# Patient Record
Sex: FEMALE | Race: WHITE | NOT HISPANIC OR LATINO | Employment: OTHER | URBAN - METROPOLITAN AREA
[De-identification: names, ages, dates, MRNs, and addresses within clinical notes are randomized per-mention and may not be internally consistent; named-entity substitution may affect disease eponyms.]

---

## 2017-01-24 DIAGNOSIS — G47.09 OTHER INSOMNIA: Primary | ICD-10-CM

## 2017-01-24 NOTE — TELEPHONE ENCOUNTER
Hydroxyzine 25 mg      Last Written Prescription Date: 11/25/2016  Last Fill Quantity: 30,  # refills: 1   Last Office Visit with [facility], [facility] or [facility] prescribing provider: 9/13/2016

## 2017-01-25 NOTE — TELEPHONE ENCOUNTER
Prescription approved per [facility] Refill Protocol.    Allen Haq RN

## 2017-01-26 RX ORDER — HYDROXYZINE HYDROCHLORIDE 25 MG/1
25 TABLET, FILM COATED ORAL
Qty: 30 TABLET | Refills: 1 | Status: SHIPPED | OUTPATIENT
Start: 2017-01-26 | End: 2017-03-29

## 2017-02-01 DIAGNOSIS — E78.5 HYPERLIPIDEMIA LDL GOAL <130: ICD-10-CM

## 2017-02-01 LAB
CHOLEST SERPL-MCNC: 204 MG/DL
HDLC SERPL-MCNC: 49 MG/DL
LDLC SERPL CALC-MCNC: 124 MG/DL
NONHDLC SERPL-MCNC: 155 MG/DL
TRIGL SERPL-MCNC: 157 MG/DL

## 2017-02-01 PROCEDURE — 80061 LIPID PANEL: CPT | Performed by: PHYSICIAN ASSISTANT

## 2017-02-01 PROCEDURE — 36415 COLL VENOUS BLD VENIPUNCTURE: CPT | Performed by: PHYSICIAN ASSISTANT

## 2017-02-03 ENCOUNTER — TELEPHONE (OUTPATIENT)
Dept: FAMILY MEDICINE | Facility: CLINIC | Age: 72
End: 2017-02-03

## 2017-02-03 DIAGNOSIS — E78.5 HYPERLIPIDEMIA LDL GOAL <130: Primary | ICD-10-CM

## 2017-02-03 RX ORDER — SIMVASTATIN 40 MG
40 TABLET ORAL AT BEDTIME
Qty: 90 TABLET | Refills: 3 | Status: ON HOLD | OUTPATIENT
Start: 2017-02-03 | End: 2017-03-08

## 2017-02-03 NOTE — TELEPHONE ENCOUNTER
Please call Guillermina with her lab results.  She was started on Simvastatin after her physical and came back for recheck after this..  Some of her numbers have improved, but others have worsened.  I would recommend increase in dose to 40 mg per day  New script sent, but she can double what she has left of the 20 mg.  Recheck in 3 months.  Thanks  SERGO Callahan, PAPhilipC      Recent Labs   Lab Test  02/01/17   1011  09/13/16   1057  08/25/15   1100 08/22/14   CHOL  204*  196  217*  96*   HDL  49*  38*  41*  35   LDL  124*  114*  140*  25   TRIG  157*  218*  180*  180   CHOLHDLRATIO   --    --   5.3*  2.7

## 2017-02-03 NOTE — TELEPHONE ENCOUNTER
Patient states she has not been good about taking the simvastatin because it's at night time. She has sporadically taken it. She says she's working on getting better, sticking a post-it note on her mirror.  Would you still like her to increase the dose or have her take it more consistently?    Thanks,  Cj WILL

## 2017-02-03 NOTE — TELEPHONE ENCOUNTER
Called patient and gave her information below. She verbalized understanding and will be more consistent with taking her medication.    Cj Maharaj RN

## 2017-02-03 NOTE — TELEPHONE ENCOUNTER
No. Okay to continue current dose. She can just take 1/2 tablet of the new script I sent.  Thanks  SERGO Callahan, PA-C

## 2017-02-03 NOTE — LETTER
[address] [id number] [phone]      May 12, 2017      Ledy Odonnell  [address]  [facility] [address]          Dear Ledy Odonnell:    This is to remind you that your provider wanted you to return to the clinic for lab testing.    If you are coming in for Lipids and/or Glucose testing please fast for 10-12 hours. Morning medications can be taken with water.    You may call our office at [phone] to schedule an appointment.    Please disregard this notice if you have already had your labs drawn or made an            appointment.        Sincerely,    Macarena Vinson PA-C

## 2017-02-03 NOTE — TELEPHONE ENCOUNTER
Attempt # 1    Called patient at home number.     Was call answered?  No.  Left message on voicemail with information to call me back.    Cj Maharaj RN

## 2017-02-07 ENCOUNTER — TELEPHONE (OUTPATIENT)
Dept: FAMILY MEDICINE | Facility: CLINIC | Age: 72
End: 2017-02-07

## 2017-02-07 DIAGNOSIS — B37.2 CANDIDAL INTERTRIGO: Primary | ICD-10-CM

## 2017-02-07 RX ORDER — NYSTATIN 100000 [USP'U]/G
1 POWDER TOPICAL PRN
Qty: 1 BOTTLE | Refills: 5 | Status: SHIPPED | OUTPATIENT
Start: 2017-02-07 | End: 2018-01-10

## 2017-02-08 NOTE — TELEPHONE ENCOUNTER
Routing refill request to provider for review/approval because:  Drug not active on patient's medication list    Cj Maharaj RN

## 2017-03-06 DIAGNOSIS — F41.1 GENERALIZED ANXIETY DISORDER: ICD-10-CM

## 2017-03-06 DIAGNOSIS — F33.1 MAJOR DEPRESSIVE DISORDER, RECURRENT EPISODE, MODERATE (H): ICD-10-CM

## 2017-03-06 NOTE — TELEPHONE ENCOUNTER
Medication Detail      Disp Refills Start End GUIDO   sertraline (ZOLOFT) 100 MG tablet 180 tablet 0 9/13/2016  No   Sig: Take 2 tablets by mouth every day        Last Office Visit with [facility] primary care provider:  9/13/2016        Last PHQ-9 score on record=   PHQ-9 SCORE 9/13/2016   Total Score -   Total Score 4

## 2017-03-07 ENCOUNTER — APPOINTMENT (OUTPATIENT)
Dept: CT IMAGING | Facility: CLINIC | Age: 72
End: 2017-03-07
Attending: EMERGENCY MEDICINE
Payer: COMMERCIAL

## 2017-03-07 ENCOUNTER — HOSPITAL ENCOUNTER (OUTPATIENT)
Dept: CT IMAGING | Facility: CLINIC | Age: 72
Discharge: HOME | End: 2017-03-07
Attending: PHYSICIAN ASSISTANT | Admitting: PHYSICIAN ASSISTANT
Payer: COMMERCIAL

## 2017-03-07 ENCOUNTER — TELEPHONE (OUTPATIENT)
Dept: FAMILY MEDICINE | Facility: CLINIC | Age: 72
End: 2017-03-07

## 2017-03-07 ENCOUNTER — HOSPITAL ENCOUNTER (OUTPATIENT)
Facility: CLINIC | Age: 72
Setting detail: OBSERVATION
Discharge: HOME | End: 2017-03-08
Attending: EMERGENCY MEDICINE | Admitting: SURGERY
Payer: COMMERCIAL

## 2017-03-07 DIAGNOSIS — E87.1 HYPOSMOLALITY SYNDROME: ICD-10-CM

## 2017-03-07 DIAGNOSIS — S27.0XXA TRAUMATIC PNEUMOTHORAX, INITIAL ENCOUNTER: ICD-10-CM

## 2017-03-07 DIAGNOSIS — Z72.0 TOBACCO ABUSE: ICD-10-CM

## 2017-03-07 DIAGNOSIS — R29.6 MULTIPLE FALLS: ICD-10-CM

## 2017-03-07 DIAGNOSIS — S22.41XA CLOSED FRACTURE OF MULTIPLE RIBS OF RIGHT SIDE, INITIAL ENCOUNTER: ICD-10-CM
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
## 2017-03-07 DIAGNOSIS — B49 FUNGAL INFECTION: Primary | ICD-10-CM

## 2017-03-07 DIAGNOSIS — E87.1 HYPONATREMIA: ICD-10-CM

## 2017-03-07 DIAGNOSIS — R91.8 ABNORMAL CT LUNG SCREENING: ICD-10-CM

## 2017-03-07 DIAGNOSIS — S27.0XXA TRAUMATIC PNEUMOTHORAX WITH OPEN WOUND INTO THORAX, INITIAL ENCOUNTER: ICD-10-CM

## 2017-03-07 DIAGNOSIS — S21.90XA TRAUMATIC PNEUMOTHORAX WITH OPEN WOUND INTO THORAX, INITIAL ENCOUNTER: ICD-10-CM

## 2017-03-07 LAB
ALBUMIN SERPL-MCNC: 3.7 G/DL (ref 3.4–5)
ALBUMIN UR-MCNC: NEGATIVE MG/DL
ALP SERPL-CCNC: 66 U/L (ref 40–150)
ALT SERPL W P-5'-P-CCNC: 52 U/L (ref 0–50)
ANION GAP SERPL CALCULATED.3IONS-SCNC: 11 MMOL/L (ref 3–14)
APPEARANCE UR: CLEAR
AST SERPL W P-5'-P-CCNC: 85 U/L (ref 0–45)
BASOPHILS # BLD AUTO: 0 10E9/L (ref 0–0.2)
BASOPHILS NFR BLD AUTO: 0.2 %
BILIRUB SERPL-MCNC: 0.8 MG/DL (ref 0.2–1.3)
BILIRUB UR QL STRIP: NEGATIVE
BUN SERPL-MCNC: 7 MG/DL (ref 7–30)
CALCIUM SERPL-MCNC: 10 MG/DL (ref 8.5–10.1)
CHLORIDE SERPL-SCNC: 91 MMOL/L (ref 94–109)
CO2 SERPL-SCNC: 30 MMOL/L (ref 20–32)
COLOR UR AUTO: ABNORMAL
CREAT SERPL-MCNC: 0.6 MG/DL (ref 0.52–1.04)
DIFFERENTIAL METHOD BLD: ABNORMAL
EOSINOPHIL # BLD AUTO: 0.2 10E9/L (ref 0–0.7)
EOSINOPHIL NFR BLD AUTO: 2.3 %
ERYTHROCYTE [DISTWIDTH] IN BLOOD BY AUTOMATED COUNT: 12.6 % (ref 10–15)
FLUAV+FLUBV AG SPEC QL: NEGATIVE
FLUAV+FLUBV AG SPEC QL: NORMAL
GFR SERPL CREATININE-BSD FRML MDRD: ABNORMAL ML/MIN/1.7M2
GLUCOSE SERPL-MCNC: 91 MG/DL (ref 70–99)
GLUCOSE UR STRIP-MCNC: NEGATIVE MG/DL
HCT VFR BLD AUTO: 39.1 % (ref 35–47)
HGB BLD-MCNC: 13.6 G/DL (ref 11.7–15.7)
HGB UR QL STRIP: NEGATIVE
IMM GRANULOCYTES # BLD: 0 10E9/L (ref 0–0.4)
IMM GRANULOCYTES NFR BLD: 0.3 %
KETONES UR STRIP-MCNC: NEGATIVE MG/DL
LACTATE BLD-SCNC: 1.3 MMOL/L (ref 0.7–2.1)
LEUKOCYTE ESTERASE UR QL STRIP: NEGATIVE
LYMPHOCYTES # BLD AUTO: 0.8 10E9/L (ref 0.8–5.3)
LYMPHOCYTES NFR BLD AUTO: 7.8 %
MCH RBC QN AUTO: 30.6 PG (ref 26.5–33)
MCHC RBC AUTO-ENTMCNC: 34.8 G/DL (ref 31.5–36.5)
MCV RBC AUTO: 88 FL (ref 78–100)
MONOCYTES # BLD AUTO: 0.9 10E9/L (ref 0–1.3)
MONOCYTES NFR BLD AUTO: 8.2 %
MUCOUS THREADS #/AREA URNS LPF: PRESENT /LPF
NEUTROPHILS # BLD AUTO: 8.5 10E9/L (ref 1.6–8.3)
NEUTROPHILS NFR BLD AUTO: 81.2 %
NITRATE UR QL: NEGATIVE
NRBC # BLD AUTO: 0 10*3/UL
NRBC BLD AUTO-RTO: 0 /100
PH UR STRIP: 7 PH (ref 5–7)
PLATELET # BLD AUTO: 229 10E9/L (ref 150–450)
POTASSIUM SERPL-SCNC: 3.3 MMOL/L (ref 3.4–5.3)
PROT SERPL-MCNC: 7.2 G/DL (ref 6.8–8.8)
RADIOLOGIST FLAGS: ABNORMAL
RBC # BLD AUTO: 4.44 10E12/L (ref 3.8–5.2)
RBC #/AREA URNS AUTO: 0 /HPF (ref 0–2)
SODIUM SERPL-SCNC: 131 MMOL/L (ref 133–144)
SP GR UR STRIP: 1 (ref 1–1.03)
SPECIMEN SOURCE: NORMAL
TRANS CELLS #/AREA URNS HPF: <1 /HPF (ref 0–1)
TROPONIN I SERPL-MCNC: NORMAL UG/L (ref 0–0.04)
URN SPEC COLLECT METH UR: ABNORMAL
UROBILINOGEN UR STRIP-MCNC: NORMAL MG/DL (ref 0–2)
WBC # BLD AUTO: 10.5 10E9/L (ref 4–11)
WBC #/AREA URNS AUTO: <1 /HPF (ref 0–2)

## 2017-03-07 PROCEDURE — 70450 CT HEAD/BRAIN W/O DYE: CPT

## 2017-03-07 PROCEDURE — 25500064 ZZH RX 255 OP 636: Performed by: RADIOLOGY

## 2017-03-07 PROCEDURE — 85025 COMPLETE CBC W/AUTO DIFF WBC: CPT | Performed by: EMERGENCY MEDICINE

## 2017-03-07 PROCEDURE — 93005 ELECTROCARDIOGRAM TRACING: CPT | Performed by: EMERGENCY MEDICINE

## 2017-03-07 PROCEDURE — 68200002 ZZH TRAUMA EVALUATION W/O CC LEVEL II: Performed by: EMERGENCY MEDICINE

## 2017-03-07 PROCEDURE — 83605 ASSAY OF LACTIC ACID: CPT | Performed by: EMERGENCY MEDICINE

## 2017-03-07 PROCEDURE — 99285 EMERGENCY DEPT VISIT HI MDM: CPT | Mod: Z6 | Performed by: EMERGENCY MEDICINE

## 2017-03-07 PROCEDURE — 99285 EMERGENCY DEPT VISIT HI MDM: CPT | Mod: 25 | Performed by: EMERGENCY MEDICINE

## 2017-03-07 PROCEDURE — 74177 CT ABD & PELVIS W/CONTRAST: CPT

## 2017-03-07 PROCEDURE — 72125 CT NECK SPINE W/O DYE: CPT

## 2017-03-07 PROCEDURE — 84484 ASSAY OF TROPONIN QUANT: CPT | Performed by: EMERGENCY MEDICINE

## 2017-03-07 PROCEDURE — 81001 URINALYSIS AUTO W/SCOPE: CPT | Performed by: EMERGENCY MEDICINE

## 2017-03-07 PROCEDURE — 12000008 ZZH R&B INTERMEDIATE UMMC

## 2017-03-07 PROCEDURE — 25000128 H RX IP 250 OP 636: Performed by: EMERGENCY MEDICINE

## 2017-03-07 PROCEDURE — 80053 COMPREHEN METABOLIC PANEL: CPT | Performed by: EMERGENCY MEDICINE

## 2017-03-07 PROCEDURE — 84443 ASSAY THYROID STIM HORMONE: CPT | Performed by: EMERGENCY MEDICINE

## 2017-03-07 PROCEDURE — 87804 INFLUENZA ASSAY W/OPTIC: CPT | Performed by: EMERGENCY MEDICINE

## 2017-03-07 PROCEDURE — 96360 HYDRATION IV INFUSION INIT: CPT | Mod: 59 | Performed by: EMERGENCY MEDICINE

## 2017-03-07 RX ORDER — OXYCODONE HYDROCHLORIDE 5 MG/1
5-10 TABLET ORAL EVERY 4 HOURS PRN
Status: DISCONTINUED | OUTPATIENT
Start: 2017-03-07 | End: 2017-03-08 | Stop reason: HOSPADM

## 2017-03-07 RX ORDER — AMOXICILLIN 250 MG
1-2 CAPSULE ORAL 2 TIMES DAILY
Status: DISCONTINUED | OUTPATIENT
Start: 2017-03-08 | End: 2017-03-08 | Stop reason: HOSPADM

## 2017-03-07 RX ORDER — BUPROPION HYDROCHLORIDE 150 MG/1
150 TABLET ORAL EVERY MORNING
Status: DISCONTINUED | OUTPATIENT
Start: 2017-03-08 | End: 2017-03-08 | Stop reason: HOSPADM

## 2017-03-07 RX ORDER — POLYETHYLENE GLYCOL 3350 17 G/17G
17 POWDER, FOR SOLUTION ORAL DAILY PRN
Status: DISCONTINUED | OUTPATIENT
Start: 2017-03-07 | End: 2017-03-08 | Stop reason: HOSPADM

## 2017-03-07 RX ORDER — ONDANSETRON 4 MG/1
4 TABLET, ORALLY DISINTEGRATING ORAL EVERY 6 HOURS PRN
Status: DISCONTINUED | OUTPATIENT
Start: 2017-03-07 | End: 2017-03-08 | Stop reason: HOSPADM

## 2017-03-07 RX ORDER — NYSTATIN 100000/ML
500000 SUSPENSION, ORAL (FINAL DOSE FORM) ORAL 4 TIMES DAILY
Status: DISCONTINUED | OUTPATIENT
Start: 2017-03-08 | End: 2017-03-08 | Stop reason: HOSPADM

## 2017-03-07 RX ORDER — IPRATROPIUM BROMIDE AND ALBUTEROL SULFATE 2.5; .5 MG/3ML; MG/3ML
3 SOLUTION RESPIRATORY (INHALATION) EVERY 4 HOURS PRN
Status: DISCONTINUED | OUTPATIENT
Start: 2017-03-07 | End: 2017-03-08 | Stop reason: HOSPADM

## 2017-03-07 RX ORDER — OXYBUTYNIN CHLORIDE 5 MG/1
10 TABLET, EXTENDED RELEASE ORAL DAILY
Status: DISCONTINUED | OUTPATIENT
Start: 2017-03-08 | End: 2017-03-08 | Stop reason: HOSPADM

## 2017-03-07 RX ORDER — ONDANSETRON 2 MG/ML
4 INJECTION INTRAMUSCULAR; INTRAVENOUS EVERY 6 HOURS PRN
Status: DISCONTINUED | OUTPATIENT
Start: 2017-03-07 | End: 2017-03-08 | Stop reason: HOSPADM

## 2017-03-07 RX ORDER — ALBUTEROL SULFATE 90 UG/1
2 AEROSOL, METERED RESPIRATORY (INHALATION) EVERY 6 HOURS PRN
Status: DISCONTINUED | OUTPATIENT
Start: 2017-03-07 | End: 2017-03-08 | Stop reason: HOSPADM

## 2017-03-07 RX ORDER — MAGNESIUM SULFATE HEPTAHYDRATE 40 MG/ML
4 INJECTION, SOLUTION INTRAVENOUS EVERY 4 HOURS PRN
Status: DISCONTINUED | OUTPATIENT
Start: 2017-03-07 | End: 2017-03-08 | Stop reason: HOSPADM

## 2017-03-07 RX ORDER — TRAZODONE HYDROCHLORIDE 50 MG/1
50-100 TABLET, FILM COATED ORAL
Status: DISCONTINUED | OUTPATIENT
Start: 2017-03-07 | End: 2017-03-08 | Stop reason: HOSPADM

## 2017-03-07 RX ORDER — NALOXONE HYDROCHLORIDE 0.4 MG/ML
.1-.4 INJECTION, SOLUTION INTRAMUSCULAR; INTRAVENOUS; SUBCUTANEOUS
Status: DISCONTINUED | OUTPATIENT
Start: 2017-03-07 | End: 2017-03-08 | Stop reason: HOSPADM

## 2017-03-07 RX ORDER — ACETAMINOPHEN 325 MG/1
975 TABLET ORAL EVERY 8 HOURS
Status: DISCONTINUED | OUTPATIENT
Start: 2017-03-08 | End: 2017-03-08 | Stop reason: HOSPADM

## 2017-03-07 RX ORDER — POTASSIUM CHLORIDE 1.5 G/1.58G
20-40 POWDER, FOR SOLUTION ORAL
Status: DISCONTINUED | OUTPATIENT
Start: 2017-03-07 | End: 2017-03-08 | Stop reason: HOSPADM

## 2017-03-07 RX ORDER — POTASSIUM CHLORIDE 750 MG/1
20-40 TABLET, EXTENDED RELEASE ORAL
Status: DISCONTINUED | OUTPATIENT
Start: 2017-03-07 | End: 2017-03-08 | Stop reason: HOSPADM

## 2017-03-07 RX ORDER — POTASSIUM CHLORIDE 7.45 MG/ML
10 INJECTION INTRAVENOUS
Status: DISCONTINUED | OUTPATIENT
Start: 2017-03-07 | End: 2017-03-08 | Stop reason: HOSPADM

## 2017-03-07 RX ORDER — SODIUM CHLORIDE 9 MG/ML
1000 INJECTION, SOLUTION INTRAVENOUS CONTINUOUS
Status: DISCONTINUED | OUTPATIENT
Start: 2017-03-07 | End: 2017-03-08

## 2017-03-07 RX ORDER — SERTRALINE HYDROCHLORIDE 100 MG/1
100 TABLET, FILM COATED ORAL DAILY
Status: DISCONTINUED | OUTPATIENT
Start: 2017-03-08 | End: 2017-03-08

## 2017-03-07 RX ORDER — SERTRALINE HYDROCHLORIDE 100 MG/1
TABLET, FILM COATED ORAL
Qty: 60 TABLET | Refills: 0 | Status: SHIPPED | OUTPATIENT
Start: 2017-03-07 | End: 2017-04-11

## 2017-03-07 RX ORDER — NICOTINE 21 MG/24HR
1 PATCH, TRANSDERMAL 24 HOURS TRANSDERMAL DAILY
Status: DISCONTINUED | OUTPATIENT
Start: 2017-03-08 | End: 2017-03-08 | Stop reason: HOSPADM

## 2017-03-07 RX ORDER — IOPAMIDOL 755 MG/ML
104 INJECTION, SOLUTION INTRAVASCULAR ONCE
Status: COMPLETED | OUTPATIENT
Start: 2017-03-07 | End: 2017-03-07

## 2017-03-07 RX ORDER — POTASSIUM CHLORIDE 29.8 MG/ML
20 INJECTION INTRAVENOUS
Status: DISCONTINUED | OUTPATIENT
Start: 2017-03-07 | End: 2017-03-08 | Stop reason: HOSPADM

## 2017-03-07 RX ORDER — LIDOCAINE 50 MG/G
1-3 PATCH TOPICAL
Status: DISCONTINUED | OUTPATIENT
Start: 2017-03-08 | End: 2017-03-08 | Stop reason: HOSPADM

## 2017-03-07 RX ADMIN — IOPAMIDOL 104 ML: 755 INJECTION, SOLUTION INTRAVENOUS at 16:48

## 2017-03-07 RX ADMIN — SODIUM CHLORIDE 1000 ML: 9 INJECTION, SOLUTION INTRAVENOUS at 18:17

## 2017-03-07 RX ADMIN — SODIUM CHLORIDE 1000 ML: 9 INJECTION, SOLUTION INTRAVENOUS at 17:12

## 2017-03-07 ASSESSMENT — ENCOUNTER SYMPTOMS
CHILLS: 0
VOMITING: 0
LIGHT-HEADEDNESS: 0
NAUSEA: 1
COUGH: 1
NUMBNESS: 0
WEAKNESS: 1
BACK PAIN: 1
RHINORRHEA: 0
FEVER: 0
ABDOMINAL PAIN: 1
SORE THROAT: 0
CONSTIPATION: 0
HEMATURIA: 0
PALPITATIONS: 0
DIARRHEA: 0
FREQUENCY: 0
HEADACHES: 1
DYSURIA: 0

## 2017-03-07 ASSESSMENT — ACTIVITIES OF DAILY LIVING (ADL)
DRESS: 0-->INDEPENDENT
BATHING: 0-->INDEPENDENT
TRANSFERRING: 0-->INDEPENDENT
RETIRED_EATING: 1-->ASSISTIVE EQUIPMENT
COGNITION: 0 - NO COGNITION ISSUES REPORTED
RETIRED_COMMUNICATION: 2-->DIFFICULTY UNDERSTANDING (NOT RELATED TO LANGUAGE BARRIER)
NUMBER_OF_TIMES_PATIENT_HAS_FALLEN_WITHIN_LAST_SIX_MONTHS: 3
SWALLOWING: 0-->SWALLOWS FOODS/LIQUIDS WITHOUT DIFFICULTY
TOILETING: 0-->INDEPENDENT
FALL_HISTORY_WITHIN_LAST_SIX_MONTHS: YES
AMBULATION: 0-->INDEPENDENT

## 2017-03-07 NOTE — TELEPHONE ENCOUNTER
I was contacted by Radiology with urgent CT results. I was told that Guillermina had multiple rib fractures and a small pneumothorax.  She has having a f/u CT scan for findings on lung cancer screening CT.  I therefore called Guillermina to check on her.  She initially is in good spirits, however, when I asked her if she is okay, and if she has had any falls, she states that she has.  She reports that she has not been feeling well for quite some time.   Has been really weak, sick and having recurrent falls. Continues to be very weak.    This fall that she feels injured her ribs was 3 weeks ago.   Has had more than one fall, doesn't say how many.  She needs help getting up after sitting or laying down.  Still very weak and having SOB as well.  I recommended that she take an ambulance to the ER, but she refuses. States that her sister can drive her, but she is not sure when.  I will contact her before the end of my day to find out what her plans are.    Called Guillermina again, she is actually in the ED. Called an updated MD with her situation.    SERGO Callahan, PAPhilipC

## 2017-03-07 NOTE — IP AVS SNAPSHOT
[address] [address]    Phone:  [phone]                                       After Visit Summary   3/7/2017    Ledy Odonnell    MRN: [id number]           After Visit Summary Signature Page     I have received my discharge instructions, and my questions have been answered. I have discussed any challenges I see with this plan with the nurse or doctor.    ..........................................................................................................................................  Patient/Patient Representative Signature      ..........................................................................................................................................  Patient Representative Print Name and Relationship to Patient    ..................................................               ................................................  Date                                            Time    ..........................................................................................................................................  Reviewed by Signature/Title    ...................................................              ..............................................  Date                                                            Time

## 2017-03-07 NOTE — ED NOTES
"Pt reports flu like symptoms, dry cough, decreased appetite, fall x 2 in past two weeks. PT reports told to come to ED for rib fx and \"hole in lung\"   Pneumothorax on CT.  "

## 2017-03-07 NOTE — ED PROVIDER NOTES
"  History     Chief Complaint   Patient presents with     Shortness of Breath     Pt reports flu like symptoms, dry cough, decreased appetite, fall x 2 in past two weeks. PT reports told to come to ED for rib fx and \"hole in lung\"     HPI  Ledy Odonnell is a 71 year old female with a history of osteoarthritis, hypertension, hyperlipidemia, tobacco use and congential left foot deformity who presents to the emergency department today at the recommendation of her primary care provider, Macarena Vinson, for further evaluation of multiple rib fractures and a small pneumothorax. Patient presented today for outpatient CT for follow up of a lung cancer screening CT done in 09/2016. She was noted to have multiple right sided rib fractures as well as a small right pneumothorax. Patient reports increasing weakness with standing over the past few weeks and reports having increased difficulty with standing and moving around. She reports falling 3 times within the past 2 weeks. She denies any chest pain, palpitations or light headedness associated with the falls. She denies hitting her head or loss of consciousness. Patient states she has landed primary on her right side. Over the past week or so she has reported some increasing right sided chest, upper back and upper abdominal pain. She reports an ongoing cough related to chronic bronchitis and states she has been coughing more recently. She states the cough is nonproductive. She denies recent fevers, chills, body aches, runny nose, sore throat or other cold symptoms. She reports some nausea but denies vomiting, diarrhea, constipation or any urinary complaints. Patient does report recent headaches for the past month, which she states is new for her. She denies any recent head trauma and is not anticoagulated.     Of note, patient has a congential deformity of her left foot/ankle. She states she has tried a walking boot but states this did not help her mobility very much. She " currently has an ankle brace and a special orthopedic shoe in order to help her get around easier. She states her left foot is chronically weak and often gives out causing her to fall. She denies any recent surgeries on her foot. She denies any new numbness, weakness or tingling in her left lower extremity. She does report an occasional throbbing pain in the foot. Patient currently lives independently at home.      I did speak with her primary care provider who referred the patient to the emergency department.  She does reports that Mr. Odonnell has a history of alcohol abuse and is unclear if she has resumed drinking which may be contributing to her falls.    I have reviewed the Medications, Allergies, Past Medical and Surgical History, and Social History in the [employer] system.    Past Medical History   Diagnosis Date     Depressive disorder, not elsewhere classified      GENERALIZED ANXIETY DIS 6/21/2007     Inflammatory arthritis      Osteoarthrosis, unspecified whether generalized or localized, unspecified site      Other and unspecified alcohol dependence, unspecified drinking behavior      Unspecified essential hypertension        Past Surgical History   Procedure Laterality Date     C nonspecific procedure  2001     Shoulder Surgery     C nonspecific procedure  1975     Gastric Bypass     C nonspecific procedure       Cholecystectomy     C shoulder surg proc unlisted       C hand/finger surgery unlisted       C total hip arthroplasty  1/4/2011     Lt VALERIE     Herniorrhaphy ventral  5/14/2013     Procedure: HERNIORRHAPHY VENTRAL;  Open Ventral Hernia Repair;  Surgeon: Jhoan Rogers MD;  Location:  OR       Family History   Problem Relation Age of Onset     CANCER Father      pancreatic     HEART DISEASE Mother      Unknown specifics     Arthritis Paternal Grandmother      RA     Dementia Mother      Dementia Maternal Grandmother        Social History   Substance Use Topics     Smoking status: Current Every Day  "Smoker     Packs/day: 0.50     Years: 43.00     Types: Cigarettes     Smokeless tobacco: Never Used     Alcohol use No     No current facility-administered medications for this encounter.      Current Outpatient Prescriptions   Medication     sertraline (ZOLOFT) 100 MG tablet     nystatin (MYCOSTATIN) 128516 UNIT/GM POWD     simvastatin (ZOCOR) 40 MG tablet     hydrOXYzine (ATARAX) 25 MG tablet     oxyCODONE-acetaminophen (PERCOCET)  MG per tablet     oxyCODONE-acetaminophen (PERCOCET)  MG per tablet     oxyCODONE-acetaminophen (PERCOCET)  MG per tablet     traZODone (DESYREL) 50 MG tablet     oxybutynin (DITROPAN XL) 10 MG 24 hr tablet     buPROPion (WELLBUTRIN XL) 150 MG 24 hr tablet     albuterol (PROAIR HFA, PROVENTIL HFA, VENTOLIN HFA) 108 (90 BASE) MCG/ACT inhaler     fluticasone-salmeterol (ADVAIR DISKUS) 250-50 MCG/DOSE diskus inhaler     oxyCODONE-acetaminophen (PERCOCET)  MG per tablet     Cyanocobalamin (VITAMIN B-12 PO)     senna-docusate (SENOKOT-S;PERICOLACE) 8.6-50 MG per tablet     MULTIVITAMIN OR        Allergies   Allergen Reactions     No Known Allergies        Review of Systems   Constitutional: Negative for chills and fever.   HENT: Negative for congestion, rhinorrhea and sore throat.    Respiratory: Positive for cough.    Cardiovascular: Positive for chest pain (right sided). Negative for palpitations.   Gastrointestinal: Positive for abdominal pain (RUQ) and nausea. Negative for constipation, diarrhea and vomiting.   Genitourinary: Negative for dysuria, frequency, hematuria and urgency.   Musculoskeletal: Positive for back pain (right sided upper back).   Neurological: Positive for weakness (generalized) and headaches. Negative for light-headedness and numbness.   All other systems reviewed and are negative.      Physical Exam   BP: 134/76  Pulse: 82  Temp: 98.8  F (37.1  C)  Resp: 18  Height: 165.1 cm (5' 5\")  Weight: 77.1 kg (170 lb)  SpO2: 96 %  Physical Exam "   General: patient is alert and oriented and in no acute distress   Head: atraumatic and normocephalic   EENT: moist mucus membranes without tonsillar erythema or exudates, pupils equal round and reactive   Neck: supple, no midline cervical spine tenderness to palpation  Cardiovascular: regular rate and rhythm, extremities warm and well perfused, no lower extremity edema  Pulmonary: lungs clear to auscultation bilaterally, symmetric breath sounds, no significant crepitus noted on exam  Abdomen: soft, non-tender, nondistended  Musculoskeletal: No point extremity tenderness to palpation, no tenderness to palpation of the thoracic or lumbar spine, left club foot  Neurological: alert and oriented, GCS 15,  moving all extremities symmetrically, CN II-XII intact, strength 5/5 and symmetric in , elbow flexion/extension, knee flexion/extension, sensation to light touch in distal upper and lower extremities intact  Skin: warm, dry, multiple bruises of varying age noted on the back as well as the right upper quadrant of abdomen, erythema of the intertriginous region on the left, small area of abrasion and erythema on the right lateral foot as well as the left lateral foot      ED Course     ED Course     Procedures   3:08 PM  The patient was seen and examined by Dr. Hough in Room ED18.              Critical Care time:  none  Trauma:  Level of trauma activation: Trauma evaluation (consult)  C-collar and immobilization: not indicated, cleared.  CSpine Clearance: by Nexus Criteria  GCS at arrival: 15  GCS at disposition: unchanged  Full Primary and Secondary survey with appropriate immobilization of spine completed in exam section.  Consults prior to admission or transfer: none  Procedures done in the ED: none            Labs Ordered and Resulted from Time of ED Arrival Up to the Time of Departure from the ED - No data to display    Assessments & Plan (with Medical Decision Making)   Blas is a 71 year old female with a  history of osteoarthritis, hypertension, hyperlipidemia, tobacco use and congential left foot deformity who presents with rib fracture and PTX after multiple falls recently.  She is hemodynamically stable and in no respiratory distress.  At this time she does not require a chest tube for the pneumothorax.  Given her multiple falls a CT of the head, cervical spine and abdomen and pelvis was obtained and does not show other acute traumatic injury.  For her weakness if loose up was obtained which is negative.  UA is negative.  Troponin and ECG are unremarkable.  She has no elevation in lactate and denies other infectious symptoms.  Her electrolytes are significant for a sodium of 131.  AST and ALT are slightly elevated with AST being greater in consistent with possible recurrent alcohol use.  Her hemoglobin is normal at 13.6.  She has been seen and evaluated by trauma surgery and will be admitted for further management of the rib fractures, pneumothorax and safety evaluation at home given her multiple falls.    I have reviewed the nursing notes.    I have reviewed the findings, diagnosis, plan and need for follow up with the patient.    New Prescriptions    No medications on file       Final diagnoses:   Closed fracture of multiple ribs of right side, initial encounter   Traumatic pneumothorax, initial encounter   Multiple falls   Hyponatremia   I, Glory Saini, am serving as a trained medical scribe to document services personally performed by Judith Hough MD, based on the provider's statements to me.      IJudtih MD, was physically present and have reviewed and verified the accuracy of this note documented by Glory Saini.       3/7/2017   [facility], EMERGENCY DEPARTMENT     Judith Hough MD  03/07/17 [id number]

## 2017-03-07 NOTE — IP AVS SNAPSHOT
MRN:[id number]                      After Visit Summary   3/7/2017    Ledy Odonnell    MRN: [id number]           Thank you!     Thank you for choosing [facility] for your care. Our goal is always to provide you with excellent care. Hearing back from our patients is one way we can continue to improve our services. Please take a few minutes to complete the written survey that you may receive in the mail after you visit with us. Thank you!        Patient Information     Date Of Birth          1945        About your hospital stay     You were admitted on:  March 7, 2017 You last received care in the:  [address] [facility]    You were discharged on:  March 8, 2017        Reason for your hospital stay       You were hospitalized and treated for the following conditions:  Trauma mechanism: Presumed ground level fall   Time/date of injury: Unknown       Known Injuries:  1. Acute to subacute right sided 4th and 5th anterior rib fractures  2. Subacute right sided 10th rib fracture  3. Small right sided pneumothorax (stable on imaging)       Other diagnoses:   1. Multiple falls   2. COPD  3. Tobacco use disorder  4. HTN  5. HLD  6. 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 Osteoarthritis  8. Generalized anxiety  9. Moderate major depression   10. GERD  11. Chronic pain   12. Hx of gastric bypass s/p 40 years      You were seen by the following services:  Trauma Service  Physical and Occupational therapies                  Who to Call     For medical emergencies, please call 911.  For non-urgent questions about your medical care, please call your primary care provider or clinic, [phone]          Attending Provider     Provider Specialty    Judith Hough MD Emergency Medicine    Ware, Daniele Soriano MD Transplant       Primary Care Provider Office Phone # Fax #    Macarena Vinson PA-C [phone]       [address]  "[facility] [address]         When to contact your care team       Should you have any questions, you can reach us in the following ways. During weekday working hours Monday-Friday, 7:00 am - 4:00 pm, call [phone] to reach our nurse. After 4:00pm and on on weekends call  [phone] and ask  to page [phone] for the Trauma provider on call.      Return to the emergency department if you notice the following: fever over 101.5F,  feel dizzy or faint, fast or irregular heart beats, heavy sweating, increased shortness of breath, changes in walking, speech, or thinking or confusion,  constant nausea or vomiting, persistent pain or new drainage from your wounds.     In case of an emergency go to Emergency department or call 911.                  After Care Instructions     Activity       Your activity upon discharge: activity as tolerated. No heavy lifting of > 10lbs for 8-10 weeks.     Refer to \"going home with a chest injury\" handout.            Diet       Follow this diet upon discharge: Orders Placed This Encounter      Advance Diet as Tolerated: Regular Diet Adult                  Follow-up Appointments     Adult [facility] Follow-up and recommended labs and tests       Follow up with primary care provider, Macarena Vinson, within 7 days to evaluate medication change and for hospital follow- up.  No follow up labs or test are needed.  You should have a followup BMP to assess your sodium and potassium level in 1 week.     Appointments on [place] and/or [facility] (with [facility] or [facility] provider or service). Call [phone] if you haven't heard regarding these appointments within 7 days of discharge.                  Additional Services     Home Care OT Referral for Hospital Discharge       OT to eval and treat    Your provider has ordered home care - occupational therapy. If you have not been contacted within 2 days of your discharge please call the department phone number listed on the top of this " "document.            Home Care PT Referral for Hospital Discharge       _______________________  [facility]  Phone  [phone]  Fax  [phone]  ______________________    PT to eval and treat    Your provider has ordered home care - physical therapy. If you have not been contacted within 2 days of your discharge please call the department phone number listed on the top of this document.                  Pending Results     No orders found for last 3 day(s).            Statement of Approval     Ordered          17 1437  I have reviewed and agree with all the recommendations and orders detailed in this document.  EFFECTIVE NOW     Approved and electronically signed by:  Irma Milan NP             Admission Information     Date & Time Provider Department Dept. Phone    3/7/2017 Daniele Ware MD [address] [facility] [phone]      Your Vitals Were     Blood Pressure Pulse Temperature Respirations Height Weight    147/72 (BP Location: Right arm) 76 98.3  F (36.8  C) (Oral) 20 1.651 m (5' 5\") 77.1 kg (170 lb)    Pulse Oximetry BMI (Body Mass Index)                95% 28.29 kg/m2          MyChart Information     [employer] lets you send messages to your doctor, view your test results, renew your prescriptions, schedule appointments and more. To sign up, go to [email or web address] . Click on \"Log in\" on the left side of the screen, which will take you to the Welcome page. Then click on \"Sign up Now\" on the right side of the page.     You will be asked to enter the access code listed below, as well as some personal information. Please follow the directions to create your username and password.     Your access code is: [email or web address]  Expires: 2017  3:27 PM     Your access code will  in 90 days. If you need help or a new code, please call your [facility] clinic or [phone].        Care EveryWhere ID     This is your Care EveryWhere ID. This could be used by other organizations " to access your [facility] medical records  [id number]           Review of your medicines      START taking        Dose / Directions    miconazole 2 % powder   Commonly known as:  MICATIN; MICRO GUARD   Used for:  Fungal infection        Apply topically 2 times daily Apply to groin   Quantity:  30 g   Refills:  0       nicotine 14 MG/24HR 24 hr patch   Commonly known as:  NICODERM CQ   Used for:  Tobacco abuse        Dose:  1 patch   Place 1 patch onto the skin daily   Quantity:  30 patch   Refills:  0       nystatin 368340 UNIT/ML suspension   Commonly known as:  MYCOSTATIN   Used for:  Fungal infection        Dose:  091229 Units   Take 5 mLs (500,000 Units) by mouth 4 times daily for 10 days   Quantity:  200 mL   Refills:  0         CONTINUE these medicines which may have CHANGED, or have new prescriptions. If we are uncertain of the size of tablets/capsules you have at home, strength may be listed as something that might have changed.        Dose / Directions    oxyCODONE-acetaminophen  MG per tablet   Commonly known as:  PERCOCET   This may have changed:  Another medication with the same name was removed. Continue taking this medication, and follow the directions you see here.   Used for:  Chronic pain disorder        1-2 tablets every 6 hrs prn-to fill on or after 9/11/16   Quantity:  120 tablet   Refills:  0       senna-docusate 8.6-50 MG per tablet   Commonly known as:  SENOKOT-S;PERICOLACE   This may have changed:    - how much to take  - when to take this   Used for:  Ventral hernia        Dose:  1 tablet   Take 1 tablet by mouth 2 times daily.   Quantity:  40 tablet   Refills:  11         CONTINUE these medicines which have NOT CHANGED        Dose / Directions    albuterol 108 (90 BASE) MCG/ACT Inhaler   Commonly known as:  PROAIR HFA/PROVENTIL HFA/VENTOLIN HFA   Used for:  Chronic obstructive pulmonary disease, unspecified COPD type (H)        Dose:  2 puff   Inhale 2 puffs into the lungs every 6  hours as needed for shortness of breath / dyspnea   Quantity:  3 Inhaler   Refills:  1       buPROPion 150 MG 24 hr tablet   Commonly known as:  WELLBUTRIN XL   Used for:  Major depressive disorder, recurrent episode, moderate (H), Tobacco use disorder        Dose:  150 mg   Take 1 tablet (150 mg) by mouth every morning   Quantity:  90 tablet   Refills:  3       fluticasone-salmeterol 250-50 MCG/DOSE diskus inhaler   Commonly known as:  ADVAIR DISKUS   Used for:  COPD exacerbation (H)        Dose:  1 puff   Inhale 1 puff into the lungs 2 times daily   Quantity:  1 Inhaler   Refills:  5       hydrOXYzine 25 MG tablet   Commonly known as:  ATARAX   Used for:  Other insomnia        Dose:  25 mg   Take 1 tablet (25 mg) by mouth nightly as needed for anxiety   Quantity:  30 tablet   Refills:  1       multivitamin, therapeutic Tabs tablet        Dose:  1 tablet   Take 1 tablet by mouth daily   Refills:  0       nystatin [id number] UNIT/GM Powd   Commonly known as:  MYCOSTATIN   Used for:  Candidal intertrigo        Dose:  1 g   Apply 1 g topically as needed   Quantity:  1 Bottle   Refills:  5       oxybutynin 10 MG 24 hr tablet   Commonly known as:  DITROPAN XL   Used for:  Urgency incontinence        Dose:  10 mg   Take 1 tablet (10 mg) by mouth daily   Quantity:  90 tablet   Refills:  3       sertraline 100 MG tablet   Commonly known as:  ZOLOFT   Used for:  Generalized anxiety disorder, Major depressive disorder, recurrent episode, moderate (H)        TAKE 2 TABLETS BY MOUTH EVERY DAY   Quantity:  60 tablet   Refills:  0       traZODone 50 MG tablet   Commonly known as:  DESYREL   Used for:  Insomnia        Dose:   mg   Take 1-2 tablets ( mg) by mouth nightly as needed for sleep   Quantity:  90 tablet   Refills:  3       VITAMIN B-12 PO        Dose:  100 mcg   Take 100 mcg by mouth daily   Refills:  0         STOP taking     MULTIVITAMIN PO           simvastatin 40 MG tablet   Commonly known as:  ZOCOR                 Where to get your medicines      These medications were sent to [facility] - [place], [place] - [address]  [address], [facility] [address]     Phone:  [phone]     miconazole 2 % powder    nicotine 14 MG/24HR 24 hr patch    nystatin [id number] UNIT/ML suspension                Protect others around you: Learn how to safely use, store and throw away your medicines at www.disposemymeds.org.             Medication List: This is a list of all your medications and when to take them. Check marks below indicate your daily home schedule. Keep this list as a reference.      Medications           Morning Afternoon Evening Bedtime As Needed    albuterol 108 (90 BASE) MCG/ACT Inhaler   Commonly known as:  PROAIR HFA/PROVENTIL HFA/VENTOLIN HFA   Inhale 2 puffs into the lungs every 6 hours as needed for shortness of breath / dyspnea                                buPROPion 150 MG 24 hr tablet   Commonly known as:  WELLBUTRIN XL   Take 1 tablet (150 mg) by mouth every morning   Last time this was given:  150 mg on 3/8/2017  9:49 AM                                fluticasone-salmeterol 250-50 MCG/DOSE diskus inhaler   Commonly known as:  ADVAIR DISKUS   Inhale 1 puff into the lungs 2 times daily                                hydrOXYzine 25 MG tablet   Commonly known as:  ATARAX   Take 1 tablet (25 mg) by mouth nightly as needed for anxiety   Last time this was given:  25 mg on 3/8/2017  2:31 AM                                miconazole 2 % powder   Commonly known as:  MICATIN; MICRO GUARD   Apply topically 2 times daily Apply to groin                                multivitamin, therapeutic Tabs tablet   Take 1 tablet by mouth daily                                nicotine 14 MG/24HR 24 hr patch   Commonly known as:  NICODERM CQ   Place 1 patch onto the skin daily   Last time this was given:  1 patch on 3/8/2017  1:36 AM                                nystatin 123542 UNIT/GM Powd    Commonly known as:  MYCOSTATIN   Apply 1 g topically as needed                                nystatin [id number] UNIT/ML suspension   Commonly known as:  MYCOSTATIN   Take 5 mLs (500,000 Units) by mouth 4 times daily for 10 days   Last time this was given:  500,000 Units on 3/8/2017  1:48 PM                                oxybutynin 10 MG 24 hr tablet   Commonly known as:  DITROPAN XL   Take 1 tablet (10 mg) by mouth daily   Last time this was given:  10 mg on 3/8/2017  9:50 AM                                oxyCODONE-acetaminophen  MG per tablet   Commonly known as:  PERCOCET   1-2 tablets every 6 hrs prn-to fill on or after 9/11/16                                senna-docusate 8.6-50 MG per tablet   Commonly known as:  SENOKOT-S;PERICOLACE   Take 1 tablet by mouth 2 times daily.   Last time this was given:  2 tablets on 3/8/2017  9:49 AM                                sertraline 100 MG tablet   Commonly known as:  ZOLOFT   TAKE 2 TABLETS BY MOUTH EVERY DAY   Last time this was given:  200 mg on 3/8/2017  9:49 AM                                traZODone 50 MG tablet   Commonly known as:  DESYREL   Take 1-2 tablets ( mg) by mouth nightly as needed for sleep                                VITAMIN B-12 PO   Take 100 mcg by mouth daily

## 2017-03-07 NOTE — TELEPHONE ENCOUNTER
Reason for Call:  Other     Detailed comments: patient told to call provider when she arrived at ER. GAYATRI Seo took this call.    Phone Number Patient can be reached at:   Ledy Odonnell (self) [phone]             Call taken on 3/7/2017 at 3:03 PM by Joslyn Herrera

## 2017-03-07 NOTE — H&P
"[facility], [place]    History and Physical: Trauma Service     Date of Admission:  3/7/2017  Date of Service (when I saw the patient): 03/07/17    Assessment & Plan   Trauma mechanism: Presumed ground level fall   Time/date of injury:unknown     Known Injuries:  1. Acute to subacute right sided 4th and 5th anterior rib fractures  2. Subacute right sided 10th rib fracture  3. Small to moderate right sided pneumothorax    Other diagnoses:   1. Multiple falls   2. COPD  3. Tobacco use disorder  4. HTN  5. HLD  6. 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 Osteoarthritis  8. Generalized anxiety  9. Moderate major depression   10. GERD  11. Chronic pain   12. Hx of gastric bypass s/p 40 years     Procedure:  None planned   Plan:  1. Admit to Trauma, 7B, Dr. Ware   2. Rib fracture protocol: aggressive pulmonary toilet and pain control.  IS hourly while awake, Acapella, RT to assess NIF, PTA inhalers, PRN duonebs. Supplemental O2 as needed to maintain O2 sats>92%.  2 view CXR in AM to assess pneumothorax.    3. Acute on chronic pain:  Patient has history of chronic pain and is prescribed Percocet by PCP for chronic left leg pain since hip replacement.  Will schedule tylenol, and lidocaine patches and make oxycodone available PRN while inpatient.   4. History of recent falls: Per patient she has been having increased weakness lately resulting in multiple falls apparently r/t \"balance problems\"  This is new in the past several weeks. Uses wheelchair at baseline.  Falls during transferring.  Of note, patient states she recently started taking simvastatin; has been having more weakness, falls, headaches, dizziness since that time.   5. HTN/HLD: hold simvastatin.  Patient started this medication within last month. Has been having progressive weakness and falls since that time.    6. COPD: continue Advair inhaler and PRN albuterol inhaler.  PRN nebs available " "  7. Anxiety/Depression: continue PTA zoloft, Wellbutrin and Hydroxyzine and trazodone PRN at HS  8. Thyroid: multinodular heterogenous thyroid seen on CT scan. Will obtain Thyroid US; thyroid labs   9. PT/OT  10. SW consult for discharge planning     Code status: DNR/DNI confirmed with patient.     General Cares:  GI Prophylaxis: Regular diet   DVT Prophylaxis: Mechanical for now given risk for hemothorax.   Date of last stool/Bowel Regimen:Senna scheduled, Miralax PRN   Pulmonary toilet:IS/Acapella, inhalers, PRN nebs     ETOH: This patient was asked if in the last 3-6 months there has been a time when she had 4 or more drinks in a single day/outing.. Patient answer to the screening question was in the negative. No intervention needed.  Primary Care Physician   Macarena Vinson    Chief Complaint   Weakness     History is obtained from the patient    History of Present Illness   Ledy Odonnell is a 71 year old female with history of congenital foot deformity, multiple recent falls, COPD, HTN, HLD, chronic low back pain, anxiety and depression who presents today from clinic for evaluation of right sided rib fractures and pneumothorax found incidentally on chest CT which was being performed as a follow-up for cancer screening.  Chest CT showed acute to subacute right sided 4th and 5th anterior rib fractures, a subacute appearing 10th rib fracture and small to moderate pneumothorax.  As a result, the patient was instructed to present to the ED for further evaluation and management.   According to the patient she uses a wheelchair for mobility at baseline, and is able to stand and transfer to toilet, bed, chair, shower, etc.  Recently she has been \"just weak and my leg collapses\" during transfers.  She states, \"a lot of it is due to balance\" and balance has been \"weird\" lately.  She denied any lightheadedness/dizziness including when going from sitting to standing and states, \"I give it some time\" when changing " "positions.  Of note, she states she has started taking Simvastatin within the last month.    Upon exam she does endorse some pain in right ribs, but this is not worse with deep inspiration. She states, \"I can feel it.\"  She states the pain started, \"after my last two falls\" which were \"about a week ago Friday.\"  She states, \"I knew I did it then\" but does not endorse having significant pain or SOB since that time. She states that the Percocet  she takes for chronic pain has been mostly adequate for pain control.  She does endorse chronic pain in left leg and states her left foot has \"locked up\" since her hip replacement two + years ago. She endorses chronic pain in right leg and left shoulder for which she receives injections.    Endorses headaches for the last several weeks as well as increased weakness.  She endorses double vision for \"the last 6 months.\"  Of note, she did have a head strike approximately 6 months ago for which she sought care and required \"one stitch.\"  She denies any additional ongoing symptoms since that time.      Past Medical History    I have reviewed this patient's medical history and updated it with pertinent information if needed.   Past Medical History   Diagnosis Date     Depressive disorder, not elsewhere classified      GENERALIZED ANXIETY DIS 6/21/2007     Inflammatory arthritis      Osteoarthrosis, unspecified whether generalized or localized, unspecified site      Other and unspecified alcohol dependence, unspecified drinking behavior      Unspecified essential hypertension        Past Surgical History   I have reviewed this patient's surgical history and updated it with pertinent information if needed.  Past Surgical History   Procedure Laterality Date     C nonspecific procedure  2001     Shoulder Surgery     C nonspecific procedure  1975     Gastric Bypass     C nonspecific procedure       Cholecystectomy     C shoulder surg proc unlisted       C hand/finger surgery unlisted   "     C total hip arthroplasty  2011     Lt VALERIE     Herniorrhaphy ventral  2013     Procedure: HERNIORRHAPHY VENTRAL;  Open Ventral Hernia Repair;  Surgeon: Jhoan Rogers MD;  Location: [facility] OR     Prior to Admission Medications   Prior to Admission Medications   Prescriptions Last Dose Informant Patient Reported? Taking?   Cyanocobalamin (VITAMIN B-12 PO)   Yes No   MULTIVITAMIN OR   Yes No   Sig: Once daily.    albuterol (PROAIR HFA, PROVENTIL HFA, VENTOLIN HFA) 108 (90 BASE) MCG/ACT inhaler   No No   Sig: Inhale 2 puffs into the lungs every 6 hours as needed for shortness of breath / dyspnea   buPROPion (WELLBUTRIN XL) 150 MG 24 hr tablet   No No   Sig: Take 1 tablet (150 mg) by mouth every morning   fluticasone-salmeterol (ADVAIR DISKUS) 250-50 MCG/DOSE diskus inhaler   No No   Sig: Inhale 1 puff into the lungs 2 times daily   hydrOXYzine (ATARAX) 25 MG tablet   No No   Sig: Take 1 tablet (25 mg) by mouth nightly as needed for anxiety   nystatin (MYCOSTATIN) 817962 UNIT/GM POWD   No No   Sig: Apply 1 g topically as needed   oxyCODONE-acetaminophen (PERCOCET)  MG per tablet   No No   Si-2 tablets every 6 hrs prn-to fill on or after 16   oxyCODONE-acetaminophen (PERCOCET)  MG per tablet   No No   Si-2 tablets every 6 hrs prn-to fill on or after 3/11/17   oxyCODONE-acetaminophen (PERCOCET)  MG per tablet   No No   Si-2 tablets every 6 hrs prn-to fill on or after 17   oxyCODONE-acetaminophen (PERCOCET)  MG per tablet   No No   Si-2 tablets every 6 hrs prn-to fill on or after 17   oxybutynin (DITROPAN XL) 10 MG 24 hr tablet   No No   Sig: Take 1 tablet (10 mg) by mouth daily   senna-docusate (SENOKOT-S;PERICOLACE) 8.6-50 MG per tablet   No No   Sig: Take 1 tablet by mouth 2 times daily.   sertraline (ZOLOFT) 100 MG tablet   No No   Sig: TAKE 2 TABLETS BY MOUTH EVERY DAY   simvastatin (ZOCOR) 40 MG tablet   No No   Sig: Take 1 tablet (40 mg) by mouth At  Bedtime   traZODone (DESYREL) 50 MG tablet   No No   Sig: Take 1-2 tablets ( mg) by mouth nightly as needed for sleep      Facility-Administered Medications: None     Allergies   Allergies   Allergen Reactions     No Known Allergies        Social History   Social History     Social History     Marital status:      Spouse name: N/A     Number of children: N/A     Years of education: N/A     Occupational History     Not on file.     Social History Main Topics     Smoking status: Current Every Day Smoker     Packs/day: 0.50     Years: 43.00     Types: Cigarettes     Smokeless tobacco: Never Used     Alcohol use No     Drug use: No     Sexual activity: No     Other Topics Concern     Parent/Sibling W/ Cabg, Mi Or Angioplasty Before 65f 55m? No     Social History Narrative       Family History   Family history reviewed with patient and is noncontributory.    Review of Systems   CONSTITUTIONAL: No fever, chills, sweats, fatigue   EYES: Endorses double vision past 6 months.  no visual blurring, or visual loss  ENT: no decrease in hearing, no tinnitus, no hoarseness  RESPIRATORY: +cough,  no shortness of breath, no sputum   CARDIOVASCULAR: no palpitations, no chest  pain, no exertional chest pain or pressure  GASTROINTESTINAL: no nausea or vomiting, or abd pain  GENITOURINARY:bladder spasms and frequency; some incontinence   MUSCULOSKELETAL: + weakness, no redness, no swelling, + joint pain in right knee, left hip, left shoulder   SKIN: no rashes, abrasions or lacerations  NEUROLOGIC: no numbness or tingling of hands, no numbness or tingling  of feet, no syncope, no tremors or weakness  PSYCHIATRIC: no sleep disturbances, + anxiety and depression    Physical Exam   Temp: 98.8  F (37.1  C) Temp src: Oral BP: 145/69 Pulse: 82 Heart Rate: 88 Resp: 20 SpO2: 97 % O2 Device: None (Room air)    Vital Signs with Ranges  Temp:  [98.8  F (37.1  C)] 98.8  F (37.1  C)  Pulse:  [82] 82  Heart Rate:  [88-99] 88  Resp:   [18-29] 20  BP: (134-151)/(65-90) 145/69  SpO2:  [93 %-97 %] 97 % 170 lbs 0 oz    Primary Survey:  Airway: patient talking  Breathing: symmetric respiratory effort bilaterally  Circulation: central pulses present and peripheral pulses present  Disability: Pupils - left 3 mm and brisk, right 3mm and brisk   [place] Coma Scale - Total 15/15  Eye Response (E): 4= spontaneous,  3= to verbal/voice, 2=  to pain, 1= No response   Verbal Response (V):  5= Orientated, converses,  4= Confused, converses, 3= Inappropriate words,  2= Incomprehensible sounds,  1=No response   Motor Response (M)  6= Obeys commands, 5= Localizes to pain, 4= Withdrawal to pain, 3=Fexion to pain, 2= Extension to pain, 1= No response    Secondary Survey:  General: alert, oriented to person, place, time  Head: atraumatic, normocephalic, trachea midline  Eyes: PERRLA, pupils 3mm, EOMI, corneas and conjunctivae clear  Ears:  non-inflamed external ear canals  Nose: nares patent, no drainage, nasal septum non-tender  Mouth/Throat: Thrush noted on left lower lip and tongue.  No exudates or erythema,  no dental tenderness or malocclusions, no tongue lacerations  Neck:  No cervical collar present. No midline posterior tenderness, full AROM without pain or tenderness   Chest/Pulmonary: normal respiratory rate and rhythm,  diminished clear breath sounds bilaterally, no wheezes, rales or rhonchi, right sided chest wall tenderness. No deformity.   Cardiovascular: S1, S2,  normal and regular rate and rhythm, no murmurs  Abdomen: soft, non-tender, no guarding, no rebound tenderness and no tenderness to palpation  : *no moctezuma, no urine assess  Back/Spine: no deformity, no midline tenderness, no sacral tenderness,  no step-offs and no abrasions or contusions  Musculoskel/Extremities: normal extremities, full AROM of major joints without tenderness, edema, erythema, ecchymosis, or abrasions. 2 PP. no edema. Chronic pain in right knee, left hip and left  shoulder  Hand: no gross deformities of hands or fingers. Full AROM of hand and fingers in flexion and extension.  strength equal and symmetric.   Skin: scattered ecchymosis on back and knees.  no rashes, laceration,skin warm and dry.   Neuro: PERRLA, alert, oriented x 4. CN II-XII grossly intact. No focal deficits. Strength 5/5 x 4 extremities.  Sensation intact  Psychiatric: affect/mood normal, cooperative, normal judgement/insight and memory intact    Data   UA RESULTS:  Recent Labs   Lab Test  03/07/17   1540  04/06/16   1006   COLOR  Light Yellow  Yellow   APPEARANCE  Clear  Cloudy   URINEGLC  Negative  Negative   URINEBILI  Negative  Negative   URINEKETONE  Negative  Negative   SG  1.003  1.010   UBLD  Negative  Moderate*   URINEPH  7.0  7.0   PROTEIN  Negative  Trace*   UROBILINOGEN   --   0.2   NITRITE  Negative  Negative   LEUKEST  Negative  Large*   RBCU  0  10-25*   WBCU  <1  >100*      Results for orders placed or performed during the hospital encounter of 03/07/17 (from the past 24 hour(s))   UA with Microscopic   Result Value Ref Range    Color Urine Light Yellow     Appearance Urine Clear     Glucose Urine Negative NEG mg/dL    Bilirubin Urine Negative NEG    Ketones Urine Negative NEG mg/dL    Specific Gravity Urine 1.003 1.003 - 1.035    Blood Urine Negative NEG    pH Urine 7.0 5.0 - 7.0 pH    Protein Albumin Urine Negative NEG mg/dL    Urobilinogen mg/dL Normal 0.0 - 2.0 mg/dL    Nitrite Urine Negative NEG    Leukocyte Esterase Urine Negative NEG    Source Unspecified Urine     WBC Urine <1 0 - 2 /HPF    RBC Urine 0 0 - 2 /HPF    Transitional Epi <1 0 - 1 /HPF    Mucous Urine Present (A) NEG /LPF   Influenza A/B antigen   Result Value Ref Range    Influenza A/B Agn Specimen Nares     Influenza A Negative NEG    Influenza B  NEG     Negative   Test results must be correlated with clinical data. If necessary, results   should be confirmed by a molecular assay or viral culture.     CBC with  platelets differential   Result Value Ref Range    WBC 10.5 4.0 - 11.0 10e9/L    RBC Count 4.44 3.8 - 5.2 10e12/L    Hemoglobin 13.6 11.7 - 15.7 g/dL    Hematocrit 39.1 35.0 - 47.0 %    MCV 88 78 - 100 fl    MCH 30.6 26.5 - 33.0 pg    MCHC 34.8 31.5 - 36.5 g/dL    RDW 12.6 10.0 - 15.0 %    Platelet Count 229 150 - 450 10e9/L    Diff Method Automated Method     % Neutrophils 81.2 %    % Lymphocytes 7.8 %    % Monocytes 8.2 %    % Eosinophils 2.3 %    % Basophils 0.2 %    % Immature Granulocytes 0.3 %    Nucleated RBCs 0 0 /100    Absolute Neutrophil 8.5 (H) 1.6 - 8.3 10e9/L    Absolute Lymphocytes 0.8 0.8 - 5.3 10e9/L    Absolute Monocytes 0.9 0.0 - 1.3 10e9/L    Absolute Eosinophils 0.2 0.0 - 0.7 10e9/L    Absolute Basophils 0.0 0.0 - 0.2 10e9/L    Abs Immature Granulocytes 0.0 0 - 0.4 10e9/L    Absolute Nucleated RBC 0.0    Comprehensive metabolic panel   Result Value Ref Range    Sodium 131 (L) 133 - 144 mmol/L    Potassium 3.3 (L) 3.4 - 5.3 mmol/L    Chloride 91 (L) 94 - 109 mmol/L    Carbon Dioxide 30 20 - 32 mmol/L    Anion Gap 11 3 - 14 mmol/L    Glucose 91 70 - 99 mg/dL    Urea Nitrogen 7 7 - 30 mg/dL    Creatinine 0.60 0.52 - 1.04 mg/dL    GFR Estimate >90  Non  GFR Calc   >60 mL/min/1.7m2    GFR Estimate If Black >90   GFR Calc   >60 mL/min/1.7m2    Calcium 10.0 8.5 - 10.1 mg/dL    Bilirubin Total 0.8 0.2 - 1.3 mg/dL    Albumin 3.7 3.4 - 5.0 g/dL    Protein Total 7.2 6.8 - 8.8 g/dL    Alkaline Phosphatase 66 40 - 150 U/L    ALT 52 (H) 0 - 50 U/L    AST 85 (H) 0 - 45 U/L   Lactic acid whole blood   Result Value Ref Range    Lactic Acid 1.3 0.7 - 2.1 mmol/L   Troponin I   Result Value Ref Range    Troponin I ES  0.000 - 0.045 ug/L     <0.015  The 99th percentile for upper reference range is 0.045 ug/L.  Troponin values in   the range of 0.045 - 0.120 ug/L may be associated with risks of adverse   clinical events.     EKG 12-lead, tracing only   Result Value Ref Range     Interpretation ECG Click View Image link to view waveform and result    CT Head w/o Contrast    Narrative    CT HEAD W/O CONTRAST 3/7/2017 4:59 PM    Provided History: headache s/p multiple falls    Comparison: None available.    Technique: Using multidetector thin collimation helical acquisition  technique, axial, coronal and sagittal CT images from the skull base  to the vertex were obtained without intravenous contrast.     Findings:    No intracranial hemorrhage, mass effect, or midline shift. Mild  generalized parenchymal volume loss, within normal limits for age. The  ventricles are proportionate to the cerebral sulci. The gray to white  matter differentiation of the cerebral hemispheres is preserved. Mild  nonspecific hypoattenuation the periventricular white matter of both  cerebral hemispheres. The basal cisterns are patent. There are  calcifications of the carotid siphons.    Minimal frontal sinus mucosal thickening. The mastoid air cells are  clear. No external soft tissue swelling. The orbits are unremarkable.       Impression    Impression:   1. No acute intracranial pathology.  2. Mild chronic small vessel ischemic disease and generalized  parenchymal volume loss, within normal limits for age.    I have personally reviewed the examination and initial interpretation  and I agree with the findings.    ADITI ULLOA MD   CT Cervical Spine w/o Contrast    Narrative    CT CERVICAL SPINE W/O CONTRAST 3/7/2017 5:00 PM    Provided History: trauma status post multiple falls    Comparison: Same-day chest CT. Radiographs of the cervical spine  9/15/2008    Technique: Using multidetector thin collimation helical acquisition  technique, axial, coronal and sagittal CT images through the cervical  spine were obtained without intravenous contrast.     Findings:  Evaluation is somewhat limited by motion artifact.    Trace anterolisthesis of C7 on T1 appears degenerative in nature.  There is straightening of the  normal cervical lordosis. No acute  fracture or acute subluxation. No prevertebral edema. Moderate loss of  intervertebral disc height at C3-4. Multilevel mild loss of  intervertebral disc height. Additional multilevel cervical  degenerative changes with disc bulges, endplate osteophytic spurring,  uncinate hypertrophy, and facet hypertrophy. Mild spinal canal  narrowing at C3-4. Mild bilateral neural foraminal narrowing at C3-4.     The thyroid is enlarged and heterogenous containing multiple internal  calcifications. This is also seen on CT of the chest 3/7/2017.    Partially visualized right pleural effusion. Partially visualized  right pneumothorax, as demonstrated on the same day chest CT.      Impression    Impression:   1. No acute cervical fracture or subluxation.  2. Mild to moderate multilevel cervical degenerative changes.  3. Multinodular goiter. Recommend correlation with thyroid ultrasound.  4. Partially visualized right pleural effusion and pneumothorax, as  demonstrated on the same day chest CT.    I have personally reviewed the examination and initial interpretation  and I agree with the findings.    ADITI ULLOA MD   CT Abdomen Pelvis w Contrast    Narrative    PRELIMINARY REPORT - The following report is a preliminary  interpretation.   1. Excluding partially visualized chest findings, no additional acute  traumatic findings are seen in the abdomen or pelvis to explain  right-sided abdominal pain.  2. Nonobstructing left renal calculi.       Rosa Boyce

## 2017-03-08 ENCOUNTER — APPOINTMENT (OUTPATIENT)
Dept: GENERAL RADIOLOGY | Facility: CLINIC | Age: 72
End: 2017-03-08
Attending: NURSE PRACTITIONER
Payer: COMMERCIAL

## 2017-03-08 ENCOUNTER — APPOINTMENT (OUTPATIENT)
Dept: ULTRASOUND IMAGING | Facility: CLINIC | Age: 72
End: 2017-03-08
Attending: NURSE PRACTITIONER
Payer: COMMERCIAL

## 2017-03-08 ENCOUNTER — APPOINTMENT (OUTPATIENT)
Dept: PHYSICAL THERAPY | Facility: CLINIC | Age: 72
End: 2017-03-08
Attending: NURSE PRACTITIONER
Payer: COMMERCIAL

## 2017-03-08 ENCOUNTER — TELEPHONE (OUTPATIENT)
Dept: FAMILY MEDICINE | Facility: CLINIC | Age: 72
End: 2017-03-08

## 2017-03-08 ENCOUNTER — APPOINTMENT (OUTPATIENT)
Dept: GENERAL RADIOLOGY | Facility: CLINIC | Age: 72
End: 2017-03-08
Attending: NEUROLOGICAL SURGERY
Payer: COMMERCIAL

## 2017-03-08 VITALS
HEIGHT: 65 IN | WEIGHT: 170 LBS | BODY MASS INDEX: 28.32 KG/M2 | DIASTOLIC BLOOD PRESSURE: 72 MMHG | TEMPERATURE: 98.3 F | RESPIRATION RATE: 20 BRPM | HEART RATE: 76 BPM | OXYGEN SATURATION: 95 % | SYSTOLIC BLOOD PRESSURE: 147 MMHG

## 2017-03-08 PROBLEM — J93.9 PNEUMOTHORAX: Status: ACTIVE | Noted: 2017-03-08

## 2017-03-08 LAB
CALCIUM SERPL-MCNC: 8.4 MG/DL (ref 8.5–10.1)
CHLORIDE SERPL-SCNC: 101 MMOL/L (ref 94–109)
CO2 SERPL-SCNC: 25 MMOL/L (ref 20–32)
CREAT SERPL-MCNC: 0.57 MG/DL (ref 0.52–1.04)
GFR SERPL CREATININE-BSD FRML MDRD: NORMAL ML/MIN/1.7M2
GLUCOSE SERPL-MCNC: 85 MG/DL (ref 70–99)
INTERPRETATION ECG - MUSE: NORMAL
POTASSIUM SERPL-SCNC: 3.4 MMOL/L (ref 3.4–5.3)
TSH SERPL DL<=0.005 MIU/L-ACNC: 0.42 MU/L (ref 0.4–4)

## 2017-03-08 PROCEDURE — 84132 ASSAY OF SERUM POTASSIUM: CPT | Performed by: SURGERY

## 2017-03-08 PROCEDURE — 25000132 ZZH RX MED GY IP 250 OP 250 PS 637: Performed by: NURSE PRACTITIONER

## 2017-03-08 PROCEDURE — 25000132 ZZH RX MED GY IP 250 OP 250 PS 637: Performed by: NEUROLOGICAL SURGERY

## 2017-03-08 PROCEDURE — G0378 HOSPITAL OBSERVATION PER HR: HCPCS

## 2017-03-08 PROCEDURE — 82435 ASSAY OF BLOOD CHLORIDE: CPT | Performed by: SURGERY

## 2017-03-08 PROCEDURE — 82374 ASSAY BLOOD CARBON DIOXIDE: CPT | Performed by: SURGERY

## 2017-03-08 PROCEDURE — 40000193 ZZH STATISTIC PT WARD VISIT

## 2017-03-08 PROCEDURE — 82947 ASSAY GLUCOSE BLOOD QUANT: CPT | Performed by: SURGERY

## 2017-03-08 PROCEDURE — 25000128 H RX IP 250 OP 636: Performed by: EMERGENCY MEDICINE

## 2017-03-08 PROCEDURE — 84443 ASSAY THYROID STIM HORMONE: CPT | Performed by: NURSE PRACTITIONER

## 2017-03-08 PROCEDURE — 97161 PT EVAL LOW COMPLEX 20 MIN: CPT | Mod: GP

## 2017-03-08 PROCEDURE — 76536 US EXAM OF HEAD AND NECK: CPT

## 2017-03-08 PROCEDURE — 97116 GAIT TRAINING THERAPY: CPT | Mod: GP,59

## 2017-03-08 PROCEDURE — 36415 COLL VENOUS BLD VENIPUNCTURE: CPT | Performed by: SURGERY

## 2017-03-08 PROCEDURE — 82310 ASSAY OF CALCIUM: CPT | Performed by: SURGERY

## 2017-03-08 PROCEDURE — 82565 ASSAY OF CREATININE: CPT | Performed by: SURGERY

## 2017-03-08 PROCEDURE — 71020 XR CHEST 2 VW: CPT

## 2017-03-08 PROCEDURE — 84295 ASSAY OF SERUM SODIUM: CPT | Performed by: NURSE PRACTITIONER

## 2017-03-08 PROCEDURE — 71010 XR CHEST PORT 1 VW: CPT

## 2017-03-08 PROCEDURE — 97530 THERAPEUTIC ACTIVITIES: CPT | Mod: GP

## 2017-03-08 RX ORDER — MULTIVITAMIN,THERAPEUTIC
1 TABLET ORAL DAILY
COMMUNITY

## 2017-03-08 RX ORDER — NYSTATIN 100000/ML
500000 SUSPENSION, ORAL (FINAL DOSE FORM) ORAL 4 TIMES DAILY
Qty: 200 ML | Refills: 0 | Status: SHIPPED
Start: 2017-03-08 | End: 2017-03-18

## 2017-03-08 RX ORDER — CALCIUM CARBONATE 500 MG/1
500 TABLET, CHEWABLE ORAL 2 TIMES DAILY PRN
Status: DISCONTINUED | OUTPATIENT
Start: 2017-03-08 | End: 2017-03-08 | Stop reason: HOSPADM

## 2017-03-08 RX ORDER — NICOTINE 21 MG/24HR
1 PATCH, TRANSDERMAL 24 HOURS TRANSDERMAL DAILY
Qty: 30 PATCH | Refills: 0 | Status: SHIPPED
Start: 2017-03-08 | End: 2017-04-11

## 2017-03-08 RX ORDER — SERTRALINE HYDROCHLORIDE 100 MG/1
200 TABLET, FILM COATED ORAL DAILY
Status: DISCONTINUED | OUTPATIENT
Start: 2017-03-08 | End: 2017-03-08 | Stop reason: HOSPADM

## 2017-03-08 RX ORDER — HYDROXYZINE HYDROCHLORIDE 25 MG/1
25 TABLET, FILM COATED ORAL
Status: DISCONTINUED | OUTPATIENT
Start: 2017-03-08 | End: 2017-03-08 | Stop reason: HOSPADM

## 2017-03-08 RX ADMIN — NYSTATIN 500000 UNITS: 100000 SUSPENSION ORAL at 13:48

## 2017-03-08 RX ADMIN — FLUTICASONE FUROATE AND VILANTEROL TRIFENATATE 1 PUFF: 100; 25 POWDER RESPIRATORY (INHALATION) at 09:47

## 2017-03-08 RX ADMIN — SODIUM CHLORIDE 1000 ML: 9 INJECTION, SOLUTION INTRAVENOUS at 10:13

## 2017-03-08 RX ADMIN — ACETAMINOPHEN 975 MG: 325 TABLET, FILM COATED ORAL at 09:48

## 2017-03-08 RX ADMIN — HYDROXYZINE HYDROCHLORIDE 25 MG: 25 TABLET, FILM COATED ORAL at 02:31

## 2017-03-08 RX ADMIN — NYSTATIN 500000 UNITS: 100000 SUSPENSION ORAL at 09:49

## 2017-03-08 RX ADMIN — BUPROPION HYDROCHLORIDE 150 MG: 150 TABLET, FILM COATED, EXTENDED RELEASE ORAL at 09:49

## 2017-03-08 RX ADMIN — NICOTINE 1 PATCH: 14 PATCH, EXTENDED RELEASE TRANSDERMAL at 01:36

## 2017-03-08 RX ADMIN — ACETAMINOPHEN 975 MG: 325 TABLET, FILM COATED ORAL at 01:04

## 2017-03-08 RX ADMIN — SENNOSIDES AND DOCUSATE SODIUM 2 TABLET: 8.6; 5 TABLET ORAL at 09:49

## 2017-03-08 RX ADMIN — SERTRALINE HYDROCHLORIDE 200 MG: 100 TABLET ORAL at 09:49

## 2017-03-08 RX ADMIN — LIDOCAINE 1 PATCH: 50 PATCH TOPICAL at 01:08

## 2017-03-08 RX ADMIN — OXYBUTYNIN CHLORIDE 10 MG: 5 TABLET, FILM COATED, EXTENDED RELEASE ORAL at 09:50

## 2017-03-08 RX ADMIN — POTASSIUM CHLORIDE 40 MEQ: 750 TABLET, EXTENDED RELEASE ORAL at 01:05

## 2017-03-08 RX ADMIN — OXYCODONE HYDROCHLORIDE 10 MG: 5 TABLET ORAL at 13:36

## 2017-03-08 RX ADMIN — POTASSIUM CHLORIDE 20 MEQ: 750 TABLET, EXTENDED RELEASE ORAL at 03:16

## 2017-03-08 NOTE — PROGRESS NOTES
Trauma Brief Note    Called by nursing regarding patient anxiety/ restlessness.  Briefly, patient is a pleasant 71F who was admitted late on 3/7 with rib fractures and a small associated R PTX undergoing conservative management.  Patient's nurse reported that she was anxious, restless, and tachycardic to 112.  A stat CXR was ordered to eval for expanded PTX and patient was evaluated while this was pending.  She was awake, alert, and in no apparent distress.  Continuous pulse ox monitor demonstrated SpO2 in low 90s, consistent with history of tobacco abuse, and a HR in the low 90s.  The patient reported intermittent LLE spasms/ twitching related to MSK discomfort due to old hip fracture.  Patient also reports taking hydroxyzine 25 QHS PRN at home, corroborated by the MAR.  This had not been restarted following admission.    Will restart home hydroxyzine.  Will also proceed with CXR to r/o expanding PTX.  Will discuss with daytime trauma team in AM unless otherwise dictated by clinical circumstance.    Rex Montalvo MD  Trauma [facility]  [phone] Addendum    Small-moderate R apical PTX identified on CXR, size appears approximately consistent with that seen on original CT chest.  Followup already ordered for AM.    Katia

## 2017-03-08 NOTE — PLAN OF CARE
Problem: Goal Outcome Summary  Goal: Goal Outcome Summary  OT/7B: Evaluation orders received and appreciated. Discussed with PT, MD, and RN regarding pt discharge, pet PT safe discharge recommendation has been made of Home with Home PT for continued therapy to address deficits. Following discussion, pt does not appear to need skilled inpatient OT services at this time as pt has safe discharge plan and all needs are met. Will complete orders at this time, please re-order if change in functional status.

## 2017-03-08 NOTE — PROGRESS NOTES
"[facility], [place]  Trauma Service Progress Note    Date of Service (when I saw the patient): 03/08/2017     Assessment & Plan     Trauma mechanism: Presumed ground level fall   Time/date of injury: Unknown      Known Injuries:  1. Acute to subacute right sided 4th and 5th anterior rib fractures  2. Subacute right sided 10th rib fracture  3. Small right sided pneumothorax (stable on imaging)      Other diagnoses:   1. Multiple falls   2. COPD  3. Tobacco use disorder  4. HTN  5. HLD  6. 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 Osteoarthritis  8. Generalized anxiety  9. Moderate major depression   10. GERD  11. Chronic pain   12. Hx of gastric bypass s/p 40 years      Procedure: None planned   Plan:  1. Tertiary 3/8: negative for additional injuries.   2. Rib fracture protocol: aggressive pulmonary toilet and pain control. IS hourly while awake, Acapella, RT to assess NIF, PTA inhalers, PRN duonebs. Supplemental O2 as needed to maintain O2 sats>92%. CXR 3/8: stable small pneumothorax, on room air.   3. Acute on chronic pain: Patient has history of chronic pain and is prescribed Percocet by PCP for chronic left leg pain since hip replacement. Inpatient -> Schedule tylenol, and lidocaine patches, oxycodone PRN.   4. History of recent falls: Per patient she has been having increased weakness lately resulting in multiple falls apparently r/t \"balance problems\" This is new in the past several weeks. Uses wheelchair at baseline. Falls during transferring. Of note, patient states she recently started taking simvastatin; has been having more weakness, falls, headaches, dizziness since that time.   5. HTN/HLD: hold simvastatin. Patient started this medication within last month. Has been having progressive weakness and falls since that time.   6. COPD: continue Advair inhaler and PRN albuterol inhaler. PRN nebs available.    7. Anxiety/Depression: continue PTA zoloft, Wellbutrin " and Hydroxyzine and trazodone PRN at HS  8. Thyroid: multinodular heterogenous thyroid seen on CT scan. Thyroid US; thyroid labs ordered and reviewed. To follow up with PCP.    9. PT/OT to evaluate mobility today.   10. SW consult for discharge planning      Code status: DNR/DNI confirmed with patient.      General Cares:  GI Prophylaxis: Regular diet   DVT Prophylaxis: Mechanical for now given risk for hemothorax.   Date of last stool/Bowel Regimen:Senna scheduled, Miralax PRN   Pulmonary toilet:IS/Acapella, inhalers, PRN nebs     Interval History   Course reviewed. Patient denies cp, sob, palpitations, n/v/d. Reports pain in Left hip at baseline. No new symptoms .   ROS x 8 negative with exception of those things listed in interval hx    Physical Exam   Temp: 98.8  F (37.1  C) Temp src: Oral BP: 141/81 Pulse: 101 Heart Rate: 86 Resp: 20 SpO2: 91 % O2 Device: None (Room air)    Vitals:    03/07/17 1428   Weight: 77.1 kg (170 lb)     Vital Signs with Ranges  Temp:  [97.2  F (36.2  C)-98.8  F (37.1  C)] 98.8  F (37.1  C)  Pulse:  [] 101  Heart Rate:  [77-99] 86  Resp:  [17-29] 20  BP: (124-160)/() 141/81  SpO2:  [88 %-97 %] 91 %  I/O last 3 completed shifts:  In: 1587.5 [I.V.:1587.5]  Out: -       [place] Coma Scale - Total 15/15  Eye Response (E): 4= spontaneous,  3= to verbal/voice, 2=  to pain, 1= No response  Verbal Response (V):  5= Orientated, converses,  4= Confused, converses, 3= Inappropriate words,  2= Incomprehensible sounds,  1=No response   Motor Response (M)  6= Obeys commands, 5= Localizes to pain, 4= Withdrawal to pain, 3=Fexion to pain, 2= Extension to pain, 1= No response      Constitutional: Awake, alert, cooperative, no apparent distress  Eyes: Lids and lashes normal, pupils equal, round and reactive to light, extra ocular muscles intact, sclera clear, conjunctiva normal.  ENT: Normocephalic, atraumatic  Respiratory: No increased work of breathing, good air exchange, clear to  auscultation bilaterally, no crackles or wheezing.  Cardiovascular:  regular rate and rhythm, normal S1 and S2, no S3 or S4, and no murmur noted.  GI: Normal bowel sounds, soft, non-distended, non-tender, no guarding  Genitourinary:  No urine to assess.   Skin:  Normal skin color, no redness, warmth, or swelling, no ecchymosis, no abrasions, and no jaundice.   Musculoskeletal: There is no redness, warmth, or swelling of the joints.  Pedal pulse palpated. R foot club foot noted.   Neurologic: Awake, alert, oriented.  Cranial nerves II-XII are grossly intact.  Strength and sensory is intact.  No focal deficits  Neuropsychiatric: Calm, normal eye contact, alert, affect appropriate to situation, anxious, oriented.     Medications     NaCl 1,000 mL (03/08/17 1013)       sertraline  200 mg Oral Daily     lidocaine  1-3 patch Transdermal Q24h    And     lidocaine   Transdermal Q24H    And     lidocaine   Transdermal Q8H     senna-docusate  1-2 tablet Oral BID     fluticasone-vilanterol  1 puff Inhalation Daily     oxybutynin  10 mg Oral Daily     nicotine   Transdermal Q8H     [START ON 3/9/2017] nicotine   Transdermal Daily     nicotine  1 patch Transdermal Daily     nystatin  500,000 Units Oral 4x Daily     acetaminophen  975 mg Oral Q8H     buPROPion  150 mg Oral QAM       Data   Recent Results (from the past 24 hour(s))   CT Chest Low Dose Non Contrast   Result Value    Radiologist flags (Urgent)     Unsuspected pneumothorax and multiple rib fractures.    Narrative    EXAMINATION: Chest CT  without contrast     CLINICAL HISTORY: Lung nodule follow-up.    COMPARISON: 9/20/2016.    TECHNIQUE: CT imaging obtained through the chest without intravenous  contrast. Coronal and axial MIP reformatted images obtained.    FINDINGS:  There are new rib fractures of varying ages in the right chest. Acute  to subacute appearing fractures of the right anterior fourth and fifth  ribs with subacute to chronic appearing fracture of the  lateral right  10th rib. There is a small to moderate right basilar pneumothorax,  which is presumably secondary to these fractures. Additionally, there  is patchy left chest subcutaneous emphysema without inflammatory  stranding in the subcutaneous fat.    There is subpleural atelectasis in the partially collapsed regions of  the right middle and right lower lobes. There has been interval  increase in patchy peribronchial vascular groundglass opacities,  notably in the upper and right middle lobes. Stable 2 mm solid nodule  in the anterior left upper lobe (series 5, image 116). Cluster of  calcified nodules in the left lower lobe. No new solid nodules  identified.    Heterogeneous multinodular thyroid gland. Heart size is normal. No  pericardial effusion. Mild atherosclerotic calcific patient's,  otherwise normal thoracic vasculature. There are nonenlarged  mediastinal lymph nodes, including several calcified left hilar and  mediastinal lymph nodes. Central tracheobronchial tree is patent. No  pleural effusion.    Bones and soft tissues: Multiple mildly displaced right-sided rib  fractures of varying ages as noted above. No suspicious bone lesions.    Partially imaged upper abdomen: Postsurgical changes in the left upper  quadrant near the splenic hilum. Cholecystectomy clips.      Impression    IMPRESSION:   1.  Multiple right-sided rib fractures of varying ages with a small  right basilar pneumothorax. These findings are indicative of  repetitive trauma, and clinical correlation for history of trauma is  recommended. In the absence of reported history of trauma, these  findings could be considered suspicious for nonaccidental trauma.  2.  Interval increase in patchy scattered groundglass opacities. In an  active smoker, these are most suspicious for inflammation and are  consistent with disease along the respiratory  bronchiolitis/respiratory bronchiolitis-interstitial lung  disease/desquamative interstitial  pneumonitis spectrum. These finding  should be followed on the scheduled lung cancer screening examination  in approximately 6 months (one year from the study performed on  9/28/2016).  3.  Sequela from prior granulomatous disease.  4.  Heterogeneous multinodular thyroid. Consider thyroid ultrasound.      [Urgent Result: Unsuspected pneumothorax and multiple rib fractures.]]    Finding was identified on 3/7/2017 11:03 AM.     DOUGLAS Ruiz was contacted by Dr. Vital at 3/7/2017 11:41 AM  and verbalized understanding of the urgent finding.      I have personally reviewed the examination and initial interpretation  and I agree with the findings.    ANAIS BALTAZAR MD   CT Head w/o Contrast    Narrative    CT HEAD W/O CONTRAST 3/7/2017 4:59 PM    Provided History: headache s/p multiple falls    Comparison: None available.    Technique: Using multidetector thin collimation helical acquisition  technique, axial, coronal and sagittal CT images from the skull base  to the vertex were obtained without intravenous contrast.     Findings:    No intracranial hemorrhage, mass effect, or midline shift. Mild  generalized parenchymal volume loss, within normal limits for age. The  ventricles are proportionate to the cerebral sulci. The gray to white  matter differentiation of the cerebral hemispheres is preserved. Mild  nonspecific hypoattenuation the periventricular white matter of both  cerebral hemispheres. The basal cisterns are patent. There are  calcifications of the carotid siphons.    Minimal frontal sinus mucosal thickening. The mastoid air cells are  clear. No external soft tissue swelling. The orbits are unremarkable.       Impression    Impression:   1. No acute intracranial pathology.  2. Mild chronic small vessel ischemic disease and generalized  parenchymal volume loss, within normal limits for age.    I have personally reviewed the examination and initial interpretation  and I agree with the  findings.    ADITI ULLOA MD   CT Cervical Spine w/o Contrast    Narrative    CT CERVICAL SPINE W/O CONTRAST 3/7/2017 5:00 PM    Provided History: trauma status post multiple falls    Comparison: Same-day chest CT. Radiographs of the cervical spine  9/15/2008    Technique: Using multidetector thin collimation helical acquisition  technique, axial, coronal and sagittal CT images through the cervical  spine were obtained without intravenous contrast.     Findings:  Evaluation is somewhat limited by motion artifact.    Trace anterolisthesis of C7 on T1 appears degenerative in nature.  There is straightening of the normal cervical lordosis. No acute  fracture or acute subluxation. No prevertebral edema. Moderate loss of  intervertebral disc height at C3-4. Multilevel mild loss of  intervertebral disc height. Additional multilevel cervical  degenerative changes with disc bulges, endplate osteophytic spurring,  uncinate hypertrophy, and facet hypertrophy. Mild spinal canal  narrowing at C3-4. Mild bilateral neural foraminal narrowing at C3-4.     The thyroid is enlarged and heterogenous containing multiple internal  calcifications. This is also seen on CT of the chest 3/7/2017.    Partially visualized right pleural effusion. Partially visualized  right pneumothorax, as demonstrated on the same day chest CT.      Impression    Impression:   1. No acute cervical fracture or subluxation.  2. Mild to moderate multilevel cervical degenerative changes.  3. Multinodular goiter. Recommend correlation with thyroid ultrasound.  4. Partially visualized right pleural effusion and pneumothorax, as  demonstrated on the same day chest CT.    I have personally reviewed the examination and initial interpretation  and I agree with the findings.    ADITI ULLOA MD   CT Abdomen Pelvis w Contrast    Narrative    Examination:  CT ABDOMEN PELVIS W CONTRAST 3/7/2017 5:03 PM     History: Right-sided abdominal pain, status post multiple  falls, known  rib fracture and pneumothorax    Comparison: CT chest 3/7/2017    Technique: CT of the abdomen and pelvis were obtained with IV  contrast. Sagittal and coronal reconstructions created and reviewed.    Contrast: 104 cc of isovue 370    Findings:   There are scattered calcified granulomas in the spleen and liver.  There is mild periportal edema. The gallbladder is surgically absent.  The spleen is atrophic. The adrenal glands are unremarkable. There is  symmetric nephrographic phase without hydronephrosis. The urinary  bladder is unremarkable, partially obscured by metallic streak  artifact. There are 2 small nonobstructing renal calculi in the  inferior pole of the left kidney. The largest calculus measures 4 mm.  There is a fluid attenuating cystic lesion of the inferior pole of the  left kidney.    There are postsurgical changes of gastric bypass. The small and large  bowel are normal in caliber. The low pelvis is partially obscured by  left total hip arthroplasty metallic streak artifact. No  retroperitoneal, mesenteric, or inguinal lymphadenopathy. There are  atherosclerotic calcifications of the abdominal aorta without evidence  of abdominal aortic aneurysm. The major abdominal vasculature is  patent. No abdominal free fluid or free air.    Lower thorax: The chest is better evaluated on dedicated same day CT  chest. Right-sided pneumothorax and small pleural effusion is  partially visualized. Acute and subacute rib fractures are noted in  the bilateral anterolateral ribs.    Bones and soft tissues: Degenerative changes of the lumbar spine and  right femoral acetabular joint are noted. There are postsurgical  changes of left hip arthroplasty      Impression    Impression:   1. Excluding partially visualized chest findings, no additional acute  traumatic findings are seen in the abdomen or pelvis to explain  right-sided abdominal pain.  2. Nonobstructing left renal calculi.    I have personally  reviewed the examination and initial interpretation  and I agree with the findings.    MATEO DAI MD   XR Chest Port 1 View    Narrative    Study: XR CHEST PORT 1 VW 3/8/2017 2:08 AM    History: tachycardia with known PTX    Comparison: Chest CT yesterday    Findings:   The cardiomediastinal silhouette and pulmonary vasculature are within  normal limits. Mild bibasilar streaky opacities. No pleural effusion.  Small right apical pneumothorax. No left pneumothorax. No pleural  effusion. Numerous surgical staples and suture line is noted in the  left upper quadrant the abdomen. Degenerative changes of the shoulders  bilaterally.      Impression    Impression:   1. Small right apical pneumothorax.  2. Mild bibasilar streaky opacities consistent with atelectasis, as  seen on comparison chest CT.    I have personally reviewed the examination and initial interpretation  and I agree with the findings.    ADELINA LEUNG MD   XR Chest 2 Views    Narrative     Examination:  XR CHEST 2 VW     Date:  3/8/2017 8:15 AM      Clinical Information: evaluation of pneumothorax     Additional Information: none    Comparison: 3/8/2017 0149 hours    Findings:   The pulmonary vasculature is distinct. The cardiac silhouette is  normal in size. No significant change in small right apical  pneumothorax. The lungs and pleural spaces are otherwise clear.  Surgical clips in left upper quadrant of the abdomen. No acute osseous  injury.      Impression    Impression:  1. No significant change in small right apical pneumothorax.    LAURENT DESAI MD   US Thyroid    Narrative    EXAMINATION: US THYROID, 3/8/2017 9:10 AM     COMPARISON: CT 3/7/2017.    HISTORY: Multinodular heterogeneous thyroid gland seen on CT.    Technique: Grayscale and color ultrasound imaging of the thyroid was  performed.    Findings:    Thyroid parenchyma: Diffusely heterogeneous with enlargement of the  left lobe.    The right lobe of the thyroid measures:  1.2 x 1.8 x 5 cm     The thyroid isthmus measures: 0.4 cm     The left lobe of the thyroid measures: 6.1 x 4.6 x 7.4 cm     Right lobe: Multiple subcentimeter cystic/spongiform nodules are noted  on the right, the largest in the upper pole measuring up to 5 mm.    Isthmus: No discrete nodules.    Left Lobe: The left lobe of the thyroid gland is diffusely  heterogeneous, nodular, and enlarged with a few macrocalcifications.      Impression    Impression:  Multinodular thyroid gland with diffuse heterogeneity, nodularity, and  enlargement of the left lobe and subcentimeter cystic/spongiform  nodules on the right.    MD Irma ENGLAND NP  To contact the trauma service use job code pager [phone],   Numeric texts or alpha text through [facility]

## 2017-03-08 NOTE — PROGRESS NOTES
"  Care Coordinator Progress Note     Admission Date/Time:  3/7/2017  Attending MD:  Daniele Ware MD     Data  Chart reviewed, discussed with interdisciplinary team.   Patient was admitted for:    Closed fracture of multiple ribs of right side, initial encounter  Traumatic pneumothorax, initial encounter  Multiple falls  Hyponatremia.    Concerns with insurance coverage for discharge needs: None.  Current Living Situation: Patient lives alone.  Support System: Supportive and Involved, friends and family   Services Involved: life alert, DME  Transportation: Family or Friend will provide  Barriers to Discharge: lack of support system/caregiver and lives alone    Coordination of Care and Referrals: Provided patient/family with options for Home Care.        Assessment  Patient is a 71 year female with PMH of COPD, HTN, HLD, cogenital foot deformity who was admitted for acute rib fractures following a fall.  Patient has had several recent falls and was asked by the team to assist with discharge planning.  Met with patient at bedside to introduce RNCC role and discuss discharge planning.  Patient lives alone in a house in [place].  Patient reports having many close family and friends nearby who can help her \"in seconds\" if needed.  Patient has life alert.  Patient reports she is \"wheel chair bound\" at baseline and has a manual wheel chair.  Does use a walker to walk short distances.  Patient reports she is able to drive.  Has a cleaning lady once a week but no further services.  She reports she is having trouble doing tasks such as grocery shopping and bringing her garbage out.  Patient is interested in getting set up with some community services.  Patient has [facility] [employer] and is interested in getting established with a  to help assist her with getting set up with community services like homemaker services etc.  Called [facility] seniors clinical services(P: [phone]) and left vm letting them " know patient is interested in establishing a .  Waiting for a return call back.  Patient requesting home PT.  Patient is homebound.  After giving patient choice of agency patient chose [facility](P: [phone], F: [phone]).  Referral made and orders placed.  PT working with patient this afternoon, will watch for their recommendations.  Will continue to follow and assist with discharge planning as needed.       Plan  Anticipated Discharge Date:  3/8/2017  Anticipated Discharge Plan:  Home with [facility] for skilled nursing visits    Tresa Gallegos, RNCC  [phone]

## 2017-03-08 NOTE — UTILIZATION REVIEW
"    Admission Status; Secondary Review Determination         Under the authority of the Utilization Management Committee, the utilization review process indicated a secondary review on the above patient.  The review outcome is based on review of the medical records, discussions with staff, and applying clinical experience noted on the date of the review.        ()      Inpatient Status Appropriate - This patient's medical care is consistent with medical management for inpatient care and reasonable inpatient medical practice.      (X) Observation Status Appropriate - This patient does not meet hospital inpatient criteria and is placed in observation status. If this patient's primary payer is Medicare and was admitted as an inpatient, Condition Code 44 should be used and patient status changed to \"observation\".   () Admission Status NOT Appropriate - This patient's medical care is not consistent with medical management for Inpatient or Observation Status.          RATIONALE FOR DETERMINATION     \"71 year old female with history of congenital foot deformity, multiple recent falls, COPD, HTN, HLD, chronic low back pain, anxiety and depression who presents today from clinic for evaluation of right sided rib fractures and pneumothorax found incidentally on chest CT which was being performed as a follow-up for cancer screening. Chest CT showed acute to subacute right sided 4th and 5th anterior rib fractures, a subacute appearing 10th rib fracture and small to moderate pneumothorax. As a result, the patient was instructed to present to the ED for further evaluation and management.\"     Pt has stability of PTX, her pain is reasonably controlled. Pt is awaiting PT eval as she has had recurrent falls. I spoke to the PA taking care of the patient, Rosa Boyce, and patient likely will discharge today if stable from PT standpoint.     The severity of illness, intensity of service provided, expected LOS make it appropriate for " hospital observation.        The information on this document is developed by the utilization review team in order for the business office to ensure compliance.  This only denotes the appropriateness of proper admission status and does not reflect the quality of care rendered.         The definitions of Inpatient Status and Observation Status used in making the determination above are those provided in the CMS Coverage Manual, Chapter 1 and Chapter 6, section 70.4.      Sincerely,     CEDRIC REA MD    Physician Advisor  Utilization Review/ Case Management  [facility].

## 2017-03-08 NOTE — PLAN OF CARE
Problem: Individualization  Goal: Patient Preferences  Outcome: No Change  Denied any nausea. Increased anxiety, fine tremor, tachycardia upto 112 and restless. No other for MSSA. Notified on-call trauma. Atarax per MD. Currently calm and sleeping. Pulse at 92. Sat is 96%RA. Incontinent protocol. Abdomen rounded, low pitch in all quadrant. Continue with plan of care.

## 2017-03-08 NOTE — PLAN OF CARE
Vitals:    03/08/17 0420 03/08/17 0758 03/08/17 1212 03/08/17 1300   BP: 155/80 141/81 147/72    BP Location: Right arm  Right arm    Pulse: 90 101 76    Resp: 20 20 20    Temp: 97.2  F (36.2  C) 98.8  F (37.1  C) 98.3  F (36.8  C)    TempSrc: Oral Oral Oral    SpO2: 92% 91% 94% 95%   Weight:       Height:         Afebrile, other VSS. A&O, able to make needs known. Denies nausea and SOB. Pain controlled with Tylenol and Oxycodone. Redness noted in abdominal folds and groin area, barrier cream applied. PIV infiltrated, MIVF stopped/discontinued. Incontinent of urine x2. Large BM this morning. Advanced to regular diet. Worked with PT. Pt discharging home today.

## 2017-03-08 NOTE — DISCHARGE SUMMARY
"[facility], [place]    Discharge Summary  Trauma Surgery Service    Date of Admission:  3/7/2017  Date of Discharge:  3/8/2017  Discharging Provider: Irma Milan NP, Dr. Daniele Ware  Date of Service (when I saw the patient): 03/08/17    Primary Provider: Macarena Vinson  Primary Care clinic: [address] [address]  Phone: [phone]  Fax number: [phone]     Discharge Diagnoses   Trauma mechanism: Presumed ground level fall   Time/date of injury: Unknown       Known Injuries:  1. Acute to subacute right sided 4th and 5th anterior rib fractures  2. Subacute right sided 10th rib fracture  3. Small right sided pneumothorax (stable on imaging)       Other diagnoses:   1. Multiple falls   2. COPD  3. Tobacco use disorder  4. HTN  5. HLD  6. 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 Osteoarthritis  8. Generalized anxiety  9. Moderate major depression   10. GERD  11. Chronic pain   12. Hx of gastric bypass s/p 40 years        Hospital Course   HPI:  Ledy Odonnell is a 71 year old female with history of congenital foot deformity, multiple recent falls, COPD, HTN, HLD, chronic low back pain, anxiety and depression who presents today from clinic for evaluation of right sided rib fractures and pneumothorax found incidentally on chest CT which was being performed as a follow-up for cancer screening. Chest CT showed acute to subacute right sided 4th and 5th anterior rib fractures, a subacute appearing 10th rib fracture and small to moderate pneumothorax. As a result, the patient was instructed to present to the ED for further evaluation and management.   According to the patient she uses a wheelchair for mobility at baseline, and is able to stand and transfer to toilet, bed, chair, shower, etc. Recently she has been \"just weak and my leg collapses\" during transfers. She states, \"a lot of it is due to balance\" and " "balance has been \"weird\" lately. She denied any lightheadedness/dizziness including when going from sitting to standing and states, \"I give it some time\" when changing positions. Of note, she states she has started taking Simvastatin within the last month.   Upon exam she does endorse some pain in right ribs, but this is not worse with deep inspiration. She states, \"I can feel it.\" She states the pain started, \"after my last two falls\" which were \"about a week ago Friday.\" She states, \"I knew I did it then\" but does not endorse having significant pain or SOB since that time. She states that the Percocet she takes for chronic pain has been mostly adequate for pain control. She does endorse chronic pain in left leg and states her left foot has \"locked up\" since her hip replacement two + years ago. She endorses chronic pain in right leg and left shoulder for which she receives injections.   Endorses headaches for the last several weeks as well as increased weakness. She endorses double vision for \"the last 6 months.\" Of note, she did have a head strike approximately 6 months ago for which she sought care and required \"one stitch.\" She denies any additional ongoing symptoms since that time.        Traumatic Injury  The patient sustained the above injury as a result of fall. The mechanism of injury and factors contributing to the accident were discussed with the patient.  Strategies on how to prevent future accidents were reviewed.  The patient underwent tertiary examination to evaluate for additional injuries.  The systematic review did not find any other injuries.    Subacute rib fractures  In rib fracture management, pulmonary toilet and good pain control allowing for good pulmonary toilet is paramount.  Prior to discharge, her pain was controlled for Ledy Odonnell with scheduled topical pain medications and PRN oral analgesics.     In order to prevent common pulmonary complications found with rib fracture " patients, Ledy Odonnell will need to continue with aggressive pulmonary toileting that includes, incentive spirometry, coughing and deep breathing exercises.  We recommend these continue at a minimum of QID for one month after discharge.  Patient has been provided for handout with instructions regarding this.     Small Right  Pneumothorax. Ledy Odonnell was treated conservatively for her pneumothorax and no chest tube was placed. The progression of the hemothorax/pheumothorax was monitored with serial chest x-rays to evaluate for lung re-expansion.      Acute pain  Pain was controlled with a multi-modality approach.  The current regimen for Ledy Odonnell includes the following, scheduled acetaminophen, topical analgesic and PRN short acting opioids.  Adequate pain control was achieved with this regimen.  We anticipate that they will taper off this regimen over the next several weeks.    Therapy Recommendations:   Current status of physical therapies on discharge:   Goal: Goal Outcome Summary  PT / 7B: Initial PT eval completed, treatment initiated. Pt performs bed mobility SBA. Pt performs sit <> stand SBA. Pt ambulates ~40ft with FWW and braces/orthotic shoes + CGA. Pt reports at baseline she ambulates short distances with her walker and utilizes her wheelchair for farther distances. Pt reports she does feel that she is near her baseline function, however is agreeable to home therapies to address deficits with ROM, strength, balance, mobility, and endurance, to ensure a safe return home.  Recommend DC home with assist from family/friends, HHPT/OT, and home-care services.       Significant Results and Procedures     * No surgery found *  Surgeon(s): * Surgery not found *  Non-operative procedures None performed    Pending Results   Unresulted Labs Ordered in the Past 30 Days of this Admission     No orders found for last 61 day(s).        Code Status   DNR / DNI  Physical Exam   Temp: 98.3  F (36.8  " C) Temp src: Oral BP: 147/72 Pulse: 76 Heart Rate: 86 Resp: 20 SpO2: 95 % O2 Device: None (Room air)    Vitals:    03/07/17 1428   Weight: 77.1 kg (170 lb)     Vital Signs with Ranges  Temp:  [97.2  F (36.2  C)-98.8  F (37.1  C)] 98.3  F (36.8  C)  Pulse:  [] 76  Heart Rate:  [77-93] 86  Resp:  [17-28] 20  BP: (124-160)/() 147/72  SpO2:  [88 %-97 %] 95 %  I/O last 3 completed shifts:  In: 1587.5 [I.V.:1587.5]  Out: -       Discharge Disposition   Discharged to home  Condition at discharge: Stable  Discharge VS: Blood pressure 147/72, pulse 76, temperature 98.3  F (36.8  C), temperature source Oral, resp. rate 20, height 1.651 m (5' 5\"), weight 77.1 kg (170 lb), SpO2 95 %.    Consultations This Hospital Stay   OCCUPATIONAL THERAPY ADULT IP CONSULT  PHYSICAL THERAPY ADULT IP CONSULT  SOCIAL WORK IP CONSULT  PHYSICAL THERAPY ADULT IP CONSULT  MEDICATION HISTORY IP PHARMACY CONSULT    Discharge Orders     Home Care PT Referral for Hospital Discharge     Home Care OT Referral for Hospital Discharge     MD face to face encounter   Documentation of Face to Face and Certification for Home Health Services    I certify that patient: Ledy Odonnell is under my care and that I, or a nurse practitioner or physician's assistant working with me, had a face-to-face encounter that meets the physician face-to-face encounter requirements with this patient on: 3/8/2017.    This encounter with the patient was in whole, or in part, for the following medical condition, which is the primary reason for home health care: rib fractures.    I certify that, based on my findings, the following services are medically necessary home health services: Occupational Therapy and Physical Therapy.    My clinical findings support the need for the above services because: Occupational Therapy Services are needed to assess and treat cognitive ability and address ADL safety due to impairment in endurance. and Physical Therapy Services are " "needed to assess and treat the following functional impairments: mobility and strength.    Further, I certify that my clinical findings support that this patient is homebound (i.e. absences from home require considerable and taxing effort and are for medical reasons or [facility] services or infrequently or of short duration when for other reasons) because: Requires assistance of another person or specialized equipment to access medical services because patient: Requires supervision of another for safe transfer...    Based on the above findings. I certify that this patient is confined to the home and needs intermittent skilled nursing care, physical therapy and/or speech therapy.  The patient is under my care, and I have initiated the establishment of the plan of care.  This patient will be followed by a physician who will periodically review the plan of care.  Physician/Provider to provide follow up care: Macarena Vinson    Attending hospital physician (the Medicare certified [place] provider): Dr. Ware   Physician Signature: See electronic signature associated with these discharge orders.  Date: 3/8/2017     Activity   Your activity upon discharge: activity as tolerated. No heavy lifting of > 10lbs for 8-10 weeks.     Refer to \"going home with a chest injury\" handout.     When to contact your care team   Should you have any questions, you can reach us in the following ways. During weekday working hours Monday-Friday, 7:00 am - 4:00 pm, call [phone] to reach our nurse. After 4:00pm and on on weekends call  [phone] and ask  to page [phone] for the Trauma provider on call.      Return to the emergency department if you notice the following: fever over 101.5F,  feel dizzy or faint, fast or irregular heart beats, heavy sweating, increased shortness of breath, changes in walking, speech, or thinking or confusion,  constant nausea or vomiting, persistent pain or new drainage from your wounds.     In " case of an emergency go to Emergency department or call [phone].     Reason for your hospital stay   You were hospitalized and treated for the following conditions:  Trauma mechanism: Presumed ground level fall   Time/date of injury: Unknown       Known Injuries:  1. Acute to subacute right sided 4th and 5th anterior rib fractures  2. Subacute right sided 10th rib fracture  3. Small right sided pneumothorax (stable on imaging)       Other diagnoses:   1. Multiple falls   2. COPD  3. Tobacco use disorder  4. HTN  5. HLD  6. 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 Osteoarthritis  8. Generalized anxiety  9. Moderate major depression   10. GERD  11. Chronic pain   12. Hx of gastric bypass s/p 40 years      You were seen by the following services:  Trauma Service  Physical and Occupational therapies     Adult [facility] Follow-up and recommended labs and tests   Follow up with primary care provider, Macarena Vinson, within 7 days to evaluate medication change and for hospital follow- up.  No follow up labs or test are needed.  You should have a followup BMP to assess your sodium and potassium level in 1 week.     Appointments on [place] and/or [facility] (with [facility] or [facility] provider or service). Call [phone] if you haven't heard regarding these appointments within 7 days of discharge.     Diet   Follow this diet upon discharge: Orders Placed This Encounter     Advance Diet as Tolerated: Regular Diet Adult       Discharge Medications   Current Discharge Medication List      START taking these medications    Details   miconazole (MICATIN; MICRO GUARD) 2 % powder Apply topically 2 times daily Apply to groin  Qty: 30 g, Refills: 0    Associated Diagnoses: Fungal infection      nystatin (MYCOSTATIN) 103402 UNIT/ML suspension Take 5 mLs (500,000 Units) by mouth 4 times daily for 10 days  Qty: 200 mL, Refills: 0    Associated Diagnoses: Fungal infection      nicotine (NICODERM CQ) 14 MG/24HR 24 hr  patch Place 1 patch onto the skin daily  Qty: 30 patch, Refills: 0    Associated Diagnoses: Tobacco abuse         CONTINUE these medications which have NOT CHANGED    Details   multivitamin, therapeutic (THERA-VIT) TABS tablet Take 1 tablet by mouth daily      sertraline (ZOLOFT) 100 MG tablet TAKE 2 TABLETS BY MOUTH EVERY DAY  Qty: 60 tablet, Refills: 0    Comments: Due for a visit for refills!  Associated Diagnoses: Generalized anxiety disorder; Major depressive disorder, recurrent episode, moderate (H)      nystatin (MYCOSTATIN) 764436 UNIT/GM POWD Apply 1 g topically as needed  Qty: 1 Bottle, Refills: 5    Associated Diagnoses: Candidal intertrigo      hydrOXYzine (ATARAX) 25 MG tablet Take 1 tablet (25 mg) by mouth nightly as needed for anxiety  Qty: 30 tablet, Refills: 1    Associated Diagnoses: Other insomnia      traZODone (DESYREL) 50 MG tablet Take 1-2 tablets ( mg) by mouth nightly as needed for sleep  Qty: 90 tablet, Refills: 3    Associated Diagnoses: Insomnia      oxybutynin (DITROPAN XL) 10 MG 24 hr tablet Take 1 tablet (10 mg) by mouth daily  Qty: 90 tablet, Refills: 3    Associated Diagnoses: Urgency incontinence      buPROPion (WELLBUTRIN XL) 150 MG 24 hr tablet Take 1 tablet (150 mg) by mouth every morning  Qty: 90 tablet, Refills: 3    Associated Diagnoses: Major depressive disorder, recurrent episode, moderate (H); Tobacco use disorder      albuterol (PROAIR HFA, PROVENTIL HFA, VENTOLIN HFA) 108 (90 BASE) MCG/ACT inhaler Inhale 2 puffs into the lungs every 6 hours as needed for shortness of breath / dyspnea  Qty: 3 Inhaler, Refills: 1    Associated Diagnoses: Chronic obstructive pulmonary disease, unspecified COPD type (H)      fluticasone-salmeterol (ADVAIR DISKUS) 250-50 MCG/DOSE diskus inhaler Inhale 1 puff into the lungs 2 times daily  Qty: 1 Inhaler, Refills: 5    Associated Diagnoses: COPD exacerbation (H)      oxyCODONE-acetaminophen (PERCOCET)  MG per tablet 1-2 tablets every  6 hrs prn-to fill on or after 9/11/16  Qty: 120 tablet, Refills: 0    Associated Diagnoses: Chronic pain disorder      Cyanocobalamin (VITAMIN B-12 PO) Take 100 mcg by mouth daily       senna-docusate (SENOKOT-S;PERICOLACE) 8.6-50 MG per tablet Take 1 tablet by mouth 2 times daily.  Qty: 40 tablet, Refills: 11    Associated Diagnoses: Ventral hernia         STOP taking these medications       simvastatin (ZOCOR) 40 MG tablet Comments:   Reason for Stopping:         MULTIVITAMIN OR Comments:   Reason for Stopping:             Allergies   Allergies   Allergen Reactions     No Known Allergies      Data   Most Recent 3 CBC's:  Recent Labs   Lab Test  03/07/17   1542  05/17/13   0557  05/16/13   0809   WBC  10.5  7.4  7.9   HGB  13.6  13.0  12.7   MCV  88  94  94   PLT  229  181  155      Most Recent 3 BMP's:  Recent Labs   Lab Test  03/08/17   0607  03/07/17   1542  09/13/16   1057 08/25/14 08/24/14 05/14/13   1515   NA   --   131*   --   138  135   < >  139   POTASSIUM  3.4  3.3*   --   3.4  3.4   < >  3.8   CHLORIDE  101  91*   --   104  101   < >  101   CO2  25  30   --    --    --    --   26   BUN   --   7   --   6  7   < >  8   CR  0.57  0.60   --   0.71  0.66   < >  0.66   ANIONGAP   --   11   --   4.0  6.0   < >  12   SCARLET  8.4*  10.0   --   8.2  8.0   < >  8.2*   GLC  85  91  87  109*  106*   < >  135*    < > = values in this interval not displayed.     Most Recent 2 LFT's:  Recent Labs   Lab Test  03/07/17   1542  09/13/16   1057   05/14/13   1515   AST  85*   --    --   35   ALT  52*  24   < >  32   ALKPHOS  66   --    --   56   BILITOTAL  0.8   --    --   0.6    < > = values in this interval not displayed.     Most Recent INR's and Anticoagulation Dosing History:  Anticoagulation Dose History     Recent Dosing and Labs 5/10/2013 5/14/2013 8/21/2014 8/23/2014 8/24/2014 8/25/2014 8/26/2014    INR 1.00 1.12 1.2 1.3 2.3 2.6 2.1        Most Recent 3 Troponin's:  Recent Labs   Lab Test  03/07/17   1542  05/15/13    0735  05/14/13   1515   TROPI  <0.015  The 99th percentile for upper reference range is 0.045 ug/L.  Troponin values in   the range of 0.045 - 0.120 ug/L may be associated with risks of adverse   clinical events.    0.550*  <0.012     Most Recent 6 Bacteria Isolates From Any Culture (See EPIC Reports for Culture Details):  Recent Labs   Lab Test  04/06/16   1006   CULT  >100,000 colonies/mL Klebsiella pneumoniae  10,000 to 50,000 colonies/mL Strain 2 Klebsiella pneumoniae  *     Most Recent TSH, T4 and A1c Labs:  Recent Labs   Lab Test  03/07/17   1542   07/30/14   1149  08/26/11   0950   TSH  0.42   < >   --   0.34*   T4   --    --    --   1.17   A1C   --    --   5.3   --     < > = values in this interval not displayed.     Results for orders placed or performed during the hospital encounter of 03/07/17   CT Head w/o Contrast    Narrative    CT HEAD W/O CONTRAST 3/7/2017 4:59 PM    Provided History: headache s/p multiple falls    Comparison: None available.    Technique: Using multidetector thin collimation helical acquisition  technique, axial, coronal and sagittal CT images from the skull base  to the vertex were obtained without intravenous contrast.     Findings:    No intracranial hemorrhage, mass effect, or midline shift. Mild  generalized parenchymal volume loss, within normal limits for age. The  ventricles are proportionate to the cerebral sulci. The gray to white  matter differentiation of the cerebral hemispheres is preserved. Mild  nonspecific hypoattenuation the periventricular white matter of both  cerebral hemispheres. The basal cisterns are patent. There are  calcifications of the carotid siphons.    Minimal frontal sinus mucosal thickening. The mastoid air cells are  clear. No external soft tissue swelling. The orbits are unremarkable.       Impression    Impression:   1. No acute intracranial pathology.  2. Mild chronic small vessel ischemic disease and generalized  parenchymal volume loss, within  normal limits for age.    I have personally reviewed the examination and initial interpretation  and I agree with the findings.    ADITI ULLOA MD   CT Cervical Spine w/o Contrast    Narrative    CT CERVICAL SPINE W/O CONTRAST 3/7/2017 5:00 PM    Provided History: trauma status post multiple falls    Comparison: Same-day chest CT. Radiographs of the cervical spine  9/15/2008    Technique: Using multidetector thin collimation helical acquisition  technique, axial, coronal and sagittal CT images through the cervical  spine were obtained without intravenous contrast.     Findings:  Evaluation is somewhat limited by motion artifact.    Trace anterolisthesis of C7 on T1 appears degenerative in nature.  There is straightening of the normal cervical lordosis. No acute  fracture or acute subluxation. No prevertebral edema. Moderate loss of  intervertebral disc height at C3-4. Multilevel mild loss of  intervertebral disc height. Additional multilevel cervical  degenerative changes with disc bulges, endplate osteophytic spurring,  uncinate hypertrophy, and facet hypertrophy. Mild spinal canal  narrowing at C3-4. Mild bilateral neural foraminal narrowing at C3-4.     The thyroid is enlarged and heterogenous containing multiple internal  calcifications. This is also seen on CT of the chest 3/7/2017.    Partially visualized right pleural effusion. Partially visualized  right pneumothorax, as demonstrated on the same day chest CT.      Impression    Impression:   1. No acute cervical fracture or subluxation.  2. Mild to moderate multilevel cervical degenerative changes.  3. Multinodular goiter. Recommend correlation with thyroid ultrasound.  4. Partially visualized right pleural effusion and pneumothorax, as  demonstrated on the same day chest CT.    I have personally reviewed the examination and initial interpretation  and I agree with the findings.    ADITI ULLOA MD   CT Abdomen Pelvis w Contrast    Narrative     Examination:  CT ABDOMEN PELVIS W CONTRAST 3/7/2017 5:03 PM     History: Right-sided abdominal pain, status post multiple falls, known  rib fracture and pneumothorax    Comparison: CT chest 3/7/2017    Technique: CT of the abdomen and pelvis were obtained with IV  contrast. Sagittal and coronal reconstructions created and reviewed.    Contrast: 104 cc of isovue 370    Findings:   There are scattered calcified granulomas in the spleen and liver.  There is mild periportal edema. The gallbladder is surgically absent.  The spleen is atrophic. The adrenal glands are unremarkable. There is  symmetric nephrographic phase without hydronephrosis. The urinary  bladder is unremarkable, partially obscured by metallic streak  artifact. There are 2 small nonobstructing renal calculi in the  inferior pole of the left kidney. The largest calculus measures 4 mm.  There is a fluid attenuating cystic lesion of the inferior pole of the  left kidney.    There are postsurgical changes of gastric bypass. The small and large  bowel are normal in caliber. The low pelvis is partially obscured by  left total hip arthroplasty metallic streak artifact. No  retroperitoneal, mesenteric, or inguinal lymphadenopathy. There are  atherosclerotic calcifications of the abdominal aorta without evidence  of abdominal aortic aneurysm. The major abdominal vasculature is  patent. No abdominal free fluid or free air.    Lower thorax: The chest is better evaluated on dedicated same day CT  chest. Right-sided pneumothorax and small pleural effusion is  partially visualized. Acute and subacute rib fractures are noted in  the bilateral anterolateral ribs.    Bones and soft tissues: Degenerative changes of the lumbar spine and  right femoral acetabular joint are noted. There are postsurgical  changes of left hip arthroplasty      Impression    Impression:   1. Excluding partially visualized chest findings, no additional acute  traumatic findings are seen in the  abdomen or pelvis to explain  right-sided abdominal pain.  2. Nonobstructing left renal calculi.    I have personally reviewed the examination and initial interpretation  and I agree with the findings.    MATEO DAI MD   XR Chest 2 Views    Narrative     Examination:  XR CHEST 2 VW     Date:  3/8/2017 8:15 AM      Clinical Information: evaluation of pneumothorax     Additional Information: none    Comparison: 3/8/2017 0149 hours    Findings:   The pulmonary vasculature is distinct. The cardiac silhouette is  normal in size. No significant change in small right apical  pneumothorax. The lungs and pleural spaces are otherwise clear.  Surgical clips in left upper quadrant of the abdomen. No acute osseous  injury.      Impression    Impression:  1. No significant change in small right apical pneumothorax.    LAURENT DESAI MD   US Thyroid    Narrative    EXAMINATION: US THYROID, 3/8/2017 9:10 AM     COMPARISON: CT 3/7/2017.    HISTORY: Multinodular heterogeneous thyroid gland seen on CT.    Technique: Grayscale and color ultrasound imaging of the thyroid was  performed.    Findings:    Thyroid parenchyma: Diffusely heterogeneous with enlargement of the  left lobe.    The right lobe of the thyroid measures: 1.2 x 1.8 x 5 cm     The thyroid isthmus measures: 0.4 cm     The left lobe of the thyroid measures: 6.1 x 4.6 x 7.4 cm     Right lobe: Multiple subcentimeter cystic/spongiform nodules are noted  on the right, the largest in the upper pole measuring up to 5 mm.    Isthmus: No discrete nodules.    Left Lobe: The left lobe of the thyroid gland is diffusely  heterogeneous, nodular, and enlarged with a few macrocalcifications.      Impression    Impression:  Multinodular thyroid gland with diffuse heterogeneity, nodularity, and  enlargement of the left lobe and subcentimeter cystic/spongiform  nodules on the right.    JUAN MANUEL KNIGHT MD   XR Chest Port 1 View    Narrative    Study: XR CHEST PORT 1 VW  3/8/2017 2:08 AM    History: tachycardia with known PTX    Comparison: Chest CT yesterday    Findings:   The cardiomediastinal silhouette and pulmonary vasculature are within  normal limits. Mild bibasilar streaky opacities. No pleural effusion.  Small right apical pneumothorax. No left pneumothorax. No pleural  effusion. Numerous surgical staples and suture line is noted in the  left upper quadrant the abdomen. Degenerative changes of the shoulders  bilaterally.      Impression    Impression:   1. Small right apical pneumothorax.  2. Mild bibasilar streaky opacities consistent with atelectasis, as  seen on comparison chest CT.    I have personally reviewed the examination and initial interpretation  and I agree with the findings.    ADELINA LEUNG MD       Time Spent on this Encounter   I, Irma Milan, personally saw the patient today and spent greater than 30 minutes discharging this patient.    We appreciate the opportunity to care for your patient while in the hospital.  Should you have any questions about their injuries or this discharge summary our contact information is below.    Trauma Services  [facility]   Department of Critical Care and Acute Care Surgery  [address] [place] [id number]  Office: [phone]  Clinic Nurse: [phone]

## 2017-03-08 NOTE — PHARMACY-ADMISSION MEDICATION HISTORY
Admission medication history interview status for the 3/7/2017 admission is complete. See Epic admission navigator for allergy information, pharmacy, prior to admission medications and immunization status.     Medication history interview sources:  patient's recall, [facility] in [employer] in [place], [place] ([phone])    Changes made to PTA medication list (reason)  Added: none    Deleted: none (duplicate oxycodone-APAP orders and simvastatin order was already discontinued by provider)    Changed:   - clarified direction of vitamin B-12 (was missing direction)  - clarified direction of multivitamin to 1 tablet by mouth daily (was missing a dose and route)  - added note that patient takes senna-docusate 2 tablets once daily (versus 1 tablet twice daily as prescribed)    Additional medication history information (including reliability of information, actions taken by pharmacist):  - Patient was fairly reliable and was knowledgeable of her medications, although she was a little distracted at times. Dose and directions were verified with [facility] in [employer] in [place], [place] ([phone]).  - Simvastatin was discontinued during this admission due to possible side effect (progressive weakness and falls)  - Patient had her flu shot on 9/7/2016.    Prior to Admission medications    Medication Sig Last Dose Taking? Auth Provider   multivitamin, therapeutic (THERA-VIT) TABS tablet Take 1 tablet by mouth daily 3/7/2017 at AM Yes Unknown, Entered By History   sertraline (ZOLOFT) 100 MG tablet TAKE 2 TABLETS BY MOUTH EVERY DAY 3/7/2017 at AM Yes Macarena Vinson PA-C   nystatin (MYCOSTATIN) 157562 UNIT/GM POWD Apply 1 g topically as needed Past Week Yes Macarena Vinson PA-C   hydrOXYzine (ATARAX) 25 MG tablet Take 1 tablet (25 mg) by mouth nightly as needed for anxiety Past Week Yes Macarena Vinson PA-C   traZODone (DESYREL) 50 MG tablet Take 1-2 tablets ( mg) by mouth nightly as needed for sleep 3/6/2017  at HS Yes Macarena Vinson PA-C   oxybutynin (DITROPAN XL) 10 MG 24 hr tablet Take 1 tablet (10 mg) by mouth daily 3/7/2017 at AM Yes Macarena Vinson PA-C   buPROPion (WELLBUTRIN XL) 150 MG 24 hr tablet Take 1 tablet (150 mg) by mouth every morning 3/7/2017 at AM Yes Macarena Vinson PA-C   albuterol (PROAIR HFA, PROVENTIL HFA, VENTOLIN HFA) 108 (90 BASE) MCG/ACT inhaler Inhale 2 puffs into the lungs every 6 hours as needed for shortness of breath / dyspnea PRN Yes Macarena Vinson PA-C   fluticasone-salmeterol (ADVAIR DISKUS) 250-50 MCG/DOSE diskus inhaler Inhale 1 puff into the lungs 2 times daily 3/7/2017 at AM Yes Macarena Vinson PA-C   oxyCODONE-acetaminophen (PERCOCET)  MG per tablet 1-2 tablets every 6 hrs prn-to fill on or after 9/11/16 Past Week Yes Macarena Vinson PA-C   Cyanocobalamin (VITAMIN B-12 PO) Take 100 mcg by mouth daily  3/7/2017 at AM Yes Reported, Patient   senna-docusate (SENOKOT-S;PERICOLACE) 8.6-50 MG per tablet Take 1 tablet by mouth 2 times daily.  Patient taking differently: Take 2 tablets by mouth daily  3/7/2017 at AM Yes Daniel Gaviria MD       Medication history completed by: Arabella Villarreal, PharmD  PGY-1 Pharmacy Resident

## 2017-03-08 NOTE — PROGRESS NOTES
"RN/Care Coordinator Note    Patient status changed to observation. \"What is observation status\" letter given to patient. Directed patient to contact her insurance provider for any specific policy questions.     Tresa Gallegos RN  Care Coordinator  Pager [phone]          "

## 2017-03-08 NOTE — PLAN OF CARE
Problem: Goal Outcome Summary  Goal: Goal Outcome Summary  PT / 7B: Initial PT eval completed, treatment initiated. Pt performs bed mobility SBA. Pt performs sit <> stand SBA. Pt ambulates ~40ft with FWW and braces/orthotic shoes + CGA. Pt reports at baseline she ambulates short distances with her walker and utilizes her wheelchair for farther distances. Pt reports she does feel that she is near her baseline function, however is agreeable to home therapies to address deficits with ROM, strength, balance, mobility, and endurance, to ensure a safe return home.  Recommend DC home with assist from family/friends, HHPT/OT, and home-care services.

## 2017-03-08 NOTE — PROGRESS NOTES
Patient arrived at 2300. Alert and oriented X4. Present with Fall. Wheelchair bound. Assist x2 for transfers. Sat is 93%RA. Started required admission documentation. Reviewed plan of care with patient. Denied of any pain/plan for tylenol. Continue with plan of care.

## 2017-03-08 NOTE — TELEPHONE ENCOUNTER
"Cici Kumar MD left [facility] for PCP. Pneumothorax has been stable. Will be sent home today. Found to have enlarged thyroid, they did ultrasound-you are welcome to look at results-it should be followed up on but not urgently-labs came back \"pretty benign\".    Cj Maharaj RN    "

## 2017-03-09 ENCOUNTER — TELEPHONE (OUTPATIENT)
Dept: FAMILY MEDICINE | Facility: CLINIC | Age: 72
End: 2017-03-09

## 2017-03-09 ENCOUNTER — CARE COORDINATION (OUTPATIENT)
Dept: CARDIOLOGY | Facility: CLINIC | Age: 72
End: 2017-03-09

## 2017-03-09 NOTE — PLAN OF CARE
Problem: Goal Outcome Summary  Goal: Goal Outcome Summary  Physical Therapy Discharge Summary     Reason for therapy discharge:    All goals and outcomes met, no further needs identified.     Progress towards therapy goal(s). See goals on Care Plan in [facility] electronic health record for goal details.  Goals met     Therapy recommendation(s):    Continued therapy is recommended.  Rationale/Recommendations:  Home health PT to address deficits with functional mobility.

## 2017-03-09 NOTE — PLAN OF CARE
"Problem: Goal Outcome Summary  Goal: Goal Outcome Summary  Outcome: Improving  /72 (BP Location: Right arm)  Pulse 76  Temp 98.3  F (36.8  C) (Oral)  Resp 20  Ht 1.651 m (5' 5\")  Wt 77.1 kg (170 lb)  SpO2 95%  BMI 28.29 kg/m2     Patient discharged home at ~1645. A&Ox4. Patient belongings sent home with patient. Dressed appropriately for weather. No IV's to remove. Discharge papers reviewed with patient. Transported to [facility] by  ride waiting. Instructed to stop at pharmacy.       "

## 2017-03-09 NOTE — TELEPHONE ENCOUNTER
Reason for Call:  Other call back    Detailed comments: patient calling to let PCP know that she is now DC'd from [facility].  Patient was told by PCP to call when she is at home.  Please call.    Phone Number Patient can be reached at: Home number on file [phone] (home)    Best Time: any    Can we leave a detailed message on this number? YES    Call taken on 3/9/2017 at 9:33 AM by Mariana Cleary

## 2017-03-09 NOTE — PROGRESS NOTES
03/08/17 1442   Quick Adds   Type of Visit Initial PT Evaluation   Living Environment   Lives With alone   Living Arrangements house   Home Accessibility bed and bath on same level;grab bars present (bathtub);ramps present at home   Number of Stairs to Enter Home 0  (Pt has ramp to enter home)   Number of Stairs Within Home 0   Stair Railings at Home none   Transportation Available car;family or friend will provide   Living Environment Comment Pt reports she has good support system of family/friends that are available to assist as needed.    Self-Care   Dominant Hand right   Usual Activity Tolerance fair   Current Activity Tolerance fair   Regular Exercise no   Equipment Currently Used at Home bath bench;grab bar;wheelchair;walker, rolling;raised toilet   Activity/Exercise/Self-Care Comment Reports she uses FWW for short distances, and utilizes wheelchair for far distances.    Functional Level Prior   Ambulation 1-->assistive equipment   Transferring 1-->assistive equipment   Toileting 1-->assistive equipment   Bathing 1-->assistive equipment   Dressing 0-->independent   Eating 0-->independent   Communication 0-->understands/communicates without difficulty   Swallowing 0-->swallows foods/liquids without difficulty   Cognition 0 - no cognition issues reported   Fall history within last six months yes   Number of times patient has fallen within last six months 3   Which of the above functional risks had a recent onset or change? ambulation;transferring;fall history   Prior Functional Level Comment Pt reports IND with mobility/ADLs prior to hospitalization. Pt does endorse falls, and attributes them to L LE pain and B LE weakness.   General Information   Onset of Illness/Injury or Date of Surgery - Date 03/07/17   Referring Physician Rosa Boyce APRN CNP   Patient/Family Goals Statement Pt's goal is to return home   Pertinent History of Current Problem (include personal factors and/or comorbidities that impact  the POC) Ledy Odonnell is a 71 year old female with history of congenital foot deformity, multiple recent falls, COPD, HTN, HLD, chronic low back pain, anxiety and depression who presents today from clinic for evaluation of right sided rib fractures and pneumothorax found incidentally on chest CT which was being performed as a follow-up for cancer screening. Chest CT showed acute to subacute right sided 4th and 5th anterior rib fractures, a subacute appearing 10th rib fracture and small to moderate pneumothorax. As a result, the patient was instructed to present to the ED for further evaluation and management.    Precautions/Limitations fall precautions   Heart Disease Risk Factors Age;Medical history;Stress;Lack of physical activity;High blood pressure;Smoking;Dislipidemia   General Observations Activity: Up with assist   Cognitive Status Examination   Orientation orientation to person, place and time   Level of Consciousness alert   Follows Commands and Answers Questions 100% of the time   Personal Safety and Judgment at risk behaviors demonstrated   Memory intact   Pain Assessment   Patient Currently in Pain Yes, see Vital Sign flowsheet  (L LE pain)   Posture    Posture Forward head position;Protracted shoulders;Kyphosis   Range of Motion (ROM)   ROM Comment B shoulder ROM impaired 25%, however all other UE ROM WFL; B hip ROM impaired 25%, however all other LE ROM WFL   Strength   Strength Comments B LE strength grossly 4/5   Bed Mobility   Bed Mobility Comments Pt performs supine <> sitting EOB SBA   Transfer Skills   Transfer Comments Pt performs sit <> stand transfers SBA with cues for safe hand placement and sequencing   Gait   Gait Comments Pt ambulates ~40ft with FWW and braces/orthotic shoes + CGA.   Balance   Balance Comments Sitting balance IND; Static standing balance with FWW CGA-SBA   Sensory Examination   Sensory Perception no deficits were identified   Coordination   Coordination no deficits  "were identified   Muscle Tone   Muscle Tone no deficits were identified   General Therapy Interventions   Planned Therapy Interventions balance training;bed mobility training;gait training;ROM;strengthening;transfer training;home program guidelines;progressive activity/exercise   Clinical Impression   Criteria for Skilled Therapeutic Intervention yes, treatment indicated   PT Diagnosis Impaired functional mobility   Influenced by the following impairments Decreased ROM/strength, impaired balance, decreased activity tolerance   Functional limitations due to impairments Limited with bed mobility, transfers, dynamic balance, gait, endurance   Clinical Presentation Stable/Uncomplicated   Clinical Presentation Rationale Pt presents with personal factors and comorbidities impacting the POC. Examination reveals decreased ROM/strength, impaired balance, and decreased activity tolerance. These impairments are limiting pt with bed mobility, transfers, dynamic balance, gait, and endurance.   Clinical Decision Making (Complexity) Low complexity   Therapy Frequency` other (see comments)  (One time eval + treat)   Anticipated Equipment Needs at Discharge (TBD)   Anticipated Discharge Disposition Home with Assist;Home with Home Therapy   Risk & Benefits of therapy have been explained Yes   Patient, Family & other staff in agreement with plan of care Yes   [facility] [employer] TM \"6 Clicks\"   2016, Trustees of [facility], under license to [employer].  All rights reserved.   6 Clicks Short Forms Basic Mobility Inpatient Short Form   [facility] AM-PAC  \"6 Clicks\" V.2 Basic Mobility Inpatient Short Form   1. Turning from your back to your side while in a flat bed without using bedrails? 4 - None   2. Moving from lying on your back to sitting on the side of a flat bed without using bedrails? 4 - None   3. Moving to and from a bed to a chair (including a wheelchair)? 4 - None   4. Standing up from a chair using your " arms (e.g., wheelchair, or bedside chair)? 4 - None   5. To walk in hospital room? 3 - A Little   6. Climbing 3-5 steps with a railing? 2 - A Lot   Basic Mobility Raw Score (Score out of 24.Lower scores equate to lower levels of function) 21   Total Evaluation Time   Total Evaluation Time (Minutes) 8   c

## 2017-03-09 NOTE — PROGRESS NOTES
"[facility]  \"Hello, my name is Jackie Montanez , and I am calling from the [facility].  I want to check in and see how you are doing, after leaving the hospital.  You may also receive a call from your Care Coordinator (care team), but I want to make sure you don t have any urgent needs.  I have a couple questions to review with you:     Post-Discharge Outreach                                                    Ledy Odonnell is a 71 year old female     Follow-up Appointments           [facility] Follow-up and recommended labs and tests       Follow up with primary care provider, Macarena Vinson, within 7 days to evaluate medication change and for hospital follow- up. No follow up labs or test are needed. You should have a followup BMP to assess your sodium and potassium level in 1 week.                 Care Team:    Patient Care Team       Relationship Specialty Notifications Start End    Macarena Vinson PA-C PCP - General Physician Assistant  3/15/10     Phone: [phone] Fax: [phone]         [facility] [address] [facility] [address]    Eagle Patel MD MD Internal Medicine  10/24/12     Comment:  see GI at [facility]    Phone: [phone] Fax: [phone]         [facility] [address] [place] [address]    Sada Flaherty, RN Case Manager   8/14/15     Comment:  [facility] Care Coordinator    Phone: [phone] Fax: [phone]                Transition of Care Review                                                      Patient was called three times and no answer so post 24 hr DC follow up calls will be closed out                       Plan                                                      Thanks for your time.  Your Care Coordinator may follow-up within the next couple days.  In the meantime if you have questions, concerns or problems call your care team.  "       Jackie Montanez

## 2017-03-09 NOTE — TELEPHONE ENCOUNTER
"Hospital/TCU/ED for chronic condition Discharge Protocol    \"Hi, my name is Apryl Calloway, a registered nurse, and I am calling from [facility].  I am calling to follow up and see how things are going for you after your recent emergency visit/hospital/TCU stay.\"    Tell me how you are doing now that you are home?\" Better than before but says she still feels pretty awful.       Discharge Instructions    \"Let's review your discharge instructions.  What is/are the follow-up recommendations?  Pt. Response: See PCP within 7 days    \"Has an appointment with your primary care provider been scheduled?\"   No (schedule appointment)    \"When you see the provider, I would recommend that you bring your medications with you.\"    Medications    \"Tell me what changed about your medicines when you discharged?\"    Changes to chronic meds?    0-1    \"What questions do you have about your medications?\"    None     New diagnoses of heart failure, COPD, diabetes, or MI?    No              Medication reconciliation completed? Yes  Was MTM referral placed (*Make sure to put transitions as reason for referral)?   No    Call Summary    \"What questions or concerns do you have about your recent visit and your follow-up care?\"     none    \"If you have questions or things don't continue to improve, we encourage you contact us through the main clinic number (give number).  Even if the clinic is not open, triage nurses are available 24/7 to help you.     We would like you to know that our clinic has extended hours (provide information).  We also have urgent care (provide details on closest location and hours/contact info)\"      \"Thank you for your time and take care!\"       Apryl Calloway RN   March 9, 2017 10:49 AM  [facility] Triage   [phone]      "

## 2017-03-10 ENCOUNTER — TELEPHONE (OUTPATIENT)
Dept: FAMILY MEDICINE | Facility: CLINIC | Age: 72
End: 2017-03-10

## 2017-03-10 NOTE — TELEPHONE ENCOUNTER
.Reason for Call: Request for an order or referral:    Order or referral being requested: PT orders     Date needed: as soon as possible    Has the patient been seen by the PCP for this problem? Not Applicable    Additional comments: Any questions please call Suki from [facility]     Phone number Patient can be reached at:  Other phone number:  [phone]    Best Time:  Before 5:00 today    Can we leave a detailed message on this number?  YES    Call taken on 3/10/2017 at 1:14 PM by Shannan Avalos

## 2017-03-10 NOTE — TELEPHONE ENCOUNTER
Called and spoke to  Suki, gave verbal OK for requested orders per protocol.      Apryl Calloway RN   March 10, 2017 3:07 PM  [facility] Triage   [phone]

## 2017-03-13 ENCOUNTER — TELEPHONE (OUTPATIENT)
Dept: FAMILY MEDICINE | Facility: CLINIC | Age: 72
End: 2017-03-13

## 2017-03-13 NOTE — TELEPHONE ENCOUNTER
Reason for Call: Request for an order or referral: orders     Order or referral being requested: per alden order needs to be okayed by Dr Florez. Pt was admitted on 3/11/ Needs to continue home pt 2 x a week x 3 weeks, OT  To eval and treat for safety w/ adl, cognition and adaptive equipment. Skilled nursing to eval and treat for med reconciliation and eval, skilled integrity.  to eval and treat for community services.    Date needed: as soon as possible    Has the patient been seen by the PCP for this problem? YES    Additional comments: none    Phone number Patient can be reached at:  Other phone number:  [place] [phone]    Best Time:  any    Can we leave a detailed message on this number?  YES    Call taken on 3/13/2017 at 8:18 AM by Vika Clifton

## 2017-03-14 ENCOUNTER — TELEPHONE (OUTPATIENT)
Dept: FAMILY MEDICINE | Facility: CLINIC | Age: 72
End: 2017-03-14

## 2017-03-14 ENCOUNTER — OFFICE VISIT (OUTPATIENT)
Dept: FAMILY MEDICINE | Facility: CLINIC | Age: 72
End: 2017-03-14
Payer: COMMERCIAL

## 2017-03-14 VITALS
SYSTOLIC BLOOD PRESSURE: 130 MMHG | HEART RATE: 72 BPM | DIASTOLIC BLOOD PRESSURE: 74 MMHG | TEMPERATURE: 98.7 F | HEIGHT: 65 IN

## 2017-03-14 DIAGNOSIS — J44.1 COPD EXACERBATION (H): ICD-10-CM

## 2017-03-14 DIAGNOSIS — F33.1 MAJOR DEPRESSIVE DISORDER, RECURRENT EPISODE, MODERATE (H): Primary | ICD-10-CM

## 2017-03-14 DIAGNOSIS — R91.8 PULMONARY NODULES: ICD-10-CM

## 2017-03-14 DIAGNOSIS — N39.498 OTHER URINARY INCONTINENCE: ICD-10-CM

## 2017-03-14 DIAGNOSIS — M16.9 HIP ARTHROSIS: ICD-10-CM

## 2017-03-14 DIAGNOSIS — F41.1 GENERALIZED ANXIETY DISORDER: ICD-10-CM

## 2017-03-14 DIAGNOSIS — G89.4 CHRONIC PAIN DISORDER: ICD-10-CM

## 2017-03-14 DIAGNOSIS — Q66.02 CONGENITAL TALIPES EQUINOVARUS DEFORMITY OF LEFT FOOT: ICD-10-CM

## 2017-03-14 LAB
ALBUMIN UR-MCNC: NEGATIVE MG/DL
APPEARANCE UR: CLEAR
BILIRUB UR QL STRIP: NEGATIVE
COLOR UR AUTO: YELLOW
GLUCOSE UR STRIP-MCNC: NEGATIVE MG/DL
HGB UR QL STRIP: NEGATIVE
KETONES UR STRIP-MCNC: NEGATIVE MG/DL
LEUKOCYTE ESTERASE UR QL STRIP: NEGATIVE
NITRATE UR QL: NEGATIVE
NON-SQ EPI CELLS #/AREA URNS LPF: ABNORMAL /LPF
PH UR STRIP: 7.5 PH (ref 5–7)
RBC #/AREA URNS AUTO: ABNORMAL /HPF (ref 0–2)
SP GR UR STRIP: 1.01 (ref 1–1.03)
URN SPEC COLLECT METH UR: ABNORMAL
UROBILINOGEN UR STRIP-ACNC: 0.2 EU/DL (ref 0.2–1)
WBC #/AREA URNS AUTO: ABNORMAL /HPF (ref 0–2)

## 2017-03-14 PROCEDURE — 81001 URINALYSIS AUTO W/SCOPE: CPT | Performed by: PHYSICIAN ASSISTANT

## 2017-03-14 PROCEDURE — 99496 TRANSJ CARE MGMT HIGH F2F 7D: CPT | Performed by: PHYSICIAN ASSISTANT

## 2017-03-14 RX ORDER — OXYCODONE AND ACETAMINOPHEN 10; 325 MG/1; MG/1
TABLET ORAL
Qty: 120 TABLET | Refills: 0 | Status: SHIPPED | OUTPATIENT
Start: 2017-03-14 | End: 2017-04-11

## 2017-03-14 RX ORDER — DULOXETIN HYDROCHLORIDE 60 MG/1
60 CAPSULE, DELAYED RELEASE ORAL DAILY
Qty: 90 CAPSULE | Refills: 3 | Status: SHIPPED | OUTPATIENT
Start: 2017-03-14 | End: 2018-01-10

## 2017-03-14 ASSESSMENT — ANXIETY QUESTIONNAIRES
7. FEELING AFRAID AS IF SOMETHING AWFUL MIGHT HAPPEN: SEVERAL DAYS
5. BEING SO RESTLESS THAT IT IS HARD TO SIT STILL: SEVERAL DAYS
2. NOT BEING ABLE TO STOP OR CONTROL WORRYING: SEVERAL DAYS
GAD7 TOTAL SCORE: 7
6. BECOMING EASILY ANNOYED OR IRRITABLE: SEVERAL DAYS
1. FEELING NERVOUS, ANXIOUS, OR ON EDGE: SEVERAL DAYS
3. WORRYING TOO MUCH ABOUT DIFFERENT THINGS: SEVERAL DAYS

## 2017-03-14 ASSESSMENT — PATIENT HEALTH QUESTIONNAIRE - PHQ9: 5. POOR APPETITE OR OVEREATING: SEVERAL DAYS

## 2017-03-14 NOTE — TELEPHONE ENCOUNTER
My called to get orders for the patient. Please call back, okay to leave message.    Macarena Beckett, Patient Representative

## 2017-03-14 NOTE — PROGRESS NOTES
SUBJECTIVE:                                                    Ledy Odonnell is a 71 year old female who presents to clinic today for the following health issues:        Hospital Follow-up Visit:    Hospital/Nursing Home/IP Rehab Facility: [facility]  Date of Admission: 3/7/17  Date of Discharge: 3/8/17  Reason(s) for Admission: multiple rib fractures, pneumothorax     Stomach flu 3 weeks ago, so hadn't been eating well and developed weakness and was falling as a result.          Today, is the best she has felt in a long time other than having a dry mouth.  Is now incontinent of urine, however, so she would like to be checked for a UTI. No other concerning symptoms.  Is having to get up several times per night.  Is wearing 24 hr overnight pads, but the bed is still wet when she wakes up. Felt this was similar to UTI.  Did not get checked for this in hospital.          Home care has come, PT is coming tomorrow.  Depression and anxiety not very well controlled. Is having a hard time with her failing health, inability to work ,etc. Feels she needs someone to come help her a few hours and help with housework, errands, etc. Currently, no income.     Still having pain in her R ribs. Continues to improve.                   Problems taking medications regularly:  None       Medication changes since discharge: None       Problems adhering to non-medication therapy:  None    Summary of hospitalization:  [facility] discharge summary reviewed  Diagnostic Tests/Treatments reviewed.  Follow up needed: Home Care as above  Other Healthcare Providers Involved in Patient s Care:         None  Update since discharge: improved.     Post Discharge Medication Reconciliation: discharge medications reconciled, continue medications without change.  Plan of care communicated with patient     Coding guidelines for this visit:  Type of Medical   Decision Making Face-to-Face Visit       within 7 Days of  "discharge Face-to-Face Visit        within 14 days of discharge   Moderate Complexity 30059 [id number]   High Complexity 61312 83754            -------------------------------------    Problem list and histories reviewed & adjusted, as indicated.  Additional history: as documented    Reviewed and updated as needed this visit by clinical staff  Tobacco  Allergies  Med Hx  Surg Hx  Fam Hx  Soc Hx      Reviewed and updated as needed this visit by Provider       ROS:  Constitutional, HEENT, cardiovascular, pulmonary, gi and gu systems are negative, except as otherwise noted.    OBJECTIVE:                                                    /74 (BP Location: Right arm, Cuff Size: Adult Regular)  Pulse 72  Temp 98.7  F (37.1  C) (Oral)  Ht 5' 5\" (1.651 m)  There is no height or weight on file to calculate BMI.  GENERAL: healthy, alert and no distress  NECK: no adenopathy, no asymmetry, masses, or scars and thyroid normal to palpation  RESP: lungs clear to auscultation - no rales, rhonchi or wheezes  CV: regular rate and rhythm, normal S1 S2, no S3 or S4, no murmur, click or rub, no peripheral edema and peripheral pulses strong  ABDOMEN: soft, nontender, no hepatosplenomegaly, no masses and bowel sounds normal    Diagnostic Test Results:  none      ASSESSMENT/PLAN:                                                    1. Major depressive disorder, recurrent episode, moderate (H)  2. Chronic pain disorder  3. Generalized anxiety disorder  Depression/anxiety and chronic pain not optimally controlled.  Recommend dc of Zoloft and start of Cymbalta  Will continue on current dose of pain medication.  Ideal candidate for therapy, and she is interested, but transportation and leaving her home is a problem.  Will check with Complex Care mobile team if she can just utilize the mental health provider and continue care with me.  Referral to  to discuss assistance as Guillermina currently has no income.  - DULoxetine (CYMBALTA) 60 MG EC " capsule; Take 1 capsule (60 mg) by mouth daily  Dispense: 90 capsule; Refill: 3  - oxyCODONE-acetaminophen (PERCOCET)  MG per tablet; 1-2 tablets every 6 hrs prn-to fill on or after 3/11/17  Dispense: 120 tablet; Refill: 0  - oxyCODONE-acetaminophen (PERCOCET)  MG per tablet; 1-2 tablets every 6 hrs prn-to fill on or after 6/10/17  Dispense: 120 tablet; Refill: 0  - oxyCODONE-acetaminophen (PERCOCET)  MG per tablet; 1-2 tablets every 6 hrs prn-to fill on or after 4/10/17  Dispense: 120 tablet; Refill: 0    4. Other urinary incontinence  UA appears negative.  Will add below medication for depression/pain, but has off label use for incontinence.   Consider increase in Detrol, but given her dry mouth, this is not likely ideal.  - DULoxetine (CYMBALTA) 60 MG EC capsule; Take 1 capsule (60 mg) by mouth daily  Dispense: 90 capsule; Refill: 3  - UA with Microscopic reflex to Culture    5. Hip arthrosis - left, severe  6. 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  She will check with insurance.  Script provided.  - order for DME; Equipment being ordered: motorized scooter.  Dispense: 1 Device; Refill: 0    7. COPD exacerbation (H)  Stable, continue current dosing.  - fluticasone-salmeterol (ADVAIR DISKUS) 250-50 MCG/DOSE diskus inhaler; Inhale 1 puff into the lungs 2 times daily  Dispense: 1 Inhaler; Refill: 5      Patient Instructions     Refills of pain medication today  Lulu- to either meet you today or call you.  Call insurance and medical supply store with regards to motorized scooter.  Stop Zoloft, start Cymbalta.    Macarena or her team will be in touch with you with results of urine.    Phone call in 2 weeks.    SERGO Callahan, PAPhilipC    [facility]   Discharged by : Sharlene MUÑOZ Certified Medical Assistant (AAMA)   Paper scripts provided to patient : 4   If you have  any questions regarding to your visit please contact your care team:   Team [facility] Hours Telephone Number   WOLF [address]., PA-C    7am-7pm Monday - Thursday   7am-5pm Fridays  [phone]   (Appointment scheduling available 24/7)   RN Line   [phone] option 2       What options do I have for visits at the clinic other than the traditional office visit?   To expand how we care for you, many of our providers are utilizing electronic visits (e-visits) and telephone visits, when medically appropriate, for interactions with their patients rather than a visit in the clinic. We also offer nurse visits for many medical concerns. Just like any other service, we will bill your insurance company for this type of visit based on time spent on the phone with your provider. Not all insurance companies cover these visits. Please check with your medical insurance if this type of visit is covered. You will be responsible for any charges that are not paid by your insurance.     E-visits via [employer]: generally incur a $35.00 fee.     Telephone visits:   Time spent on the phone: *charged based on time that is spent on the phone in increments of 10 minutes. Estimated cost:   5-10 mins $30.00   11-20 mins. $59.00   21-30 mins. $85.00   Use [employer] (secure email communication and access to your chart) to send your primary care provider a message or make an appointment. Ask someone on your Team how to sign up for [employer].   For a Price Quote for your services, please call our Consumer Price Line at [phone].   As always, Thank you for trusting us with your health care needs!                [facility]:    Scheduling Appointments  [place] [place]  Call: [phone]    [facility]  Call: [phone]    [facility]  Call: [phone]

## 2017-03-14 NOTE — TELEPHONE ENCOUNTER
[facility] [facility] Finding result notification:     Exam date: 3/7/17  Exam: LDCT scan   Radiologist recommendations:  Interval increase in patchy scattered groundglass opacities. In an active smoker, these are most suspicious for inflammation and are consistent with disease along the respiratory bronchiolitis/respiratory bronchiolitis-interstitial lung disease/desquamative interstitial pneumonitis spectrum. These finding should be followed on the scheduled lung cancer screening examination in approximately 6 months (one year from the study performed on 9/28/2016).  Ordering Provider: Macarena Vinson PA-C Patricia J Johnson did receive the remaining radiology results from her provider.   RN ordered a follow up CT to be completed in 6 months (Yes/No):  Yes   RN communicated the lung nodule finding to the patient (Yes/No):  No, Left message requesting a call back  RN ordered Lung nodule program referral (Yes/NA): NA  The patient had the following questions: NA, await return call back  RN sent correct letter as per Lung nodule protocol (Yes/No):  Yes   Miscellaneous information:  NA  RN has transferred the patient to scheduling team to schedule follow up (Yes/No): NA, await call back.    Image Scheduling Team (LDCT's only): Hours available (all sites below):  Mon-Fri 7A to 8P; Sat 7A to 3P.  No schedulers available on Sunday.    [facility] ([facility]): [phone]    North region ([place], [place]): [phone]    South region (Replaced by [facility]): [phone]    Complex Imaging Schedulers: [phone] [Scheduling both CT and Lung nodule clinic visit on the same day, CT scheduled first]  Complex imaging scheduling hours available: Mon, Tues, Thurs, Fri 7A - 7:30P;  Wed, 7A-6:45P   Left voicemail message requesting a call back to [phone] between 10 a.m. and 6:30 p.m. to discuss radiology finding.    [employer] RN  Lung Nodule and ED Lab Result  F/u [facility]# [phone]

## 2017-03-14 NOTE — PATIENT INSTRUCTIONS
Refills of pain medication today  Lulu- to either meet you today or call you.  Call insurance and medical supply store with regards to motorized scooter.  Stop Zoloft, start Cymbalta.    Macarena or her team will be in touch with you with results of urine.    Phone call in 2 weeks.    SERGO Callahan, WOLF    [facility]   Discharged by : Sharlene MUÑOZ Certified Medical Assistant (AAMA)   Paper scripts provided to patient : 4   If you have any questions regarding to your visit please contact your care team:   Team [facility] Hours Telephone Number   WOLF [address]., PA-C    7am-7pm Monday - Thursday   7am-5pm Fridays  [phone]   (Appointment scheduling available 24/7)   RN Line   [phone] option 2       What options do I have for visits at the clinic other than the traditional office visit?   To expand how we care for you, many of our providers are utilizing electronic visits (e-visits) and telephone visits, when medically appropriate, for interactions with their patients rather than a visit in the clinic. We also offer nurse visits for many medical concerns. Just like any other service, we will bill your insurance company for this type of visit based on time spent on the phone with your provider. Not all insurance companies cover these visits. Please check with your medical insurance if this type of visit is covered. You will be responsible for any charges that are not paid by your insurance.     E-visits via [employer] generally incur a $35.00 fee.     Telephone visits:   Time spent on the phone: *charged based on time that is spent on the phone in increments of 10 minutes. Estimated cost:   5-10 mins $30.00   11-20 mins. $59.00   21-30 mins. $85.00   Use [employer] (secure email communication and access to your chart) to send your primary care provider a message or make an appointment. Ask someone on your Team how to  sign up for [employer].   For a Price Quote for your services, please call our Consumer Price Line at [phone].   As always, Thank you for trusting us with your health care needs!                [facility]:    Scheduling Appointments  [facility]  Call: [phone]    [facility]  Call: [phone]    [facility]  Call: [phone]

## 2017-03-14 NOTE — TELEPHONE ENCOUNTER
Please call Ev and let her know that her urine did not show a bladder infection, therefore an antibiotic would not be indicated.  She can try to increase her Oxybutinin to 20 mg per day if she would like to see if this is beneficial.  It may potentially worsen her dry mouth.    SERGO Callahan, PAPhilipC

## 2017-03-14 NOTE — LETTER
2017        Ledy Odonnell MRN: [id number]   [address]  [facility] [place] [address] : 1945         Dear Ms. Odonnell,   You recently had a CT scan of your chest to monitor a previously seen spot on your lung, called a lung nodule.   We are pleased to report to you that your nodule has not grown. This is reassuring. However, we do recommend continued monitoring with CT scan for growth or change. If you haven t yet scheduled this exam, please do so by calling a location convenient to you:    [facility] ([facility]): [phone]    North region ([facility], [place]): [phone]    South region ([facility]): [phone]    Nodules are very common, and most are not a sign of lung cancer. We don t always know what causes them, but usually recommend monitoring for growth or change.  To speak with a nurse about your lung nodule, please call [phone]. Or, you may call your clinic.   Your imaging study has been sent to your healthcare provider and will be kept on file for your continuing care. Other findings from your imaging study besides this nodule should be provided from your clinic. Please inform any new doctors of your medical record with us.  Sincerely,   [facility]

## 2017-03-14 NOTE — MR AVS SNAPSHOT
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  Call insurance and medical supply store with regards to motorized scooter.  Stop Zoloft, start Cymbalta.    Macarena or her team will be in touch with you with results of urine.    Phone call in 2 weeks.    SERGO Callahan PA-C    [facility]   Discharged by : Sharlene MUÑOZ Certified Medical Assistant (AAMA)   Paper scripts provided to patient : 4   If you have any questions regarding to your visit please contact your care team:   [facility] Hours Telephone Number   WOLF [address] PA-C    7am-7pm Monday - Thursday   7am-5pm Fridays  [phone]   (Appointment scheduling available 24/7)   RN Line   [phone] option 2       What options do I have for visits at the clinic other than the traditional office visit?   To expand how we care for you, many of our providers are utilizing electronic visits (e-visits) and telephone visits, when medically appropriate, for interactions with their patients rather than a visit in the clinic. We also offer nurse visits for many medical concerns. Just like any other service, we will bill your insurance company for this type of visit based on time spent on the phone with your provider.  Not all insurance companies cover these visits. Please check with your medical insurance if this type of visit is covered. You will be responsible for any charges that are not paid by your insurance.     E-visits via [employer]: generally incur a $35.00 fee.     Telephone visits:   Time spent on the phone: *charged based on time that is spent on the phone in increments of 10 minutes. Estimated cost:   5-10 mins $30.00   11-20 mins. $59.00   21-30 mins. $85.00   Use [employer] (secure email communication and access to your chart) to send your primary care provider a message or make an appointment. Ask someone on your Team how to sign up for [employer].   For a Price Quote for your services, please call our [employer] Price Line at [phone].   As always, Thank you for trusting us with your health care needs!                [facility]:    Scheduling Appointments  [facility]  Call: [phone]    [facility]  Call: [phone]    [facility]  Call: [phone]                  Follow-ups after your visit        Who to contact     If you have questions or need follow up information about today's clinic visit or your schedule please contact [facility] directly at [phone].  Normal or non-critical lab and imaging results will be communicated to you by Webalohart, letter or phone within 4 business days after the clinic has received the results. If you do not hear from us within 7 days, please contact the clinic through Webalohart or phone. If you have a critical or abnormal lab result, we will notify you by phone as soon as possible.  Submit refill requests through [employer] or call your pharmacy and they will forward the refill request to us. Please allow 3 business days for your refill to be completed.          Additional Information About Your Visit        [employer] Information     [employer] lets you send messages to your  "doctor, view your test results, renew your prescriptions, schedule appointments and more. To sign up, go to [email or web address] . Click on \"Log in\" on the left side of the screen, which will take you to the Welcome page. Then click on \"Sign up Now\" on the right side of the page.     You will be asked to enter the access code listed below, as well as some personal information. Please follow the directions to create your username and password.     Your access code is: [email or web address]  Expires: 2017  4:27 PM     Your access code will  in 90 days. If you need help or a new code, please call your [place] clinic or [phone].        Care EveryWhere ID     This is your Care EveryWhere ID. This could be used by other organizations to access your [place] medical records  [id number]        Your Vitals Were     Pulse Temperature Height             72 98.7  F (37.1  C) (Oral) 5' 5\" (1.651 m)          Blood Pressure from Last 3 Encounters:   17 130/74   17 147/72   16 112/75    Weight from Last 3 Encounters:   17 170 lb (77.1 kg)   16 170 lb (77.1 kg)   16 173 lb 9.6 oz (78.7 kg)              We Performed the Following     UA with Microscopic reflex to Culture          Today's Medication Changes          These changes are accurate as of: 3/14/17 12:05 PM.  If you have any questions, ask your nurse or doctor.               Start taking these medicines.        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   Quantity:  1 Device   Refills:  0         These medicines have changed or have updated prescriptions.        Dose/Directions    * oxyCODONE-acetaminophen  MG per tablet   Commonly known as:  PERCOCET   This may have changed:  Another medication with the same name was added. Make sure you understand how and when to take each.   Used for:  Chronic pain disorder   Changed by:  Macarena Vinson PA-C        1-2 tablets every 6 hrs prn-to fill on or after 9/11/16   Quantity:  120 tablet   Refills:  0       * oxyCODONE-acetaminophen  MG per tablet   Commonly known as:  PERCOCET   This may have changed:  You were already taking a medication with the same name, and this prescription was added. Make sure you understand how and when to take each.   Used for:  Chronic pain disorder   Changed by:  Macarena Vinson PA-C        1-2 tablets every 6 hrs prn-to fill on or after 3/11/17   Quantity:  120 tablet   Refills:  0       * oxyCODONE-acetaminophen  MG per tablet   Commonly known as:  PERCOCET   This may have changed:  You were already taking a medication with the same name, and this prescription was added. Make sure you understand how and when to take each.   Used for:  Chronic pain disorder   Changed by:  Macarena Vinson PA-C        1-2 tablets every 6 hrs prn-to fill on or after 6/10/17   Quantity:  120 tablet   Refills:  0       * oxyCODONE-acetaminophen  MG per tablet   Commonly known as:  PERCOCET   This may have changed:  You were already taking a medication with the same name, and this prescription was added. Make sure you understand how and when to take each.   Used for:  Chronic pain disorder   Changed by:  Macarena Vinson PA-C        1-2 tablets every 6 hrs prn-to fill on or after 4/10/17   Quantity:  120 tablet   Refills:  0       senna-docusate 8.6-50 MG per tablet   Commonly known as:  SENOKOT-S;PERICOLACE   This may have  changed:    - how much to take  - when to take this   Used for:  Ventral hernia        Dose:  1 tablet   Take 1 tablet by mouth 2 times daily.   Quantity:  40 tablet   Refills:  11       * Notice:  This list has 4 medication(s) that are the same as other medications prescribed for you. Read the directions carefully, and ask your doctor or other care provider to review them with you.         Where to get your medicines      These medications were sent to [facility] [address] [address] [facility] [address]  [address] [address]  [address] [place] [address]     Phone:  [phone]     DULoxetine 60 MG EC capsule    fluticasone-salmeterol 250-50 MCG/DOSE diskus inhaler         Some of these will need a paper prescription and others can be bought over the counter.  Ask your nurse if you have questions.     Bring a paper prescription for each of these medications     order for DME    oxyCODONE-acetaminophen  MG per tablet    oxyCODONE-acetaminophen  MG per tablet    oxyCODONE-acetaminophen  MG per tablet                Primary Care Provider Office Phone # Fax #    Macarena Vinson PA-C [phone]       [address] [address]        Thank you!     Thank you for choosing [facility]  for your care. Our goal is always to provide you with excellent care. Hearing back from our patients is one way we can continue to improve our services. Please take a few minutes to complete the written survey that you may receive in the mail after your visit with us. Thank you!             Your Updated Medication List - Protect others around you: Learn how to safely use, store and throw away your medicines at www.disposemymeds.org.          This list is accurate as of: 3/14/17 12:05 PM.  Always use your most recent med list.                   Brand Name Dispense Instructions for use    albuterol 108 (90 BASE) MCG/ACT Inhaler    PROAIR HFA/PROVENTIL  HFA/VENTOLIN HFA    3 Inhaler    Inhale 2 puffs into the lungs every 6 hours as needed for shortness of breath / dyspnea       buPROPion 150 MG 24 hr tablet    WELLBUTRIN XL    90 tablet    Take 1 tablet (150 mg) by mouth every morning       DULoxetine 60 MG EC capsule    CYMBALTA    90 capsule    Take 1 capsule (60 mg) by mouth daily       fluticasone-salmeterol 250-50 MCG/DOSE diskus inhaler    ADVAIR DISKUS    1 Inhaler    Inhale 1 puff into the lungs 2 times daily       hydrOXYzine 25 MG tablet    ATARAX    30 tablet    Take 1 tablet (25 mg) by mouth nightly as needed for anxiety       miconazole 2 % powder    MICATIN; MICRO GUARD    30 g    Apply topically 2 times daily Apply to groin       multivitamin, therapeutic Tabs tablet      Take 1 tablet by mouth daily       nicotine 14 MG/24HR 24 hr patch    NICODERM CQ    30 patch    Place 1 patch onto the skin daily       nystatin 923901 UNIT/GM Powd    MYCOSTATIN    1 Bottle    Apply 1 g topically as needed       nystatin 672431 UNIT/ML suspension    MYCOSTATIN    200 mL    Take 5 mLs (500,000 Units) by mouth 4 times daily for 10 days       order for DME     1 Device    Equipment being ordered: motorized scooter.       oxybutynin 10 MG 24 hr tablet    DITROPAN XL    90 tablet    Take 1 tablet (10 mg) by mouth daily       * oxyCODONE-acetaminophen  MG per tablet    PERCOCET    120 tablet    1-2 tablets every 6 hrs prn-to fill on or after 9/11/16       * oxyCODONE-acetaminophen  MG per tablet    PERCOCET    120 tablet    1-2 tablets every 6 hrs prn-to fill on or after 3/11/17       * oxyCODONE-acetaminophen  MG per tablet    PERCOCET    120 tablet    1-2 tablets every 6 hrs prn-to fill on or after 6/10/17       * oxyCODONE-acetaminophen  MG per tablet    PERCOCET    120 tablet    1-2 tablets every 6 hrs prn-to fill on or after 4/10/17       senna-docusate 8.6-50 MG per tablet    SENOKOT-S;PERICOLACE    40 tablet    Take 1 tablet by mouth 2  times daily.       sertraline 100 MG tablet    ZOLOFT    60 tablet    TAKE 2 TABLETS BY MOUTH EVERY DAY       traZODone 50 MG tablet    DESYREL    90 tablet    Take 1-2 tablets ( mg) by mouth nightly as needed for sleep       VITAMIN B-12 PO      Take 100 mcg by mouth daily       * Notice:  This list has 4 medication(s) that are the same as other medications prescribed for you. Read the directions carefully, and ask your doctor or other care provider to review them with you.

## 2017-03-14 NOTE — NURSING NOTE
"Chief Complaint   Patient presents with     Hospital F/U       Initial /74 (BP Location: Right arm, Cuff Size: Adult Regular)  Pulse 72  Temp 98.7  F (37.1  C) (Oral)  Ht 5' 5\" (1.651 m) Estimated body mass index is 28.29 kg/(m^2) as calculated from the following:    Height as of 3/7/17: 5' 5\" (1.651 m).    Weight as of 3/7/17: 170 lb (77.1 kg).  Medication Reconciliation: complete     Latasha Bustamante MA     "

## 2017-03-15 ASSESSMENT — PATIENT HEALTH QUESTIONNAIRE - PHQ9: SUM OF ALL RESPONSES TO PHQ QUESTIONS 1-9: 8

## 2017-03-15 ASSESSMENT — ANXIETY QUESTIONNAIRES: GAD7 TOTAL SCORE: 7

## 2017-03-15 NOTE — TELEPHONE ENCOUNTER
Reason for Call:  Other call back    Detailed comments: Joyce calling back from  Home Care, Judith not in office today    Phone Number Patient can be reached at: Other phone number:  [phone], Joyce, RN    Best Time: any    Can we leave a detailed message on this number? YES    Call taken on 3/15/2017 at 9:58 AM by Melony Rashid

## 2017-03-15 NOTE — TELEPHONE ENCOUNTER
Attempt # 1    Called Judith at listed number.     Was call answered?  No.  Left message on voicemail with information to call me back.    Cj Maharaj RN

## 2017-03-15 NOTE — TELEPHONE ENCOUNTER
Ledy returned call.  She was notified of result and recommendation  CT order placed.  Radiology will call her 1 month prior to schedule.    Tobin Barnett, RN  [facility] RN  Lung Nodule and ED Lab Result F/u RN  Epic pool (ED late result f/u RN): [id number]  [facility] INCIDENTAL RADIOLOGY F/U NURSES: P [id number]  # [phone]

## 2017-03-17 ENCOUNTER — CARE COORDINATION (OUTPATIENT)
Dept: CASE MANAGEMENT | Facility: CLINIC | Age: 72
End: 2017-03-17

## 2017-03-17 ENCOUNTER — TELEPHONE (OUTPATIENT)
Dept: FAMILY MEDICINE | Facility: CLINIC | Age: 72
End: 2017-03-17

## 2017-03-17 NOTE — TELEPHONE ENCOUNTER
Attempt # 1    Called Royce at listed number.     Was call answered?  No.  Left message on voicemail with information to call me back.    Cj Maharaj RN

## 2017-03-17 NOTE — PROGRESS NOTES
Clinic Care Coordination Chart Review    CC reviewed chart after receiving care coordination referral. Patient does have home care services in place.  Plan: CC will contact patient on Monday.    ANGEL Butler   [phone]  March 17, 2017

## 2017-03-17 NOTE — TELEPHONE ENCOUNTER
Reason for call:  Patient reporting a symptom    Symptom or request: dry mouth, active thrush    Duration (how long have symptoms been present): x 2 days    Have you been treated for this before? Yes    Additional comments: Home Care calling    Phone Number patient can be reached at:  Other phone number:  [facility] RN, [phone]    Best Time:  any    Can we leave a detailed message on this number:  YES    Call taken on 3/17/2017 at 12:13 PM by Melony Rashid

## 2017-03-17 NOTE — TELEPHONE ENCOUNTER
Unfortunately, this is a side effect of the medications.  The prescription medication for dry mouth will worsen her urinary issue.  IF she isn't already, she could chew sugar free gum or sugar free candy.    SERGO Callahan, PAPhilipC

## 2017-03-17 NOTE — TELEPHONE ENCOUNTER
Reason for Call: Request for an order or referral:    Order or referral being requested: Verbal orders: OT 2 x wk x 5 wks    Date needed: as soon as possible    Has the patient been seen by the PCP for this problem? YES    Additional comments: [facility] requesting    Phone number Patient can be reached at:  Other phone number:  CARLOS Mayes [phone]    Best Time:  any    Can we leave a detailed message on this number?  YES    Call taken on 3/17/2017 at 1:00 PM by Melony Rashid

## 2017-03-17 NOTE — TELEPHONE ENCOUNTER
Forms received from  Home Care: SN Orders for Macarena Vinson PA-C.  Forms placed in provider 'sign me' folder.  Please fax forms to [phone] after completion.    Gabbi Rashid,

## 2017-03-17 NOTE — TELEPHONE ENCOUNTER
Called homecare RNRoyce. Patient has 2 days left of nystatin swish and swallow. No white patches but noticeably dry. Still c/o dryness and discomfort. She was started on wellbutrin and cymbalta recently. Has tried biotene and lozenges with no relief. Swallowing ok. Royce wondering if we should prolong nystatin, or if this could be new med side effects-or just continue as planned.    Cj Maharaj RN

## 2017-03-20 NOTE — PROGRESS NOTES
"Clinic Care Coordination Contact  OUTREACH    Referral Information:  Referral Source: PCP  Reason for Contact: Needs assistance with ADLs, Depression and Anxiety not well controlled.  Care Conference: No     Universal Utilization:   ED Visits in last year: 1  Hospital visits in last year: 1  Last PCP appointment: 03/14/17  Missed Appointments: 0  Concerns: Mental health, need for help at home  Multiple Providers or Specialists: Unknown    Clinical Concerns:  Current Medical Concerns:   Patient Active Problem List   Diagnosis     Esophageal reflux     Insomnia     FAMILY HX DIABETES MELLITUS brother     Tobacco use disorder     Obesity     Generalized anxiety disorder     Moderate major depression (H)     COPD (chronic obstructive pulmonary disease) (H)     Elevated fasting glucose     Chronic pain disorder     Idiopathic peripheral neuropathy     Hyperlipidemia LDL goal <130     Degenerative arthritis of hip     Hip arthrosis - left, severe     Trochanteric bursitis     Status post hip replacement     Advanced directives, counseling/discussion     Tobacco abuse     Gastroparesis     DDD (degenerative disc disease), lumbar     Delayed gastric emptying     Health Care Home     Candidal intertrigo     Ventral hernia     Femur fracture (H)     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 PCP reports they are \"not well controlled\".  Education Provided to patient: Community resources   Clinical Pathway Name: None  Clinical Pathway: None    Medication Management:  Patient denies concerns.     Functional Status:  Mobility Status: Independent w/Device  Equipment Currently Used at Home: bath bench, grab bar, wheelchair, walker, rolling, raised toilet  Transportation: Patient is able to drive.  Patient reports she is able to make it to her car with her " "walker but is only able to go to stores that have electric carts/wheelchairs, such as [employer], [employer], and [employer]. She reports difficulty completing housekeeping tasks at home and would need assistance going into the community when she does not have access to a wheelchair/electric cart. Patient does privately pay for a cleaning lady once a month. Her sister and brother-in-law were over to her home this weekend and helped clean.         Psychosocial:  Current living arrangement: I live alone, I live in a private home. I level home.  Financial/Insurance: [facility] for Seniors insurance. She reports her social security income is less than $1,000/month. Her mortgage is more than that. She has been living off her savings and detention.  Discussed finances. She has a certain amount of money set aside \"in my head\" for my  expenses. CC encouraged her to call a  home and find out how much money she can set aside into a special account, and CC explained how this does not count as an asset if patient needs to apply for MA. Patient does receive heat assistance. No other financial assistance. She reports she has \"a strong Spiritism connection\" and a 15 year old Spiritism gal comes over to visit her on  and does help with minor chores. She has no children, 5 siblings but they do not live nearby.     Resources and Interventions:  Current Resources: Home Care- [facility]   Advanced Care Plans/Directives on file:: Yes  Patient/Caregiver understanding: Patient is interested in Store-to-Door information only if she can place an order over the phone as she does not have a computer/internet. She would also like  [employer]on/[employer] services information and financial assistance information for [facility].  Frequency of Care Coordination: As needed        Plan: Mailing:  fallon and [employer] information, [facility] and  danis care program information, and Store-to-door brochure. [facility] Line brochure mailed.  Secure e-mail sent to [facility] SW " Kim Perez as she is scheduled to see patient tomorrow.    ANGEL Butler  \[facility] [place] [facility]\""   [phone]  March 20, 2017

## 2017-03-22 ENCOUNTER — TELEPHONE (OUTPATIENT)
Dept: FAMILY MEDICINE | Facility: CLINIC | Age: 72
End: 2017-03-22

## 2017-03-22 NOTE — TELEPHONE ENCOUNTER
Forms received from  Home Care: OT Orders for Macarena Vinson PA-C.  Forms placed in provider 'sign me' folder.  Please fax forms to [phone] after completion.    Gabbi Rashid,

## 2017-03-27 ENCOUNTER — TELEPHONE (OUTPATIENT)
Dept: FAMILY MEDICINE | Facility: CLINIC | Age: 72
End: 2017-03-27

## 2017-03-27 NOTE — TELEPHONE ENCOUNTER
Received referral for Complex Care Clinic from Macarena Vinson PA-C.   Reviewed the patient's chart and she is not homebound, so she does not qualify for Complex Care services at this time.    Elle Falcon RN

## 2017-03-28 ENCOUNTER — DOCUMENTATION ONLY (OUTPATIENT)
Dept: CARE COORDINATION | Facility: CLINIC | Age: 72
End: 2017-03-28

## 2017-03-28 ENCOUNTER — TELEPHONE (OUTPATIENT)
Dept: FAMILY MEDICINE | Facility: CLINIC | Age: 72
End: 2017-03-28

## 2017-03-28 NOTE — TELEPHONE ENCOUNTER
[facility] received.  Given to Team Kyree WILL for med rec.  Please give to provider for review and signature upon completion.    Please fax forms to [phone] after completion.    Gabbi Rashid,

## 2017-03-28 NOTE — PROGRESS NOTES
[facility] now requests orders and shares plan of care/discharge summaries for some patients through [employer].  Please REPLY TO THIS MESSAGE in order to give authorization for orders when needed.  This is considered a verbal order, you will still receive a faxed copy of orders for signature.  Thank you for your assistance in improving collaboration for our patients.    ORDER  Continue home Physical Therapy visits 2w1, 1w1 as previously ordered for gait training including with new/modified custom AFOs when she receives these, transfer training,progression of home ex program for strength/balance,  falls prevention education, monitor and mitigate pain, monitor skin integrity,and continue working towards the following goals,   1. Pt will improve strength to be able to perform basic transfer mod independent/safe  2. Pt will be able to maintain static standing balance x 2-3 min with UE support and Supervision  3. Pt will be able to ambulate with 2ww and CGA 20-30ft with no LOB during minor balance challenges  4. Pt will demsotrate correct performance with HEP for continued functional strength gains at [place] home services  5. Pt will verbalize understanding with 2 mobility modifications to prevent falls.

## 2017-03-29 DIAGNOSIS — Z53.9 DIAGNOSIS NOT YET DEFINED: Primary | ICD-10-CM

## 2017-03-29 DIAGNOSIS — G47.09 OTHER INSOMNIA: ICD-10-CM

## 2017-03-29 PROCEDURE — G0180 MD CERTIFICATION HHA PATIENT: HCPCS | Performed by: FAMILY MEDICINE

## 2017-03-29 NOTE — TELEPHONE ENCOUNTER
Medication Detail      Disp Refills Start End GUIDO   hydrOXYzine (ATARAX) 25 MG tablet 30 tablet 1 1/26/2017  No   Sig: Take 1 tablet (25 mg) by mouth nightly as needed for anxiety   Class: E-Prescribe   Route: Oral        Last Office Visit with [facility], [facility] or [facility] prescribing provider: 9/13/2016

## 2017-03-30 RX ORDER — HYDROXYZINE HYDROCHLORIDE 25 MG/1
TABLET, FILM COATED ORAL
Qty: 30 TABLET | Refills: 1 | Status: SHIPPED | OUTPATIENT
Start: 2017-03-30 | End: 2017-06-04

## 2017-03-30 NOTE — TELEPHONE ENCOUNTER
Routing refill request to provider for review/approval because:  I can't tell from your last note if you wanted her to continue this for anxiety or not.     Apryl Calloway RN

## 2017-04-05 ENCOUNTER — TELEPHONE (OUTPATIENT)
Dept: FAMILY MEDICINE | Facility: CLINIC | Age: 72
End: 2017-04-05

## 2017-04-05 NOTE — TELEPHONE ENCOUNTER
Reason for Call:  Other     Detailed comments: Patient seen Macarena Vinson PA-C in clinic on 03/14. Her discharge paperwork states phone call in 2 weeks. Patient is unclear on what that means.     Phone Number Patient can be reached at: Home number on file [phone] (home)    Best Time: any    Can we leave a detailed message on this number? YES    Call taken on 4/5/2017 at 12:04 PM by Christelle Vo

## 2017-04-06 ENCOUNTER — TELEPHONE (OUTPATIENT)
Dept: FAMILY MEDICINE | Facility: CLINIC | Age: 72
End: 2017-04-06

## 2017-04-06 NOTE — TELEPHONE ENCOUNTER
Phone visit f/u 2 weeks from last visit for f/u medication change.She stopped Zoloft and started Cymbalta.  Thanks  Macarena Vinson, MPAS, PA-C

## 2017-04-06 NOTE — TELEPHONE ENCOUNTER
Called patient and left a VM message to call clinic and ask to speak to  to schedule a phone visit w/ Macarena Vinson (see note below).    Ana Mullen

## 2017-04-07 ENCOUNTER — TELEPHONE (OUTPATIENT)
Dept: FAMILY MEDICINE | Facility: CLINIC | Age: 72
End: 2017-04-07

## 2017-04-07 NOTE — TELEPHONE ENCOUNTER
Attempt # 1    Called patient at listed number.     Was call answered?  No.  Left message on voicemail with information to call me back.    Cj Maharaj RN

## 2017-04-07 NOTE — TELEPHONE ENCOUNTER
Reason for call:  Patient reporting a symptom    Symptom or request: very dry mouth, drinking a lot of water    Duration (how long have symptoms been present): about the last 6 months but has intensified since she stopped the zoloft and started the zimbalta.    Have you been treated for this before? Yes    Additional comments:     Phone Number patient can be reached at:  Home number on file [phone] (home)    Best Time:  anytime    Can we leave a detailed message on this number:  YES    Call taken on 4/7/2017 at 3:28 PM by Ana Mullen

## 2017-04-10 NOTE — TELEPHONE ENCOUNTER
"Attempted to call patient but \"voicemail system needs you to re-enter the number you are calling\". When number re-entered, it does not recognize number.    Cj Maharaj RN    "

## 2017-04-10 NOTE — TELEPHONE ENCOUNTER
Patient returns call to RN VM at 1:32pm & will be around the rest of the afternoon.  Grisel Flaherty RN

## 2017-04-10 NOTE — TELEPHONE ENCOUNTER
Patient returns call to RN VM at 9:09am. She would like to do a phone visit tomorrow if possible. She can be reached at [phone].  Grisel Flaherty RN

## 2017-04-11 ENCOUNTER — VIRTUAL VISIT (OUTPATIENT)
Dept: FAMILY MEDICINE | Facility: CLINIC | Age: 72
End: 2017-04-11
Payer: COMMERCIAL

## 2017-04-11 DIAGNOSIS — G89.4 CHRONIC PAIN DISORDER: ICD-10-CM

## 2017-04-11 DIAGNOSIS — N39.41 URGENCY INCONTINENCE: ICD-10-CM

## 2017-04-11 DIAGNOSIS — F41.1 GENERALIZED ANXIETY DISORDER: ICD-10-CM

## 2017-04-11 DIAGNOSIS — F33.1 MAJOR DEPRESSIVE DISORDER, RECURRENT EPISODE, MODERATE (H): Primary | ICD-10-CM

## 2017-04-11 PROCEDURE — 98966 PH1 ASSMT&MGMT NQHP 5-10: CPT | Performed by: PHYSICIAN ASSISTANT

## 2017-04-11 ASSESSMENT — ANXIETY QUESTIONNAIRES
7. FEELING AFRAID AS IF SOMETHING AWFUL MIGHT HAPPEN: NOT AT ALL
3. WORRYING TOO MUCH ABOUT DIFFERENT THINGS: NOT AT ALL
GAD7 TOTAL SCORE: 1
5. BEING SO RESTLESS THAT IT IS HARD TO SIT STILL: NOT AT ALL
6. BECOMING EASILY ANNOYED OR IRRITABLE: SEVERAL DAYS
2. NOT BEING ABLE TO STOP OR CONTROL WORRYING: NOT AT ALL
1. FEELING NERVOUS, ANXIOUS, OR ON EDGE: NOT AT ALL

## 2017-04-11 ASSESSMENT — PATIENT HEALTH QUESTIONNAIRE - PHQ9: 5. POOR APPETITE OR OVEREATING: NOT AT ALL

## 2017-04-11 NOTE — MR AVS SNAPSHOT
"              After Visit Summary   2017    Ledy Odonnell    MRN: [id number]           Patient Information     Date Of Birth          1945        Visit Information        Provider Department      2017 9:20 AM Macarena Vinson PA-C [facility]        Today's Diagnoses     Major depressive disorder, recurrent episode, moderate (H)    -  1    Chronic pain disorder        Generalized anxiety disorder        Urgency incontinence           Follow-ups after your visit        Who to contact     If you have questions or need follow up information about today's clinic visit or your schedule please contact [facility] directly at [phone].  Normal or non-critical lab and imaging results will be communicated to you by [employer], letter or phone within 4 business days after the clinic has received the results. If you do not hear from us within 7 days, please contact the clinic through [employer] or phone. If you have a critical or abnormal lab result, we will notify you by phone as soon as possible.  Submit refill requests through [employer] or call your pharmacy and they will forward the refill request to us. Please allow 3 business days for your refill to be completed.          Additional Information About Your Visit        MyChart Information     [employer] lets you send messages to your doctor, view your test results, renew your prescriptions, schedule appointments and more. To sign up, go to [email or web address] . Click on \"Log in\" on the left side of the screen, which will take you to the Welcome page. Then click on \"Sign up Now\" on the right side of the page.     You will be asked to enter the access code listed below, as well as some personal information. Please follow the directions to create your username and password.     Your access code is: [email or web address]  Expires: 2017  4:27 PM     Your access code will  in 90 days. If you need help or a new code, " please call your [place] clinic or [phone].        Care EveryWhere ID     This is your Care EveryWhere ID. This could be used by other organizations to access your [place] medical records  [id number]         Blood Pressure from Last 3 Encounters:   03/14/17 130/74   03/08/17 147/72   12/21/16 112/75    Weight from Last 3 Encounters:   03/07/17 170 lb (77.1 kg)   12/21/16 170 lb (77.1 kg)   09/07/16 173 lb 9.6 oz (78.7 kg)              Today, you had the following     No orders found for display         Today's Medication Changes          These changes are accurate as of: 4/11/17  9:40 AM.  If you have any questions, ask your nurse or doctor.               These medicines have changed or have updated prescriptions.        Dose/Directions    senna-docusate 8.6-50 MG per tablet   Commonly known as:  SENOKOT-S;PERICOLACE   This may have changed:    - how much to take  - when to take this   Used for:  Ventral hernia        Dose:  1 tablet   Take 1 tablet by mouth 2 times daily.   Quantity:  40 tablet   Refills:  11                Primary Care Provider Office Phone # Fax #    Macarena Vinson PA-C [phone]       [address] [address]        Thank you!     Thank you for choosing [facility]  for your care. Our goal is always to provide you with excellent care. Hearing back from our patients is one way we can continue to improve our services. Please take a few minutes to complete the written survey that you may receive in the mail after your visit with us. Thank you!             Your Updated Medication List - Protect others around you: Learn how to safely use, store and throw away your medicines at www.disposemymeds.org.          This list is accurate as of: 4/11/17  9:40 AM.  Always use your most recent med list.                   Brand Name Dispense Instructions for use    albuterol 108 (90 BASE) MCG/ACT Inhaler    PROAIR  HFA/PROVENTIL HFA/VENTOLIN HFA    3 Inhaler    Inhale 2 puffs into the lungs every 6 hours as needed for shortness of breath / dyspnea       buPROPion 150 MG 24 hr tablet    WELLBUTRIN XL    90 tablet    Take 1 tablet (150 mg) by mouth every morning       DULoxetine 60 MG EC capsule    CYMBALTA    90 capsule    Take 1 capsule (60 mg) by mouth daily       fluticasone-salmeterol 250-50 MCG/DOSE diskus inhaler    ADVAIR DISKUS    1 Inhaler    Inhale 1 puff into the lungs 2 times daily       hydrOXYzine 25 MG tablet    ATARAX    30 tablet    TAKE 1 TABLET (25 MG) BY MOUTH NIGHTLY AS NEEDED FOR ANXIETY       miconazole 2 % powder    MICATIN; MICRO GUARD    30 g    Apply topically 2 times daily Apply to groin       multivitamin, therapeutic Tabs tablet      Take 1 tablet by mouth daily       nystatin 000009 UNIT/GM Powd    MYCOSTATIN    1 Bottle    Apply 1 g topically as needed       oxybutynin 10 MG 24 hr tablet    DITROPAN XL    90 tablet    Take 1 tablet (10 mg) by mouth daily       oxyCODONE-acetaminophen  MG per tablet    PERCOCET    120 tablet    1-2 tablets every 6 hrs prn-to fill on or after 9/11/16       senna-docusate 8.6-50 MG per tablet    SENOKOT-S;PERICOLACE    40 tablet    Take 1 tablet by mouth 2 times daily.       traZODone 50 MG tablet    DESYREL    90 tablet    Take 1-2 tablets ( mg) by mouth nightly as needed for sleep       VITAMIN B-12 PO      Take 100 mcg by mouth daily

## 2017-04-11 NOTE — PROGRESS NOTES
"Ledy Odonnell is a 71 year old female who is being evaluated via a telephone visit.      The patient has been notified of following:     \"This telephone visit will be conducted via a call between you and your physician/provider. We have found that certain health care needs can be provided without the need for a physical exam.  This service lets us provide the care you need with a short phone conversation.  If a prescription is necessary we can send it directly to your pharmacy.  If lab work is needed we can place an order for that and you can then stop by our lab to have the test done at a later time.    We will bill your insurance company for this service.  Please check with your medical insurance if this type of visit is covered. You may be responsible for the cost of this type of visit if insurance coverage is denied.  The typical cost is $30 (10min), $59 (11-20min) and $85 (21-30min).  Most often these visits are shorter than 10 minutes.    If during the course of the call the physician/provider feels a telephone visit is not appropriate, you will not be charged for this service.\"       Consent has been obtained for this service by 1 care team member: yes. See the scanned image in the medical record.    Ledy Odonnell complains of  Recheck Medication      I have reviewed and updated the patient's Past Medical History, Social History, Family History and Medication List.    ALLERGIES  No known allergies    Latasha Bustamante MA  (MA signature)    Additional provider notes:  Follow up call after switching from Zoloft to Cymbalta.Overall is doing okay. Her dry mouth had gotten really bad so this has been quite bothersome. She drank 3 bottles of water during a 50 minute [employer] service. Does seem to be a little bit better this week so wants to give this longer. Between her dry mouth and loss of bladder control she is not leaving the house to often.  OT is currently working with her for bladder exercises so is " eager to see if this is helpful.  Is taking several medications that are causing a dry mouth-percocet, cymbalta, oxybutinin.  Cymbalta has otherwise been really helpful, has noted improvements in her mood. Has still been taking pain medication as directed.  Is starting to realize that she can't do as much as she used to.    Assessment/Plan:  1. Major depressive disorder, recurrent episode, moderate (H)  2. Chronic pain disorder  3. Generalized anxiety disorder  Some improvements with switch from Zoloft to Cymbalta  Dry mouth has been only downside.  F/u call in 3 weeks.  Consider [facility] for therapy  Consider referral to ENT for dry mouth.    4. Urgency incontinence  Currently on Detrol 10 mg  May decrease this to 5 mg after doing pelvic floor PT for some time  I suspect that she has little benefit from the higher dose and worse dry mouth.  F/u in 3 weeks with phone visit.  Due to severe dry mouth,     I have reviewed the note as documented above.  This accurately captures the substance of my conversation with the patient,    Total time of call between patient and provider was 5 minutes

## 2017-04-12 ASSESSMENT — ANXIETY QUESTIONNAIRES: GAD7 TOTAL SCORE: 1

## 2017-04-12 ASSESSMENT — PATIENT HEALTH QUESTIONNAIRE - PHQ9: SUM OF ALL RESPONSES TO PHQ QUESTIONS 1-9: 5

## 2017-04-21 ENCOUNTER — TELEPHONE (OUTPATIENT)
Dept: FAMILY MEDICINE | Facility: CLINIC | Age: 72
End: 2017-04-21

## 2017-04-21 NOTE — TELEPHONE ENCOUNTER
Reason for Call: Request for an order or referral:    Order or referral being requested: Occupational Therapy order:  Continue OT 2x/week for 2 weeks; 1x/week for 1 week. For ADL's, safety and equipment needs.,    Date needed: as soon as possible    Has the patient been seen by the PCP for this problem? YES    Additional comments:     Phone number Patient can be reached at:  Other phone number:  [phone] to reach [place]    Best Time:  An y    Can we leave a detailed message on this number?  YES    Call taken on 4/21/2017 at 3:43 PM by Alejandro Baca

## 2017-04-24 NOTE — TELEPHONE ENCOUNTER
Left message on voicemail of below number with verbal OK for requested orders per protocol.      Apryl Calloway RN

## 2017-04-25 ENCOUNTER — MEDICAL CORRESPONDENCE (OUTPATIENT)
Dept: HEALTH INFORMATION MANAGEMENT | Facility: CLINIC | Age: 72
End: 2017-04-25

## 2017-04-26 ENCOUNTER — TELEPHONE (OUTPATIENT)
Dept: FAMILY MEDICINE | Facility: CLINIC | Age: 72
End: 2017-04-26

## 2017-04-27 ENCOUNTER — OFFICE VISIT (OUTPATIENT)
Dept: RHEUMATOLOGY | Facility: CLINIC | Age: 72
End: 2017-04-27
Payer: COMMERCIAL

## 2017-04-27 VITALS
DIASTOLIC BLOOD PRESSURE: 72 MMHG | HEART RATE: 89 BPM | TEMPERATURE: 97 F | SYSTOLIC BLOOD PRESSURE: 107 MMHG | OXYGEN SATURATION: 98 %

## 2017-04-27 DIAGNOSIS — Z87.81 H/O FRACTURE: ICD-10-CM

## 2017-04-27 DIAGNOSIS — M17.11 PRIMARY OSTEOARTHRITIS OF RIGHT KNEE: ICD-10-CM

## 2017-04-27 DIAGNOSIS — M19.012 PRIMARY OSTEOARTHRITIS OF BOTH SHOULDERS: Primary | ICD-10-CM

## 2017-04-27 DIAGNOSIS — M19.011 PRIMARY OSTEOARTHRITIS OF BOTH SHOULDERS: Primary | ICD-10-CM

## 2017-04-27 DIAGNOSIS — Z13.820 SCREENING FOR OSTEOPOROSIS: ICD-10-CM

## 2017-04-27 PROCEDURE — 99213 OFFICE O/P EST LOW 20 MIN: CPT | Mod: 25 | Performed by: INTERNAL MEDICINE

## 2017-04-27 PROCEDURE — 20610 DRAIN/INJ JOINT/BURSA W/O US: CPT | Mod: 59 | Performed by: INTERNAL MEDICINE

## 2017-04-27 RX ORDER — TRIAMCINOLONE ACETONIDE 40 MG/ML
40 INJECTION, SUSPENSION INTRA-ARTICULAR; INTRAMUSCULAR ONCE
Qty: 1 ML | Refills: 0 | OUTPATIENT
Start: 2017-04-27 | End: 2017-04-27

## 2017-04-27 NOTE — PROGRESS NOTES
Rheumatology Clinic Visit      Ledy Odonnell MRN# [id number]   YOB: 1945 Age: 71 year old      Date of visit: 4/27/17   PCP: Macarena Vinson       Chief Complaint   Patient presents with:  RECHECK: cortisone injection both shoulder and right knee.      Assessment and Plan     1. Osteoarthritis: Symptoms are most consistent with osteoarthritis, not an inflammatory arthritis. No morning stiffness. No synovitis on exam. She has seen physiatry because of her left ankle/foot congenital deformities and they made devices to help keep her mobile; she is very happy with this.    2. Right knee osteoarthritis and bilateral shoulder OA: hx of rotator cuff injuries in the past. Significant improvement with steroid injections in the past and repeating today as documented in the procedure section.  She refuses surgery for her knees; and says that after one failed rotator cuff repair she does not want to have the other operated on, also because of poor predicted surgical outcome reportedly per orthopedic surgery.     3. Cigarette smoking: Encouraged complete cessation and discussed the health benefits.      4. Screening for osteoporosis: Reportedly the DEXA that she had in 2014 was only of her ankle. She has a history of a right VALERIE. Initial left hip fracture was surgical, and second right hip fracture was nonsurgical. One parent with a history of a hip fracture. Recent fall with rib fractures.  - DEXA  - Labs: Ca, Vitamin D, Creatinine    Ms. Odonnell verbalized agreement with and understanding of the rational for the diagnosis and treatment plan.  All questions were answered to best of my ability and the patient's satisfaction. Ms. Odonnell was advised to contact the clinic with any questions that may arise after the clinic visit.      Thank you for involving me in the care of the patient    Return to clinic: she will call to make an appointment when needed; no sooner than 3 months from today if for repeat  injections of the same joints      HPI   Ledy Odonnell is a 71 year old female with medical history significant for hyperlipidemia, peripheral neuropathy, COPD, depression, anxiety, obesity, tobacco use disorder, GERD, gastroparesis, osteoarthritis, and history of femur fracture who presents for f/u of OA.     Today, she reports that she has been doing well except for that she fell and broke several ribs that required a hospitalization because she also had a pneumothorax. Overall, she is improving. She said that she fell because she was standing at the bathroom counter and her right knee gave out. She says that her right knee needs another steroid injection because of osteoarthritis pain she also reports that she would like to have steroid injections of her bilateral shoulders. Steroid injections are effective for at least 3 months and she would like to continue receiving them.     History of surgical right hip fracture initially, then right hip fracture nonsurgically afterward; has a right VALERIE. Reportedly the DEXA done in 2014 was only of her ankle.    Denies fevers, chills, nausea, vomiting, constipation, diarrhea. No abdominal pain. No chest pain/pressure, palpitations, or shortness of breath. No oral or nasal sores. No neck pain. No rash.     With regard to her rib fractures, I asked about her safety at home or if she is being hurt by anybody and she replied that she is very safe and lives by herself. She also wears a Life Alert device as a necklace.    Tobacco: 0.5 ppd  EtOH: None  Drugs: None  Occupation: Retired. Used to be a professional     ROS   GEN: No fevers, chills, night sweats  SKIN: No itching, rashes, sores  HEENT: No headache. No epistaxis. No oral or nasal ulcers.  CV: No chest pain, pressure, palpitations, or dyspnea on exertion.  PULM: No SOB, wheeze, cough.  GI: No change in appetite. No nausea, vomiting, constipation, diarrhea. No blood in stool. No abdominal pain.  : No blood  in urine.  MSK: See HPI.  ENDO: No heat/cold intolerance.  EXT: No LE swelling    Active Problem List     Patient Active Problem List   Diagnosis     Esophageal reflux     Insomnia     FAMILY HX DIABETES MELLITUS brother     Tobacco use disorder     Obesity     Generalized anxiety disorder     Moderate major depression (H)     COPD (chronic obstructive pulmonary disease) (H)     Elevated fasting glucose     Chronic pain disorder     Idiopathic peripheral neuropathy     Hyperlipidemia LDL goal <130     Degenerative arthritis of hip     Hip arthrosis - left, severe     Trochanteric bursitis     Status post hip replacement     Advanced directives, counseling/discussion     Tobacco abuse     Gastroparesis     DDD (degenerative disc disease), lumbar     Delayed gastric emptying     Health Care Home     Candidal intertrigo     Ventral hernia     Femur fracture (H)     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     Past Medical History     Past Medical History:   Diagnosis Date     Depressive disorder, not elsewhere classified      GENERALIZED ANXIETY DIS 6/21/2007     Inflammatory arthritis      Osteoarthrosis, unspecified whether generalized or localized, unspecified site      Other and unspecified alcohol dependence, unspecified drinking behavior      Unspecified essential hypertension      Past Surgical History     Past Surgical History:   Procedure Laterality Date     C HAND/FINGER SURGERY UNLISTED       C NONSPECIFIC PROCEDURE  2001    Shoulder Surgery     C NONSPECIFIC PROCEDURE  1975    Gastric Bypass     C NONSPECIFIC PROCEDURE      Cholecystectomy     C SHOULDER SURG PROC UNLISTED       C TOTAL HIP ARTHROPLASTY  1/4/2011    Lt VALERIE     HERNIORRHAPHY VENTRAL  5/14/2013    Procedure: HERNIORRHAPHY VENTRAL;  Open Ventral Hernia Repair;  Surgeon: Jhoan Rogers MD;  Location: [facility] OR     Allergy      Allergies   Allergen Reactions     No Known Allergies      Current Medication List     Current Outpatient Prescriptions   Medication Sig     hydrOXYzine (ATARAX) 25 MG tablet TAKE 1 TABLET (25 MG) BY MOUTH NIGHTLY AS NEEDED FOR ANXIETY     DULoxetine (CYMBALTA) 60 MG EC capsule Take 1 capsule (60 mg) by mouth daily     fluticasone-salmeterol (ADVAIR DISKUS) 250-50 MCG/DOSE diskus inhaler Inhale 1 puff into the lungs 2 times daily     miconazole (MICATIN; MICRO GUARD) 2 % powder Apply topically 2 times daily Apply to groin     multivitamin, therapeutic (THERA-VIT) TABS tablet Take 1 tablet by mouth daily     nystatin (MYCOSTATIN) 359435 UNIT/GM POWD Apply 1 g topically as needed     traZODone (DESYREL) 50 MG tablet Take 1-2 tablets ( mg) by mouth nightly as needed for sleep     oxybutynin (DITROPAN XL) 10 MG 24 hr tablet Take 1 tablet (10 mg) by mouth daily     buPROPion (WELLBUTRIN XL) 150 MG 24 hr tablet Take 1 tablet (150 mg) by mouth every morning     albuterol (PROAIR HFA, PROVENTIL HFA, VENTOLIN HFA) 108 (90 BASE) MCG/ACT inhaler Inhale 2 puffs into the lungs every 6 hours as needed for shortness of breath / dyspnea     oxyCODONE-acetaminophen (PERCOCET)  MG per tablet 1-2 tablets every 6 hrs prn-to fill on or after 9/11/16     Cyanocobalamin (VITAMIN B-12 PO) Take 100 mcg by mouth daily      senna-docusate (SENOKOT-S;PERICOLACE) 8.6-50 MG per tablet Take 1 tablet by mouth 2 times daily. (Patient taking differently: Take 2 tablets by mouth daily )     No current facility-administered medications for this visit.          Social History   See HPI    Family History     Family History   Problem Relation Age of Onset     CANCER Father      pancreatic     HEART DISEASE Mother      Unknown specifics     Dementia Mother      Arthritis Paternal Grandmother      RA     Dementia Maternal Grandmother      Paternal grandmother: Rheumatoid arthritis  Many family members: Osteoarthritis    Physical Exam  "    Temp Readings from Last 3 Encounters:   04/27/17 97  F (36.1  C) (Oral)   03/14/17 98.7  F (37.1  C) (Oral)   03/08/17 98.3  F (36.8  C) (Oral)     BP Readings from Last 5 Encounters:   04/27/17 107/72   03/14/17 130/74   03/08/17 147/72   12/21/16 112/75   09/13/16 124/70     Pulse Readings from Last 1 Encounters:   04/27/17 89     Resp Readings from Last 1 Encounters:   03/08/17 20     Estimated body mass index is 28.29 kg/(m^2) as calculated from the following:    Height as of 3/7/17: 1.651 m (5' 5\").    Weight as of 3/7/17: 77.1 kg (170 lb).    GEN: NAD, overweight; in a wheelchair  HEENT: MMM. Anicteric, noninjected sclera  CV: S1, S2. RRR. No m/r/g.  PULM: CTA bilaterally. No w/c.  MSK: Right fifth DIP with no range of motion. Right fourth PIP with bony hypertrophy but no soft tissue swelling and not tender to palpation. Heberden's and Austin's nodes present. Other MCPs, PIPs, DIPs without swelling, tenderness palpation, or overlying erythema. Wrists and elbows without synovial swelling, increased warmth, tenderness to palpation, or overlying erythema.  Bilateral shoulders nontender to palpation; full ROM but painful with ROM.  Right knee with medial joint line tenderness; no effusion or increased warmth. Left knee without swelling or tenderness to palpation. Left ankle and foot with significant deformities.  NEURO: UE and LE strengths 5/5 and equal bilaterally.   SKIN: No rash  EXT: No LE edema  PSYCH: Alert. Appropriate.    Labs / Imaging (select studies)     RF/CCP  Recent Labs   Lab Test  01/22/10   1043  09/15/08   1246  07/24/08   1013   CYCLICCITPEP  2  <2   --    RHF   --   43*  39*     CBC  Recent Labs   Lab Test  05/17/13   0557  05/16/13   0809  05/14/13   2157  05/14/13   1515   09/15/08   1246   WBC  7.4  7.9   --   9.2   < >  9.4   RBC  4.16  4.11   --   4.75   < >  4.75   HGB  13.0  12.7   --   15.0   < >  13.5   HCT  39.2  38.8   --   43.7   < >  41.0   MCV  94  94   --   92   < >  86 "   RDW  12.4  12.6   --   13.0   < >  13.3   PLT  181  155  177  199   < >  278   MCH  31.3  30.9   --   31.6   < >  28.4   MCHC  33.2  32.7   --   34.3   < >  32.9   NEUTROPHIL  85.6*   --    --    --    --   65   LYMPH  6.0*   --    --    --    --   25   MONOCYTE  6.8   --    --    --    --   7   EOSINOPHIL  1.2   --    --    --    --   2   BASOPHIL  0.1   --    --    --    --   1   ANEU  6.3   --    --    --    --   6.3   ALYM  0.4*   --    --    --    --   2.3   ANGELA  0.5   --    --    --    --   0.6   AEOS  0.1   --    --    --    --   0.1   ABAS  0.0   --    --    --    --   0.1    < > = values in this interval not displayed.     CMP  Recent Labs   Lab Test  08/25/15   1100 08/25/14 08/24/14 08/23/14 08/22/14 05/14/13   1515  05/10/13   0907  08/22/12   1418  05/10/11   1016   NA   --   138  135  137  137   < >  139  143  132*  140   POTASSIUM   --   3.4  3.4  3.8  3.7   < >  3.8  4.3  3.7  4.3   CHLORIDE   --   104  101  103  103   < >  101  102  94  99   CO2   --    --    --    --    --    --   26  27  26  28   ANIONGAP   --   4.0  6.0  4.0  6.0   < >  12  14  11  13   GLC   --   109*  106*  120*  110*   < >  135*  108*  110*  100*   BUN   --   6  7  5  10   < >  8  10  10  10   CR   --   0.71  0.66  0.75  0.75   < >  0.66  0.54  0.68  0.70   GFRESTIMATED   --   >60  >60  >60  >60   < >  90  >90  87  84   GFRESTBLACK   --   >60  >60  >60  >60   < >  >90  >90  >90  >90   SCARLET   --   8.2  8.0  7.5  8.1   < >  8.2*  9.5  9.5  9.3   BILITOTAL   --    --    --    --    --    --   0.6   --   0.8  0.5   ALBUMIN   --    --    --    --    --    --   3.8   --   4.5  4.1   PROTTOTAL   --    --    --    --    --    --   7.2   --   7.8  7.6   ALKPHOS   --    --    --    --    --    --   56   --   88  87   AST   --    --    --    --    --    --   35   --   47*  29   ALT  27   --    --    --   21   --   32   --   31  9    < > = values in this interval not displayed.     HgA1c  Recent Labs   Lab Test  07/30/14   0503   08/12/10   0900  03/01/10   1101   A1C  5.3  5.8  6.3*     Calcium/VitaminD  Recent Labs   Lab Test 08/25/14 08/24/14 08/23/14   05/04/10   1114   SCARLET  8.2  8.0  7.5   < >   --    D3VIT   --    --    --    --   42    < > = values in this interval not displayed.     ESR/CRP  Recent Labs   Lab Test  01/22/10   1043  09/15/08   1246   SED  9   --    CRP  11.5*  6.8     TSH/T4  Recent Labs   Lab Test 08/25/14 08/24/14 08/26/11   0950  05/10/11   1016   TSH  0.39  0.25*  0.34*  0.34*   T4   --    --   1.17  0.97     Lipid Panel  Recent Labs   Lab Test  08/25/15   1100 08/22/14 07/30/14   1149  12/02/13   0841   CHOL  217*  96*  160  159   TRIG  180*  180  193*  177*   HDL  41*  35  49*  36*   LDL  140*  25  72  88   VLDL  36*   --   39*  35*   CHOLHDLRATIO  5.3*  2.7  3.3  4.5     Hepatitis B  Recent Labs   Lab Test  09/15/08   1246   HEPBANG  Negative     Hepatitis C  Recent Labs   Lab Test  09/15/08   1246   HCVAB  Negative     UA  Recent Labs   Lab Test  04/06/16   1006  08/25/15   1118  05/10/13   0907  08/22/12   1419   COLOR  Yellow  Yellow  Yellow  Yellow   APPEARANCE  Cloudy  Clear  Clear  Clear   URINEGLC  Negative  Negative  Negative  Negative   URINEBILI  Negative  Negative  Negative  Negative   SG  1.010  1.010  1.010  1.010   URINEPH  7.0  7.0  7.0  6.5   PROTEIN  Trace*  Negative  Negative  30*   UROBILINOGEN  0.2  0.2  0.2  0.2   NITRITE  Negative  Negative  Negative  Negative   UBLD  Moderate*  Negative  Negative  Negative   LEUKEST  Large*  Negative  Small*  Negative   WBCU  >100*   --   O - 2  2-5*   RBCU  10-25*   --   O - 2  O - 2   SQUAMOUSEPI  Few   --   Few  Few   BACTERIA  Many*   --    --   Few*     Urine Microscopic  Recent Labs   Lab Test  04/06/16   1006  05/10/13   0907  08/22/12   1419   WBCU  >100*  O - 2  2-5*   RBCU  10-25*  O - 2  O - 2   SQUAMOUSEPI  Few  Few  Few   BACTERIA  Many*   --   Few*       Immunization History     Immunization History   Administered Date(s) Administered      Influenza (High Dose) 3 valent vaccine 10/15/2013, 09/22/2015     Influenza (IIV3) 10/31/2003, 12/02/2005, 10/21/2010, 09/07/2016     Pneumococcal (PCV 13) 08/26/2011     Pneumococcal 23 valent 12/06/2001, 11/05/2002, 09/22/2015     TD (ADULT, 7+) 10/31/2003     TDAP Vaccine (Boostrix) 10/15/2013     Zoster vaccine, live 06/10/2015       Procedures     Procedure: Steroid injections of the right knee, and bilateral shoulders  Indication: Pain, OA    The procedure was explained in detail. Risks including infection, pain, structural damage such as cartilage damage and tendon rupture, fat atrophy, skin hyper-/hypo-pigmentation, and medication reaction was explained. The need for rest of the affected joint for one week after the procedure was explained.  The option of not doing the procedure was also provided. All questions were answered and the patient consented to the procedure.     A time-out was performed and the correct patient, procedure, and laterality were verified.    The right shoulder was examined and location for injection was identified (posterior approach). The area was cleaned with chlorhexidine, twice.  Ethyl chloride was then used for topical anaesthetic. Then a mixture of lidocaine 1% 2 mL and Kenalog 40mg was injected into the intra-articular space.     The left shoulder was examined and location for injection was identified (posterior approach). The area was cleaned with chlorhexidine, twice.  Ethyl chloride was then used for topical anaesthetic. Then a mixture of lidocaine 1% 2 mL and Kenalog 40mg was injected into the intra-articular space.     The right knee was examined and location for injection was identified (anterior medial). The area was cleaned with chlorhexidine, twice.  Ethyl chloride was then used for topical anaesthetic.  Then a mixture of lidocaine 1% 2 mL and Kenalog 40mg was injected into the intra-articular space.     The patient tolerated the procedures well. No  complications.    MEDICATION: Kenalog 40 mg  LOT #: [id number]  : [employer]  EXPIRATION DATE: 09/2018  NDC: 1434-8139-30    MEDICATION: Kenalog 40 mg  LOT #: [id number]  : [employer]  EXPIRATION DATE: 09/2018  NDC: 0003-0293-05    MEDICATION: Kenalog 40 mg  LOT #: [id number]  : [employer]  EXPIRATION DATE: 09/2018  NDC: 0003-0293-05         Chart documentation done in part with Dragon Voice recognition Software. Although reviewed after completion, some word and grammatical error may remain.    Max Choi MD

## 2017-04-27 NOTE — NURSING NOTE
"Chief Complaint   Patient presents with     RECHECK     cortisone injection both shoulder and right knee.       Initial /72  Pulse 89  Temp 97  F (36.1  C) (Oral)  SpO2 98% Estimated body mass index is 28.29 kg/(m^2) as calculated from the following:    Height as of 3/7/17: 1.651 m (5' 5\").    Weight as of 3/7/17: 77.1 kg (170 lb).  BP completed using cuff size: regular  Niesha Vizcarra MA         RAPID3 (0-30) Cumulative Score  17.3          RAPID3 Weighted Score (divide #4 by 3 and that is the weighted score)  5.8         "

## 2017-04-27 NOTE — Clinical Note
Chai Fiore, I injected her right knee and both shoulders for OA pain today - they are still helping.  I also ordered a DEXA. Thank you, Max

## 2017-04-27 NOTE — NURSING NOTE
The following medication was given:     MEDICATION: Kenalog 40 mg    SITE: Right  shoulder  DOSE: 1ml  LOT #: [id number]  :  [employer]  EXPIRATION DATE:  09/2018  NDC: 2648-9619-01      The following medication was given:     MEDICATION: Kenalog 40 mg    SITE: Left  shoulder  DOSE: 1ml  LOT #: [id number]  :  [employer]  EXPIRATION DATE:  09/2018  NDC: 0536-4514-36    The following medication was given:     MEDICATION: Kenalog 40 mg    SITE: Right  Knee  DOSE: 1ml  LOT #: [id number]  :  [employer]  EXPIRATION DATE:  09/2018  NDC: 2325-3560-77

## 2017-04-27 NOTE — MR AVS SNAPSHOT
"              After Visit Summary   4/27/2017    Ledy Odonnell    MRN: [id number]           Patient Information     Date Of Birth          1945        Visit Information        Provider Department      4/27/2017 11:40 AM Max Choi MD [facility]        Today's Diagnoses     Screening for osteoporosis    -  1    H/O fracture          Care Instructions    DEXA [phone]        Follow-ups after your visit        Future tests that were ordered for you today     Open Future Orders        Priority Expected Expires Ordered    DX Hip/Pelvis/Spine Routine  4/28/2018 4/27/2017            Who to contact     If you have questions or need follow up information about today's clinic visit or your schedule please contact [facility] directly at [phone].  Normal or non-critical lab and imaging results will be communicated to you by MyChart, letter or phone within 4 business days after the clinic has received the results. If you do not hear from us within 7 days, please contact the clinic through MyChart or phone. If you have a critical or abnormal lab result, we will notify you by phone as soon as possible.  Submit refill requests through [employer] or call your pharmacy and they will forward the refill request to us. Please allow 3 business days for your refill to be completed.          Additional Information About Your Visit        MyChart Information     [employer] lets you send messages to your doctor, view your test results, renew your prescriptions, schedule appointments and more. To sign up, go to [email or web address] . Click on \"Log in\" on the left side of the screen, which will take you to the Welcome page. Then click on \"Sign up Now\" on the right side of the page.     You will be asked to enter the access code listed below, as well as some personal information. Please follow the directions to create your username and password.     Your access code is: [email or web address]  Expires: " 2017  4:27 PM     Your access code will  in 90 days. If you need help or a new code, please call your [place] clinic or [phone].        Care EveryWhere ID     This is your Care EveryWhere ID. This could be used by other organizations to access your [place] medical records  [id number]        Your Vitals Were     Pulse Temperature Pulse Oximetry             89 97  F (36.1  C) (Oral) 98%          Blood Pressure from Last 3 Encounters:   17 107/72   17 130/74   17 147/72    Weight from Last 3 Encounters:   17 77.1 kg (170 lb)   16 77.1 kg (170 lb)   16 78.7 kg (173 lb 9.6 oz)              We Performed the Following     Comprehensive metabolic panel     Vitamin D Deficiency          Today's Medication Changes          These changes are accurate as of: 17 12:05 PM.  If you have any questions, ask your nurse or doctor.               These medicines have changed or have updated prescriptions.        Dose/Directions    senna-docusate 8.6-50 MG per tablet   Commonly known as:  SENOKOT-S;PERICOLACE   This may have changed:    - how much to take  - when to take this   Used for:  Ventral hernia        Dose:  1 tablet   Take 1 tablet by mouth 2 times daily.   Quantity:  40 tablet   Refills:  11                Primary Care Provider Office Phone # Fax #    Macarena Vinson PA-C [phone]       [address] [address]        Thank you!     Thank you for choosing [facility]  for your care. Our goal is always to provide you with excellent care. Hearing back from our patients is one way we can continue to improve our services. Please take a few minutes to complete the written survey that you may receive in the mail after your visit with us. Thank you!             Your Updated Medication List - Protect others around you: Learn how to safely use, store and throw away your medicines at  www.disposemymeds.org.          This list is accurate as of: 4/27/17 12:05 PM.  Always use your most recent med list.                   Brand Name Dispense Instructions for use    albuterol 108 (90 BASE) MCG/ACT Inhaler    PROAIR HFA/PROVENTIL HFA/VENTOLIN HFA    3 Inhaler    Inhale 2 puffs into the lungs every 6 hours as needed for shortness of breath / dyspnea       buPROPion 150 MG 24 hr tablet    WELLBUTRIN XL    90 tablet    Take 1 tablet (150 mg) by mouth every morning       DULoxetine 60 MG EC capsule    CYMBALTA    90 capsule    Take 1 capsule (60 mg) by mouth daily       fluticasone-salmeterol 250-50 MCG/DOSE diskus inhaler    ADVAIR DISKUS    1 Inhaler    Inhale 1 puff into the lungs 2 times daily       hydrOXYzine 25 MG tablet    ATARAX    30 tablet    TAKE 1 TABLET (25 MG) BY MOUTH NIGHTLY AS NEEDED FOR ANXIETY       miconazole 2 % powder    MICATIN; MICRO GUARD    30 g    Apply topically 2 times daily Apply to groin       multivitamin, therapeutic Tabs tablet      Take 1 tablet by mouth daily       nystatin [id number] UNIT/GM Powd    MYCOSTATIN    1 Bottle    Apply 1 g topically as needed       oxybutynin 10 MG 24 hr tablet    DITROPAN XL    90 tablet    Take 1 tablet (10 mg) by mouth daily       oxyCODONE-acetaminophen  MG per tablet    PERCOCET    120 tablet    1-2 tablets every 6 hrs prn-to fill on or after 9/11/16       senna-docusate 8.6-50 MG per tablet    SENOKOT-S;PERICOLACE    40 tablet    Take 1 tablet by mouth 2 times daily.       traZODone 50 MG tablet    DESYREL    90 tablet    Take 1-2 tablets ( mg) by mouth nightly as needed for sleep       VITAMIN B-12 PO      Take 100 mcg by mouth daily

## 2017-04-28 ENCOUNTER — CARE COORDINATION (OUTPATIENT)
Dept: CASE MANAGEMENT | Facility: CLINIC | Age: 72
End: 2017-04-28

## 2017-04-28 NOTE — PROGRESS NOTES
Clinic Care Coordination Contact  [facility]/Voicemail    Referral Source: PCP  Clinical Data: Care Coordinator Outreach  Outreach attempted x 1.  Left message on voicemail with call back information and requested return call.  Plan: Care Coordinator will try to reach patient again in 3-5 business days.    ANGEL Scott  [facility] Care Coordinator  [phone]

## 2017-04-28 NOTE — LETTER
[facility] - CARE MANAGEMENT DEPT  [address] [address]  Phone: [phone]      April 28, 2017      Ledy Odonnell  [address]  [facility] [address]    Dear Ledy,  I am the Clinic Care Coordinator that works with your primary care provider's clinic. I wanted to thank you for spending the time to talk with me.  Below is a description of what Clinic Care Coordination is and how I can further assist you.     The Clinic Care Coordinator role is a Registered Nurse and/or  who understands the health care system. The goal of Clinic Care Coordination is to help you manage your health and improve access to the [facility] system in the most efficient manner.  The Registered Nurse can assist you in meeting your health care goals by providing education, coordinating services, and strengthening the communication among your providers. The  can assist you with financial, behavioral, psychosocial, and chemical dependency and counseling/psychiatric resources.    Please feel free to keep this letter and contact information to contact me at [phone] with any further questions or concerns that may arise. We at [facility] are focused on providing you with the highest-quality healthcare experience possible and that all starts with you.       Sincerely,     Madelin Ontiveros

## 2017-04-28 NOTE — PROGRESS NOTES
Clinic Care Coordination Contact  OUTREACH    Pt reports that she is doing well. Construction is happening on her block making it difficult to get in and out of her home. Pt has put her [facility] therapy services on hold until construction is completed or conditions improve (worried about the hassle for staff). Pt has been working with [facility] ALEIXA Alvarez on finding additional supports. ALEXIA MORA will mail out contact information and follow-up after home care discharge.    ANGEL Scott  [facility] Care Coordinator  [phone]

## 2017-05-04 DIAGNOSIS — N39.41 URGENCY INCONTINENCE: ICD-10-CM

## 2017-05-04 NOTE — TELEPHONE ENCOUNTER
Chart reviewed.  Sushant ROBLES I was unable to find any orders to increase her oxybutynin to twice daily. Do you recall any conversation or discussion?    Ya Pineda RN  Triage  [facility]

## 2017-05-04 NOTE — TELEPHONE ENCOUNTER
Reason for Call:  Other prescription    Detailed comments: patient states she needs a refill on oxybutynin (DITROPAN XL) 10 MG 24 hr tablet, started taking twice a day, so she is out of medication and pharmacy wont refill early. Please send to [facility] [id number] [facility] - [place], [place] - [address]    Phone Number Patient can be reached at: Home number on file [phone] (home)    Best Time: any    Can we leave a detailed message on this number? YES    Call taken on 5/4/2017 at 11:16 AM by Melony Rashid

## 2017-05-05 RX ORDER — OXYBUTYNIN CHLORIDE 10 MG/1
10 TABLET, EXTENDED RELEASE ORAL 2 TIMES DAILY
Qty: 90 TABLET | Refills: 3 | Status: CANCELLED | OUTPATIENT
Start: 2017-05-05

## 2017-05-05 RX ORDER — OXYBUTYNIN CHLORIDE 10 MG/1
20 TABLET, EXTENDED RELEASE ORAL DAILY
Qty: 180 TABLET | Refills: 1 | Status: SHIPPED | OUTPATIENT
Start: 2017-05-05 | End: 2017-09-11

## 2017-05-05 NOTE — TELEPHONE ENCOUNTER
Attempt # 1    Called patient at home number.     Was call answered?  No.  Left message on voicemail with information to call me back.   Apryl Calloway RN

## 2017-05-05 NOTE — TELEPHONE ENCOUNTER
See phone visit on 4/11/17.  Dose of Oxybutynin was increased to 10 mg in 9/16.  At our phone visit, we discussed severe dry mouth and no change in her urinary control with increasing from 5 to 10 mg.  We therefore actually discussed decreasing back to 5 mg in the future.  Is her overactive bladder improving with taking 2 10 mg tablets?  I would assume her dry mouth has worsened yet.    SERGO Callahan, PAPhilipC

## 2017-05-05 NOTE — TELEPHONE ENCOUNTER
"Spoke to Ledy. She does not recall being told to decrease to 5mg per day. She was under the impression that at her hospital follow up visit (in March of 2017) you told her to increase to 10mg BID. She states that the dry mouth \"comes and goes\" but is not terribly bothersome at this time. However her urinary control is improving. So she would like to stay at 10mg BID. However she is now completely out of the medication because the Rx still said once per day.     Apryl Calloway RN    "

## 2017-05-08 ENCOUNTER — VIRTUAL VISIT (OUTPATIENT)
Dept: FAMILY MEDICINE | Facility: CLINIC | Age: 72
End: 2017-05-08
Payer: COMMERCIAL

## 2017-05-08 DIAGNOSIS — B37.2 CANDIDAL INTERTRIGO: ICD-10-CM

## 2017-05-08 DIAGNOSIS — S22.41XD CLOSED FRACTURE OF MULTIPLE RIBS OF RIGHT SIDE WITH ROUTINE HEALING, SUBSEQUENT ENCOUNTER: ICD-10-CM

## 2017-05-08 DIAGNOSIS — G89.4 CHRONIC PAIN DISORDER: ICD-10-CM

## 2017-05-08 DIAGNOSIS — G47.00 INSOMNIA: ICD-10-CM

## 2017-05-08 DIAGNOSIS — F41.1 GENERALIZED ANXIETY DISORDER: ICD-10-CM

## 2017-05-08 DIAGNOSIS — F33.1 MAJOR DEPRESSIVE DISORDER, RECURRENT EPISODE, MODERATE (H): Primary | ICD-10-CM

## 2017-05-08 PROCEDURE — 98966 PH1 ASSMT&MGMT NQHP 5-10: CPT | Performed by: PHYSICIAN ASSISTANT

## 2017-05-08 RX ORDER — NYSTATIN 100000 U/G
CREAM TOPICAL DAILY PRN
Qty: 30 G | Refills: 1 | Status: SHIPPED | OUTPATIENT
Start: 2017-05-08 | End: 2017-05-22

## 2017-05-08 NOTE — TELEPHONE ENCOUNTER
traZODone (DESYREL) 50 MG tablet    mg, AT BEDTIME PRN 3 ordered  Edit     Summary: Take 1-2 tablets ( mg) by mouth nightly as needed for sleep, Disp-90 tablet, R-3, E-Prescribe   Dose, Route, Frequency:  mg, Oral, AT BEDTIME PRN  Start: 11/1/2016  Ord/Sold: 11/1/2016 (O)  Report  Taking:   Long-term:   Pharmacy: [facility] [id number] [place] [address] Dose History       Patient Sig: Take 1-2 tablets ( mg) by mouth nightly as needed for sleep       Ordered on: 11/1/2016       Authorized by: MACARENA LAM       Dispense: 90 tablet          Last Office Visit with G, P or [facility] prescribing provider:  3-   Next 5 appointments (look out 90 days)     May 08, 2017  4:00 PM CDT   Telephone Visit with Macarena Lam PA-C   [facility]    [address] [facility] [address] [phone]                   Last PHQ-9 score on record=   PHQ-9 SCORE 4/11/2017   Total Score 5       Lab Results   Component Value Date    AST 85 03/07/2017     Lab Results   Component Value Date    ALT 52 03/07/2017

## 2017-05-08 NOTE — MR AVS SNAPSHOT
"              After Visit Summary   5/8/2017    Ledy Odonnell    MRN: [id number]           Patient Information     Date Of Birth          1945        Visit Information        Provider Department      5/8/2017 4:00 PM Macarena Vinson PA-C [facility]        Today's Diagnoses     Major depressive disorder, recurrent episode, moderate (H)    -  1    Chronic pain disorder        Closed fracture of multiple ribs of right side with routine healing, subsequent encounter        Generalized anxiety disorder        Candidal intertrigo           Follow-ups after your visit        Who to contact     If you have questions or need follow up information about today's clinic visit or your schedule please contact [facility] directly at [phone].  Normal or non-critical lab and imaging results will be communicated to you by MyChart, letter or phone within 4 business days after the clinic has received the results. If you do not hear from us within 7 days, please contact the clinic through MyChart or phone. If you have a critical or abnormal lab result, we will notify you by phone as soon as possible.  Submit refill requests through [employer] or call your pharmacy and they will forward the refill request to us. Please allow 3 business days for your refill to be completed.          Additional Information About Your Visit        MyChart Information     [employer] lets you send messages to your doctor, view your test results, renew your prescriptions, schedule appointments and more. To sign up, go to [email or web address] . Click on \"Log in\" on the left side of the screen, which will take you to the Welcome page. Then click on \"Sign up Now\" on the right side of the page.     You will be asked to enter the access code listed below, as well as some personal information. Please follow the directions to create your username and password.     Your access code is: [email or web address]  Expires: " 2017  4:27 PM     Your access code will  in 90 days. If you need help or a new code, please call your [facility] clinic or [phone].        Care EveryWhere ID     This is your Care EveryWhere ID. This could be used by other organizations to access your [facility] medical records  [id number]         Blood Pressure from Last 3 Encounters:   17 107/72   17 130/74   17 147/72    Weight from Last 3 Encounters:   17 170 lb (77.1 kg)   16 170 lb (77.1 kg)   16 173 lb 9.6 oz (78.7 kg)              Today, you had the following     No orders found for display         Today's Medication Changes          These changes are accurate as of: 17  6:31 PM.  If you have any questions, ask your nurse or doctor.               These medicines have changed or have updated prescriptions.        Dose/Directions    * nystatin 867343 UNIT/GM Powd   Commonly known as:  MYCOSTATIN   This may have changed:  Another medication with the same name was added. Make sure you understand how and when to take each.   Used for:  Candidal intertrigo   Changed by:  Macarena Vinson PA-C        Dose:  1 g   Apply 1 g topically as needed   Quantity:  1 Bottle   Refills:  5       * nystatin cream   Commonly known as:  MYCOSTATIN   This may have changed:  You were already taking a medication with the same name, and this prescription was added. Make sure you understand how and when to take each.   Used for:  Candidal intertrigo   Changed by:  Macarena Vinson PA-C        Apply topically daily as needed for dry skin   Quantity:  30 g   Refills:  1       oxybutynin 10 MG 24 hr tablet   Commonly known as:  DITROPAN XL   This may have changed:  Another medication with the same name was removed. Continue taking this medication, and follow the directions you see here.   Used for:  Urgency incontinence   Changed by:  Macarena Vinson PA-C        Dose:  20 mg   Take 2 tablets (20 mg) by mouth daily    Quantity:  180 tablet   Refills:  1       senna-docusate 8.6-50 MG per tablet   Commonly known as:  SENOKOT-S;PERICOLACE   This may have changed:    - how much to take  - when to take this   Used for:  Ventral hernia        Dose:  1 tablet   Take 1 tablet by mouth 2 times daily.   Quantity:  40 tablet   Refills:  11       * Notice:  This list has 2 medication(s) that are the same as other medications prescribed for you. Read the directions carefully, and ask your doctor or other care provider to review them with you.         Where to get your medicines      These medications were sent to [facility] [address] [facility] [place] - [address]  [address] [place] [address]     Phone:  [phone]     nystatin cream                Primary Care Provider Office Phone # Fax #    Macarena Vinson PA-C [phone]       [address] [address]        Thank you!     Thank you for choosing [facility]  for your care. Our goal is always to provide you with excellent care. Hearing back from our patients is one way we can continue to improve our services. Please take a few minutes to complete the written survey that you may receive in the mail after your visit with us. Thank you!             Your Updated Medication List - Protect others around you: Learn how to safely use, store and throw away your medicines at www.disposemymeds.org.          This list is accurate as of: 5/8/17  6:31 PM.  Always use your most recent med list.                   Brand Name Dispense Instructions for use    albuterol 108 (90 BASE) MCG/ACT Inhaler    PROAIR HFA/PROVENTIL HFA/VENTOLIN HFA    3 Inhaler    Inhale 2 puffs into the lungs every 6 hours as needed for shortness of breath / dyspnea       buPROPion 150 MG 24 hr tablet    WELLBUTRIN XL    90 tablet    Take 1 tablet (150 mg) by mouth every morning       DULoxetine 60 MG EC capsule    CYMBALTA    90 capsule     Take 1 capsule (60 mg) by mouth daily       fluticasone-salmeterol 250-50 MCG/DOSE diskus inhaler    ADVAIR DISKUS    1 Inhaler    Inhale 1 puff into the lungs 2 times daily       hydrOXYzine 25 MG tablet    ATARAX    30 tablet    TAKE 1 TABLET (25 MG) BY MOUTH NIGHTLY AS NEEDED FOR ANXIETY       miconazole 2 % powder    MICATIN; MICRO GUARD    30 g    Apply topically 2 times daily Apply to groin       multivitamin, therapeutic Tabs tablet      Take 1 tablet by mouth daily       * nystatin 881023 UNIT/GM Powd    MYCOSTATIN    1 Bottle    Apply 1 g topically as needed       * nystatin cream    MYCOSTATIN    30 g    Apply topically daily as needed for dry skin       oxybutynin 10 MG 24 hr tablet    DITROPAN XL    180 tablet    Take 2 tablets (20 mg) by mouth daily       oxyCODONE-acetaminophen  MG per tablet    PERCOCET    120 tablet    1-2 tablets every 6 hrs prn-to fill on or after 9/11/16       senna-docusate 8.6-50 MG per tablet    SENOKOT-S;PERICOLACE    40 tablet    Take 1 tablet by mouth 2 times daily.       traZODone 50 MG tablet    DESYREL    90 tablet    Take 1-2 tablets ( mg) by mouth nightly as needed for sleep       VITAMIN B-12 PO      Take 100 mcg by mouth daily       * Notice:  This list has 2 medication(s) that are the same as other medications prescribed for you. Read the directions carefully, and ask your doctor or other care provider to review them with you.

## 2017-05-10 RX ORDER — TRAZODONE HYDROCHLORIDE 50 MG/1
TABLET, FILM COATED ORAL
Qty: 90 TABLET | Refills: 1 | Status: SHIPPED | OUTPATIENT
Start: 2017-05-10 | End: 2017-09-11

## 2017-05-10 NOTE — TELEPHONE ENCOUNTER
Prescription approved per [facility] Refill Protocol.     Apryl Calloway RN

## 2017-05-11 ENCOUNTER — TELEPHONE (OUTPATIENT)
Dept: FAMILY MEDICINE | Facility: CLINIC | Age: 72
End: 2017-05-11

## 2017-05-11 NOTE — TELEPHONE ENCOUNTER
Reason for Call: Request for an order or referral:    Order or referral being requested: Verbal orders    Date needed: as soon as possible    Has the patient been seen by the PCP for this problem? YES    Additional comments: They are looking for a verbal order to re certify home care   and OT  1 x a week for 1 week, and 2 x a week for 4 weeks  For ADL equipment needs home safety and depression    Phone number Patient can be reached at: [phone] Sugey WILL  Best Time:  Any    Can we leave a detailed message on this number?  YES    Call taken on 5/11/2017 at 9:49 AM by Abril Dominguez

## 2017-05-11 NOTE — TELEPHONE ENCOUNTER
Telephone call to Sugey GONZALEZ with [facility]. Authorized orders as requested below.  Ya Pineda RN  Triage  [facility]

## 2017-05-25 ENCOUNTER — TELEPHONE (OUTPATIENT)
Dept: FAMILY MEDICINE | Facility: CLINIC | Age: 72
End: 2017-05-25

## 2017-05-25 NOTE — TELEPHONE ENCOUNTER
Reason for Call: Request for an order or referral:    Order or referral being requested: Physical Therapy Eval     Date needed: as soon as possible    Has the patient been seen by the PCP for this problem? NO    Additional comments:     Phone number Patient can be reached at:  Other phone number:  [phone] to reach Sugey    Best Time:  Any    Can we leave a detailed message on this number?  YES    Call taken on 5/25/2017 at 8:10 AM by Alejandro Baca

## 2017-05-26 ENCOUNTER — TELEPHONE (OUTPATIENT)
Dept: FAMILY MEDICINE | Facility: CLINIC | Age: 72
End: 2017-05-26

## 2017-05-26 NOTE — TELEPHONE ENCOUNTER
Sugey calling to check on status.  Was hoping to get patient scheduled for next week and so will need orders today please.

## 2017-06-01 ENCOUNTER — TELEPHONE (OUTPATIENT)
Dept: FAMILY MEDICINE | Facility: CLINIC | Age: 72
End: 2017-06-01

## 2017-06-01 NOTE — TELEPHONE ENCOUNTER
Forms received from  Home Care: PT 1 x 1 day for Macarena Vinson PA-C.  Forms placed in provider 'sign me' folder.  Please fax forms to [phone] after completion.    Gabbi Rashid,

## 2017-06-04 DIAGNOSIS — G47.09 OTHER INSOMNIA: ICD-10-CM

## 2017-06-05 ENCOUNTER — TELEPHONE (OUTPATIENT)
Dept: FAMILY MEDICINE | Facility: CLINIC | Age: 72
End: 2017-06-05

## 2017-06-05 NOTE — TELEPHONE ENCOUNTER
hydrOXYzine (ATARAX) 25 MG tablet    1 ordered  Edit     Summary: TAKE 1 TABLET (25 MG) BY MOUTH NIGHTLY AS NEEDED FOR ANXIETY, Disp-30 tablet, R-1, E-Prescribe   Start: 3/30/2017  Ord/Sold: 3/30/2017 (O)  Report  Taking:   Long-term:   Pharmacy: [facility] [address] [place] [facility] [place] [place] [facility] [address]  Med Dose History       Patient Sig: TAKE 1 TABLET (25 MG) BY MOUTH NIGHTLY AS NEEDED FOR ANXIETY       Ordered on: 3/30/2017       Authorized by: JESS LAM       Dispense: 30 tablet       Prior Authorization: Closed          Last Office Visit with [facility], P or [facility] prescribing provider: 3-                                         Next 5 appointments (look out 90 days)     Jun 20, 2017  6:30 PM CDT   Office Visit with Ema Justice,    [facility] ([facility])    [address] [facility] [address]   [phone]

## 2017-06-05 NOTE — TELEPHONE ENCOUNTER
[facility] received.  Given to Team Kyree WILL for med rec.  Please give to provider for review and signature upon completion.    Please fax forms to [phone] after completion.    Gabbi Rashid,

## 2017-06-07 RX ORDER — UBIDECARENONE 75 MG
100 CAPSULE ORAL DAILY
COMMUNITY
Start: 2017-06-07 | End: 2018-08-21

## 2017-06-07 RX ORDER — HYDROXYZINE HYDROCHLORIDE 25 MG/1
TABLET, FILM COATED ORAL
Qty: 30 TABLET | Refills: 1 | Status: SHIPPED | OUTPATIENT
Start: 2017-06-07 | End: 2017-08-05

## 2017-06-07 NOTE — TELEPHONE ENCOUNTER
Routed to pcp only one month and one refill given. Taking for anxiety as prn, do you wish to refill?  Candy Hubbard,Clinic Rn  [facility]

## 2017-06-07 NOTE — TELEPHONE ENCOUNTER
Patient's pharmacy called to follow up on the medication that they had called in. Please call back, okay to leave message.    Macarena Beckett, Patient Representative

## 2017-06-09 PROCEDURE — G0179 MD RECERTIFICATION HHA PT: HCPCS | Performed by: FAMILY MEDICINE

## 2017-06-23 ENCOUNTER — DOCUMENTATION ONLY (OUTPATIENT)
Dept: CARE COORDINATION | Facility: CLINIC | Age: 72
End: 2017-06-23

## 2017-06-23 NOTE — PROGRESS NOTES
[facility] now requests orders and shares plan of care/discharge summaries for some patients through [employer].  Please REPLY TO THIS MESSAGE in order to give authorization for orders when needed.  This is considered a verbal order, you will still receive a faxed copy of orders for signature.  Thank you for your assistance in improving collaboration for our patients.    ORDER  Continue home PT visits.  Pt was only seen for 2 home PT visits in June due to her street being torn up and repaved, therefore PT is requesting orders for 2x/week X 2 weeks for gait training with her new AFOs, transfer training, therex/instruction in home exercise program, falls prevention plan, monitor for s/s, monitor for pain/skin integrity/depression, and to achieve the following goals,  1. Pt will improve strength to be able to perform basic transfer mod ind/ safe/ MET  2. Pt will be able to maintain static standing balance x 2-3 min with UE support and SBA/ in progress  3. Pt will be able to ambulate with 2ww and wearing new custom shoes/AFO 75ft w/close sba in home w/steady pace, good uprightposture and no LOB.  4. Pt will demonstrate correct/indep performance of HEP for continued functional strength gains at [facility] home services/in progress.  5. Pt will verbalize understanding with 2 mobility modifications to prevent falls/in progress.   New goal  6. Pt will be able to don/doff her new customized shoes/AFOs indep and safety.

## 2017-06-25 NOTE — PROGRESS NOTES
Agree with requested orders.   This will be managed in conjunction with her PCP Macarena Florez MD

## 2017-06-28 ENCOUNTER — TELEPHONE (OUTPATIENT)
Dept: FAMILY MEDICINE | Facility: CLINIC | Age: 72
End: 2017-06-28

## 2017-06-28 NOTE — TELEPHONE ENCOUNTER
Forms received from  Home Care: PT 2 wk 3, 6/1/17 for Macarena Vinson PA-C.  Forms placed in provider 'sign me' folder.  Please fax forms to [phone] after completion.    Gabbi Rashid,

## 2017-06-30 ENCOUNTER — TELEPHONE (OUTPATIENT)
Dept: FAMILY MEDICINE | Facility: CLINIC | Age: 72
End: 2017-06-30

## 2017-06-30 DIAGNOSIS — G89.4 CHRONIC PAIN DISORDER: ICD-10-CM

## 2017-06-30 RX ORDER — OXYCODONE AND ACETAMINOPHEN 10; 325 MG/1; MG/1
TABLET ORAL
Qty: 120 TABLET | Refills: 0 | Status: CANCELLED | OUTPATIENT
Start: 2017-06-30

## 2017-06-30 RX ORDER — OXYCODONE AND ACETAMINOPHEN 10; 325 MG/1; MG/1
TABLET ORAL
Qty: 120 TABLET | Refills: 0 | Status: SHIPPED | OUTPATIENT
Start: 2017-06-30 | End: 2017-09-11

## 2017-06-30 RX ORDER — OXYCODONE AND ACETAMINOPHEN 10; 325 MG/1; MG/1
TABLET ORAL
Qty: 120 TABLET | Refills: 0 | Status: SHIPPED | OUTPATIENT
Start: 2017-07-10 | End: 2017-09-11

## 2017-06-30 NOTE — TELEPHONE ENCOUNTER
Called patient and let her know that her scripts(3) will be at  to .    Walked scripts to  and logged into book.    Ana Mullen

## 2017-06-30 NOTE — TELEPHONE ENCOUNTER
Oxycodone acetaminophen (PERCOCET)  MG per tablet      Last Written Prescription Date:  04/06/2016  Last Fill Quantity: 120,   # refills: 0  Last Office Visit with G, [facility] or [facility] prescribing provider: 05/08/2017  Future Office visit:    Next 5 appointments (look out 90 days)     Jul 07, 2017  1:00 PM CDT   Office Visit with Ema Justice,    [facility] ([facility])    [address] [facility] [address] [phone]                   Routing refill request to provider for review/approval because:  Drug not on the [facility], [facility] or [facility] refill protocol or controlled substance

## 2017-07-07 ENCOUNTER — DOCUMENTATION ONLY (OUTPATIENT)
Dept: CARE COORDINATION | Facility: CLINIC | Age: 72
End: 2017-07-07

## 2017-07-07 NOTE — PROGRESS NOTES
[facility] now requests orders and shares plan of care/discharge summaries for some patients through "[employer].  Please REPLY TO THIS MESSAGE in order to give authorization for orders when needed.  This is considered a verbal order, you will still receive a faxed copy of orders for signature.  Thank you for your assistance in improving collaboration for our patients.    ORDER Continue home PT visits in new Certification period starting 7/9/17 for gait training with new AFOs, transfer training, instruction in HEP. The plan also includes falls risk assessment and falls prevention plan, monitor and treat pain, monitor skin integrity, monitor for s/s of depression, and to achieve the following goals,   1. Pt will improve strength to be able to perform basic transfer mod ind/ safe/ MET  2. Pt will be able to maintain static standing balance x 2-3 min with UE support and SBA/ in progress  3. Pt will be able to ambulate with 2ww and wearing new custom shoes/AFO 75ft w/close sba in home w/steady pace, good uprightposture and no LOB.  4. Pt will demonstrate correct/indep performance of HEP for continued functional strength gains at [facility] home services/in progress.  5. Pt will verbalize understanding with 2 mobility modifications to prevent falls/in progress.   New goal  6. Pt will be able to don/doff her new customized shoes/AFOs indep and safety.

## 2017-07-15 ENCOUNTER — TRANSFERRED RECORDS (OUTPATIENT)
Dept: HEALTH INFORMATION MANAGEMENT | Facility: CLINIC | Age: 72
End: 2017-07-15

## 2017-07-21 DIAGNOSIS — E78.5 HYPERLIPIDEMIA LDL GOAL <130: ICD-10-CM

## 2017-07-21 DIAGNOSIS — R41.3 MEMORY LOSS: ICD-10-CM

## 2017-07-21 DIAGNOSIS — Z87.81 H/O FRACTURE: ICD-10-CM

## 2017-07-21 PROCEDURE — 82306 VITAMIN D 25 HYDROXY: CPT | Performed by: INTERNAL MEDICINE

## 2017-07-21 PROCEDURE — 82565 ASSAY OF CREATININE: CPT | Performed by: INTERNAL MEDICINE

## 2017-07-21 PROCEDURE — 80061 LIPID PANEL: CPT | Performed by: INTERNAL MEDICINE

## 2017-07-21 PROCEDURE — 86780 TREPONEMA PALLIDUM: CPT | Performed by: INTERNAL MEDICINE

## 2017-07-21 PROCEDURE — 82310 ASSAY OF CALCIUM: CPT | Performed by: INTERNAL MEDICINE

## 2017-07-21 PROCEDURE — 36415 COLL VENOUS BLD VENIPUNCTURE: CPT | Performed by: INTERNAL MEDICINE

## 2017-07-21 NOTE — LETTER
" July 26, 2017        Ledy Odonnell  [address]  [facility] [address]        Dear Ledy,       The results of your recent cholesterol , HDL \"Good Cholesterol\", triglycerides } were normal.     The results of your recent LDL/Non-HDL \"Bad Cholesterol\"   were still elevated, but improved from previous. Please continue on your current dose of Simvastatin.    Please note that test explanations are brief and do not reflect all diagnostic uses.    Please make a follow-up appointment if you have additional questions.    Sincerely,        Macarena Vinson PA-C    Results for orders placed or performed in visit on 07/21/17   Anti Treponema   Result Value Ref Range    Treponema pallidum Antibody Negative NEG   Lipid panel reflex to direct LDL   Result Value Ref Range    Cholesterol 184 <200 mg/dL    Triglycerides 114 <150 mg/dL    HDL Cholesterol 52 >49 mg/dL    LDL Cholesterol Calculated 109 (H) <100 mg/dL    Non HDL Cholesterol 132 (H) <130 mg/dL   Vitamin D Deficiency   Result Value Ref Range    Vitamin D Deficiency screening 47 20 - 75 ug/L   Calcium   Result Value Ref Range    Calcium 9.3 8.5 - 10.1 mg/dL   Creatinine   Result Value Ref Range    Creatinine 0.70 0.52 - 1.04 mg/dL    GFR Estimate 83 >60 mL/min/1.7m2    GFR Estimate If Black >90   GFR Calc   >60 mL/min/1.7m2               "

## 2017-07-21 NOTE — LETTER
[facility]  [address] [facility]  [address]  [place]  [address]  Tel: [phone]    July 27, 2017    Ledy Odonnell  [address]  [facility] [address]        Dear Ms. Odonnell,    The syphilis screening test is negative = normal. This is good to know in the context of memory concerns.  I wonder if you were able to schedule an appointment with the neuropsychologist Dr. Elma Taylor?  Please let me know how you are doing and schedule a follow up appointment at the [facility] where I saw you last fall.    If you have any further questions or problems, please contact our office.      Sincerely,    Ema Justice, DO/SML      Enclosure: Lab Results  Results for orders placed or performed in visit on 07/21/17   Anti Treponema   Result Value Ref Range    Treponema pallidum Antibody Negative NEG   Lipid panel reflex to direct LDL   Result Value Ref Range    Cholesterol 184 <200 mg/dL    Triglycerides 114 <150 mg/dL    HDL Cholesterol 52 >49 mg/dL    LDL Cholesterol Calculated 109 (H) <100 mg/dL    Non HDL Cholesterol 132 (H) <130 mg/dL   Vitamin D Deficiency   Result Value Ref Range    Vitamin D Deficiency screening 47 20 - 75 ug/L   Calcium   Result Value Ref Range    Calcium 9.3 8.5 - 10.1 mg/dL   Creatinine   Result Value Ref Range    Creatinine 0.70 0.52 - 1.04 mg/dL    GFR Estimate 83 >60 mL/min/1.7m2    GFR Estimate If Black >90   GFR Calc   >60 mL/min/1.7m2

## 2017-07-22 LAB
CALCIUM SERPL-MCNC: 9.3 MG/DL (ref 8.5–10.1)
CHOLEST SERPL-MCNC: 184 MG/DL
CREAT SERPL-MCNC: 0.7 MG/DL (ref 0.52–1.04)
GFR SERPL CREATININE-BSD FRML MDRD: 83 ML/MIN/1.7M2
HDLC SERPL-MCNC: 52 MG/DL
LDLC SERPL CALC-MCNC: 109 MG/DL
NONHDLC SERPL-MCNC: 132 MG/DL
T PALLIDUM IGG+IGM SER QL: NEGATIVE
TRIGL SERPL-MCNC: 114 MG/DL

## 2017-07-24 LAB — DEPRECATED CALCIDIOL+CALCIFEROL SERPL-MC: 47 UG/L (ref 20–75)

## 2017-07-25 ENCOUNTER — TELEPHONE (OUTPATIENT)
Dept: FAMILY MEDICINE | Facility: CLINIC | Age: 72
End: 2017-07-25

## 2017-07-25 NOTE — TELEPHONE ENCOUNTER
[facility] received.  Given to Team Kyree WILL for med rec.  Please give to provider for review and signature upon completion.    Please fax forms to [phone] after completion.    Gabbi Rashid,

## 2017-07-26 ENCOUNTER — TELEPHONE (OUTPATIENT)
Dept: FAMILY MEDICINE | Facility: CLINIC | Age: 72
End: 2017-07-26

## 2017-07-26 NOTE — TELEPHONE ENCOUNTER
Forms received from  Home Care: PT 2 week 3 for Macarena Vinson PA-C.  Forms placed in provider 'sign me' folder.  Please fax forms to [phone] after completion.    Gabbi Rashid,

## 2017-07-27 PROCEDURE — 99207 C MD RECERTIFICATION HHA PT: CPT | Performed by: FAMILY MEDICINE

## 2017-07-31 NOTE — PROGRESS NOTES
Rheumatology team: Please call to notify Ms. Odonnell that the calcium and vitamin D levels are normal.     Max Choi MD  7/31/2017 6:47 AM

## 2017-08-04 ENCOUNTER — TELEPHONE (OUTPATIENT)
Dept: FAMILY MEDICINE | Facility: CLINIC | Age: 72
End: 2017-08-04

## 2017-08-04 NOTE — TELEPHONE ENCOUNTER
Reason for Call:  Other     Detailed comments: Need verbal orders for home physical therapy continued two times a week for two weeks for gate training home exercise program.    Phone Number Patient can be reached at: Other phone number:   [facility] [phone]         Best Time:     Can we leave a detailed message on this number? YES    Call taken on 8/4/2017 at 11:09 AM by Kristen Parson

## 2017-08-05 DIAGNOSIS — G47.09 OTHER INSOMNIA: ICD-10-CM

## 2017-08-07 NOTE — TELEPHONE ENCOUNTER
hydrOXYzine (ATARAX) 25 MG tablet    1 ordered  Edit     Summary: TAKE 1 TABLET (25 MG) BY MOUTH NIGHTLY AS NEEDED FOR ANXIETY, Disp-30 tablet, R-1, E-Prescribe   Start: 6/7/2017  Ord/Sold: 6/7/2017 (O)  Report  Taking:   Long-term:   Pharmacy: [facility] [id number] [place] [facility], [place] [address] [facility]  Med Dose History       Patient Sig: TAKE 1 TABLET (25 MG) BY MOUTH NIGHTLY AS NEEDED FOR ANXIETY       Ordered on: 6/7/2017       Authorized by: JESS LAM       Dispense: 30 tablet          Last Office Visit with [facility], [facility] or [facility] prescribing provider: 3-

## 2017-08-08 RX ORDER — HYDROXYZINE HYDROCHLORIDE 25 MG/1
TABLET, FILM COATED ORAL
Qty: 30 TABLET | Refills: 1 | Status: SHIPPED | OUTPATIENT
Start: 2017-08-08 | End: 2017-10-06

## 2017-08-08 NOTE — TELEPHONE ENCOUNTER
Routing refill request to provider for review/approval because:  Drug not on the [facility] refill protocol   Ya Pineda RN

## 2017-08-09 ENCOUNTER — TELEPHONE (OUTPATIENT)
Dept: FAMILY MEDICINE | Facility: CLINIC | Age: 72
End: 2017-08-09

## 2017-08-09 NOTE — TELEPHONE ENCOUNTER
Forms received from  Home Care: PT 2 wk 2 for Macarena Vinson PA-C.  Forms placed in provider 'sign me' folder.  Please fax forms to [phone] after completion.    Gabbi Rashid,

## 2017-08-15 ENCOUNTER — MEDICAL CORRESPONDENCE (OUTPATIENT)
Dept: HEALTH INFORMATION MANAGEMENT | Facility: CLINIC | Age: 72
End: 2017-08-15

## 2017-08-23 ENCOUNTER — TELEPHONE (OUTPATIENT)
Dept: FAMILY MEDICINE | Facility: CLINIC | Age: 72
End: 2017-08-23

## 2017-08-23 NOTE — TELEPHONE ENCOUNTER
Forms received from  Home Care: PT 2 Week 2 for Macarena Vinson PA-C.  Forms placed in provider 'sign me' folder.  Please fax forms to [phone] after completion.    Gabbi Rashid,

## 2017-08-28 ENCOUNTER — TELEPHONE (OUTPATIENT)
Dept: FAMILY MEDICINE | Facility: CLINIC | Age: 72
End: 2017-08-28

## 2017-08-28 DIAGNOSIS — F17.200 TOBACCO USE DISORDER: ICD-10-CM

## 2017-08-28 DIAGNOSIS — F33.1 MAJOR DEPRESSIVE DISORDER, RECURRENT EPISODE, MODERATE (H): ICD-10-CM

## 2017-08-28 NOTE — TELEPHONE ENCOUNTER
buPROPion (WELLBUTRIN XL) 150 MG 24 hr tablet   150 mg, EVERY MORNING 3 ordered  Edit     Summary: Take 1 tablet (150 mg) by mouth every morning, Disp-90 tablet, R-3, E-Prescribe   Dose, Route, Frequency: 150 mg, Oral, EVERY MORNING  Start: 9/13/2016  Ord/Sold: 9/13/2016 (O)  Report  Taking:   Long-term:   Pharmacy: [place] [id number] [place] [facility], [place] - [address] [place] [address] Dose History       Patient Sig: Take 1 tablet (150 mg) by mouth every morning       Ordered on: 9/13/2016       Authorized by: JESS LAM       Dispense: 90 tablet          Last Office Visit with [facility], [facility] or [facility] prescribing provider:  3-   Next 5 appointments (look out 90 days)     Sep 14, 2017 10:40 AM CDT   [facility]   [facility]    [address] [place] [address]  [place] [address] [phone]                   Last PHQ-9 score on record=   PHQ-9 SCORE 4/11/2017   Total Score -   Total Score 5       Lab Results   Component Value Date    AST 85 03/07/2017     Lab Results   Component Value Date    ALT 52 03/07/2017

## 2017-08-29 RX ORDER — BUPROPION HYDROCHLORIDE 150 MG/1
150 TABLET ORAL EVERY MORNING
Qty: 30 TABLET | Refills: 0 | Status: SHIPPED | OUTPATIENT
Start: 2017-08-29 | End: 2017-09-11

## 2017-08-29 NOTE — TELEPHONE ENCOUNTER
Medication is being filled for 1 time refill only due to:  Patient needs to be seen because it has been 6 months since last depression visit..     Cj Maharaj RN

## 2017-08-30 ENCOUNTER — CARE COORDINATION (OUTPATIENT)
Dept: CARE COORDINATION | Facility: CLINIC | Age: 72
End: 2017-08-30

## 2017-08-31 NOTE — PROGRESS NOTES
Clinic Care Coordination Contact  [facility]/Voicemail    Referral Source: PCP  Clinical Data: Care Coordinator Outreach  Outreach attempted x 2.  Left message on voicemail with call back information and requested return call.  Plan:  Care Coordinator will try to reach patient again in 3-5 business days.    Madelin Ontiveros HEMANTH  Clinic Care Coordinator  [phone]  [email or web address]

## 2017-09-05 ENCOUNTER — TELEPHONE (OUTPATIENT)
Dept: FAMILY MEDICINE | Facility: CLINIC | Age: 72
End: 2017-09-05

## 2017-09-05 NOTE — TELEPHONE ENCOUNTER
Reason for Call:  Other phone appointment      Detailed comments: please call patient would like a phone appointment with PCP patient stated that she is in wheel chair and it is very hard to come to clinic     Phone Number Patient can be reached at: Home number on file [phone] (home)    Best Time: after 3 pm today     Can we leave a detailed message on this number? YES    Call taken on 9/5/2017 at 1:09 PM by Cathleen Garcia

## 2017-09-05 NOTE — TELEPHONE ENCOUNTER
Macarena,    Does this patient need to come in for visit or do you do phone visits with her every time?    Gabbi Rashid,

## 2017-09-06 NOTE — TELEPHONE ENCOUNTER
This depends on what she needs to be seen for. Okay for phone visit if this is follow up depression/anxiety and chronic pain.    SERGO Callahan, PA-C

## 2017-09-11 ENCOUNTER — VIRTUAL VISIT (OUTPATIENT)
Dept: FAMILY MEDICINE | Facility: CLINIC | Age: 72
End: 2017-09-11
Payer: COMMERCIAL

## 2017-09-11 DIAGNOSIS — G47.09 OTHER INSOMNIA: ICD-10-CM

## 2017-09-11 DIAGNOSIS — F41.1 GENERALIZED ANXIETY DISORDER: ICD-10-CM

## 2017-09-11 DIAGNOSIS — N39.41 URGENCY INCONTINENCE: Primary | ICD-10-CM

## 2017-09-11 DIAGNOSIS — G89.4 CHRONIC PAIN DISORDER: ICD-10-CM

## 2017-09-11 DIAGNOSIS — F33.1 MAJOR DEPRESSIVE DISORDER, RECURRENT EPISODE, MODERATE (H): ICD-10-CM

## 2017-09-11 DIAGNOSIS — J44.9 CHRONIC OBSTRUCTIVE PULMONARY DISEASE, UNSPECIFIED COPD TYPE (H): ICD-10-CM

## 2017-09-11 DIAGNOSIS — F17.200 TOBACCO USE DISORDER: ICD-10-CM

## 2017-09-11 PROCEDURE — 98966 PH1 ASSMT&MGMT NQHP 5-10: CPT | Performed by: PHYSICIAN ASSISTANT

## 2017-09-11 RX ORDER — OXYCODONE AND ACETAMINOPHEN 10; 325 MG/1; MG/1
TABLET ORAL
Qty: 120 TABLET | Refills: 0 | Status: SHIPPED | OUTPATIENT
Start: 2017-09-11 | End: 2018-01-10

## 2017-09-11 RX ORDER — TRAZODONE HYDROCHLORIDE 50 MG/1
TABLET, FILM COATED ORAL
Qty: 90 TABLET | Refills: 1 | Status: SHIPPED | OUTPATIENT
Start: 2017-09-11 | End: 2017-12-14

## 2017-09-11 RX ORDER — OXYBUTYNIN CHLORIDE 10 MG/1
20 TABLET, EXTENDED RELEASE ORAL DAILY
Qty: 180 TABLET | Refills: 3 | Status: SHIPPED | OUTPATIENT
Start: 2017-09-11 | End: 2018-10-15

## 2017-09-11 RX ORDER — BUPROPION HYDROCHLORIDE 150 MG/1
150 TABLET ORAL EVERY MORNING
Qty: 90 TABLET | Refills: 3 | Status: SHIPPED | OUTPATIENT
Start: 2017-09-11 | End: 2018-10-15

## 2017-09-11 ASSESSMENT — PATIENT HEALTH QUESTIONNAIRE - PHQ9: SUM OF ALL RESPONSES TO PHQ QUESTIONS 1-9: 0

## 2017-09-11 NOTE — PROGRESS NOTES
"Ledy Odonnell is a 72 year old female who is being evaluated via a telephone visit.      The patient has been notified of following:     \"This telephone visit will be conducted via a call between you and your physician/provider. We have found that certain health care needs can be provided without the need for a physical exam.  This service lets us provide the care you need with a short phone conversation.  If a prescription is necessary we can send it directly to your pharmacy.  If lab work is needed we can place an order for that and you can then stop by our lab to have the test done at a later time.    We will bill your insurance company for this service.  Please check with your medical insurance if this type of visit is covered. You may be responsible for the cost of this type of visit if insurance coverage is denied.  The typical cost is $30 (10min), $59 (11-20min) and $85 (21-30min).  Most often these visits are shorter than 10 minutes.    If during the course of the call the physician/provider feels a telephone visit is not appropriate, you will not be charged for this service.\"       Consent has been obtained for this service by 2 care team members: yes. See the scanned image in the medical record.    Ledy Odonnell complains of  Medication Problem (Oxybutibn - sig  on bottle says take 1 by mouth daily but patient states that she takes 2 tablets daily. She is currently out of medication. ); Recheck Medication (Follow up on medications ); and Refill Request      I have reviewed and updated the patient's Past Medical History, Social History, Family History and Medication List.    ALLERGIES  No known allergies    Bre Zuluaga CMA (AAMA) (MA signature)    Additional provider notes:     Follow up for depression/anxiety, urge incontinence and chronic pain.  She is doing really well since being on this combination of medications. Feels the Cymbalta has been very beneficial for both her mood and bladder, as " well as her dry mouth..  She is now able to leave her home much more often that had been before as isn't worried about losing control  No longer needing PT and OT.  She was very pleased with these services. Continues to do daily exercises for her bladder.  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  No new falls.  Has 100% recovered from previous rib fractures.  Pain is stable, can't say whether the Cymbalta has helped her pain or not.     She has no new complaints, just needs refills of her chronic medications.    Her last visit in clinic was 6 months ago.    Assessment/Plan:  1. Urgency incontinence  Well controlled, continue current dose.  Continue at home exercises as well.  - oxybutynin (DITROPAN XL) 10 MG 24 hr tablet; Take 2 tablets (20 mg) by mouth daily  Dispense: 180 tablet; Refill: 3    2. Major depressive disorder, recurrent episode, moderate (H)  3. Generalized anxiety disorder  Doing really well.  Continue current medications.  - buPROPion (WELLBUTRIN XL) 150 MG 24 hr tablet; Take 1 tablet (150 mg) by mouth every morning  Dispense: 90 tablet; Refill: 3    4. Chronic obstructive pulmonary disease, unspecified COPD type (H)  Stable, well controlled.    5. Chronic pain disorder  Stable, well controlled.  - oxyCODONE-acetaminophen (PERCOCET)  MG per tablet; 1-2 tablets every 6 hrs prn-to fill on or after 12/10/17  Dispense: 120 tablet; Refill: 0  - oxyCODONE-acetaminophen (PERCOCET)  MG per tablet; 1-2 tablets every 6 hrs prn-to fill on or after 11/10/17  Dispense: 120 tablet; Refill: 0  - oxyCODONE-acetaminophen (PERCOCET)  MG per tablet; 1-2 tablets every 6 hrs prn-to fill on or after 10/10/17  Dispense: 120 tablet; Refill: 0    6. Tobacco use disorder  - buPROPion (WELLBUTRIN XL) 150 MG 24 hr tablet; Take 1 tablet (150 mg) by mouth every morning  Dispense: 90 tablet; Refill: 3    7. Other insomnia  - traZODone (DESYREL) 50 MG tablet; TAKE 1-2 TABLETS BY  MOUTH NIGHTLY AS NEEDED FOR SLEEP  Dispense: 90 tablet; Refill: 1    Will need visit in clinic at least 1 year.  Given stability of issues, okay for phone visit if risk for falls in winter is too high.    I have reviewed the note as documented above.  This accurately captures the substance of my conversation with the patient,    Total time of call between patient and provider was 6 minutes     SERGO Callahan, PA-C

## 2017-09-11 NOTE — MR AVS SNAPSHOT
"              After Visit Summary   9/11/2017    Ledy Odonnell    MRN: [id number]           Patient Information     Date Of Birth          1945        Visit Information        Provider Department      9/11/2017 10:40 AM Macarena Vinson PA-C [facility]        Today's Diagnoses     Urgency incontinence    -  1    Major depressive disorder, recurrent episode, moderate (H)        Generalized anxiety disorder        Chronic obstructive pulmonary disease, unspecified COPD type (H)        Chronic pain disorder        Tobacco use disorder        Other insomnia           Follow-ups after your visit        Your next 10 appointments already scheduled     Sep 14, 2017 10:40 AM CDT   [facility]    [address] [facility] [address] [phone]              Who to contact     If you have questions or need follow up information about today's clinic visit or your schedule please contact [facility] directly at [phone].  Normal or non-critical lab and imaging results will be communicated to you by [employer], letter or phone within 4 business days after the clinic has received the results. If you do not hear from us within 7 days, please contact the clinic through [employer]Ark[employer]et or phone. If you have a critical or abnormal lab result, we will notify you by phone as soon as possible.  Submit refill requests through Viyet or call your pharmacy and they will forward the refill request to us. Please allow 3 business days for your refill to be completed.          Additional Information About Your Visit        CYP [employer] Information     Viyet lets you send messages to your doctor, view your test results, renew your prescriptions, schedule appointments and more. To sign up, go to [email or web address] . Click on \"Log in\" on the left side of the screen, which will take you to the Welcome page. Then " "click on \"Sign up Now\" on the right side of the page.     You will be asked to enter the access code listed below, as well as some personal information. Please follow the directions to create your username and password.     Your access code is: [email or web address]  Expires: 12/10/2017 11:12 AM     Your access code will  in 90 days. If you need help or a new code, please call your [place] clinic or [phone].        Care EveryWhere ID     This is your Care EveryWhere ID. This could be used by other organizations to access your [place] medical records  [id number]         Blood Pressure from Last 3 Encounters:   17 107/72   17 130/74   17 147/72    Weight from Last 3 Encounters:   17 170 lb (77.1 kg)   16 170 lb (77.1 kg)   16 173 lb 9.6 oz (78.7 kg)              Today, you had the following     No orders found for display         Today's Medication Changes          These changes are accurate as of: 17 11:12 AM.  If you have any questions, ask your nurse or doctor.               These medicines have changed or have updated prescriptions.        Dose/Directions    buPROPion 150 MG 24 hr tablet   Commonly known as:  WELLBUTRIN XL   This may have changed:  additional instructions   Used for:  Major depressive disorder, recurrent episode, moderate (H), Tobacco use disorder   Changed by:  Macarena Vinson PA-C        Dose:  150 mg   Take 1 tablet (150 mg) by mouth every morning   Quantity:  90 tablet   Refills:  3       * oxyCODONE-acetaminophen  MG per tablet   Commonly known as:  PERCOCET   This may have changed:  additional instructions   Used for:  Chronic pain disorder   Changed by:  Macarena Vinson PA-C        1-2 tablets every 6 hrs prn-to fill on or after 12/10/17   Quantity:  120 tablet   Refills:  0       * oxyCODONE-acetaminophen  MG per tablet   Commonly known as:  PERCOCET   This may have changed:  additional instructions   Used for:  Chronic " pain disorder   Changed by:  Macarena Vinson PA-C        1-2 tablets every 6 hrs prn-to fill on or after 11/10/17   Quantity:  120 tablet   Refills:  0       * oxyCODONE-acetaminophen  MG per tablet   Commonly known as:  PERCOCET   This may have changed:  additional instructions   Used for:  Chronic pain disorder   Changed by:  Macarena Vinson PA-C        1-2 tablets every 6 hrs prn-to fill on or after 10/10/17   Quantity:  120 tablet   Refills:  0       senna-docusate 8.6-50 MG per tablet   Commonly known as:  SENOKOT-S;PERICOLACE   This may have changed:    - how much to take  - when to take this   Used for:  Ventral hernia        Dose:  1 tablet   Take 1 tablet by mouth 2 times daily.   Quantity:  40 tablet   Refills:  11       traZODone 50 MG tablet   Commonly known as:  DESYREL   This may have changed:  See the new instructions.   Used for:  Other insomnia   Changed by:  Macarena Vinson PA-C        TAKE 1-2 TABLETS BY MOUTH NIGHTLY AS NEEDED FOR SLEEP   Quantity:  90 tablet   Refills:  1       * Notice:  This list has 3 medication(s) that are the same as other medications prescribed for you. Read the directions carefully, and ask your doctor or other care provider to review them with you.         Where to get your medicines      These medications were sent to [facility] [address] [place] [address] [address]     Phone:  [phone]     buPROPion 150 MG 24 hr tablet    oxybutynin 10 MG 24 hr tablet    traZODone 50 MG tablet         Some of these will need a paper prescription and others can be bought over the counter.  Ask your nurse if you have questions.     Bring a paper prescription for each of these medications     oxyCODONE-acetaminophen  MG per tablet    oxyCODONE-acetaminophen  MG per tablet    oxyCODONE-acetaminophen  MG per tablet                Primary Care Provider Office Phone # Fax #    Macarena Vinson PA-C  [phone]       [address] [facility] [address]        Equal Access to Services     JENNIFER SCOTT : Hadii aad ku hadguadalupezeny [place], wajose juanda tanojayantha, qabrainta kaalmada kimberly, cory scarletin hayaakulwinder adamsmayra santizo laPhillipmainor acevedo. So [facility] [phone].    ATENCIÓN: Si habla español, tiene a michele disposición servicios gratuitos de asistencia lingüística. Llame al [phone].    We comply with applicable federal civil rights laws and [place] laws. We do not discriminate on the basis of race, color, national origin, age, disability sex, sexual orientation or gender identity.            Thank you!     Thank you for choosing [facility]  for your care. Our goal is always to provide you with excellent care. Hearing back from our patients is one way we can continue to improve our services. Please take a few minutes to complete the written survey that you may receive in the mail after your visit with us. Thank you!             Your Updated Medication List - Protect others around you: Learn how to safely use, store and throw away your medicines at www.disposemymeds.org.          This list is accurate as of: 9/11/17 11:12 AM.  Always use your most recent med list.                   Brand Name Dispense Instructions for use Diagnosis    albuterol 108 (90 BASE) MCG/ACT Inhaler    PROAIR HFA/PROVENTIL HFA/VENTOLIN HFA    3 Inhaler    Inhale 2 puffs into the lungs every 6 hours as needed for shortness of breath / dyspnea    Chronic obstructive pulmonary disease, unspecified COPD type (H)       buPROPion 150 MG 24 hr tablet    WELLBUTRIN XL    90 tablet    Take 1 tablet (150 mg) by mouth every morning    Major depressive disorder, recurrent episode, moderate (H), Tobacco use disorder       cyanocolbalamin 100 MCG tablet    vitamin  B-12     Take 1 tablet (100 mcg) by mouth daily        DULoxetine 60 MG EC capsule    CYMBALTA    90 capsule    Take 1 capsule (60 mg) by mouth daily    Major depressive  disorder, recurrent episode, moderate (H), Chronic pain disorder, Generalized anxiety disorder, Other urinary incontinence       fluticasone-salmeterol 250-50 MCG/DOSE diskus inhaler    ADVAIR DISKUS    1 Inhaler    Inhale 1 puff into the lungs 2 times daily    COPD exacerbation (H)       hydrOXYzine 25 MG tablet    ATARAX    30 tablet    TAKE 1 TABLET (25 MG) BY MOUTH NIGHTLY AS NEEDED FOR ANXIETY    Other insomnia       miconazole 2 % powder    MICATIN; MICRO GUARD    30 g    Apply topically 2 times daily Apply to groin    Fungal infection       multivitamin, therapeutic Tabs tablet      Take 1 tablet by mouth daily        nystatin 924331 UNIT/GM Powd    MYCOSTATIN    1 Bottle    Apply 1 g topically as needed    Candidal intertrigo       oxybutynin 10 MG 24 hr tablet    DITROPAN XL    180 tablet    Take 2 tablets (20 mg) by mouth daily    Urgency incontinence       * oxyCODONE-acetaminophen  MG per tablet    PERCOCET    120 tablet    1-2 tablets every 6 hrs prn-to fill on or after 12/10/17    Chronic pain disorder       * oxyCODONE-acetaminophen  MG per tablet    PERCOCET    120 tablet    1-2 tablets every 6 hrs prn-to fill on or after 11/10/17    Chronic pain disorder       * oxyCODONE-acetaminophen  MG per tablet    PERCOCET    120 tablet    1-2 tablets every 6 hrs prn-to fill on or after 10/10/17    Chronic pain disorder       senna-docusate 8.6-50 MG per tablet    SENOKOT-S;PERICOLACE    40 tablet    Take 1 tablet by mouth 2 times daily.    Ventral hernia       traZODone 50 MG tablet    DESYREL    90 tablet    TAKE 1-2 TABLETS BY MOUTH NIGHTLY AS NEEDED FOR SLEEP    Other insomnia       * Notice:  This list has 3 medication(s) that are the same as other medications prescribed for you. Read the directions carefully, and ask your doctor or other care provider to review them with you.

## 2017-09-11 NOTE — PROGRESS NOTES
Clinic Care Coordination Contact  OUTREACH    Referral Information:  Referral Source: PCP  Reason for Contact: Follow-up on discharge from home care  Care Conference: No     Universal Utilization:   ED Visits in last year: 1  Hospital visits in last year: 1  Last PCP appointment: 05/08/17  Missed Appointments: 0  Concerns: None  Multiple Providers or Specialists: No    Clinical Concerns:  Current Medical Concerns:   Patient Active Problem List   Diagnosis     Esophageal reflux     Insomnia     FAMILY HX DIABETES MELLITUS brother     Tobacco use disorder     Obesity     Generalized anxiety disorder     Moderate major depression (H)     COPD (chronic obstructive pulmonary disease) (H)     Elevated fasting glucose     Chronic pain disorder     Idiopathic peripheral neuropathy     Hyperlipidemia LDL goal <130     Degenerative arthritis of hip     Hip arthrosis - left, severe     Trochanteric bursitis     Status post hip replacement     Advanced directives, counseling/discussion     Tobacco abuse     Gastroparesis     DDD (degenerative disc disease), lumbar     Delayed gastric emptying     Health Care Home     Candidal intertrigo     Ventral hernia     Femur fracture (H)     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         Education Provided to patient: Care Coordination   Clinical Pathway: None    Medication Management:  Pt reports adherence and understanding, Reviewed with RN prior to home care discharge    Functional Status:  Mobility Status: Independent w/Device- uses a wheelchair to move around home  Equipment Currently Used at Home: bath bench, grab bar, wheelchair, walker, rolling, raised toilet  Transportation: Family assists, mainly homebound     Psychosocial:  Current living arrangement:: I live alone, I live in a private home  Financial/Insurance: [employer] for [employer], Low fixed income,  Qualified for ACW in May of 2017     Resources and Interventions:  Current Resources: ACW  Advanced Care Plans/Directives on file:: Yes    Patient/Caregiver understanding: Pt reports that things are going well since she was discharged from home care. Pt is able to perform household tasks independently and denied any needs from Care Coordination.     Frequency of Care Coordination: No further outreaches will be made at this time unless a new referral is made or a change in the pt's status occurs. Patient was provided with this writer's contact information and encouraged to call with any questions or concerns.    Upcoming appointment: 09/14/17     Plan: ALEXIA CC will be available to patient as needed.    ANGEL Scott  [facility]   [phone]  [email or web address]

## 2017-09-14 ENCOUNTER — OFFICE VISIT (OUTPATIENT)
Dept: RHEUMATOLOGY | Facility: CLINIC | Age: 72
End: 2017-09-14
Payer: COMMERCIAL

## 2017-09-14 VITALS
WEIGHT: 147 LBS | HEART RATE: 94 BPM | BODY MASS INDEX: 24.49 KG/M2 | OXYGEN SATURATION: 93 % | TEMPERATURE: 97.5 F | SYSTOLIC BLOOD PRESSURE: 159 MMHG | DIASTOLIC BLOOD PRESSURE: 96 MMHG | HEIGHT: 65 IN

## 2017-09-14 DIAGNOSIS — M17.11 PRIMARY OSTEOARTHRITIS OF RIGHT KNEE: ICD-10-CM

## 2017-09-14 DIAGNOSIS — M19.011 PRIMARY OSTEOARTHRITIS OF BOTH SHOULDERS: Primary | ICD-10-CM

## 2017-09-14 DIAGNOSIS — M19.012 PRIMARY OSTEOARTHRITIS OF BOTH SHOULDERS: Primary | ICD-10-CM

## 2017-09-14 PROCEDURE — 20610 DRAIN/INJ JOINT/BURSA W/O US: CPT | Mod: 50 | Performed by: INTERNAL MEDICINE

## 2017-09-14 RX ORDER — TRIAMCINOLONE ACETONIDE 40 MG/ML
40 INJECTION, SUSPENSION INTRA-ARTICULAR; INTRAMUSCULAR ONCE
Qty: 1 ML | Refills: 0 | OUTPATIENT
Start: 2017-09-14 | End: 2017-09-14

## 2017-09-14 NOTE — MR AVS SNAPSHOT
"              After Visit Summary   2017    Ledy Odonnell    MRN: [id number]           Patient Information     Date Of Birth          1945        Visit Information        Provider Department      2017 10:40 AM [facility] Ch[facility]        Care [place] Max's direct line [phone]          Follow-ups after your visit        Who to contact     If you have questions or need follow up information about today's clinic visit or your schedule please contact [facility] directly at [phone].  Normal or non-critical lab and imaging results will be communicated to you by achvrhart, letter or phone within 4 business days after the clinic has received the results. If you do not hear from us within 7 days, please contact the clinic through achvrhart or phone. If you have a critical or abnormal lab result, we will notify you by phone as soon as possible.  Submit refill requests through [employer] or call your pharmacy and they will forward the refill request to us. Please allow 3 business days for your refill to be completed.          Additional Information About Your Visit        MyChart Information     [employer] lets you send messages to your doctor, view your test results, renew your prescriptions, schedule appointments and more. To sign up, go to [email or web address] . Click on \"Log in\" on the left side of the screen, which will take you to the Welcome page. Then click on \"Sign up Now\" on the right side of the page.     You will be asked to enter the access code listed below, as well as some personal information. Please follow the directions to create your username and password.     Your access code is: [email or web address]  Expires: 12/10/2017 11:12 AM     Your access code will  in 90 days. If you need help or a new code, please call your [facility] or [phone].        Care EveryWhere ID     This is your Care EveryWhere ID. This could be used by other " "organizations to access your [facility] medical records  [id number]        Your Vitals Were     Pulse Temperature Height Pulse Oximetry BMI (Body Mass Index)       94 97.5  F (36.4  C) (Oral) 1.651 m (5' 5\") 93% 24.46 kg/m2        Blood Pressure from Last 3 Encounters:   09/14/17 (!) 159/96   04/27/17 107/72   03/14/17 130/74    Weight from Last 3 Encounters:   09/14/17 66.7 kg (147 lb)   03/07/17 77.1 kg (170 lb)   12/21/16 77.1 kg (170 lb)              Today, you had the following     No orders found for display         Today's Medication Changes          These changes are accurate as of: 9/14/17 11:06 AM.  If you have any questions, ask your nurse or doctor.               These medicines have changed or have updated prescriptions.        Dose/Directions    senna-docusate 8.6-50 MG per tablet   Commonly known as:  SENOKOT-S;PERICOLACE   This may have changed:    - how much to take  - when to take this   Used for:  Ventral hernia        Dose:  1 tablet   Take 1 tablet by mouth 2 times daily.   Quantity:  40 tablet   Refills:  11                Primary Care Provider Office Phone # Fax #    Macarena Vinson PA-C [phone]       [address] [place], [place] [address]        Equal Access to Services     JENNIFER SCOTT AH: Renetta way S[place]lin, waaxda luqadaha, qaybta kaalmacory negron. So [facility] [phone].    ATENCIÓN: Si habla español, tiene a michele disposición servicios gratuitos de asistencia lingüística. Jh al [phone].    We comply with applicable federal civil rights laws and [place] laws. We do not discriminate on the basis of race, color, national origin, age, disability sex, sexual orientation or gender identity.            Thank you!     Thank you for choosing [facility]  for your care. Our goal is always to provide you with excellent care. Hearing back from our patients is one way we can continue to improve our " services. Please take a few minutes to complete the written survey that you may receive in the mail after your visit with us. Thank you!             Your Updated Medication List - Protect others around you: Learn how to safely use, store and throw away your medicines at www.disposemymeds.org.          This list is accurate as of: 9/14/17 11:06 AM.  Always use your most recent med list.                   Brand Name Dispense Instructions for use Diagnosis    albuterol 108 (90 BASE) MCG/ACT Inhaler    PROAIR HFA/PROVENTIL HFA/VENTOLIN HFA    3 Inhaler    Inhale 2 puffs into the lungs every 6 hours as needed for shortness of breath / dyspnea    Chronic obstructive pulmonary disease, unspecified COPD type (H)       buPROPion 150 MG 24 hr tablet    WELLBUTRIN XL    90 tablet    Take 1 tablet (150 mg) by mouth every morning    Major depressive disorder, recurrent episode, moderate (H), Tobacco use disorder       cyanocolbalamin 100 MCG tablet    vitamin  B-12     Take 1 tablet (100 mcg) by mouth daily        DULoxetine 60 MG EC capsule    CYMBALTA    90 capsule    Take 1 capsule (60 mg) by mouth daily    Major depressive disorder, recurrent episode, moderate (H), Chronic pain disorder, Generalized anxiety disorder, Other urinary incontinence       fluticasone-salmeterol 250-50 MCG/DOSE diskus inhaler    ADVAIR DISKUS    1 Inhaler    Inhale 1 puff into the lungs 2 times daily    COPD exacerbation (H)       hydrOXYzine 25 MG tablet    ATARAX    30 tablet    TAKE 1 TABLET (25 MG) BY MOUTH NIGHTLY AS NEEDED FOR ANXIETY    Other insomnia       miconazole 2 % powder    MICATIN; MICRO GUARD    30 g    Apply topically 2 times daily Apply to groin    Fungal infection       multivitamin, therapeutic Tabs tablet      Take 1 tablet by mouth daily        nystatin 635725 UNIT/GM Powd    MYCOSTATIN    1 Bottle    Apply 1 g topically as needed    Candidal intertrigo       oxybutynin 10 MG 24 hr tablet    DITROPAN XL    180 tablet    Take  2 tablets (20 mg) by mouth daily    Urgency incontinence       * oxyCODONE-acetaminophen  MG per tablet    PERCOCET    120 tablet    1-2 tablets every 6 hrs prn-to fill on or after 12/10/17    Chronic pain disorder       * oxyCODONE-acetaminophen  MG per tablet    PERCOCET    120 tablet    1-2 tablets every 6 hrs prn-to fill on or after 11/10/17    Chronic pain disorder       * oxyCODONE-acetaminophen  MG per tablet    PERCOCET    120 tablet    1-2 tablets every 6 hrs prn-to fill on or after 10/10/17    Chronic pain disorder       senna-docusate 8.6-50 MG per tablet    SENOKOT-S;PERICOLACE    40 tablet    Take 1 tablet by mouth 2 times daily.    Ventral hernia       traZODone 50 MG tablet    DESYREL    90 tablet    TAKE 1-2 TABLETS BY MOUTH NIGHTLY AS NEEDED FOR SLEEP    Other insomnia       * Notice:  This list has 3 medication(s) that are the same as other medications prescribed for you. Read the directions carefully, and ask your doctor or other care provider to review them with you.

## 2017-09-14 NOTE — PROGRESS NOTES
Rheumatology Clinic Visit      Ledy Odonnell MRN# [id number]   YOB: 1945 Age: 72 year old      Date of visit: 9/14/17   PCP: Macarena Vinson       Chief Complaint   Patient presents with:  RECHECK: patient would like a steroid injection both shoulders and right knee      Assessment and Plan     1. Osteoarthritis: Symptoms are most consistent with osteoarthritis, not an inflammatory arthritis. No morning stiffness. No synovitis on exam. She has seen physiatry because of her left ankle/foot congenital deformities and they made devices to help keep her mobile; she was very happy with this.    2. Right knee osteoarthritis and bilateral shoulder OA: hx of rotator cuff injuries in the past. Significant improvement with steroid injections in the past and repeating today as documented in the procedure section.  She refuses surgery for her knees; and says that after one failed rotator cuff repair she does not want to have the other operated on, also because of poor predicted surgical outcome reportedly per orthopedic surgery.     Ms. Odonnell verbalized agreement with and understanding of the rational for the diagnosis and treatment plan.  All questions were answered to best of my ability and the patient's satisfaction. Ms. Odonnell was advised to contact the clinic with any questions that may arise after the clinic visit.      Thank you for involving me in the care of the patient    Return to clinic: she will call to make an appointment when needed; no sooner than 3 months from today if for repeat injections of the same joints      HPI   Ledy Odonnell is a 72 year old female with medical history significant for hyperlipidemia, peripheral neuropathy, COPD, depression, anxiety, obesity, tobacco use disorder, GERD, gastroparesis, osteoarthritis, and history of femur fracture who presents for f/u of OA for steroid injections..     Today, she reports that she would like repeat steroid injections of her  shoulders and right knee because they have been very effective in the past and keep her mobile. Currently with pain in her shoulders and right knee.    History of surgical right hip fracture initially, then right hip fracture nonsurgically afterward; has a right VALERIE. Reportedly the DEXA done in 2014 was only of her ankle.    Denies fevers, chills, nausea, vomiting, constipation, diarrhea. No abdominal pain. No chest pain/pressure, palpitations, or shortness of breath. No oral or nasal sores. No neck pain. No rash.     Tobacco: 0.5 ppd  EtOH: None  Drugs: None  Occupation: Retired. Used to be a professional     ROS   GEN: No fevers, chills, night sweats  SKIN: No itching, rashes, sores  CV: No chest pain, pressure, palpitations, or dyspnea on exertion.  PULM: No SOB, wheeze, cough.  MSK: See HPI.  EXT: No LE swelling    Active Problem List     Patient Active Problem List   Diagnosis     Esophageal reflux     Insomnia     FAMILY HX DIABETES MELLITUS brother     Tobacco use disorder     Obesity     Generalized anxiety disorder     Moderate major depression (H)     COPD (chronic obstructive pulmonary disease) (H)     Elevated fasting glucose     Chronic pain disorder     Idiopathic peripheral neuropathy     Hyperlipidemia LDL goal <130     Degenerative arthritis of hip     Hip arthrosis - left, severe     Trochanteric bursitis     Status post hip replacement     Advanced directives, counseling/discussion     Tobacco abuse     Gastroparesis     DDD (degenerative disc disease), lumbar     Delayed gastric emptying     Health Care Home     Candidal intertrigo     Ventral hernia     Femur fracture (H)     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     Past Medical History     Past Medical History:   Diagnosis Date     Depressive disorder, not elsewhere classified      GENERALIZED ANXIETY DIS  6/21/2007     Inflammatory arthritis      Osteoarthrosis, unspecified whether generalized or localized, unspecified site      Other and unspecified alcohol dependence, unspecified drinking behavior      Unspecified essential hypertension      Past Surgical History     Past Surgical History:   Procedure Laterality Date     C HAND/FINGER SURGERY UNLISTED       C NONSPECIFIC PROCEDURE  2001    Shoulder Surgery     C NONSPECIFIC PROCEDURE  1975    Gastric Bypass     C NONSPECIFIC PROCEDURE      Cholecystectomy     C SHOULDER SURG PROC UNLISTED       C TOTAL HIP ARTHROPLASTY  1/4/2011    Lt VALERIE     HERNIORRHAPHY VENTRAL  5/14/2013    Procedure: HERNIORRHAPHY VENTRAL;  Open Ventral Hernia Repair;  Surgeon: Jhoan Rogers MD;  Location: [facility] OR     Allergy     Allergies   Allergen Reactions     No Known Allergies      Current Medication List     Current Outpatient Prescriptions   Medication Sig     oxybutynin (DITROPAN XL) 10 MG 24 hr tablet Take 2 tablets (20 mg) by mouth daily     buPROPion (WELLBUTRIN XL) 150 MG 24 hr tablet Take 1 tablet (150 mg) by mouth every morning     oxyCODONE-acetaminophen (PERCOCET)  MG per tablet 1-2 tablets every 6 hrs prn-to fill on or after 12/10/17     oxyCODONE-acetaminophen (PERCOCET)  MG per tablet 1-2 tablets every 6 hrs prn-to fill on or after 11/10/17     oxyCODONE-acetaminophen (PERCOCET)  MG per tablet 1-2 tablets every 6 hrs prn-to fill on or after 10/10/17     traZODone (DESYREL) 50 MG tablet TAKE 1-2 TABLETS BY MOUTH NIGHTLY AS NEEDED FOR SLEEP     hydrOXYzine (ATARAX) 25 MG tablet TAKE 1 TABLET (25 MG) BY MOUTH NIGHTLY AS NEEDED FOR ANXIETY     cyanocolbalamin (VITAMIN  B-12) 100 MCG tablet Take 1 tablet (100 mcg) by mouth daily     DULoxetine (CYMBALTA) 60 MG EC capsule Take 1 capsule (60 mg) by mouth daily     fluticasone-salmeterol (ADVAIR DISKUS) 250-50 MCG/DOSE diskus inhaler Inhale 1 puff into the lungs 2 times daily     miconazole (MICATIN; MICRO GUARD)  "2 % powder Apply topically 2 times daily Apply to groin     multivitamin, therapeutic (THERA-VIT) TABS tablet Take 1 tablet by mouth daily     nystatin (MYCOSTATIN) 900731 UNIT/GM POWD Apply 1 g topically as needed     albuterol (PROAIR HFA, PROVENTIL HFA, VENTOLIN HFA) 108 (90 BASE) MCG/ACT inhaler Inhale 2 puffs into the lungs every 6 hours as needed for shortness of breath / dyspnea     senna-docusate (SENOKOT-S;PERICOLACE) 8.6-50 MG per tablet Take 1 tablet by mouth 2 times daily. (Patient taking differently: Take 2 tablets by mouth daily )     No current facility-administered medications for this visit.          Social History   See HPI    Family History     Family History   Problem Relation Age of Onset     CANCER Father      pancreatic     HEART DISEASE Mother      Unknown specifics     Dementia Mother      Arthritis Paternal Grandmother      RA     Dementia Maternal Grandmother      Paternal grandmother: Rheumatoid arthritis  Many family members: Osteoarthritis    Physical Exam     Temp Readings from Last 3 Encounters:   09/14/17 97.5  F (36.4  C) (Oral)   04/27/17 97  F (36.1  C) (Oral)   03/14/17 98.7  F (37.1  C) (Oral)     BP Readings from Last 5 Encounters:   09/14/17 (!) 159/96   04/27/17 107/72   03/14/17 130/74   03/08/17 147/72   12/21/16 112/75     Pulse Readings from Last 1 Encounters:   09/14/17 94     Resp Readings from Last 1 Encounters:   03/08/17 20     Estimated body mass index is 24.46 kg/(m^2) as calculated from the following:    Height as of this encounter: 1.651 m (5' 5\").    Weight as of this encounter: 66.7 kg (147 lb).    GEN: NAD, overweight; using a walker  HEENT: MMM. Anicteric, noninjected sclera  MSK: Right fifth DIP with no range of motion. Right fourth PIP with bony hypertrophy but no soft tissue swelling and not tender to palpation. Heberden's and Austin's nodes present. Other MCPs, PIPs, DIPs without swelling, tenderness palpation, or overlying erythema. Bilateral shoulders " nontender to direct palpation; full range of motion but has pain with range of motion. Right knee with medial joint line tenderness; no effusion or increased warmth.   NEURO: UE and LE strengths 5/5 and equal bilaterally.   SKIN: No rash  EXT: No LE edema  PSYCH: Alert. Appropriate.    Labs / Imaging (select studies)   RF/CCP  Recent Labs   Lab Test  01/22/10   1043   CYCLICCITPEP  2     CBC  Recent Labs   Lab Test  03/07/17   1542  05/17/13   0557  05/16/13   0809   WBC  10.5  7.4  7.9   RBC  4.44  4.16  4.11   HGB  13.6  13.0  12.7   HCT  39.1  39.2  38.8   MCV  88  94  94   RDW  12.6  12.4  12.6   PLT  229  181  155   MCH  30.6  31.3  30.9   MCHC  34.8  33.2  32.7   NEUTROPHIL  81.2  85.6*   --    LYMPH  7.8  6.0*   --    MONOCYTE  8.2  6.8   --    EOSINOPHIL  2.3  1.2   --    BASOPHIL  0.2  0.1   --    ANEU  8.5*  6.3   --    ALYM  0.8  0.4*   --    ANGELA  0.9  0.5   --    AEOS  0.2  0.1   --    ABAS  0.0  0.0   --      CMP  Recent Labs   Lab Test  07/21/17   1106  03/08/17   0607  03/07/17   1542  09/13/16   1057  08/25/15   1100 08/25/14 08/24/14 05/14/13   1515   08/22/12   1418   NA   --    --   131*   --    --   138  135   < >  139   < >  132*   POTASSIUM   --   3.4  3.3*   --    --   3.4  3.4   < >  3.8   < >  3.7   CHLORIDE   --   101  91*   --    --   104  101   < >  101   < >  94   CO2   --   25  30   --    --    --    --    --   26   < >  26   ANIONGAP   --    --   11   --    --   4.0  6.0   < >  12   < >  11   GLC   --   85  91  87   --   109*  106*   < >  135*   < >  110*   BUN   --    --   7   --    --   6  7   < >  8   < >  10   CR  0.70  0.57  0.60   --    --   0.71  0.66   < >  0.66   < >  0.68   GFRESTIMATED  83  >90  Non  GFR Calc    >90  Non  GFR Calc     --    --   >60  >60   < >  90   < >  87   GFRESTBLACK  >90   GFR Calc    >90   GFR Calc    >90   GFR Calc     --    --   >60  >60   < >  >90   < >  >90   SCARLET   9.3  8.4*  10.0   --    --   8.2  8.0   < >  8.2*   < >  9.5   BILITOTAL   --    --   0.8   --    --    --    --    --   0.6   --   0.8   ALBUMIN   --    --   3.7   --    --    --    --    --   3.8   --   4.5   PROTTOTAL   --    --   7.2   --    --    --    --    --   7.2   --   7.8   ALKPHOS   --    --   66   --    --    --    --    --   56   --   88   AST   --    --   85*   --    --    --    --    --   35   --   47*   ALT   --    --   52*  24  27   --    --    < >  32   --   31    < > = values in this interval not displayed.     Immunization History     Immunization History   Administered Date(s) Administered     Influenza (High Dose) 3 valent vaccine 10/15/2013, 09/22/2015     Influenza (IIV3) 10/31/2003, 12/02/2005, 10/21/2010, 09/07/2016     Pneumococcal (PCV 13) 08/26/2011     Pneumococcal 23 valent 12/06/2001, 11/05/2002, 09/22/2015     TD (ADULT, 7+) 10/31/2003     TDAP Vaccine (Boostrix) 10/15/2013     Zoster vaccine, live 06/10/2015       Procedures     Procedure: Steroid injections of the right knee, and bilateral shoulders  Indication: Pain, OA    The procedure was explained in detail. Risks including infection, pain, structural damage such as cartilage damage and tendon rupture, fat atrophy, skin hyper-/hypo-pigmentation, and medication reaction was explained. The need for rest of the affected joint for one week after the procedure was explained.  The option of not doing the procedure was also provided. All questions were answered and the patient consented to the procedure.     A time-out was performed and the correct patient, procedure, and laterality were verified.    The right shoulder was examined and location for injection was identified (posterior approach). The area was cleaned with chlorhexidine, twice.  Ethyl chloride was then used for topical anaesthetic. Then a mixture of lidocaine 1% 2 mL and Kenalog 40mg was injected into the intra-articular space.     The left shoulder was examined and  location for injection was identified (posterior approach). The area was cleaned with chlorhexidine, twice.  Ethyl chloride was then used for topical anaesthetic. Then a mixture of lidocaine 1% 2 mL and Kenalog 40mg was injected into the intra-articular space.     The right knee was examined and location for injection was identified (anterior medial). The area was cleaned with chlorhexidine, twice.  Ethyl chloride was then used for topical anaesthetic.  Then a mixture of lidocaine 1% 2 mL and Kenalog 40mg was injected into the intra-articular space.     The patient tolerated the procedures well. No complications.    MEDICATION: Kenalog 40 mg  LOT #: [id number]  : [employer]  EXPIRATION DATE: 12/01/2018  NDC#: 4174-5800-58     MEDICATION: Kenalog 40 mg  LOT #: [id number]  : [employer]  EXPIRATION DATE: 12/01/2018  NDC#: 9496-5146-06     MEDICATION: Kenalog 40 mg  LOT #: [id number]  : [employer]  EXPIRATION DATE: 12/01/2018  NDC#: 0645-1938-71          Chart documentation done in part with Dragon Voice recognition Software. Although reviewed after completion, some word and grammatical error may remain.    Max Choi MD

## 2017-09-14 NOTE — NURSING NOTE
"Chief Complaint   Patient presents with     RECHECK     patient would like a steroid injection both shoulders and right knee       Initial BP (!) 159/96 (BP Location: Left arm, Patient Position: Chair, Cuff Size: Adult Regular)  Pulse 94  Temp 97.5  F (36.4  C) (Oral)  Ht 1.651 m (5' 5\")  Wt 66.7 kg (147 lb)  SpO2 93%  BMI 24.46 kg/m2 Estimated body mass index is 24.46 kg/(m^2) as calculated from the following:    Height as of this encounter: 1.651 m (5' 5\").    Weight as of this encounter: 66.7 kg (147 lb).  BP completed using cuff size: regular         RAPID3 (0-30) Cumulative Score  9.0          RAPID3 Weighted Score (divide #4 by 3 and that is the weighted score)  3.0         "

## 2017-09-14 NOTE — NURSING NOTE
The following medication was given:     MEDICATION: Kenalog 40 mg  SITE: Right shoulder  DOSE: 1 ml  LOT #: [id number]  :  [employer]  EXPIRATION DATE:  12/01/2018  NDC#: 4848-5665-85       MEDICATION: Kenalog 40 mg  SITE: Left shoulder  DOSE: 1 ml  LOT #: [id number]  :  [employer]  EXPIRATION DATE:  12/01/2018  NDC#: 8102-2230-82       MEDICATION: Kenalog 40 mg  SITE: Right knee  DOSE: 1 ml  LOT #: [id number]  :  [employer]  EXPIRATION DATE:  12/01/2018  NDC#: 3336-9464-12

## 2017-09-27 NOTE — TELEPHONE ENCOUNTER
Patient picking up envelope ID verified and envelope handed to patient.    .Susy Dinero  Patient Representative

## 2017-10-06 DIAGNOSIS — G47.09 OTHER INSOMNIA: ICD-10-CM

## 2017-10-06 RX ORDER — HYDROXYZINE HYDROCHLORIDE 25 MG/1
TABLET, FILM COATED ORAL
Qty: 30 TABLET | Refills: 1 | Status: SHIPPED | OUTPATIENT
Start: 2017-10-06 | End: 2018-01-10

## 2017-10-06 NOTE — TELEPHONE ENCOUNTER
Prescription approved per [facility] [facility].  Candy Hubbard,Clinic Rn  [place]

## 2017-10-06 NOTE — TELEPHONE ENCOUNTER
hydrOXYzine   Last Written Prescription Date: 08/08/17  Last Fill Quantity: 30,  # refills: 1   Last Office Visit with G, [facility] or [facility] prescribing provider: 03/14/17 Karel

## 2017-11-13 ENCOUNTER — TELEPHONE (OUTPATIENT)
Dept: FAMILY MEDICINE | Facility: CLINIC | Age: 72
End: 2017-11-13

## 2017-11-13 DIAGNOSIS — N39.0 URINARY TRACT INFECTION WITHOUT HEMATURIA, SITE UNSPECIFIED: ICD-10-CM

## 2017-11-13 DIAGNOSIS — N39.0 URINARY TRACT INFECTION WITH HEMATURIA, SITE UNSPECIFIED: ICD-10-CM

## 2017-11-13 DIAGNOSIS — R31.9 URINARY TRACT INFECTION WITH HEMATURIA, SITE UNSPECIFIED: ICD-10-CM

## 2017-11-13 DIAGNOSIS — B37.0 ORAL THRUSH: Primary | ICD-10-CM

## 2017-11-13 DIAGNOSIS — R30.0 DYSURIA: ICD-10-CM

## 2017-11-13 RX ORDER — NYSTATIN 100000/ML
500000 SUSPENSION, ORAL (FINAL DOSE FORM) ORAL 4 TIMES DAILY
Qty: 60 ML | Refills: 1 | Status: SHIPPED | OUTPATIENT
Start: 2017-11-13 | End: 2017-11-15

## 2017-11-13 NOTE — TELEPHONE ENCOUNTER
"Called and spoke with patient. Patient was in hospital in March and had a bad case of thrush. She was given a liquid nystatin. She feels that she has the same condition again now. \"I feel like my tongue is a parking lot with spiked tires\". It's been going on for about a week. Symptoms: White tongue, a lot of pain, hard to swallow because of pain and dry mouth. Denies sore throat or swelling.    Also, she has noticed that her urine is cloudy. She reports pain with urination at the end of her stream (mild pain) and frequency. She denies inability to urinate, hematuria, fever, perineal or rectal pain. Urinary symptoms have been present for 2 weeks.     Will route to PCP. Patient is unable to get a ride here this week. Are you able to do a phone visit for this?    Cj Maharaj RN    "

## 2017-11-13 NOTE — TELEPHONE ENCOUNTER
Reason for call:  Patient reporting a symptom    Symptom or request: thrush- so painful she doesn't want to eat. Possible bladder infection - urine was a bit cloudy    Duration (how long have symptoms been present): a few days    Have you been treated for this before? Yes    Additional comments: Patient doesn't have any way to get to the clinic and was hoping Macarena Vinson would prescribe her medications to treat the thrush and bladder infection. She states she had thrush while in the ER and was given liquid nystatin.    Phone Number patient can be reached at:  Home number on file [phone] (home)    Best Time:  asap    Can we leave a detailed message on this number:  YES    Call taken on 11/13/2017 at 4:00 PM by Cj Bennett

## 2017-11-14 NOTE — TELEPHONE ENCOUNTER
Called and gave patient message below. She is going to look in to getting a ride and will call back if she is able to get one. UA ordered per LL. Will keep open.    Cj Maharaj RN

## 2017-11-14 NOTE — TELEPHONE ENCOUNTER
Patient scheduled a lab appointment for tomorrow at 11:15.     Macarena Beckett, Patient Representative

## 2017-11-14 NOTE — TELEPHONE ENCOUNTER
Script for Nystatin sent, but I can't prescribe an antibiotics wtihout a urine sample.  There are too many risks with antibiotics to treat without knowing the cause.  Would somebody be able to  a urine sample for drop off?  We would need to supply her with the collection cup.    Macarena Vinson, RITESHS, PA-C

## 2017-11-15 DIAGNOSIS — B37.0 ORAL THRUSH: ICD-10-CM

## 2017-11-15 DIAGNOSIS — R30.0 DYSURIA: ICD-10-CM

## 2017-11-15 DIAGNOSIS — R82.90 ABNORMAL URINALYSIS: Primary | ICD-10-CM

## 2017-11-15 LAB
ALBUMIN UR-MCNC: ABNORMAL MG/DL
APPEARANCE UR: CLEAR
BACTERIA #/AREA URNS HPF: ABNORMAL /HPF
BILIRUB UR QL STRIP: NEGATIVE
COLOR UR AUTO: YELLOW
GLUCOSE UR STRIP-MCNC: NEGATIVE MG/DL
HGB UR QL STRIP: ABNORMAL
KETONES UR STRIP-MCNC: NEGATIVE MG/DL
LEUKOCYTE ESTERASE UR QL STRIP: ABNORMAL
NITRATE UR QL: POSITIVE
NON-SQ EPI CELLS #/AREA URNS LPF: ABNORMAL /LPF
PH UR STRIP: 7.5 PH (ref 5–7)
RBC #/AREA URNS AUTO: ABNORMAL /HPF
SOURCE: ABNORMAL
SP GR UR STRIP: 1.01 (ref 1–1.03)
UROBILINOGEN UR STRIP-ACNC: 0.2 EU/DL (ref 0.2–1)
WBC #/AREA URNS AUTO: >100 /HPF

## 2017-11-15 PROCEDURE — 81001 URINALYSIS AUTO W/SCOPE: CPT | Performed by: PHYSICIAN ASSISTANT

## 2017-11-15 PROCEDURE — 87086 URINE CULTURE/COLONY COUNT: CPT | Performed by: PHYSICIAN ASSISTANT

## 2017-11-15 RX ORDER — NYSTATIN 100000/ML
500000 SUSPENSION, ORAL (FINAL DOSE FORM) ORAL 4 TIMES DAILY
Qty: 60 ML | Refills: 1 | Status: SHIPPED | OUTPATIENT
Start: 2017-11-15 | End: 2017-12-14

## 2017-11-15 RX ORDER — SULFAMETHOXAZOLE/TRIMETHOPRIM 800-160 MG
1 TABLET ORAL 2 TIMES DAILY
Qty: 14 TABLET | Refills: 0 | Status: SHIPPED | OUTPATIENT
Start: 2017-11-15 | End: 2018-01-10

## 2017-11-16 LAB
BACTERIA SPEC CULT: NORMAL
SPECIMEN SOURCE: NORMAL

## 2017-12-06 ENCOUNTER — TELEPHONE (OUTPATIENT)
Dept: FAMILY MEDICINE | Facility: CLINIC | Age: 72
End: 2017-12-06

## 2017-12-06 DIAGNOSIS — G47.09 OTHER INSOMNIA: ICD-10-CM

## 2017-12-06 NOTE — TELEPHONE ENCOUNTER
Medication Detail      Disp Refills Start End GUIDO   hydrOXYzine (ATARAX) 25 MG tablet 30 tablet 1 10/6/2017  No   Sig: TAKE 1 TABLET (25 MG) BY MOUTH NIGHTLY AS NEEDED FOR ANXIETY   Class: E-Prescribe   Order: [id number]   E-Prescribing Status: Receipt confirmed by pharmacy (10/6/2017  3:59 PM CDT)     LOV:3/14/2017

## 2017-12-11 DIAGNOSIS — G47.09 OTHER INSOMNIA: ICD-10-CM

## 2017-12-11 NOTE — TELEPHONE ENCOUNTER
Left message for patient to call back. Patient is on trazadone since 9/11 for sleep not sure if she is still taking.  Candy Hubbard,Clinic Rn  [place] [place]

## 2017-12-11 NOTE — TELEPHONE ENCOUNTER
traZODone (DESYREL) 50 MG tablet    1 ordered  Edit     Summary: TAKE 1-2 TABLETS BY MOUTH NIGHTLY AS NEEDED FOR SLEEP, Disp-90 tablet, R-1, E-Prescribe   Start: 9/11/2017  Ord/Sold: 9/11/2017 (O)  Report  Taking:   Long-term:   Pharmacy: [facility] [address] [place] [facility], [address]  Med Dose History       Patient Sig: TAKE 1-2 TABLETS BY MOUTH NIGHTLY AS NEEDED FOR SLEEP       Ordered on: 9/11/2017       Authorized by: JESS LAM       Dispense: 90 tablet       Admin Instructions: TAKE 1-2 TABLETS BY MOUTH NIGHTLY AS NEEDED FOR SLEEP        LOV: 3/14/2017

## 2017-12-14 DIAGNOSIS — R31.9 URINARY TRACT INFECTION WITH HEMATURIA, SITE UNSPECIFIED: ICD-10-CM

## 2017-12-14 DIAGNOSIS — G47.09 OTHER INSOMNIA: ICD-10-CM

## 2017-12-14 DIAGNOSIS — N39.0 URINARY TRACT INFECTION WITH HEMATURIA, SITE UNSPECIFIED: ICD-10-CM

## 2017-12-14 DIAGNOSIS — B37.0 ORAL THRUSH: ICD-10-CM

## 2017-12-14 RX ORDER — TRAZODONE HYDROCHLORIDE 50 MG/1
TABLET, FILM COATED ORAL
Qty: 90 TABLET | Refills: 0 | Status: SHIPPED | OUTPATIENT
Start: 2017-12-14 | End: 2018-01-10

## 2017-12-14 RX ORDER — NYSTATIN 100000/ML
500000 SUSPENSION, ORAL (FINAL DOSE FORM) ORAL 4 TIMES DAILY
Qty: 60 ML | Refills: 0 | Status: SHIPPED | OUTPATIENT
Start: 2017-12-14 | End: 2018-01-10

## 2017-12-14 RX ORDER — SULFAMETHOXAZOLE/TRIMETHOPRIM 800-160 MG
1 TABLET ORAL 2 TIMES DAILY
Qty: 14 TABLET | Refills: 0 | Status: CANCELLED | OUTPATIENT
Start: 2017-12-14

## 2017-12-14 RX ORDER — TRAZODONE HYDROCHLORIDE 50 MG/1
TABLET, FILM COATED ORAL
Qty: 90 TABLET | Refills: 1 | OUTPATIENT
Start: 2017-12-14

## 2017-12-14 NOTE — TELEPHONE ENCOUNTER
Reason for Call:  Medication or medication refill:    Do you use a [facility]?  Name of the pharmacy and phone number for the current request:  [facility] [place]     Name of the medication requested: sulfamethoxazole-trimethoprim (BACTRIM DS/SEPTRA DS) 800-160 MG per tablet    Other request: Ledy is calling wanting to get a refill on the medication that she was prescribed for her bladder infection. She said that she does not have any blood but there is a discoloration in her urian.     Can we leave a detailed message on this number? YES    Phone number patient can be reached at: Home number on file [phone] (home)    Best Time: Anytime     Call taken on 12/14/2017 at 3:26 PM by Cj Bennett

## 2017-12-14 NOTE — TELEPHONE ENCOUNTER
Attempted to reach patient but phone line is busy.  Will need to try again later.    Allen Haq RN

## 2017-12-14 NOTE — TELEPHONE ENCOUNTER
Route for UTI symptoms. Called patient- she states she felt UTI relief two days after starting the antibiotics on 11/15, but all of the same symptoms started returning 4 days ago. She reports burning, pain & cloudiness. She is trying to increase her water intake. She states it is hard to get to the clinic. Would you like to refill, test another sample or see patient?  [facility] informed her they should have one on file, but I do see a request & I sent remaining refills.   Grisel Flaherty RN

## 2017-12-15 RX ORDER — HYDROXYZINE HYDROCHLORIDE 25 MG/1
TABLET, FILM COATED ORAL
Qty: 30 TABLET | Refills: 1 | OUTPATIENT
Start: 2017-12-15

## 2017-12-15 NOTE — TELEPHONE ENCOUNTER
She will need to be seen for a visit.  She dropped off a sample last time and given this has persisted, I don't feel comfortable prescribing without a visit.    Macarena Vinson, RITESHS, PA-C

## 2018-01-10 ENCOUNTER — TELEPHONE (OUTPATIENT)
Dept: FAMILY MEDICINE | Facility: CLINIC | Age: 73
End: 2018-01-10

## 2018-01-10 ENCOUNTER — OFFICE VISIT (OUTPATIENT)
Dept: FAMILY MEDICINE | Facility: CLINIC | Age: 73
End: 2018-01-10
Payer: COMMERCIAL

## 2018-01-10 ENCOUNTER — CARE COORDINATION (OUTPATIENT)
Dept: CARE COORDINATION | Facility: CLINIC | Age: 73
End: 2018-01-10

## 2018-01-10 VITALS
SYSTOLIC BLOOD PRESSURE: 125 MMHG | OXYGEN SATURATION: 96 % | TEMPERATURE: 98.5 F | DIASTOLIC BLOOD PRESSURE: 73 MMHG | HEART RATE: 81 BPM | HEIGHT: 65 IN

## 2018-01-10 DIAGNOSIS — R30.0 DYSURIA: ICD-10-CM

## 2018-01-10 DIAGNOSIS — B37.0 ORAL THRUSH: ICD-10-CM

## 2018-01-10 DIAGNOSIS — R63.4 WEIGHT LOSS: ICD-10-CM

## 2018-01-10 DIAGNOSIS — N39.0 URINARY TRACT INFECTION WITHOUT HEMATURIA, SITE UNSPECIFIED: ICD-10-CM

## 2018-01-10 DIAGNOSIS — F41.1 GENERALIZED ANXIETY DISORDER: ICD-10-CM

## 2018-01-10 DIAGNOSIS — J44.9 CHRONIC OBSTRUCTIVE PULMONARY DISEASE, UNSPECIFIED COPD TYPE (H): ICD-10-CM

## 2018-01-10 DIAGNOSIS — F33.1 MAJOR DEPRESSIVE DISORDER, RECURRENT EPISODE, MODERATE (H): Primary | ICD-10-CM

## 2018-01-10 DIAGNOSIS — N39.498 OTHER URINARY INCONTINENCE: ICD-10-CM

## 2018-01-10 DIAGNOSIS — B37.2 CANDIDAL INTERTRIGO: ICD-10-CM

## 2018-01-10 DIAGNOSIS — G47.09 OTHER INSOMNIA: ICD-10-CM

## 2018-01-10 DIAGNOSIS — L89.529: ICD-10-CM

## 2018-01-10 DIAGNOSIS — G89.4 CHRONIC PAIN DISORDER: ICD-10-CM

## 2018-01-10 DIAGNOSIS — Q66.02 CONGENITAL TALIPES EQUINOVARUS DEFORMITY OF LEFT FOOT: ICD-10-CM

## 2018-01-10 DIAGNOSIS — Z72.0 TOBACCO ABUSE: ICD-10-CM

## 2018-01-10 DIAGNOSIS — E78.5 HYPERLIPIDEMIA LDL GOAL <130: ICD-10-CM

## 2018-01-10 DIAGNOSIS — R09.89 DECREASED PEDAL PULSES: ICD-10-CM

## 2018-01-10 LAB
ALBUMIN UR-MCNC: NEGATIVE MG/DL
APPEARANCE UR: CLEAR
BACTERIA #/AREA URNS HPF: ABNORMAL /HPF
BILIRUB UR QL STRIP: NEGATIVE
COLOR UR AUTO: YELLOW
ERYTHROCYTE [DISTWIDTH] IN BLOOD BY AUTOMATED COUNT: 12.8 % (ref 10–15)
GLUCOSE UR STRIP-MCNC: NEGATIVE MG/DL
HCT VFR BLD AUTO: 41 % (ref 35–47)
HGB BLD-MCNC: 14.2 G/DL (ref 11.7–15.7)
HGB UR QL STRIP: NEGATIVE
KETONES UR STRIP-MCNC: NEGATIVE MG/DL
LEUKOCYTE ESTERASE UR QL STRIP: ABNORMAL
MCH RBC QN AUTO: 31.1 PG (ref 26.5–33)
MCHC RBC AUTO-ENTMCNC: 34.6 G/DL (ref 31.5–36.5)
MCV RBC AUTO: 90 FL (ref 78–100)
NITRATE UR QL: POSITIVE
NON-SQ EPI CELLS #/AREA URNS LPF: ABNORMAL /LPF
PH UR STRIP: 7 PH (ref 5–7)
PLATELET # BLD AUTO: 335 10E9/L (ref 150–450)
RBC # BLD AUTO: 4.56 10E12/L (ref 3.8–5.2)
RBC #/AREA URNS AUTO: ABNORMAL /HPF
SOURCE: ABNORMAL
SP GR UR STRIP: 1.01 (ref 1–1.03)
UROBILINOGEN UR STRIP-ACNC: 0.2 EU/DL (ref 0.2–1)
WBC # BLD AUTO: 9 10E9/L (ref 4–11)
WBC #/AREA URNS AUTO: ABNORMAL /HPF

## 2018-01-10 PROCEDURE — 80053 COMPREHEN METABOLIC PANEL: CPT | Performed by: PHYSICIAN ASSISTANT

## 2018-01-10 PROCEDURE — 81001 URINALYSIS AUTO W/SCOPE: CPT | Performed by: PHYSICIAN ASSISTANT

## 2018-01-10 PROCEDURE — 85027 COMPLETE CBC AUTOMATED: CPT | Performed by: PHYSICIAN ASSISTANT

## 2018-01-10 PROCEDURE — 99000 SPECIMEN HANDLING OFFICE-LAB: CPT | Performed by: PHYSICIAN ASSISTANT

## 2018-01-10 PROCEDURE — 80061 LIPID PANEL: CPT | Performed by: PHYSICIAN ASSISTANT

## 2018-01-10 PROCEDURE — 99215 OFFICE O/P EST HI 40 MIN: CPT | Performed by: PHYSICIAN ASSISTANT

## 2018-01-10 PROCEDURE — 82306 VITAMIN D 25 HYDROXY: CPT | Performed by: PHYSICIAN ASSISTANT

## 2018-01-10 PROCEDURE — 36415 COLL VENOUS BLD VENIPUNCTURE: CPT | Performed by: PHYSICIAN ASSISTANT

## 2018-01-10 PROCEDURE — 87088 URINE BACTERIA CULTURE: CPT | Performed by: PHYSICIAN ASSISTANT

## 2018-01-10 PROCEDURE — 87086 URINE CULTURE/COLONY COUNT: CPT | Performed by: PHYSICIAN ASSISTANT

## 2018-01-10 PROCEDURE — 80307 DRUG TEST PRSMV CHEM ANLYZR: CPT | Mod: 90 | Performed by: PHYSICIAN ASSISTANT

## 2018-01-10 PROCEDURE — 87186 SC STD MICRODIL/AGAR DIL: CPT | Performed by: PHYSICIAN ASSISTANT

## 2018-01-10 PROCEDURE — 84443 ASSAY THYROID STIM HORMONE: CPT | Performed by: PHYSICIAN ASSISTANT

## 2018-01-10 RX ORDER — OXYCODONE AND ACETAMINOPHEN 10; 325 MG/1; MG/1
TABLET ORAL
Qty: 120 TABLET | Refills: 0 | Status: SHIPPED | OUTPATIENT
Start: 2018-01-10 | End: 2018-04-02

## 2018-01-10 RX ORDER — DULOXETIN HYDROCHLORIDE 60 MG/1
60 CAPSULE, DELAYED RELEASE ORAL DAILY
Qty: 90 CAPSULE | Refills: 3 | Status: SHIPPED | OUTPATIENT
Start: 2018-01-10 | End: 2018-12-31

## 2018-01-10 RX ORDER — ALBUTEROL SULFATE 90 UG/1
2 AEROSOL, METERED RESPIRATORY (INHALATION) EVERY 6 HOURS PRN
Qty: 3 INHALER | Refills: 1 | Status: SHIPPED | OUTPATIENT
Start: 2018-01-10 | End: 2018-12-31

## 2018-01-10 RX ORDER — HYDROXYZINE HYDROCHLORIDE 25 MG/1
TABLET, FILM COATED ORAL
Qty: 30 TABLET | Refills: 1 | Status: SHIPPED | OUTPATIENT
Start: 2018-01-10 | End: 2018-08-21

## 2018-01-10 RX ORDER — TRAZODONE HYDROCHLORIDE 50 MG/1
TABLET, FILM COATED ORAL
Qty: 90 TABLET | Refills: 0 | Status: SHIPPED | OUTPATIENT
Start: 2018-01-10 | End: 2018-03-12

## 2018-01-10 RX ORDER — NYSTATIN 100000/ML
500000 SUSPENSION, ORAL (FINAL DOSE FORM) ORAL 4 TIMES DAILY
Qty: 60 ML | Refills: 0 | Status: SHIPPED | OUTPATIENT
Start: 2018-01-10 | End: 2018-01-10

## 2018-01-10 RX ORDER — SULFAMETHOXAZOLE/TRIMETHOPRIM 800-160 MG
1 TABLET ORAL 2 TIMES DAILY
Qty: 14 TABLET | Refills: 0 | Status: SHIPPED | OUTPATIENT
Start: 2018-01-10 | End: 2018-07-06

## 2018-01-10 RX ORDER — NYSTATIN 100000 [USP'U]/G
1 POWDER TOPICAL PRN
Qty: 1 BOTTLE | Refills: 5 | Status: SHIPPED | OUTPATIENT
Start: 2018-01-10 | End: 2018-07-02

## 2018-01-10 RX ORDER — NYSTATIN 100000/ML
500000 SUSPENSION, ORAL (FINAL DOSE FORM) ORAL 4 TIMES DAILY
Qty: 473 ML | Refills: 1 | Status: SHIPPED | OUTPATIENT
Start: 2018-01-10 | End: 2018-06-26

## 2018-01-10 ASSESSMENT — ANXIETY QUESTIONNAIRES
5. BEING SO RESTLESS THAT IT IS HARD TO SIT STILL: SEVERAL DAYS
3. WORRYING TOO MUCH ABOUT DIFFERENT THINGS: SEVERAL DAYS
1. FEELING NERVOUS, ANXIOUS, OR ON EDGE: SEVERAL DAYS
GAD7 TOTAL SCORE: 7
6. BECOMING EASILY ANNOYED OR IRRITABLE: SEVERAL DAYS
2. NOT BEING ABLE TO STOP OR CONTROL WORRYING: SEVERAL DAYS
7. FEELING AFRAID AS IF SOMETHING AWFUL MIGHT HAPPEN: SEVERAL DAYS

## 2018-01-10 ASSESSMENT — PATIENT HEALTH QUESTIONNAIRE - PHQ9
5. POOR APPETITE OR OVEREATING: SEVERAL DAYS
SUM OF ALL RESPONSES TO PHQ QUESTIONS 1-9: 7

## 2018-01-10 NOTE — PROGRESS NOTES
Clinic Care Coordination Contact  [facility]/Voicemail    Referral Source: PCP  Clinical Data: Care Coordinator Outreach  Outreach attempted x 1.  Left message on voicemail with call back information and requested return call.  Plan: Care Coordinator will try to reach patient again in 3-5 business days.    ANGEL Scott  [facility]   [phone]  [email or web address]

## 2018-01-10 NOTE — PATIENT INSTRUCTIONS
Macarena or her team will be in touch with you with results of today's labs.  Call [facility] to schedule appointment for imaging [phone] CT scan and artery evaluation in foot/legs at [place].  Scripts sent to pharmacy.  Our  to call you to discuss in home resources.    SERGO Callahan, PA-C

## 2018-01-10 NOTE — TELEPHONE ENCOUNTER
Please call Ev with results of her urine. UTI is present. Antibiotic was sent to pharmacy.  Have her call if her symptoms do not improve.    SERGO Callahan, PAPhilipC

## 2018-01-10 NOTE — MR AVS SNAPSHOT
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  Call [facility] to schedule appointment for imaging [phone] CT scan and artery evaluation in foot/legs at [place].  Scripts sent to pharmacy.  Our  to call you to discuss in home resources.    SERGO Callahan PA-C            Follow-ups after your visit        Additional Services     CARE COORDINATION REFERRAL       Services are provided by a Care Coordinator for people with complex needs such as: medical, social, or financial troubles.  The Care Coordinator works with the patient and their Primary Care Provider to determine health goals, obtain resources, achieve outcomes, and develop care plans that help coordinate the patient's care.     Reason for Referral: Concerns with ADLs/IADLs and Home Safety Concerns    Provide additional details for Care Coordination to best meet the patient's current needs: pt requesting more resources of assistance at home, on getting out of house, social groups, etc.    Clinical Staff have discussed the Care Coordination Referral with the patient and/or caregiver:  "yes                  Future tests that were ordered for you today     Open Future Orders        Priority Expected Expires Ordered    US DU Doppler No Exercise Routine  1/10/2019 1/10/2018    CT Chest Lung Cancer Scrn Low Dose wo Routine  1/10/2019 1/10/2018            Who to contact     If you have questions or need follow up information about today's clinic visit or your schedule please contact [facility] directly at [phone].  Normal or non-critical lab and imaging results will be communicated to you by ALENTYhart, letter or phone within 4 business days after the clinic has received the results. If you do not hear from us within 7 days, please contact the clinic through IORevolutiont or phone. If you have a critical or abnormal lab result, we will notify you by phone as soon as possible.  Submit refill requests through lifeIO or call your pharmacy and they will forward the refill request to us. Please allow 3 business days for your refill to be completed.          Additional Information About Your Visit        lifeIO Information     lifeIO lets you send messages to your doctor, view your test results, renew your prescriptions, schedule appointments and more. To sign up, go to [email or web address] . Click on \"Log in\" on the left side of the screen, which will take you to the Welcome page. Then click on \"Sign up Now\" on the right side of the page.     You will be asked to enter the access code listed below, as well as some personal information. Please follow the directions to create your username and password.     Your access code is: [email or web address]  Expires: 4/10/2018 11:52 AM     Your access code will  in 90 days. If you need help or a new code, please call your [place] clinic or [phone].        Care EveryWhere ID     This is your Care EveryWhere ID. This could be used by other organizations to access your [place] medical records  [id number]        Your Vitals Were     Pulse " "Temperature Height Pulse Oximetry          81 98.5  F (36.9  C) (Oral) 5' 5\" (1.651 m) 96%         Blood Pressure from Last 3 Encounters:   01/10/18 125/73   09/14/17 (!) 159/96   04/27/17 107/72    Weight from Last 3 Encounters:   09/14/17 147 lb (66.7 kg)   03/07/17 170 lb (77.1 kg)   12/21/16 170 lb (77.1 kg)              We Performed the Following     CARE COORDINATION REFERRAL     CBC with platelets     Comprehensive metabolic panel (BMP + Alb, Alk Phos, ALT, AST, Total. Bili, TP)     Drug  Screen Comprehensive , Urine with Reported Meds (MedTox) (Pain Care Package)     Lipid panel reflex to direct LDL Fasting     TSH with free T4 reflex     UA with Microscopic reflex to Culture     Vitamin D Deficiency          Today's Medication Changes          These changes are accurate as of: 1/10/18 11:52 AM.  If you have any questions, ask your nurse or doctor.               These medicines have changed or have updated prescriptions.        Dose/Directions    hydrOXYzine 25 MG tablet   Commonly known as:  ATARAX   This may have changed:  See the new instructions.   Used for:  Other insomnia   Changed by:  Macarena Vinson PA-C        TAKE 1 TABLET (25 MG) BY MOUTH NIGHTLY AS NEEDED FOR ANXIETY   Quantity:  30 tablet   Refills:  1       * oxyCODONE-acetaminophen  MG per tablet   Commonly known as:  PERCOCET   This may have changed:  additional instructions   Used for:  Chronic pain disorder   Changed by:  Macarena Vinson PA-C        1-2 tablets every 6 hrs prn-to fill on or after 3/10/18   Quantity:  120 tablet   Refills:  0       * oxyCODONE-acetaminophen  MG per tablet   Commonly known as:  PERCOCET   This may have changed:  additional instructions   Used for:  Chronic pain disorder   Changed by:  Macarena Vinson PA-C        1-2 tablets every 6 hrs prn-to fill on or after 2/10/18   Quantity:  120 tablet   Refills:  0       * oxyCODONE-acetaminophen  MG per tablet   Commonly known as:  " PERCOCET   This may have changed:  additional instructions   Used for:  Chronic pain disorder   Changed by:  Macarena Vinson PA-C        1-2 tablets every 6 hrs prn-to fill on or after 1/10/18   Quantity:  120 tablet   Refills:  0       senna-docusate 8.6-50 MG per tablet   Commonly known as:  SENOKOT-S;PERICOLACE   This may have changed:    - how much to take  - when to take this   Used for:  Ventral hernia        Dose:  1 tablet   Take 1 tablet by mouth 2 times daily.   Quantity:  40 tablet   Refills:  11       * Notice:  This list has 3 medication(s) that are the same as other medications prescribed for you. Read the directions carefully, and ask your doctor or other care provider to review them with you.         Where to get your medicines      These medications were sent to [facility] [address] [place] [facility] [address] [facility] [address]     Phone:  [phone]     albuterol 108 (90 BASE) MCG/ACT Inhaler    DULoxetine 60 MG EC capsule    fluticasone-salmeterol 250-50 MCG/DOSE diskus inhaler    hydrOXYzine 25 MG tablet    miconazole 2 % powder    nystatin 229451 UNIT/GM Powd    nystatin 140785 UNIT/ML suspension    traZODone 50 MG tablet         Some of these will need a paper prescription and others can be bought over the counter.  Ask your nurse if you have questions.     Bring a paper prescription for each of these medications     oxyCODONE-acetaminophen  MG per tablet    oxyCODONE-acetaminophen  MG per tablet    oxyCODONE-acetaminophen  MG per tablet                Primary Care Provider Office Phone # Fax #    Macarena Vinson PA-C [phone]       [address] [facility] [address]        Equal Access to Services     JENNIFER SCOTT AH: Renetta Epps, wadarlyn martinez, qabrainta kaalmabib harris, cory acevedo. So [facility] [phone].    ATENCIÓN: Si habla español, tiene a michele disposición servicios  ino de asistencia lingüística. Jh rosales [phone].    We comply with applicable federal civil rights laws and [place] laws. We do not discriminate on the basis of race, color, national origin, age, disability, sex, sexual orientation, or gender identity.            Thank you!     Thank you for choosing [facility]  for your care. Our goal is always to provide you with excellent care. Hearing back from our patients is one way we can continue to improve our services. Please take a few minutes to complete the written survey that you may receive in the mail after your visit with us. Thank you!             Your Updated Medication List - Protect others around you: Learn how to safely use, store and throw away your medicines at www.disposemymeds.org.          This list is accurate as of: 1/10/18 11:52 AM.  Always use your most recent med list.                   Brand Name Dispense Instructions for use Diagnosis    albuterol 108 (90 BASE) MCG/ACT Inhaler    PROAIR HFA/PROVENTIL HFA/VENTOLIN HFA    3 Inhaler    Inhale 2 puffs into the lungs every 6 hours as needed for shortness of breath / dyspnea    Chronic obstructive pulmonary disease, unspecified COPD type (H)       buPROPion 150 MG 24 hr tablet    WELLBUTRIN XL    90 tablet    Take 1 tablet (150 mg) by mouth every morning    Major depressive disorder, recurrent episode, moderate (H), Tobacco use disorder       cyanocolbalamin 100 MCG tablet    vitamin  B-12     Take 1 tablet (100 mcg) by mouth daily        DULoxetine 60 MG EC capsule    CYMBALTA    90 capsule    Take 1 capsule (60 mg) by mouth daily    Major depressive disorder, recurrent episode, moderate (H), Chronic pain disorder, Generalized anxiety disorder, Other urinary incontinence       fluticasone-salmeterol 250-50 MCG/DOSE diskus inhaler    ADVAIR DISKUS    1 Inhaler    Inhale 1 puff into the lungs 2 times daily        hydrOXYzine 25 MG tablet    ATARAX    30 tablet    TAKE 1  TABLET (25 MG) BY MOUTH NIGHTLY AS NEEDED FOR ANXIETY    Other insomnia       miconazole 2 % powder    MICATIN; MICRO GUARD    30 g    Apply topically 2 times daily Apply to groin        multivitamin, therapeutic Tabs tablet      Take 1 tablet by mouth daily        nystatin 815767 UNIT/GM Powd    MYCOSTATIN    1 Bottle    Apply 1 g topically as needed    Candidal intertrigo       nystatin 954517 UNIT/ML suspension    MYCOSTATIN    60 mL    Take 5 mLs (500,000 Units) by mouth 4 times daily    Oral thrush       oxybutynin 10 MG 24 hr tablet    DITROPAN XL    180 tablet    Take 2 tablets (20 mg) by mouth daily    Urgency incontinence       * oxyCODONE-acetaminophen  MG per tablet    PERCOCET    120 tablet    1-2 tablets every 6 hrs prn-to fill on or after 3/10/18    Chronic pain disorder       * oxyCODONE-acetaminophen  MG per tablet    PERCOCET    120 tablet    1-2 tablets every 6 hrs prn-to fill on or after 2/10/18    Chronic pain disorder       * oxyCODONE-acetaminophen  MG per tablet    PERCOCET    120 tablet    1-2 tablets every 6 hrs prn-to fill on or after 1/10/18    Chronic pain disorder       senna-docusate 8.6-50 MG per tablet    SENOKOT-S;PERICOLACE    40 tablet    Take 1 tablet by mouth 2 times daily.    Ventral hernia       traZODone 50 MG tablet    DESYREL    90 tablet    TAKE 1-2 TABLETS BY MOUTH NIGHTLY AS NEEDED FOR SLEEP    Other insomnia       * Notice:  This list has 3 medication(s) that are the same as other medications prescribed for you. Read the directions carefully, and ask your doctor or other care provider to review them with you.

## 2018-01-10 NOTE — LETTER
[place] CARE COORDINATION  Macarena Vinson  [address] [facility] [address]      January 11, 2018        Ledy Odonnell  [address]  [place] [place] [address]      Dear Ev,    I am a clinic care coordinator who works with Macarena Vinson PA-C at the [facility]. I wanted to thank you for spending the time to talk with me. Included is a flyer with a description of clinic care coordination and how I can further assist you.     I also wanted to include some of the information that we discussed. I hope this is helpful for you.      The Elderly Waiver (EW) program is a federal Medicaid waiver program that funds home and community-based services for people 65 years old and older who are eligible for Medical Assistance (MA), require the level of care provided in a nursing home, and choose to live in the community. EW recipients can receive waiver services and MA services funded through a managed care organization (MCO). This can be through [place] [facility] ([facility] or [facility] ([facility]).    The Alternative Care (AC) program is a state-funded program that supports limited home and community-based services for people 65 years old and older who are not financially eligible for MA, but who meet AC financial and service eligibility requirements and require the level of care provided in a nursing home. People eligible for AC have low levels of income and assets but are not yet eligible for MA.  To be assessed for either of these programs call the [facility] Front Door [phone]. Press 0 and ask for a Long Term Care Consultation.  Help At Your Door  ([phone] [phone] is a non-profit agency serving seniors and individuals with disabilities across the seven-county [place] metropolitan area. Our Store To Door grocery and delivery,  and transportation services provide assistance with in-home tasks and chores. Call  [phone] to get  connected.   [employer] LinkAge Svzf6-307-412-301 is a free, statewide telephone information and assistance service which makes it easy for older adults and their families to find community services. Senior LinkAge Line staff members have special knowledge in the areas of Medicare, prescription drug expense assistance, long-term care insurance, caregiver issues, and support for grandparents raising grandchildren.  HOME (Household and Outside Maintenance for Elderly) [phone] Low cost chore and household maintenance, outside maintenance services in [facility] and [place]; Fees based off income; Must be 59 yo+  Meals on Wheels Hot meals delivered to home M-F 11AM-1PM; Sample meal includes one entrée, vegetable, fruit, roll, and milk; Low-sodium diabetic, Kosher, Asian, Central African meal options; cost ranges from $4-$6 per meal but financial assistance may be available [place]: [phone] [place]/ALEXIA [place]: [phone] [place]: [phone]  [facility] Community Services [phone] Programs include: HOME ([phone]), Senior Outreach ([place] [phone]) - in home counseling and case management to help seniors remain independent & services for caregivers, Medicare insurance counseling ([place] [phone]), and Senior Centers with activities ([place] [phone])     [facility] Phone: [phone] Help with housekeeping, assistance with personal care, cooking and shopping, pet care,  services, and medication reminders.    Please feel free to contact me at [phone], with any questions or concerns.     Sincerely,     Madelin Ontiveros

## 2018-01-10 NOTE — TELEPHONE ENCOUNTER
Reason for Call:  Other prescription    Detailed comments: Mary with [facility] calling about Nystatin. The directions seems like a short duration of only 3 days. She would like to clarify if the 3 day treatment is correct.    Phone Number Patient can be reached at: Other phone number:  [phone]    Best Time: any    Can we leave a detailed message on this number? YES    Call taken on 1/10/2018 at 11:56 AM by Christelle Vo

## 2018-01-10 NOTE — PROGRESS NOTES
SUBJECTIVE:   Ledy Odonnell is a 72 year old female who presents to clinic today for the following health issues:    Depression and Anxiety Follow-Up    Status since last visit: Not optimally controlled.  She has lost weight, is isolated to her home, not able to get out as often as she desires. Limited mobility given congenital club foot.    Other associated symptoms:None    Complicating factors:     Significant life event: No     Current substance abuse: None    Has lost a lot of weight, unclear why.  Down 23 lbs since last March, down 100 lbs since 2009  Reports decreased activity and appetite.  She has had lung cancer screening within the year, cervical cancer screening dcd at 66, nml mammogram 2016, up to date with colon cancer screening.  Alternating between constipation and diarrhea.  Having recurrent urinary symptoms.  Frequency, burning, incontinence. Was last treated for UTI in 11/2017. Improved, but symptoms returned     PHQ-9 Score and MyChart F/U Questions 4/11/2017 9/11/2017 1/10/2018   Total Score 5 0 7   Q9: Suicide Ideation Not at all Not at all Not at all     LESTER-7 SCORE 3/14/2017 4/11/2017 1/10/2018   Total Score - - -   Total Score 7 1 7     In the past two weeks have you had thoughts of suicide or self-harm?  No.    Do you have concerns about your personal safety or the safety of others?   No    PHQ-9  English  PHQ-9   Any Language  GAD7  Suicide Assessment Five-step Evaluation and Treatment (SAFE-T)  COPD Follow-Up    Symptoms are currently: stable    Current fatigue or dyspnea with ambulation: stable     Shortness of breath: stable    Increased or change in Cough/Sputum: No    Fever(s): No    Baseline ambulation without stopping to rest: Less than 1 block. Able to walk up 1 flights of stairs without stopping to rest.    Any ER/UC or hospital admissions since your last visit? No    Continues to smoke but has cut down.     Using albuterol every 2 weeks or so, using Advair daily.    History    Smoking Status     Current Every Day Smoker     Packs/day: 0.50     Years: 43.00     Types: Cigarettes   Smokeless Tobacco     Never Used     No results found for: FEV1, FEV1 FVC    Chronic Pain Follow-Up       Her foot pain has gotten considerable worse. When it gets really bad, will shake her entire leg, jolts, b  Pressure ulcer to lateral L foot x 3 weeks. Having drainage.  Was trying at home PT exercises, laying down more.  Using topical antibiotics.    Type / Location of Pain: Left leg, shoulders, L foot.  Has new sore on her L lateral ankle. Has known congential clubfoot. Mobility signficantly decreased after hip fracture and repair in 2014. Has seen ortho/podiatry in past to discuss treatment options. She has even requested amputation and has been declined.  This is significant source of pain and decreased mobility/frustration for her.  Analgesia/pain control:       Recent changes:  Same, some days are worse       Overall control: Comfortably manageable  Activity level/function:      Daily activities:  Can do most things most days, with some rest    Work:  not applicable  Adverse effects:  No  Adherance    Taking medication as directed?  Yes    Participating in other treatments: yes-cortisone injections   Risk Factors:    Sleep:  Poor-pain is worse at night    Mood/anxiety:  controlled    Recent family or social stressors:  none noted    Other aggravating factors: prolonged sitting  PHQ-9 SCORE 4/11/2017 9/11/2017 1/10/2018   Total Score - - -   Total Score 5 0 7     LESTER-7 SCORE 3/14/2017 4/11/2017 1/10/2018   Total Score - - -   Total Score 7 1 7     Encounter-Level CSA - 08/25/2015:          Controlled Substance Agreement - Scan on 9/4/2015 10:45 AM : CONTROLLED SUBSTANCE AGREEMENT, 8/25/15 (below)                Amount of exercise or physical activity: None    Problems taking medications regularly: No    Medication side effects: none    Diet: regular (no  "restrictions)    -------------------------------------    Problem list and histories reviewed & adjusted, as indicated.  Additional history: as documented    Reviewed and updated as needed this visit by clinical staffTobacco  Allergies  Meds  Med Hx  Surg Hx  Fam Hx  Soc Hx      Reviewed and updated as needed this visit by Provider  Allergies       ROS:  Constitutional, HEENT, cardiovascular, pulmonary, GI, , musculoskeletal, neuro, skin, endocrine and psych systems are negative, except as otherwise noted.      OBJECTIVE:   /73 (BP Location: Right arm, Cuff Size: Adult Large)  Pulse 81  Temp 98.5  F (36.9  C) (Oral)  Ht 5' 5\" (1.651 m)  SpO2 96%  There is no height or weight on file to calculate BMI.  GENERAL: healthy, alert and no distress  NECK: no adenopathy, no asymmetry, masses, or scars and thyroid normal to palpation  RESP: lungs clear to auscultation - no rales, rhonchi or wheezes  CV: regular rate and rhythm, normal S1 S2, no S3 or S4, no murmur, click or rub, no peripheral edema and peripheral pulses strong  ABDOMEN: soft, nontender, no hepatosplenomegaly, no masses and bowel sounds normal  MS: no gross musculoskeletal defects noted, no edema. Unable to locate pedal pulses.  SKIN: L lateral ankle with 1.5 cm shallow ulcerated lesion with mild surrounding erythema and tenderness.  NEURO: Normal strength and tone, mentation intact and speech normal  PSYCH: mentation appears normal and affect normal/bright    Diagnostic Test Results:  none     ASSESSMENT/PLAN:   1. Major depressive disorder, recurrent episode, moderate (H)  2. Generalized anxiety disorder  Not well controlled  Pt is struggling as she is not able to get out as much as she used to. Has become more isolated and is struggling with loss of independence.  Does not have adequate help at her home and would benefit from PCA vs similar type of resource.  Needs help with laundry, groceries, errands, getting out of house, etc.  We had " tried motorized wheelchair but this was denied by her insurance.  Referral for CC provided  We have discussed therapy in past, she did have this in home in past and was helpful  She does not think she will be able to afford or get to appointments out of the home.  - DULoxetine (CYMBALTA) 60 MG EC capsule; Take 1 capsule (60 mg) by mouth daily  Dispense: 90 capsule; Refill: 3  - CARE COORDINATION REFERRAL    3. Chronic pain disorder  Stable, but new pain in L ankle causing worsening pain.  Refills provided.  - Lipid panel reflex to direct LDL Fasting  - Drug  Screen Comprehensive , Urine with Reported Meds (MedTox) (Pain Care Package)  - DULoxetine (CYMBALTA) 60 MG EC capsule; Take 1 capsule (60 mg) by mouth daily  Dispense: 90 capsule; Refill: 3  - CARE COORDINATION REFERRAL  - oxyCODONE-acetaminophen (PERCOCET)  MG per tablet; 1-2 tablets every 6 hrs prn-to fill on or after 3/10/18  Dispense: 120 tablet; Refill: 0  - oxyCODONE-acetaminophen (PERCOCET)  MG per tablet; 1-2 tablets every 6 hrs prn-to fill on or after 2/10/18  Dispense: 120 tablet; Refill: 0  - oxyCODONE-acetaminophen (PERCOCET)  MG per tablet; 1-2 tablets every 6 hrs prn-to fill on or after 1/10/18  Dispense: 120 tablet; Refill: 0    4. Decubitus ulcer of left ankle, unspecified ulcer stage  5. 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  Dr. Pineda was able to do quick consult with myself and examined patient.  Suggested keeping brace and shoe off of foot as much as possible, topical antibiotics and bandage as needed  She will likely f/u with him to discuss potential treatment options. Including amputation.  Recommended DU given decreased pedal pulses.  - US DU Doppler No Exercise; Future  - sulfamethoxazole-trimethoprim (BACTRIM DS/SEPTRA DS) 800-160 MG per tablet; Take 1 tablet by mouth 2 times daily  Dispense: 14 tablet; Refill: 0    6. Dysuria  8. Urinary tract infection without  hematuria, site unspecified  UA positive.  Below medication as directed with full discussion of risks, benefits, and possible adverse effects.  - sulfamethoxazole-trimethoprim (BACTRIM DS/SEPTRA DS) 800-160 MG per tablet; Take 1 tablet by mouth 2 times daily  Dispense: 14 tablet; Refill: 0  - UA with Microscopic reflex to Culture  - Urine Culture Aerobic Bacterial    7. Chronic obstructive pulmonary disease, unspecified COPD type (H)  Continue to encouraged smoking cessation.  Refills of below medications for stable chronic conditions.  - albuterol (PROAIR HFA/PROVENTIL HFA/VENTOLIN HFA) 108 (90 BASE) MCG/ACT Inhaler; Inhale 2 puffs into the lungs every 6 hours as needed for shortness of breath / dyspnea  Dispense: 3 Inhaler; Refill: 1    9. Other insomnia  - traZODone (DESYREL) 50 MG tablet; TAKE 1-2 TABLETS BY MOUTH NIGHTLY AS NEEDED FOR SLEEP  Dispense: 90 tablet; Refill: 0  - hydrOXYzine (ATARAX) 25 MG tablet; TAKE 1 TABLET (25 MG) BY MOUTH NIGHTLY AS NEEDED FOR ANXIETY  Dispense: 30 tablet; Refill: 1    10. Other urinary incontinenc  - DULoxetine (CYMBALTA) 60 MG EC capsule; Take 1 capsule (60 mg) by mouth daily  Dispense: 90 capsule; Refill: 3  - CARE COORDINATION REFERRAL    11. Candidal intertrigo  Under breasts, pannus  - nystatin (MYCOSTATIN) 643753 UNIT/GM POWD; Apply 1 g topically as needed  Dispense: 1 Bottle; Refill: 5    12. Oral thrush  Has not been rinsing after use of Advair  Instructions on doing so provided  To use oral nystatin as directed.    13. Weight loss  25 lb unintentional weight loss in 10 months.  Pt reports decreased appetite, depression.  She has had lung cancer screening within the year, cervical cancer screening dcd at 66, nml mammogram 2016, up to date with colon cancer screening.elow labs in further evaluation.  Consider CT abd/pelvis pending labs results.  - CBC with platelets  - Comprehensive metabolic panel (BMP + Alb, Alk Phos, ALT, AST, Total. Bili, TP)  - Vitamin D  Deficiency  - TSH with free T4 reflex    14. Tobacco abuse  Due for repeat 3/2018  - CT Chest Lung Cancer Scrn Low Dose wo; Future    15. Hyperlipidemia LDL goal <130  - Lipid panel reflex to direct LDL Fasting      Visit was 50 minutes in length with >50% in face to face counseling.    Macarena Vinson PA-C  [facility]    Patient Instructions   Macarena or her team will be in touch with you with results of today's labs.  Call [facility] to schedule appointment for imaging [phone] CT scan and artery evaluation in foot/legs at [place].  Scripts sent to pharmacy.  Our  to call you to discuss in home resources.    SERGO Callahan, PA-C

## 2018-01-10 NOTE — LETTER
Health Care Home - Access Care Plan    About Me  Patient Name:  Ledy Odonnell    YOB: 1945  Age:                             72 year old   Kaye MRN:            [id number] Telephone Information:     Home Phone [phone]   Mobile [phone]       Address:    [address] [place] [address] [address]  [facility] [address] Email address:  No e-mail address on record      Emergency Contact(s)  Name Relationship Lgl Grd Work Phone Home Phone Mobile Phone   NEISHA TARIQ Sister   [phone]             Health Maintenance: Routine Health maintenance Reviewed: Due/Overdue     My Access Plan  Medical Emergency 911   Questions or concerns during clinic hours Primary Clinic Line, I will call the clinic directly: Primary Clinic: [facility] [phone]   24 Hour Appointment Line [phone] or  [phone] [place] ([phone]) (toll free)   24 Hour Nurse Line [phone] (toll free)   Questions or concerns outside clinic hours 24 Hour Appointment Line, I will call the after-hours on-call line:   [facility] [phone] or [phone] ([phone]) (toll-free)   Preferred Urgent Care Preferred Urgent Care:  (NA)   Preferred Hospital Preferred Hospital: [facility]  [phone]   Preferred Pharmacy [place] [facility] - PHARMACY CLOSED - [facility]OCATED TO [facility]     Behavioral Health Crisis Line The National Suicide Prevention Lifeline at 1-555.418.2738 or 911     My Care Team Members  Patient Care Team       Relationship Specialty Notifications Start End    Macarena Vinson PA-C PCP - General Physician Assistant  3/15/10     Phone: [phone] Fax: [phone]         [address] [facility] [address]    Eagle Patel MD MD Internal Medicine  10/24/12     Comment:  see GI at [facility]    Phone: [phone] Fax: [phone]         [facility] [address] [place] [address]     Madelin Ontiveros, BSW Clinic Care Coordinator   1/11/18     Comment:  P: [phone]        My Medical and Care Information  Problem List   Patient Active Problem List   Diagnosis     Esophageal reflux     Insomnia     FAMILY HX DIABETES MELLITUS brother     Tobacco use disorder     Obesity     Generalized anxiety disorder     Moderate major depression (H)     COPD (chronic obstructive pulmonary disease) (H)     Elevated fasting glucose     Chronic pain disorder     Idiopathic peripheral neuropathy     Hyperlipidemia LDL goal <130     Degenerative arthritis of hip     Hip arthrosis - left, severe     Trochanteric bursitis     Status post hip replacement     Advanced directives, counseling/discussion     Tobacco abuse     Gastroparesis     DDD (degenerative disc disease), lumbar     Delayed gastric emptying     Health Care Home     Candidal intertrigo     Ventral hernia     Femur fracture (H)     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

## 2018-01-11 ENCOUNTER — TELEPHONE (OUTPATIENT)
Dept: FAMILY MEDICINE | Facility: CLINIC | Age: 73
End: 2018-01-11

## 2018-01-11 LAB
ALBUMIN SERPL-MCNC: 3.3 G/DL (ref 3.4–5)
ALP SERPL-CCNC: 65 U/L (ref 40–150)
ALT SERPL W P-5'-P-CCNC: 18 U/L (ref 0–50)
ANION GAP SERPL CALCULATED.3IONS-SCNC: 8 MMOL/L (ref 3–14)
AST SERPL W P-5'-P-CCNC: 22 U/L (ref 0–45)
BILIRUB SERPL-MCNC: 0.4 MG/DL (ref 0.2–1.3)
BUN SERPL-MCNC: 6 MG/DL (ref 7–30)
CALCIUM SERPL-MCNC: 8.3 MG/DL (ref 8.5–10.1)
CHLORIDE SERPL-SCNC: 98 MMOL/L (ref 94–109)
CHOLEST SERPL-MCNC: 132 MG/DL
CO2 SERPL-SCNC: 28 MMOL/L (ref 20–32)
CREAT SERPL-MCNC: 0.63 MG/DL (ref 0.52–1.04)
DEPRECATED CALCIDIOL+CALCIFEROL SERPL-MC: 44 UG/L (ref 20–75)
GFR SERPL CREATININE-BSD FRML MDRD: >90 ML/MIN/1.7M2
GLUCOSE SERPL-MCNC: 71 MG/DL (ref 70–99)
HDLC SERPL-MCNC: 52 MG/DL
LDLC SERPL CALC-MCNC: 55 MG/DL
NONHDLC SERPL-MCNC: 80 MG/DL
POTASSIUM SERPL-SCNC: 3.5 MMOL/L (ref 3.4–5.3)
PROT SERPL-MCNC: 6.5 G/DL (ref 6.8–8.8)
SODIUM SERPL-SCNC: 134 MMOL/L (ref 133–144)
TRIGL SERPL-MCNC: 127 MG/DL
TSH SERPL DL<=0.005 MIU/L-ACNC: 0.6 MU/L (ref 0.4–4)

## 2018-01-11 ASSESSMENT — ANXIETY QUESTIONNAIRES: GAD7 TOTAL SCORE: 7

## 2018-01-11 NOTE — TELEPHONE ENCOUNTER
Your request has been denied   CaseId:[id number];Product Name:ST: High Risk Medications - First Generation Antihistamines (diphenhydramine, hydroxyzine HCL, promethazine) PA (PA Type 1) - MEDICARE (hospitals, [facility]) - \A Chronology of [place] Hospitals\"";Status:Denied;Appeal Information: Attention:COMPLAINTS, APPEALS, GRIEVANCES [facility] [address],[place], [place],[address] Phone:[phone];    Will route to Macarena ny Cook, Certified Medical Assistant (AAMA)

## 2018-01-11 NOTE — TELEPHONE ENCOUNTER
Per pharmacy, the patient's insurance requires a prior authorization for one or more of the patient's medications.  Please initiate prior authorization or call/fax pharmacy to change patient's medication.    Medication: Hydroxyzine

## 2018-01-11 NOTE — PROGRESS NOTES
Clinic Care Coordination Contact  OUTREACH    Referral Information:  Referral Source: PCP  Reason for Contact: Resource Navigation  Care Conference: No     Universal Utilization:   ED Visits in last year: 1  Hospital visits in last year: 1  Last PCP appointment: 01/10/18  Missed Appointments: 0  Concerns: None  Multiple Providers or Specialists: No    Clinical Concerns:  Current Medical Concerns: Pt presented to the clinic recently for an appointment and has lost a significant amount of weight recently she is down 23 lbs since last March, down 100 lbs since 2009. Pt attributes this to decreased activity and appetite.Pt reports alternating between constipation and diarrhea.  Pt reports that mood has been stable recently.   Current Behavioral Concerns: None noted    Education Provided to patient: Senior Linkage Line, Elder Law 's, LTC assessment process through the [facility], educated on MA and EW, educated on memory care units of [facility] and LTC placement- how they differ, community involvement; Mandaeism, recreation, private duty services to assist at home, grocery delivery, VEAP, and care coordination.    Clinical Pathway Name: Depression  Clinical Pathway:   Clinic Care Coordinator - Depression Initial Assessment  **Reviewed patients discharge instructions/provider specific recommendations.    Pt identified current zone as: Green    Pt identifies current symptoms of: change in appetite, inactivity or withdrawal from typically pleasurable activities and feelings of hopelessness and helplessness    Most recent PHQ-9 score:   PHQ-9 SCORE 1/10/2018   Total Score -   Total Score 7     Medication Management:  Pt reports adherence and denies any major concerns, Pt does report that at times it is difficult to get to the store to get her medications and sometimes she needs to ask for help     Functional Status:  Mobility Status: Independent w/Device- pt utilizes a wheelchair  Equipment Currently Used at Home: shower chair,  "walker, rolling, wheelchair, manual  Transportation: Pt does own her own vehicle and is able to drive     Psychosocial:  Current living arrangement:: I live alone, I live in a private home- Pt is still paying a mortgage on her home. Neighbors and friends help with lawn maintenance and snow removal. Pt tries to manage in home maintenance,but is finding that this is becoming increasingly difficult.  Financial/Insurance: [employer] and [employer] product, Pt is on a low fixed income, pt makes around $900 each month from  and an [employer]- pt reports that she has about $4,000 left in the KAYCE. Pt would most likely qualify for Medical Assistance very soon. Discussed the services available through waivers and LTC Medical Assistance. Pt seemed very interested in PCA services for assistance with cleaning occasionally but mainly with shopping and other more complex tasks. However, pt did mention that she plans to leave her home to her brother in order to pay him back. Explained that MA will put a lean on the home after pt passes and she will not be able to leave her home to family. Pt then decided she wasn't interested in the service as she wants to\"pay people back.\" Discussed in depth the costs and benefits of applying for the program. Pt was open to learning about the services.      Resources and Interventions:  Current Resources: No formal supports- Many informal supports available through friends, however pt reports that people around her are offering to help less as the time goes on and is concerned about having the help needed in the future  Advanced Care Plans/Directives on file:: Yes  Patient/Caregiver understanding: Pt denies any additional questions or concerns at this time.  Frequency of Care Coordination: 4 weeks      Plan:  ALEXIA MORA will mail out information about finances, community resources, and [facility] supports. Pt to review and call with questions. ALEXIA MORA will follow-up in 4 weeks.    ANGEL Scott  [facility] Case " Manager  [phone]  [email or web address]

## 2018-01-14 LAB
BACTERIA SPEC CULT: ABNORMAL
BACTERIA SPEC CULT: ABNORMAL
SPECIMEN SOURCE: ABNORMAL

## 2018-01-16 LAB — PAIN DRUG SCR UR W RPTD MEDS: NORMAL

## 2018-01-17 NOTE — TELEPHONE ENCOUNTER
Reason for Call:  Other prescription-hydrOXYzine (ATARAX) 25 MG tablet    Detailed comments: Mary from [facility] called. She wanted to touch base to see if there would be a PA or is pt paying out of pocket.    Phone Number Patient can be reached at: [facility]-[phone]    Best Time:     Can we leave a detailed message on this number? YES    Call taken on 1/17/2018 at 1:20 PM by Stacey Rosales

## 2018-01-19 NOTE — TELEPHONE ENCOUNTER
Called pharmacy and let them know that PA was denied.    Sharlene Cook, Certified Medical Assistant (AAMA)

## 2018-02-21 ENCOUNTER — RADIANT APPOINTMENT (OUTPATIENT)
Dept: GENERAL RADIOLOGY | Facility: CLINIC | Age: 73
End: 2018-02-21
Attending: PHYSICIAN ASSISTANT
Payer: COMMERCIAL

## 2018-02-21 ENCOUNTER — OFFICE VISIT (OUTPATIENT)
Dept: ORTHOPEDICS | Facility: CLINIC | Age: 73
End: 2018-02-21
Payer: COMMERCIAL

## 2018-02-21 VITALS — HEIGHT: 65 IN | RESPIRATION RATE: 16 BRPM | BODY MASS INDEX: 24.49 KG/M2 | WEIGHT: 147 LBS

## 2018-02-21 DIAGNOSIS — G89.29 CHRONIC PAIN OF BOTH SHOULDERS: ICD-10-CM

## 2018-02-21 DIAGNOSIS — M25.512 CHRONIC PAIN OF BOTH SHOULDERS: ICD-10-CM

## 2018-02-21 DIAGNOSIS — G89.29 CHRONIC PAIN OF BOTH SHOULDERS: Primary | ICD-10-CM

## 2018-02-21 DIAGNOSIS — M25.511 CHRONIC PAIN OF BOTH SHOULDERS: ICD-10-CM

## 2018-02-21 DIAGNOSIS — M12.811 ROTATOR CUFF TEAR ARTHROPATHY OF BOTH SHOULDERS: ICD-10-CM

## 2018-02-21 DIAGNOSIS — M25.512 CHRONIC PAIN OF BOTH SHOULDERS: Primary | ICD-10-CM

## 2018-02-21 DIAGNOSIS — M25.511 CHRONIC PAIN OF BOTH SHOULDERS: Primary | ICD-10-CM

## 2018-02-21 DIAGNOSIS — M75.101 ROTATOR CUFF TEAR ARTHROPATHY OF BOTH SHOULDERS: ICD-10-CM

## 2018-02-21 DIAGNOSIS — M17.11 PRIMARY OSTEOARTHRITIS OF RIGHT KNEE: ICD-10-CM

## 2018-02-21 DIAGNOSIS — M12.812 ROTATOR CUFF TEAR ARTHROPATHY OF BOTH SHOULDERS: ICD-10-CM

## 2018-02-21 DIAGNOSIS — M75.102 ROTATOR CUFF TEAR ARTHROPATHY OF BOTH SHOULDERS: ICD-10-CM

## 2018-02-21 PROCEDURE — 73030 X-RAY EXAM OF SHOULDER: CPT | Mod: LT

## 2018-02-21 PROCEDURE — 20610 DRAIN/INJ JOINT/BURSA W/O US: CPT | Mod: 59 | Performed by: ORTHOPAEDIC SURGERY

## 2018-02-21 PROCEDURE — 20610 DRAIN/INJ JOINT/BURSA W/O US: CPT | Mod: 50 | Performed by: ORTHOPAEDIC SURGERY

## 2018-02-21 PROCEDURE — 99204 OFFICE O/P NEW MOD 45 MIN: CPT | Mod: 25 | Performed by: ORTHOPAEDIC SURGERY

## 2018-02-21 RX ORDER — TRIAMCINOLONE ACETONIDE 40 MG/ML
40 INJECTION, SUSPENSION INTRA-ARTICULAR; INTRAMUSCULAR ONCE
Qty: 1 ML | Refills: 0 | OUTPATIENT
Start: 2018-02-21 | End: 2018-02-21

## 2018-02-21 RX ORDER — METHYLPREDNISOLONE ACETATE 80 MG/ML
80 INJECTION, SUSPENSION INTRA-ARTICULAR; INTRALESIONAL; INTRAMUSCULAR; SOFT TISSUE ONCE
Qty: 1 ML | Refills: 0 | OUTPATIENT
Start: 2018-02-21 | End: 2018-02-21

## 2018-02-21 ASSESSMENT — PAIN SCALES - GENERAL: PAINLEVEL: SEVERE PAIN (7)

## 2018-02-21 NOTE — LETTER
"    2/21/2018         RE: Ledy Odonnell  [address]  [facility] [address]        Dear Colleague,    Thank you for referring your patient, Ledy Odonnell, to the [facility]. Please see a copy of my visit note below.    CHIEF COMPLAINT:   Chief Complaint   Patient presents with     Shoulder Pain     Bilateral shoulder pain and right knee pain. Gets these injected with cortisone with Dr. Choi every few months, but was unable to get in with him,. Last injections on 9/14/17.      Shoulder Pain     Had right RCR about 10 years ago that \"didn't take\". Right shoulder is worse than left. She is left hand dominant.       HISTORY:  Ledy Odonnell is a 72 year old female, right -hand dominant, who is seen for bilateral shoulder pain that started years ago. Right > left today. Today her pain is severe, rated a 7/10. She has a lot of pain with getting up off her wheelchair or using her arms to wheel herself around. She has been getting cortisone injections with Dr. Chio, but was unable to get an appointment with him. Her last injections were on 9/14/2017. She would like repeat injections today. Reports known bilateral rotator cuff tears in the past, right side with surgery about 10 years ago without improvements.    Also presents with right knee pain. Pain has been present for many years. Today her pain is rated a 6/10. She would like an injection today.    History of hip and hand fracture as well. Congenital bilateral club feet, left with chronic deformity.      Aggravated by: with shoulder range of motion, any weightbearing, using the arms for her wheelchair, walking  Relieved by: at rest, with cortisone injections  Present symptoms: pain with ADL's (dressing),  pain with overhead activities,  pain reaching behind back,  pain reaching out or away from body (flexion/ abduction),  weakness with lifting,  Pain location: diffuse shoulders bilateral, right knee diffuse  Pain severity: " 7/10 shoulders, 6/10 knee  Pain quality: aching and sharp  Frequency of symptoms: frequently  Associated symptoms: none    Treatment up to this point: right shoulder rotator cuff repair 10 years ago, history of cortisone injections in bilateral shoulders, nothing for right knee.  Has not tried: cortisone injection in right knee, reverse total shoulder arthroplasty  Prior history of related problems: chronic shoulder pain, treated with cortisone injections    Significant Orthopedic past medical history: known bilateral shoulder osteoarthritis, history of rotator cuff repair, right shoulder 10 years ago  Usual level of recreational activity: sedentary  Usual level of work activity: retired    Other PMH:  has a past medical history of Depressive disorder, not elsewhere classified; GENERALIZED ANXIETY DIS (6/21/2007); Inflammatory arthritis; Osteoarthrosis, unspecified whether generalized or localized, unspecified site; Other and unspecified alcohol dependence, unspecified drinking behavior; and Unspecified essential hypertension. She also has no past medical history of Asthma; Bleeding disorder (H); Cancer (H); Chronic infection; Diabetes mellitus (H); Heart disease; Hepatitis; History of blood transfusion; Kidney disease; Malignant hyperthermia; PONV (postoperative nausea and vomiting); Renal disease; Seizures (H); Stroke (H); Surgical complications; Thrombosis of leg; Thyroid disease; Type II or unspecified type diabetes mellitus without mention of complication, not stated as uncontrolled; Unspecified asthma(493.90); or Unspecified cerebral artery occlusion with cerebral infarction.  Patient Active Problem List    Diagnosis Date Noted     Pulmonary nodules 03/14/2017     Priority: Medium     Currently managed with follow up imaging by [facility] result Team.         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  FPA / [facility]        DX V65.8 REPLACED WITH 96[facility]ALTH CARE HOME (04/08/2013)       Delayed gastric emptying 11/13/2012     Priority: Medium     Gastric Emptying Study 10/24/12: Markedly delayed at 281 mins (nml is )  Tx: eating 5-6 smaller meals throughout the day, and diet low in fat and fiber as both with take longer to empty       DDD (degenerative disc disease), lumbar 10/08/2012     Priority: Medium     Gastroparesis 08/27/2012     Priority: Medium     Pt to have UGI follow through and gastric emptying tests. F/u with [facility] pending results.       Tobacco abuse 08/23/2012     Priority: Medium     Advanced directives, counseling/discussion 08/26/2011     Priority: Medium     Advance Directive Problem List Overview:   Name Relationship Phone    Primary Health Care Agent            Alternative Health Care Agent          Advance Directive received and scanned. Click on Code in the patient header to view. 8/26/2011          Status post hip replacement 02/14/2011     Priority: Medium     (Problem list name updated by automated process. Provider to review and confirm.)       Hip arthrosis - left, severe 10/29/2010     Priority: Medium     Trochanteric bursitis 10/29/2010     Priority: Medium     Degenerative arthritis of hip 10/22/2010     Priority: Medium     L hip, severe arthritis       Hyperlipidemia LDL goal <130 05/09/2010     Priority: Medium     Idiopathic peripheral neuropathy 05/04/2010     Priority: Medium     Moderate major depression (H) 03/01/2010     Priority: Medium      COPD (chronic obstructive pulmonary disease) (H) 03/01/2010     Priority: Medium     Elevated fasting glucose 03/01/2010     Priority: Medium     A1C 6.3 3/1/10         Chronic pain disorder 03/01/2010     Priority: Medium     Patient is followed by JESS LAM for ongoing prescription of pain medication.  All refills should be approved by this provider, or covering partner.    Medication(s): Percocet   Maximum quantity per month: 120  Clinic visit frequency required: Q 6  months     Controlled substance agreement on file: Yes       Date(s): 10/15/13    Pain Clinic evaluation in the past: Yes       Date(s):  5/2010       Location(s):  [facility]    DIRE Total Score(s):    7/8/2015   Total Score 17       Last [facility] website verification:  done on 7/8/15   [email or web address]             Generalized anxiety disorder 06/21/2007     Priority: Medium     Tobacco use disorder 02/01/2007     Priority: Medium     Tried Chantix-discontinued bc of adverse effects       Obesity 02/01/2007     Priority: Medium     Problem list name updated by automated process. Provider to review       FAMILY HX DIABETES MELLITUS brother 08/29/2006     Priority: Medium     Insomnia 12/02/2005     Priority: Medium     Problem list name updated by automated process. Provider to review       Esophageal reflux 06/28/2005     Priority: Medium       Surgical Hx:  has a past surgical history that includes NONSPECIFIC PROCEDURE (2001); NONSPECIFIC PROCEDURE (1975); NONSPECIFIC PROCEDURE; SHOULDER SURG PROC UNLISTED; HAND/FINGER SURGERY UNLISTED; TOTAL HIP ARTHROPLASTY (1/4/2011); and Herniorrhaphy ventral (5/14/2013).    Medications:   Current Outpatient Prescriptions:      traZODone (DESYREL) 50 MG tablet, TAKE 1-2 TABLETS BY MOUTH NIGHTLY AS NEEDED FOR SLEEP, Disp: 90 tablet, Rfl: 0     hydrOXYzine (ATARAX) 25 MG tablet, TAKE 1 TABLET (25 MG) BY MOUTH NIGHTLY AS NEEDED FOR ANXIETY, Disp: 30 tablet, Rfl: 1     DULoxetine  (CYMBALTA) 60 MG EC capsule, Take 1 capsule (60 mg) by mouth daily, Disp: 90 capsule, Rfl: 3     fluticasone-salmeterol (ADVAIR DISKUS) 250-50 MCG/DOSE diskus inhaler, Inhale 1 puff into the lungs 2 times daily, Disp: 1 Inhaler, Rfl: 5     albuterol (PROAIR HFA/PROVENTIL HFA/VENTOLIN HFA) 108 (90 BASE) MCG/ACT Inhaler, Inhale 2 puffs into the lungs every 6 hours as needed for shortness of breath / dyspnea, Disp: 3 Inhaler, Rfl: 1     nystatin (MYCOSTATIN) 461671 UNIT/GM POWD, Apply 1 g topically as needed, Disp: 1 Bottle, Rfl: 5     miconazole (MICATIN; MICRO GUARD) 2 % powder, Apply topically 2 times daily Apply to groin, Disp: 30 g, Rfl: 0     oxyCODONE-acetaminophen (PERCOCET)  MG per tablet, 1-2 tablets every 6 hrs prn-to fill on or after 3/10/18, Disp: 120 tablet, Rfl: 0     oxyCODONE-acetaminophen (PERCOCET)  MG per tablet, 1-2 tablets every 6 hrs prn-to fill on or after 2/10/18, Disp: 120 tablet, Rfl: 0     oxyCODONE-acetaminophen (PERCOCET)  MG per tablet, 1-2 tablets every 6 hrs prn-to fill on or after 1/10/18, Disp: 120 tablet, Rfl: 0     nystatin (MYCOSTATIN) 785717 UNIT/ML suspension, Take 5 mLs (500,000 Units) by mouth 4 times daily, Disp: 473 mL, Rfl: 1     sulfamethoxazole-trimethoprim (BACTRIM DS/SEPTRA DS) 800-160 MG per tablet, Take 1 tablet by mouth 2 times daily, Disp: 14 tablet, Rfl: 0     oxybutynin (DITROPAN XL) 10 MG 24 hr tablet, Take 2 tablets (20 mg) by mouth daily, Disp: 180 tablet, Rfl: 3     buPROPion (WELLBUTRIN XL) 150 MG 24 hr tablet, Take 1 tablet (150 mg) by mouth every morning, Disp: 90 tablet, Rfl: 3     cyanocolbalamin (VITAMIN  B-12) 100 MCG tablet, Take 1 tablet (100 mcg) by mouth daily, Disp: , Rfl:      multivitamin, therapeutic (THERA-VIT) TABS tablet, Take 1 tablet by mouth daily, Disp: , Rfl:      senna-docusate (SENOKOT-S;PERICOLACE) 8.6-50 MG per tablet, Take 1 tablet by mouth 2 times daily. (Patient taking differently: Take 2 tablets by mouth daily ),  "Disp: 40 tablet, Rfl: 11    Allergies:   Allergies   Allergen Reactions     No Known Allergies        Social Hx: nathaniel  reports that she has been smoking Cigarettes.  She has a 21.50 pack-year smoking history. She has never used smokeless tobacco. She reports that she does not drink alcohol or use illicit drugs.    Family Hx: family history includes Arthritis in her paternal grandmother; CANCER in her father; Dementia in her maternal grandmother and mother; HEART DISEASE in her mother..    REVIEW OF SYSTEMS: 10 point ROS neg other than the symptoms noted above in the HPI and PMH. Notables include  CONSTITUTIONAL:NEGATIVE for fever, chills, change in weight  INTEGUMENTARY/SKIN: NEGATIVE for worrisome rashes, moles or lesions  MUSCULOSKELETAL:See HPI above  NEURO: NEGATIVE for weakness, dizziness or paresthesias    This document serves as a record of the services and decisions personally performed and made by Varinder Wallace MD. It was created on his behalf by Sarah Ware, a trained medical scribe. The creation of this document is based the provider's statements to the medical scribe.    Scribe Sarah Ware 10:55 AM 2/21/2018    PHYSICAL EXAM:  Resp 16  Ht 1.651 m (5' 5\")  Wt 66.7 kg (147 lb)  BMI 24.46 kg/m2   GENERAL APPEARANCE: healthy, alert, no distress  SKIN: no suspicious lesions or rashes  NEURO: Normal strength and tone, mentation intact and speech normal  PSYCH:  mentation appears normal and affect normal, not anxious  RESPIRATORY: No increased work of breathing.  VASCULAR: Radial pulses 2+ and brisk capillary refill     MUSCULOSKELETAL:    RIGHT UPPER EXTREMITY:  Sensation intact to light touch in median, radial, ulnar and axillary nerve distributions  Palpable 2+ radial pulse, brisk capillary refill to all fingers, wwp  Intact epl fpl fdp edc wrist flexion/extension biceps triceps deltoid    RIGHT SHOULDER:  Shoulder Inspection: saber type scar over anterior shoulder, marked supraspinatus and " infraspinatus atrophy  Tender: diffuse.  There is anterior/superior prominence of the humeral head.  Range of Motion:   Active: forward flexion 90 degrees, external rotation 30 degrees   Passive: forward flexion 100 degrees, external rotation 40 degrees; crepitus.  Strength: forward flexion 3/5, External rotation 3/5   Impingement: positive   Special tests: positive empty can.    LEFT UPPER EXTREMITY:  Sensation intact to light touch in median, radial, ulnar and axillary nerve distributions  Palpable 2+ radial pulse, brisk capillary refill to all fingers, wwp  Intact epl fpl fdp edc wrist flexion/extension biceps triceps deltoid    LEFT SHOULDER:  Shoulder Inspection: no swelling, bruising, discoloration, marked supraspinatus and infraspinatus atrophy  Tender: diffuse  There is anterior/superior prominence of the humeral head.  Range of Motion:   Active: forward flexion 110 degrees, external rotation 30 degrees   Passive: forward flexion 140 degrees, external rotation 40 degrees  Strength: forward flexion 3/5, External rotation 3/5   Impingement: positive   Special tests: empty can positive.      GAIT: not assessed, in wheelchair.  There is deformity of the left foot/ankle.    RIGHT KNEE EXAM:    Skin: intact, no ecchymosis or erythema  ROM: 0 extension to 120 flexion  Tight hamstrings on straight leg raise.  Effusion: none  Tender: medial joint line, lateral joint line . Pes.  McMurrays: negative    Patellofemoral joint:                Extensor Lag: none              Q Angle: normal              Patellar Mobility: normal              Apprehension: negative              Crepitations: mild   Grind: positive      X-RAY INTERPRETATION: 3 views bilateral shoulder obtained 2/21/2018 were reviewed personally in clinic today with the patient. On my review, there as superior elevation of the humeral head relative to the glenoid with loss of subacromial space and wearing of the undersurface of the acromion. Advanced  "bilateral gleno-humeral degenerative changes.    3 views right knee taken on 5/11/2016 were reviewed. On my review, mild tricompartmental degenerative changes.    ASSESSMENT: Ledy Odonnell is a 72 year old female, right hand dominant with   1. Chronic Bilateral shoulder pain, bilateral cuff tear arthropathy  2. Chronic Right knee pain, primary osteoarthritis     PLAN:   SHOULDER:  * reviewed xrays with patient. Exam and xrays consistent with chronic, long-standing rotator cuff tear with underlying degenerative changes. There has been elevation of humeral head under the acromion due to lack of constraint from rotator cuff tear. This has started to wear the undersurface of the acromion. This is common finding in chronic large rotator cuff tears. Additionally, inability to raise arms called \"pseudoparalysis\" also seen in cases with chronic rotator cuff tear and arthropathy.  * this is most likely reason for shoulder pain and decreased range of motion, weakness.   * treatment options depend on how symptomatic as well has how aggressive patient wants to be. If not overly symptomatic, we can try a cortisone injection as well as Physical Therapy for deltoid based shoulder exercises, pain control with tylenol and NSAIDS.  * surgical options would be a cuff tear hemiarthroplasty versus reverse total shoulder arthroplasty. Conventional total shoulder arthroplasty would not work in the absence of an intact, functional rotator cuff, only doomed for failure.  * risks and perceived benefits of nonsurgical and surgical options of each discussed.  * the primary goal of surgery is pain relief, with return or improvement of some function secondary as a bonus. The arm will never work as normal, but hopefully we can get better function than current.  * risks of surgery include, but not limited to, bleeding, infection, pain, scar, damage to adjacent structures (such as nerves, vessels), temporary versus permanent nerve injury, " "failure to relieve or improve symptoms or function, recurrence of symptoms, stiffness, implant dislocation, implant failure, implant infection, need for further surgery, blood clots, risks of anesthesia and death.    * at this time, patient has no interest in surgery.    * deltoid based shoulder exercises  * rest  * activity modification  * tyelnol, NSAIDS  * risks and perceived benefits of cortisone injections discussed. Patient elects to proceed. See procedure note below.  * return to clinic as needed.    KNEE:  * reviewed imaging studies with patient, showing arthritis. Arthritis is wearing of the cartilage due to longstanding \"wear and tear\" or can follow an injury to the joint.    Discussed typical symptoms of arthritic pain is pain aggravated with weight bearing activities, stiffness, relieved by sitting or rest. Swelling may be associated. It is common to have some grinding and popping in the knee with arthritis.    Discussed various treatments for degenerative arthrosis of the knee, including nonoperative and operative approaches. Nonoperative approaches, which are exhausted prior to operative treatment, include doing nothing and living with the pain as patient has been doing, activity modification (avoid aggravating activities), physical therapy and strengthening exercises, weight loss, anti-inflammatory medications, bracing, ambulatory aids (cane, walker) and injections. Once these have been tried and are deemed unsuccessful with a good effort, and the patient is an appropriate candidate, the next treatment would be knee arthroplasty or replacement. Depending on the location of the arthritis, knee replacement can be partial (one side of the knee affected by arthritis) or a total knee arthroplasty (all 3 compartments). The risks, perceived benefits and perioperative rehabilitation expectations of knee arthroplasty were discussed in detail. Also discussed that approximately 10% of patients that undergo knee " "arthroplasty are not happy following surgery and may have worse pain or no improvement in pain, contrary to their preoperative expectations.    At this time, nonoperative treatment will be pursued  Non-surgical treatment for knee arthritis includes:    * rest, sitting  * Activity modification - avoid impact activities or activities that aggravate symptoms.  * NSAIDS (non-steroidal anti-inflammatory medications; e.g. Aleve, advil, motrin, ibuprofen) - regular use for inflammation ( twice daily or three times daily), with food, as long as no contra-indications Please discuss with primary care doctor if needed  * ice, 15-20 minutes at a time several times a day or as needed.  * Strengthening of quadriceps muscles  * Physical Therapy for strengthening, stretching and range of motion exercises of legs  * Tylenol as needed for pain, consider Tylenol arthritis or similar  * Weight loss: Weight loss:  Body mass index is 24.46 kg/(m^2).. weight loss benefits, not only for the current pain symptoms, but also overall health. Recommend a good diet plan that works for the patient, with the assistance of a dietician or primary care doctor as needed. Also, a good, low-impact exercise program for at least 20 minutes per day, 3 times per week, such as exercise bike, elliptical , or pool.  * Exercise: low impact such as stationary bike, elliptical, pool.  * Injections: cortisone versus viscosupplementation (hyaluronic acid, \"rooster comb\", \"gel shots\"); risks and perceived benefits discussed today. Patient elects to proceed.  * Bracing: bracing the knee may offer some relief of symptoms when worn and provide some stability.  * over the counter supplements such as glucosamine and chondroitin sulfate may help with joint pain.  * topical ointments may help as well    * return to clinic as needed.      PROCEDURE NOTE:  The risks, perceived benefits and potential complications (including but not limited to: bleeding, infection, " pain, scar, damage to adjacent structures, atrophy or necrosis of soft tissue, skin blanching, failure to relieve symptoms, worsening of symptoms, allergic reaction) of injection were discussed with the patient. Questions were addressed and answered.The patient elected to proceed. Written informed consent was obtained. The correct procedural site was identified and confirmed. A RIGHT shoulder subacromial injection was performed using 2mL Kenalog-40 40mg per mL and 7mL (4mL 1% lidocaine, 3mL 0.25% marcaine)  of local anesthetic after sterile prep, to the correct procedural site. Sterile bandaid applied. This was tolerated well by the patient. No apparent complications. Did also discuss that if diabetic, recommend close monitoring of blood sugars over the next week as cortisone injections can temporarily elevate blood sugars.     The risks, perceived benefits and potential complications (including but not limited to: bleeding, infection, pain, scar, damage to adjacent structures, atrophy or necrosis of soft tissue, skin blanching, failure to relieve symptoms, worsening of symptoms, allergic reaction) of injection were discussed with the patient. Questions were addressed and answered.The patient elected to proceed. Written informed consent was obtained. The correct procedural site was identified and confirmed. A LEFT shoulder subacromial injection was performed using 2mL Kenalog-40 40mg per mL and 7mL (4mL 1% lidocaine, 3mL 0.25% marcaine)  of local anesthetic after sterile prep, to the correct procedural site. Sterile bandaid applied. This was tolerated well by the patient. No apparent complications. Did also discuss that if diabetic, recommend close monitoring of blood sugars over the next week as cortisone injections can temporarily elevate blood sugars.      PROCEDURE NOTE:  The risks, perceived benefits and potential complications (including but not limited to: bleeding, infection, pain, scar, damage to adjacent  structures, atrophy or necrosis of soft tissue, skin blanching, failure to relieve symptoms, worsening of symptoms, allergic reaction) of injection were discussed with the patient. Questions were addressed and answered.The patient elected to proceed. Written informed consent was obtained. The correct procedural site was identified and confirmed. A RIGHT knee intraarticular  injection was performed using 1mL Depo Medrol 80mg per mL and 7mL (4mL 1% lidocaine, 3mL 0.25% marcaine)  of local anesthetic after sterile prep, to the correct procedural site. Sterile bandaid applied. This was tolerated well by the patient. No apparent complications. Did also discuss that if diabetic, recommend close monitoring of blood sugars over the next week as cortisone injections can temporarily elevate blood sugars.       The information in this document, created by a scribe for me, accurately reflects the services I personally performed and the decisions made by me. I have reviewed and approved this document for accuracy.     Varinder Wallace M.D., M.S.  Dept. of Orthopaedic Surgery  [facility]    The patient's Bilateral shoulders were prepped with betadine solution after verification of allergies. Area approximately 10 cm x 10 cm prepped in a sterile fashion. After injection, betadine removed with soap and water and band-aids applied.    4cc Lidocaine 1%  NDC 8400-3348-82, LOT -[id number],  [id number]  3cc Bupivacaine 0.25% NDC 5328-7783-94, LOT [id number],  [id number]  2cc Kenalog 40 NDC 3286-2130-00, LOT [id number],  [id number]   injected into patient's Bilateral subacromial space without resistance using posterolateral approach by:   Edvin Mulligan PA-C, CAQ (Ortho)  Supervising Physician: Varinder Wallace M.D., M.S.  Dept. of Orthopaedic Surgery  [facility]    The patient's right knee was prepped with betadine solution after verification of allergies. Area approximately 10 cm x 10 cm prepped in a  sterile fashion. After injection, betadine removed with soap and water and band-aids applied.    4cc Lidocaine 1% NDC 8496-4466-52, LOT -[id number],  2019  3cc Bupivacaine 0.25% NDC 8689-2098-54, LOT [id number],  2018  1cc Depo Medrol 80 mg/ml NDC 4409-3049-14, LOT [id number],  2020   injected into patient's right Knee by:  Edvin Mulligan PA-C, CAQ (Ortho)  Supervising Physician: Varinder Wallace M.D., M.S.  Dept. of Orthopaedic Surgery  [facility]                Again, thank you for allowing me to participate in the care of your patient.        Sincerely,        Varinder Wallace MD

## 2018-02-21 NOTE — PROGRESS NOTES
The patient's Bilateral shoulders were prepped with betadine solution after verification of allergies. Area approximately 10 cm x 10 cm prepped in a sterile fashion. After injection, betadine removed with soap and water and band-aids applied.    4cc Lidocaine 1%  NDC 3926-3309-11, LOT -[id number],  2019  3cc Bupivacaine 0.25% NDC 3155-0350-95, LOT [id number],  2018  2cc Kenalog 40 ND 7400-8317-28, LOT [id number],  [id number]   injected into patient's Bilateral subacromial space without resistance using posterolateral approach by:   Edvin Mulligan PA-C, DIEGO (Ortho)  Supervising Physician: Varinder Wallace M.D., M.S.  Dept. of Orthopaedic Surgery  [facility]    The patient's right knee was prepped with betadine solution after verification of allergies. Area approximately 10 cm x 10 cm prepped in a sterile fashion. After injection, betadine removed with soap and water and band-aids applied.    4cc Lidocaine 1% NDC 4641-8278-93, LOT -dk,  2019  3cc Bupivacaine 0.25% NDC 6255-0357-15, LOT [id number],  2018  1cc Depo Medrol 80 mg/ml NDC 6838-5769-47, LOT [id number],  2020   injected into patient's right Knee by:  Edvin Mulligan PA-C, DIEGO (Ortho)  Supervising Physician: Varinder Wallace M.D., M.S.  Dept. of Orthopaedic Surgery  [facility]

## 2018-02-21 NOTE — MR AVS SNAPSHOT
"              After Visit Summary   2/21/2018    Ledy Odonnell    MRN: [id number]           Patient Information     Date Of Birth          1945        Visit Information        Provider Department      2/21/2018 10:30 AM Varinder Wallace MD [facility]        Today's Diagnoses     Chronic pain of both shoulders    -  1    Primary osteoarthritis of right knee        Rotator cuff tear arthropathy of both shoulders           Follow-ups after your visit        Follow-up notes from your care team     Return if symptoms worsen or fail to improve.      Who to contact     If you have questions or need follow up information about today's clinic visit or your schedule please contact [facility] directly at [phone].  Normal or non-critical lab and imaging results will be communicated to you by MyChart, letter or phone within 4 business days after the clinic has received the results. If you do not hear from us within 7 days, please contact the clinic through MyChart or phone. If you have a critical or abnormal lab result, we will notify you by phone as soon as possible.  Submit refill requests through [employer] or call your pharmacy and they will forward the refill request to us. Please allow 3 business days for your refill to be completed.          Additional Information About Your Visit        MyChart Information     [employer] lets you send messages to your doctor, view your test results, renew your prescriptions, schedule appointments and more. To sign up, go to [email or web address] . Click on \"Log in\" on the left side of the screen, which will take you to the Welcome page. Then click on \"Sign up Now\" on the right side of the page.     You will be asked to enter the access code listed below, as well as some personal information. Please follow the directions to create your username and password.     Your access code is: [email or web address]  Expires: 4/10/2018 11:52 AM     Your access code will " " in 90 days. If you need help or a new code, please call your [place] clinic or [phone].        Care EveryWhere ID     This is your Care EveryWhere ID. This could be used by other organizations to access your [place] medical records  [id number]        Your Vitals Were     Respirations Height BMI (Body Mass Index)             16 5' 5\" (1.651 m) 24.46 kg/m2          Blood Pressure from Last 3 Encounters:   01/10/18 125/73   17 (!) 159/96   17 107/72    Weight from Last 3 Encounters:   18 147 lb (66.7 kg)   17 147 lb (66.7 kg)   17 170 lb (77.1 kg)              We Performed the Following     DRAIN/INJECT LARGE JOINT/BURSA     METHYLPREDNISOLONE 80 MG INJ     TRIAMCINOLONE ACET INJ NOS          Today's Medication Changes          These changes are accurate as of 18  3:16 PM.  If you have any questions, ask your nurse or doctor.               Start taking these medicines.        Dose/Directions    methylPREDNISolone acetate 80 MG/ML injection   Commonly known as:  DEPO-MEDROL   Used for:  Primary osteoarthritis of right knee   Started by:  Varinder Wallace MD        Dose:  80 mg   1 mL (80 mg) by INTRA-ARTICULAR route once for 1 dose   Quantity:  1 mL   Refills:  0       triamcinolone acetonide 40 MG/ML injection   Commonly known as:  KENALOG-40   Used for:  Rotator cuff tear arthropathy of both shoulders, Chronic pain of both shoulders   Started by:  Varinder Wallace MD        Dose:  40 mg   Inject 1 mL (40 mg) into the muscle once for 1 dose   Quantity:  1 mL   Refills:  0         These medicines have changed or have updated prescriptions.        Dose/Directions    senna-docusate 8.6-50 MG per tablet   Commonly known as:  SENOKOT-S;PERICOLACE   This may have changed:    - how much to take  - when to take this   Used for:  Ventral hernia        Dose:  1 tablet   Take 1 tablet by mouth 2 times daily.   Quantity:  40 tablet   Refills:  11            Where to get your " medicines      Some of these will need a paper prescription and others can be bought over the counter.  Ask your nurse if you have questions.     You don't need a prescription for these medications     methylPREDNISolone acetate 80 MG/ML injection    triamcinolone acetonide 40 MG/ML injection                Primary Care Provider Office Phone # Fax #    Macarena Queenie Vinson PA-C [phone]       [address] [facility] [address]        Equal Access to Services     JENNIFER SCOTT : Hadii aad ku hadasho Soomaali, waaxda luqadaha, qaybta kaalmada adeegyada, waxay idiin hayaan adeeg sukhdev trujillo . So [facility] [phone].    ATENCIÓN: Si sabas espjosselyn, tiene a michele disposición servicios gratuitos de asistencia lingüística. [facility] [phone].    We comply with applicable federal civil rights laws and [place] laws. We do not discriminate on the basis of race, color, national origin, age, disability, sex, sexual orientation, or gender identity.            Thank you!     Thank you for choosing [facility]  for your [facility]. Our goal is always to provide you with excellent care. Hearing back from our patients is one way we can continue to improve our services. Please take a few minutes to complete the written survey that you may receive in the mail after your visit with us. Thank you!             Your Updated Medication List - Protect others around you: Learn how to safely use, store and throw away your medicines at www.disposemymeds.org.          This list is accurate as of 2/21/18  3:16 PM.  Always use your most recent med list.                   Brand Name Dispense Instructions for use Diagnosis    albuterol 108 (90 BASE) MCG/ACT Inhaler    PROAIR HFA/PROVENTIL HFA/VENTOLIN HFA    3 Inhaler    Inhale 2 puffs into the lungs every 6 hours as needed for shortness of breath / dyspnea    Chronic obstructive pulmonary disease, unspecified COPD type (H)       buPROPion 150 MG 24 hr tablet     WELLBUTRIN XL    90 tablet    Take 1 tablet (150 mg) by mouth every morning    Major depressive disorder, recurrent episode, moderate (H), Tobacco use disorder       cyanocolbalamin 100 MCG tablet    vitamin  B-12     Take 1 tablet (100 mcg) by mouth daily        DULoxetine 60 MG EC capsule    CYMBALTA    90 capsule    Take 1 capsule (60 mg) by mouth daily    Major depressive disorder, recurrent episode, moderate (H), Chronic pain disorder, Generalized anxiety disorder, Other urinary incontinence       fluticasone-salmeterol 250-50 MCG/DOSE diskus inhaler    ADVAIR DISKUS    1 Inhaler    Inhale 1 puff into the lungs 2 times daily        hydrOXYzine 25 MG tablet    ATARAX    30 tablet    TAKE 1 TABLET (25 MG) BY MOUTH NIGHTLY AS NEEDED FOR ANXIETY    Other insomnia       methylPREDNISolone acetate 80 MG/ML injection    DEPO-MEDROL    1 mL    1 mL (80 mg) by INTRA-ARTICULAR route once for 1 dose    Primary osteoarthritis of right knee       miconazole 2 % powder    MICATIN; MICRO GUARD    30 g    Apply topically 2 times daily Apply to groin        multivitamin, therapeutic Tabs tablet      Take 1 tablet by mouth daily        nystatin 218527 UNIT/GM Powd    MYCOSTATIN    1 Bottle    Apply 1 g topically as needed    Candidal intertrigo       nystatin 117677 UNIT/ML suspension    MYCOSTATIN    473 mL    Take 5 mLs (500,000 Units) by mouth 4 times daily    Oral thrush       oxybutynin 10 MG 24 hr tablet    DITROPAN XL    180 tablet    Take 2 tablets (20 mg) by mouth daily    Urgency incontinence       * oxyCODONE-acetaminophen  MG per tablet    PERCOCET    120 tablet    1-2 tablets every 6 hrs prn-to fill on or after 3/10/18    Chronic pain disorder       * oxyCODONE-acetaminophen  MG per tablet    PERCOCET    120 tablet    1-2 tablets every 6 hrs prn-to fill on or after 2/10/18    Chronic pain disorder       * oxyCODONE-acetaminophen  MG per tablet    PERCOCET    120 tablet    1-2 tablets every 6 hrs  prn-to fill on or after 1/10/18    Chronic pain disorder       senna-docusate 8.6-50 MG per tablet    SENOKOT-S;PERICOLACE    40 tablet    Take 1 tablet by mouth 2 times daily.    Ventral hernia       sulfamethoxazole-trimethoprim 800-160 MG per tablet    BACTRIM DS/SEPTRA DS    14 tablet    Take 1 tablet by mouth 2 times daily    Decubitus ulcer of left ankle, unspecified ulcer stage, Urinary tract infection without hematuria, site unspecified       traZODone 50 MG tablet    DESYREL    90 tablet    TAKE 1-2 TABLETS BY MOUTH NIGHTLY AS NEEDED FOR SLEEP    Other insomnia       triamcinolone acetonide 40 MG/ML injection    KENALOG-40    1 mL    Inject 1 mL (40 mg) into the muscle once for 1 dose    Rotator cuff tear arthropathy of both shoulders, Chronic pain of both shoulders       * Notice:  This list has 3 medication(s) that are the same as other medications prescribed for you. Read the directions carefully, and ask your doctor or other care provider to review them with you.

## 2018-02-21 NOTE — PROGRESS NOTES
"CHIEF COMPLAINT:   Chief Complaint   Patient presents with     Shoulder Pain     Bilateral shoulder pain and right knee pain. Gets these injected with cortisone with Dr. Choi every few months, but was unable to get in with him,. Last injections on 9/14/17.      Shoulder Pain     Had right RCR about 10 years ago that \"didn't take\". Right shoulder is worse than left. She is left hand dominant.       HISTORY:  Ledy Odonnell is a 72 year old female, right -hand dominant, who is seen for bilateral shoulder pain that started years ago. Right > left today. Today her pain is severe, rated a 7/10. She has a lot of pain with getting up off her wheelchair or using her arms to wheel herself around. She has been getting cortisone injections with Dr. Choi, but was unable to get an appointment with him. Her last injections were on 9/14/2017. She would like repeat injections today. Reports known bilateral rotator cuff tears in the past, right side with surgery about 10 years ago without improvements.    Also presents with right knee pain. Pain has been present for many years. Today her pain is rated a 6/10. She would like an injection today.    History of hip and hand fracture as well. Congenital bilateral club feet, left with chronic deformity.      Aggravated by: with shoulder range of motion, any weightbearing, using the arms for her wheelchair, walking  Relieved by: at rest, with cortisone injections  Present symptoms: pain with ADL's (dressing),  pain with overhead activities,  pain reaching behind back,  pain reaching out or away from body (flexion/ abduction),  weakness with lifting,  Pain location: diffuse shoulders bilateral, right knee diffuse  Pain severity: 7/10 shoulders, 6/10 knee  Pain quality: aching and sharp  Frequency of symptoms: frequently  Associated symptoms: none    Treatment up to this point: right shoulder rotator cuff repair 10 years ago, history of cortisone injections in bilateral " shoulders, nothing for right knee.  Has not tried: cortisone injection in right knee, reverse total shoulder arthroplasty  Prior history of related problems: chronic shoulder pain, treated with cortisone injections    Significant Orthopedic past medical history: known bilateral shoulder osteoarthritis, history of rotator cuff repair, right shoulder 10 years ago  Usual level of recreational activity: sedentary  Usual level of work activity: retired    Other PMH:  has a past medical history of Depressive disorder, not elsewhere classified; GENERALIZED ANXIETY DIS (6/21/2007); Inflammatory arthritis; Osteoarthrosis, unspecified whether generalized or localized, unspecified site; Other and unspecified alcohol dependence, unspecified drinking behavior; and Unspecified essential hypertension. She also has no past medical history of Asthma; Bleeding disorder (H); Cancer (H); Chronic infection; Diabetes mellitus (H); Heart disease; Hepatitis; History of blood transfusion; Kidney disease; Malignant hyperthermia; PONV (postoperative nausea and vomiting); Renal disease; Seizures (H); Stroke (H); Surgical complications; Thrombosis of leg; Thyroid disease; Type II or unspecified type diabetes mellitus without mention of complication, not stated as uncontrolled; Unspecified asthma(493.90); or Unspecified cerebral artery occlusion with cerebral infarction.  Patient Active Problem List    Diagnosis Date Noted     Pulmonary nodules 03/14/2017     Priority: Medium     Currently managed with follow up imaging by [facility] result Team.         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  A / [facility]        DX V65.8 REPLACED WITH 73480 HEALTH CARE HOME (04/08/2013)       Delayed gastric emptying 11/13/2012     Priority: Medium     Gastric Emptying Study 10/24/12: Markedly delayed at 281 mins (nml is )  Tx: eating 5-6 smaller meals throughout the day, and diet low in fat and fiber as both with take longer to empty       DDD (degenerative disc disease), lumbar 10/08/2012     Priority: Medium     Gastroparesis 08/27/2012     Priority: Medium     Pt to have UGI follow through and gastric emptying tests. F/u with [facility] pending results.       Tobacco abuse 08/23/2012     Priority: Medium     Advanced directives, counseling/discussion 08/26/2011     Priority: Medium     Advance Directive Problem List Overview:   Name Relationship Phone    Primary Health Care Agent            Alternative Health Care Agent          Advance Directive received and scanned. Click on Code in the patient header to view. 8/26/2011          Status post hip replacement 02/14/2011     Priority: Medium     (Problem list name updated by automated process. Provider to review and confirm.)       Hip arthrosis - left, severe 10/29/2010     Priority: Medium     Trochanteric bursitis 10/29/2010     Priority: Medium     Degenerative arthritis of hip 10/22/2010     Priority: Medium     L hip, severe arthritis       Hyperlipidemia LDL goal <130 05/09/2010     Priority: Medium     Idiopathic peripheral neuropathy 05/04/2010     Priority: Medium     Moderate major depression (H) 03/01/2010     Priority: Medium     COPD (chronic obstructive pulmonary disease) (H) 03/01/2010     Priority: Medium     Elevated fasting glucose 03/01/2010     Priority: Medium     A1C 6.3 3/1/10         Chronic pain disorder 03/01/2010     Priority: Medium     Patient is  followed by JESS LAM for ongoing prescription of pain medication.  All refills should be approved by this provider, or covering partner.    Medication(s): Percocet   Maximum quantity per month: 120  Clinic visit frequency required: Q 6  months     Controlled substance agreement on file: Yes       Date(s): 10/15/13    Pain Clinic evaluation in the past: Yes       Date(s):  5/2010       Location(s):  [facility]    DIRE Total Score(s):    7/8/2015   Total Score 17       Last [facility] website verification:  done on 7/8/15   [email or web address]             Generalized anxiety disorder 06/21/2007     Priority: Medium     Tobacco use disorder 02/01/2007     Priority: Medium     Tried Chantix-discontinued bc of adverse effects       Obesity 02/01/2007     Priority: Medium     Problem list name updated by automated process. Provider to review       FAMILY HX DIABETES MELLITUS brother 08/29/2006     Priority: Medium     Insomnia 12/02/2005     Priority: Medium     Problem list name updated by automated process. Provider to review       Esophageal reflux 06/28/2005     Priority: Medium       Surgical Hx:  has a past surgical history that includes NONSPECIFIC PROCEDURE (2001); NONSPECIFIC PROCEDURE (1975); NONSPECIFIC PROCEDURE; SHOULDER SURG PROC UNLISTED; HAND/FINGER SURGERY UNLISTED; TOTAL HIP ARTHROPLASTY (1/4/2011); and Herniorrhaphy ventral (5/14/2013).    Medications:   Current Outpatient Prescriptions:      traZODone (DESYREL) 50 MG tablet, TAKE 1-2 TABLETS BY MOUTH NIGHTLY AS NEEDED FOR SLEEP, Disp: 90 tablet, Rfl: 0     hydrOXYzine (ATARAX) 25 MG tablet, TAKE 1 TABLET (25 MG) BY MOUTH NIGHTLY AS NEEDED FOR ANXIETY, Disp: 30 tablet, Rfl: 1     DULoxetine (CYMBALTA) 60 MG EC capsule, Take 1 capsule (60 mg) by mouth daily, Disp: 90 capsule, Rfl: 3     fluticasone-salmeterol (ADVAIR DISKUS) 250-50 MCG/DOSE diskus inhaler, Inhale 1 puff into the lungs 2 times daily, Disp: 1 Inhaler, Rfl: 5      albuterol (PROAIR HFA/PROVENTIL HFA/VENTOLIN HFA) 108 (90 BASE) MCG/ACT Inhaler, Inhale 2 puffs into the lungs every 6 hours as needed for shortness of breath / dyspnea, Disp: 3 Inhaler, Rfl: 1     nystatin (MYCOSTATIN) 704128 UNIT/GM POWD, Apply 1 g topically as needed, Disp: 1 Bottle, Rfl: 5     miconazole (MICATIN; MICRO GUARD) 2 % powder, Apply topically 2 times daily Apply to groin, Disp: 30 g, Rfl: 0     oxyCODONE-acetaminophen (PERCOCET)  MG per tablet, 1-2 tablets every 6 hrs prn-to fill on or after 3/10/18, Disp: 120 tablet, Rfl: 0     oxyCODONE-acetaminophen (PERCOCET)  MG per tablet, 1-2 tablets every 6 hrs prn-to fill on or after 2/10/18, Disp: 120 tablet, Rfl: 0     oxyCODONE-acetaminophen (PERCOCET)  MG per tablet, 1-2 tablets every 6 hrs prn-to fill on or after 1/10/18, Disp: 120 tablet, Rfl: 0     nystatin (MYCOSTATIN) 724093 UNIT/ML suspension, Take 5 mLs (500,000 Units) by mouth 4 times daily, Disp: 473 mL, Rfl: 1     sulfamethoxazole-trimethoprim (BACTRIM DS/SEPTRA DS) 800-160 MG per tablet, Take 1 tablet by mouth 2 times daily, Disp: 14 tablet, Rfl: 0     oxybutynin (DITROPAN XL) 10 MG 24 hr tablet, Take 2 tablets (20 mg) by mouth daily, Disp: 180 tablet, Rfl: 3     buPROPion (WELLBUTRIN XL) 150 MG 24 hr tablet, Take 1 tablet (150 mg) by mouth every morning, Disp: 90 tablet, Rfl: 3     cyanocolbalamin (VITAMIN  B-12) 100 MCG tablet, Take 1 tablet (100 mcg) by mouth daily, Disp: , Rfl:      multivitamin, therapeutic (THERA-VIT) TABS tablet, Take 1 tablet by mouth daily, Disp: , Rfl:      senna-docusate (SENOKOT-S;PERICOLACE) 8.6-50 MG per tablet, Take 1 tablet by mouth 2 times daily. (Patient taking differently: Take 2 tablets by mouth daily ), Disp: 40 tablet, Rfl: 11    Allergies:   Allergies   Allergen Reactions     No Known Allergies        Social Hx: catamadorjessica.  reports that she has been smoking Cigarettes.  She has a 21.50 pack-year smoking history. She has never used  "smokeless tobacco. She reports that she does not drink alcohol or use illicit drugs.    Family Hx: family history includes Arthritis in her paternal grandmother; CANCER in her father; Dementia in her maternal grandmother and mother; HEART DISEASE in her mother..    REVIEW OF SYSTEMS: 10 point ROS neg other than the symptoms noted above in the HPI and PMH. Notables include  CONSTITUTIONAL:NEGATIVE for fever, chills, change in weight  INTEGUMENTARY/SKIN: NEGATIVE for worrisome rashes, moles or lesions  MUSCULOSKELETAL:See HPI above  NEURO: NEGATIVE for weakness, dizziness or paresthesias    This document serves as a record of the services and decisions personally performed and made by Varinder Wallace MD. It was created on his behalf by Sarah Ware, a trained medical scribe. The creation of this document is based the provider's statements to the medical scribe.    Scribrob Ware 10:55 AM 2/21/2018    PHYSICAL EXAM:  Resp 16  Ht 1.651 m (5' 5\")  Wt 66.7 kg (147 lb)  BMI 24.46 kg/m2   GENERAL APPEARANCE: healthy, alert, no distress  SKIN: no suspicious lesions or rashes  NEURO: Normal strength and tone, mentation intact and speech normal  PSYCH:  mentation appears normal and affect normal, not anxious  RESPIRATORY: No increased work of breathing.  VASCULAR: Radial pulses 2+ and brisk capillary refill     MUSCULOSKELETAL:    RIGHT UPPER EXTREMITY:  Sensation intact to light touch in median, radial, ulnar and axillary nerve distributions  Palpable 2+ radial pulse, brisk capillary refill to all fingers, wwp  Intact epl fpl fdp edc wrist flexion/extension biceps triceps deltoid    RIGHT SHOULDER:  Shoulder Inspection: saber type scar over anterior shoulder, marked supraspinatus and infraspinatus atrophy  Tender: diffuse.  There is anterior/superior prominence of the humeral head.  Range of Motion:   Active: forward flexion 90 degrees, external rotation 30 degrees   Passive: forward flexion 100 degrees, external rotation " 40 degrees; crepitus.  Strength: forward flexion 3/5, External rotation 3/5   Impingement: positive   Special tests: positive empty can.    LEFT UPPER EXTREMITY:  Sensation intact to light touch in median, radial, ulnar and axillary nerve distributions  Palpable 2+ radial pulse, brisk capillary refill to all fingers, wwp  Intact epl fpl fdp edc wrist flexion/extension biceps triceps deltoid    LEFT SHOULDER:  Shoulder Inspection: no swelling, bruising, discoloration, marked supraspinatus and infraspinatus atrophy  Tender: diffuse  There is anterior/superior prominence of the humeral head.  Range of Motion:   Active: forward flexion 110 degrees, external rotation 30 degrees   Passive: forward flexion 140 degrees, external rotation 40 degrees  Strength: forward flexion 3/5, External rotation 3/5   Impingement: positive   Special tests: empty can positive.      GAIT: not assessed, in wheelchair.  There is deformity of the left foot/ankle.    RIGHT KNEE EXAM:    Skin: intact, no ecchymosis or erythema  ROM: 0 extension to 120 flexion  Tight hamstrings on straight leg raise.  Effusion: none  Tender: medial joint line, lateral joint line . Pes.  McMurrays: negative    Patellofemoral joint:                Extensor Lag: none              Q Angle: normal              Patellar Mobility: normal              Apprehension: negative              Crepitations: mild   Grind: positive      X-RAY INTERPRETATION: 3 views bilateral shoulder obtained 2/21/2018 were reviewed personally in clinic today with the patient. On my review, there as superior elevation of the humeral head relative to the glenoid with loss of subacromial space and wearing of the undersurface of the acromion. Advanced bilateral gleno-humeral degenerative changes.    3 views right knee taken on 5/11/2016 were reviewed. On my review, mild tricompartmental degenerative changes.    ASSESSMENT: Ledy Odonnell is a 72 year old female, right hand dominant with   1.  "Chronic Bilateral shoulder pain, bilateral cuff tear arthropathy  2. Chronic Right knee pain, primary osteoarthritis     PLAN:   SHOULDER:  * reviewed xrays with patient. Exam and xrays consistent with chronic, long-standing rotator cuff tear with underlying degenerative changes. There has been elevation of humeral head under the acromion due to lack of constraint from rotator cuff tear. This has started to wear the undersurface of the acromion. This is common finding in chronic large rotator cuff tears. Additionally, inability to raise arms called \"pseudoparalysis\" also seen in cases with chronic rotator cuff tear and arthropathy.  * this is most likely reason for shoulder pain and decreased range of motion, weakness.   * treatment options depend on how symptomatic as well has how aggressive patient wants to be. If not overly symptomatic, we can try a cortisone injection as well as Physical Therapy for deltoid based shoulder exercises, pain control with tylenol and NSAIDS.  * surgical options would be a cuff tear hemiarthroplasty versus reverse total shoulder arthroplasty. Conventional total shoulder arthroplasty would not work in the absence of an intact, functional rotator cuff, only doomed for failure.  * risks and perceived benefits of nonsurgical and surgical options of each discussed.  * the primary goal of surgery is pain relief, with return or improvement of some function secondary as a bonus. The arm will never work as normal, but hopefully we can get better function than current.  * risks of surgery include, but not limited to, bleeding, infection, pain, scar, damage to adjacent structures (such as nerves, vessels), temporary versus permanent nerve injury, failure to relieve or improve symptoms or function, recurrence of symptoms, stiffness, implant dislocation, implant failure, implant infection, need for further surgery, blood clots, risks of anesthesia and death.    * at this time, patient has no " "interest in surgery.    * deltoid based shoulder exercises  * rest  * activity modification  * tyelnol, NSAIDS  * risks and perceived benefits of cortisone injections discussed. Patient elects to proceed. See procedure note below.  * return to clinic as needed.    KNEE:  * reviewed imaging studies with patient, showing arthritis. Arthritis is wearing of the cartilage due to longstanding \"wear and tear\" or can follow an injury to the joint.    Discussed typical symptoms of arthritic pain is pain aggravated with weight bearing activities, stiffness, relieved by sitting or rest. Swelling may be associated. It is common to have some grinding and popping in the knee with arthritis.    Discussed various treatments for degenerative arthrosis of the knee, including nonoperative and operative approaches. Nonoperative approaches, which are exhausted prior to operative treatment, include doing nothing and living with the pain as patient has been doing, activity modification (avoid aggravating activities), physical therapy and strengthening exercises, weight loss, anti-inflammatory medications, bracing, ambulatory aids (cane, walker) and injections. Once these have been tried and are deemed unsuccessful with a good effort, and the patient is an appropriate candidate, the next treatment would be knee arthroplasty or replacement. Depending on the location of the arthritis, knee replacement can be partial (one side of the knee affected by arthritis) or a total knee arthroplasty (all 3 compartments). The risks, perceived benefits and perioperative rehabilitation expectations of knee arthroplasty were discussed in detail. Also discussed that approximately 10% of patients that undergo knee arthroplasty are not happy following surgery and may have worse pain or no improvement in pain, contrary to their preoperative expectations.    At this time, nonoperative treatment will be pursued  Non-surgical treatment for knee arthritis " "includes:    * rest, sitting  * Activity modification - avoid impact activities or activities that aggravate symptoms.  * NSAIDS (non-steroidal anti-inflammatory medications; e.g. Aleve, advil, motrin, ibuprofen) - regular use for inflammation ( twice daily or three times daily), with food, as long as no contra-indications Please discuss with primary care doctor if needed  * ice, 15-20 minutes at a time several times a day or as needed.  * Strengthening of quadriceps muscles  * Physical Therapy for strengthening, stretching and range of motion exercises of legs  * Tylenol as needed for pain, consider Tylenol arthritis or similar  * Weight loss: Weight loss:  Body mass index is 24.46 kg/(m^2).. weight loss benefits, not only for the current pain symptoms, but also overall health. Recommend a good diet plan that works for the patient, with the assistance of a dietician or primary care doctor as needed. Also, a good, low-impact exercise program for at least 20 minutes per day, 3 times per week, such as exercise bike, elliptical , or pool.  * Exercise: low impact such as stationary bike, elliptical, pool.  * Injections: cortisone versus viscosupplementation (hyaluronic acid, \"rooster comb\", \"gel shots\"); risks and perceived benefits discussed today. Patient elects to proceed.  * Bracing: bracing the knee may offer some relief of symptoms when worn and provide some stability.  * over the counter supplements such as glucosamine and chondroitin sulfate may help with joint pain.  * topical ointments may help as well    * return to clinic as needed.      PROCEDURE NOTE:  The risks, perceived benefits and potential complications (including but not limited to: bleeding, infection, pain, scar, damage to adjacent structures, atrophy or necrosis of soft tissue, skin blanching, failure to relieve symptoms, worsening of symptoms, allergic reaction) of injection were discussed with the patient. Questions were addressed and " answered.The patient elected to proceed. Written informed consent was obtained. The correct procedural site was identified and confirmed. A RIGHT shoulder subacromial injection was performed using 2mL Kenalog-40 40mg per mL and 7mL (4mL 1% lidocaine, 3mL 0.25% marcaine)  of local anesthetic after sterile prep, to the correct procedural site. Sterile bandaid applied. This was tolerated well by the patient. No apparent complications. Did also discuss that if diabetic, recommend close monitoring of blood sugars over the next week as cortisone injections can temporarily elevate blood sugars.     The risks, perceived benefits and potential complications (including but not limited to: bleeding, infection, pain, scar, damage to adjacent structures, atrophy or necrosis of soft tissue, skin blanching, failure to relieve symptoms, worsening of symptoms, allergic reaction) of injection were discussed with the patient. Questions were addressed and answered.The patient elected to proceed. Written informed consent was obtained. The correct procedural site was identified and confirmed. A LEFT shoulder subacromial injection was performed using 2mL Kenalog-40 40mg per mL and 7mL (4mL 1% lidocaine, 3mL 0.25% marcaine)  of local anesthetic after sterile prep, to the correct procedural site. Sterile bandaid applied. This was tolerated well by the patient. No apparent complications. Did also discuss that if diabetic, recommend close monitoring of blood sugars over the next week as cortisone injections can temporarily elevate blood sugars.      PROCEDURE NOTE:  The risks, perceived benefits and potential complications (including but not limited to: bleeding, infection, pain, scar, damage to adjacent structures, atrophy or necrosis of soft tissue, skin blanching, failure to relieve symptoms, worsening of symptoms, allergic reaction) of injection were discussed with the patient. Questions were addressed and answered.The patient elected to  proceed. Written informed consent was obtained. The correct procedural site was identified and confirmed. A RIGHT knee intraarticular  injection was performed using 1mL Depo Medrol 80mg per mL and 7mL (4mL 1% lidocaine, 3mL 0.25% marcaine)  of local anesthetic after sterile prep, to the correct procedural site. Sterile bandaid applied. This was tolerated well by the patient. No apparent complications. Did also discuss that if diabetic, recommend close monitoring of blood sugars over the next week as cortisone injections can temporarily elevate blood sugars.       The information in this document, created by a scribe for me, accurately reflects the services I personally performed and the decisions made by me. I have reviewed and approved this document for accuracy.     Varinder Wallace M.D., M.S.  Dept. of Orthopaedic Surgery  [facility]

## 2018-02-26 ENCOUNTER — CARE COORDINATION (OUTPATIENT)
Dept: CARE COORDINATION | Facility: CLINIC | Age: 73
End: 2018-02-26

## 2018-02-26 NOTE — PROGRESS NOTES
Clinic Care Coordination Contact  [facility]/Voicemail    Referral Source: PCP  Clinical Data: Care Coordinator Outreach  Outreach attempted x 1.  Left message on voicemail with call back information and requested return call.  Plan: Care Coordinator will try to reach patient again in 3-5 business days.    ANGEL Scott  [facility]   [phone]  [email or web address]

## 2018-02-26 NOTE — LETTER
[place] CARE COORDINATION  [address] [facility] [address]      February 28, 2018    Ledy Odonnell  [address]  [facility] [address]      Dear Ledy,    I am a clinic care coordinator who works with Macarena Vinson PA-C. I wanted to thank you for spending the time to talk with me.      Below is the various meal delivery programs available.     Meals on Wheels  Whether you want the convenience of healthy and ready-to-eat meals delivered to your home, or are unable to prepare nutritious meals for yourself, you can receive meals from Meals on Wheels! Meals are available both on a long-term basis and temporarily if you are recovering from surgery or illness.  Meals on Wheels provides excellent value for what you receive - a freshly prepared, ready-to-eat meal delivered right to your door. While we ask for a modest contribution toward your meals, the price is based on need. Meals may be authorized as part of [place]'s home- and community-based services Medicaid waiver program, and other subsidy programs can also help cover the cost of meal delivery service. If you have questions about funding options for your clients, call us at [phone].  In addition to regular hot meal deliver Monday through Friday between 11 a.m. and 1 p.m., we also offer weekly frozen meal delivery if it s more convenient for you. If you d like meals for weekends or holidays, additional frozen meals can be delivered during the week.    [facility] Meals- [phone]  [employer] Senior Dining Choices is more than meals delivered to your home. You create your own nutritious dining experience and we provide a complete service to meet your needs and preferences. Ordering for yourself or for another supports the choice to live independently at home by offering a reliable way to provide nutrition daily.  Delicious Dining  Choose from over 65 delicious entrees including beef, pork, poultry, seafood and vegetarian options,  featuring complementary side dishes. Every full meal includes a beverage, fruit, dinner roll and dessert.  Nutritious Meals  Enjoyed daily, our meals provide one third of the Recommended Dietary Allowance (RDA) of all major nutrients according to the guidelines of the Food and Nutrition Board of the National Research [place] of the National Academy of Science. Most entrees are diabetic-friendly and meet these established senior nutritional components when included in your diet:    1 serving of protein    2 servings of vegetable    1 serving of starch    Less than 30% calories from fat    Less than 800 mg sodium    Personalized Choices  Choose each day what you wish to eat and enjoy from among the meals already stored in your refrigerator or freezer. If you require a modified diet, we offer:    Diabetic    Calorie controlled    Easy-to-chew and swallow (puree)    Sodium controlled    Convenient Service  [facility] offers more than just delivery to you door. With our meals, we bring you -    Friendly service    Reliable staff    Weekly delivery (5 business days after order is placed)    Personal assistance    Helpful Support  The staff and volunteers of [employer] also provide personal attention and a weekly check-in that includes assistance with storing the meals, and instructions to open and heat your meals. Volunteers and staff provide important social contact to you. If there is a concern for your health, safety or well being, the staff and volunteers of [employer] will notify [facility] to arrange for follow-up.    As a participant in [facility], you become part of the [facility] family. So if your needs change, you have the added benefit of coordinated care with other [employer] bridging options. You also have priority access to other [facility] living and care options, based on availability.  Mom s Meals-  [phone]  Mom s Meals Independent at Home Program provides fresh-made, nutritious and great-tasting home delivered meals.  Whether you re thinking about meals for yourself, or you re a family caregiver seeking senior meals for a loved one, [employer] Meals can help.  Our service is ideal for those:Managing chronic disease, Looking for convenient meals, Desiring independent living, Recuperating at home  Choose from nearly 60 delicious meals! Special menus available: heart-friendly (low sodium), diabetic-friendly, renal-friendly, gluten-free, vegetarian, cancer support or pureed  Only $6.99 per meal plus shipping  Order in quantities of 7, 10, 14 or 21 meals Flat fee $14.95 shipping charge per order No long term commitment  No need to shop or cook-Fully-prepared, nutritionally balanced meals provide up to 700 calories with protein, vegetables, grains and snacks-Ready to eat and enjoy-Heat for 2 minutes in the microwave, eat and enjoy! Home delivered direct to your doorstep!  Please feel free to contact me at [phone], with any questions or concerns.     Sincerely,     Madelin Ontiveros

## 2018-02-28 NOTE — PROGRESS NOTES
Clinic Care Coordination Contact  OUTREACH    Referral Information:  Referral Source: PCP  Reason for Contact: Follow-up on Resource Navigation  Care Conference: No     Universal Utilization:   ED Visits in last year: 1  Hospital visits in last year: 1  Last PCP appointment: 01/10/18  Missed Appointments: 0  Concerns: None  Multiple Providers or Specialists: No    Clinical Concerns:  Current Medical Concerns: Pt received the package that was sent by ALEXIA MORA and reports that she was pleased with the information the package contained, however she is going to wait until closer to spring to tap into the informal supports-HOME and outside assistance programs. Pt is still not interested in [facility] services. Pt did express interest in Meals on Wheels- will mail out information about the program.       Education Provided to patient: Community Resources   Clinical Pathway Name: Depression  Clinical Pathway: Clinic Care Coordinator - Depression Follow-Up Assessment   Pt identified current zone as: Green  Pt does have the crisis line phone number.    Clinic Care Coordinator - Depression Lifestyle  Patient identified their support system as: neighbors, friends.    Clinic Care Coordinator - Depression Diet  Pt reports appetite is decreased  Benefits of healthy eating for overall well-being  were  discussed.  Lack of appetite or increased eating, which can be symptoms of depression, was discussed. Will send information about meal programs.    Medication Management:  Pt reports adherence and denies any major concerns at this time     Functional Status:  Mobility Status: Independent w/Device  Equipment Currently Used at Home: shower chair, walker, rolling, wheelchair, manual  Transportation: Pt reports adherence    Psychosocial:  Current living arrangement:: I live alone, I live in a private home  Financial/Insurance: Pt still not wanting to pursue [facility] resources at this time     Resources and Interventions:  Current Resources:  No  formal support  Advanced Care Plans/Directives on file:: Yes  Referrals Placed: Community Resources, [facility] Resources  Patient/Caregiver understanding: Pt reports understanding and denies any additional questions or concerns at this times. ALEXIA CC engaged in AIDET communication during encounter.  Frequency of Care Coordination: 4 weeks      Plan: ALEXIA MORA will mail out information about meal delivery programs and follow-up in 4 weeks.    ANGEL Scott  [facility]   [phone]  [email or web address]

## 2018-03-12 DIAGNOSIS — G47.09 OTHER INSOMNIA: ICD-10-CM

## 2018-03-12 NOTE — TELEPHONE ENCOUNTER
"Requested Prescriptions   Pending Prescriptions Disp Refills     traZODone (DESYREL) 50 MG tablet [Pharmacy Med Name: TRAZODONE 50 MG TABLET]  Last Written Prescription Date:  1/10/2018  Last Fill Quantity: 90 tabs,  # refills: 0   Last office visit: 1/10/2018 with prescribing provider:  JEFFERY Vinson   Future Office Visit:     90 tablet 0     Sig: TAKE 1-2 TABLETS BY MOUTH NIGHTLY AS NEEDED FOR SLEEP    Serotonin Modulators Passed    3/12/2018  1:45 AM       Passed - Recent (12 mo) or future (30 days) visit within the authorizing provider's specialty    Patient had office visit in the last 12 months or has a visit in the next 30 days with authorizing provider or within the authorizing provider's specialty.  See \"Patient Info\" tab in inbasket, or \"Choose Columns\" in Meds & Orders section of the refill encounter.           Passed - Patient is age 18 or older       Passed - No active pregnancy on record       Passed - No positive pregnancy test in past 12 months          "

## 2018-03-13 RX ORDER — TRAZODONE HYDROCHLORIDE 50 MG/1
TABLET, FILM COATED ORAL
Qty: 90 TABLET | Refills: 0 | Status: SHIPPED | OUTPATIENT
Start: 2018-03-13 | End: 2018-04-24

## 2018-03-13 NOTE — TELEPHONE ENCOUNTER
Prescription approved per [facility] Refill Protocol.    Cj Maharaj RN

## 2018-03-14 ENCOUNTER — CARE COORDINATION (OUTPATIENT)
Dept: CARE COORDINATION | Facility: CLINIC | Age: 73
End: 2018-03-14

## 2018-03-16 NOTE — PROGRESS NOTES
"Clinic Care Coordination Contact    Situation: Patient calling to inquire about cost of meal delivery.     Assessment: Spoke with patient and provided information about Meals on Wheels including costs involved explained that they offer this service on a sliding fee scale if needed and provided contact information for the nearest provider/despenser of meals- [facility] [place] -[phone]. Also, explained that this organization does a lot of other community involvement projects and offers a variety of services. Pt also inquired about \"Dinner at you Door.\" ALEXIA MORA was not familiar with this service, but Internet search found that this is a program offered by the [place] [facility] office. Provided the information from the web site and gave pt the contact phone number.  Plan/Recommendations: Pt will call agencies and update ALEXIA MORA with progress or if any other questions arise.    ANGEL Scott  [facility]   [phone]  [email or web address]          "

## 2018-03-20 NOTE — PROGRESS NOTES
"Pt calling to update [facility] about \"Dinner at your Door\" pt reports that it is close by and she can go to the center or have it delivered. There is a a lot of menu options to select from and the meals are $5.50 each. Pt is still waiting to hear back from Meals [employer] Wheels, but she is pretty sure she will go with Dinner at your Door.     ANGEL Scott  [facility]   [phone]  [email or web address]      "

## 2018-04-02 ENCOUNTER — TELEPHONE (OUTPATIENT)
Dept: FAMILY MEDICINE | Facility: CLINIC | Age: 73
End: 2018-04-02

## 2018-04-02 DIAGNOSIS — G89.4 CHRONIC PAIN DISORDER: ICD-10-CM

## 2018-04-02 RX ORDER — OXYCODONE AND ACETAMINOPHEN 10; 325 MG/1; MG/1
TABLET ORAL
Qty: 120 TABLET | Refills: 0 | Status: SHIPPED | OUTPATIENT
Start: 2018-04-02 | End: 2018-06-26

## 2018-04-02 RX ORDER — OXYCODONE AND ACETAMINOPHEN 10; 325 MG/1; MG/1
TABLET ORAL
Qty: 120 TABLET | Refills: 0 | Status: SHIPPED | OUTPATIENT
Start: 2018-04-02 | End: 2018-07-06

## 2018-04-02 RX ORDER — OXYCODONE AND ACETAMINOPHEN 10; 325 MG/1; MG/1
TABLET ORAL
Qty: 120 TABLET | Refills: 0 | Status: SHIPPED | OUTPATIENT
Start: 2018-04-02 | End: 2018-08-21

## 2018-04-02 NOTE — TELEPHONE ENCOUNTER
Reason for Call:  Other appointment and prescription    Detailed comments: please refill oxyCODONE-acetaminophen (PERCOCET)  MG per tablet patient will be out of medication Sunday please when ready to be picked up     Phone Number Patient can be reached at: Home number on file [phone] (home)    Best Time: any    Can we leave a detailed message on this number? YES    Call taken on 4/2/2018 at 2:53 PM by Cathleen Garcia

## 2018-04-03 NOTE — TELEPHONE ENCOUNTER
Prescription of oxyCODONE-acetaminophen (PERCOCET)  MG  was brought to the  and patient informed to .

## 2018-04-17 DIAGNOSIS — J44.9 COPD (CHRONIC OBSTRUCTIVE PULMONARY DISEASE) (H): Primary | ICD-10-CM

## 2018-04-17 NOTE — TELEPHONE ENCOUNTER
"Pharmacy is requesting a 90-day supply.  qty:180    Requested Prescriptions   Pending Prescriptions Disp Refills     fluticasone-salmeterol (ADVAIR DISKUS) 250-50 MCG/DOSE diskus inhaler  Last Written Prescription Date:  1/10/2018  Last Fill Quantity: 1 inhaler,  # refills: 6   Last office visit: 1/10/2018 with prescribing provider:  JEFFERY Vinson   Future Office Visit:     1 Inhaler 5     Sig: Inhale 1 puff into the lungs 2 times daily    Inhaled Steroids Protocol Passed    4/17/2018  6:33 PM       Passed - Patient is age 12 or older       Passed - Recent (12 mo) or future (30 days) visit within the authorizing provider's specialty    Patient had office visit in the last 12 months or has a visit in the next 30 days with authorizing provider or within the authorizing provider's specialty.  See \"Patient Info\" tab in inbasket, or \"Choose Columns\" in Meds & Orders section of the refill encounter.              "

## 2018-04-18 NOTE — TELEPHONE ENCOUNTER
Prescription approved per [facility] Refill Protocol.  Shruthi Vernon RN

## 2018-04-24 DIAGNOSIS — G47.09 OTHER INSOMNIA: ICD-10-CM

## 2018-04-24 RX ORDER — TRAZODONE HYDROCHLORIDE 50 MG/1
TABLET, FILM COATED ORAL
Qty: 90 TABLET | Refills: 2 | Status: SHIPPED | OUTPATIENT
Start: 2018-04-24 | End: 2018-11-29

## 2018-04-24 NOTE — TELEPHONE ENCOUNTER
"Requested Prescriptions   Pending Prescriptions Disp Refills     traZODone (DESYREL) 50 MG tablet [Pharmacy Med Name: TRAZODONE 50 MG TABLET]  Last Written Prescription Date:  3/13/2018  Last Fill Quantity: 90 tabs,  # refills: 0   Last office visit: 1/10/2018 with prescribing provider:  Karel   Future Office Visit:     90 tablet 0     Sig: TAKE 1-2 TABLETS BY MOUTH NIGHTLY AS NEEDED FOR SLEEP    Serotonin Modulators Passed    4/24/2018  1:15 AM       Passed - Recent (12 mo) or future (30 days) visit within the authorizing provider's specialty    Patient had office visit in the last 12 months or has a visit in the next 30 days with authorizing provider or within the authorizing provider's specialty.  See \"Patient Info\" tab in inbasket, or \"Choose Columns\" in Meds & Orders section of the refill encounter.           Passed - Patient is age 18 or older       Passed - No active pregnancy on record       Passed - No positive pregnancy test in past 12 months          "

## 2018-04-24 NOTE — TELEPHONE ENCOUNTER
Prescription approved per [facility] Refill Protocol.  Grisel Flaherty RN

## 2018-05-30 ENCOUNTER — PATIENT OUTREACH (OUTPATIENT)
Dept: CARE COORDINATION | Facility: CLINIC | Age: 73
End: 2018-05-30

## 2018-05-30 ASSESSMENT — ACTIVITIES OF DAILY LIVING (ADL): DEPENDENT_IADLS:: MEAL PREPARATION

## 2018-05-30 NOTE — LETTER
[place] CARE COORDINATION    May 30, 2018    Ledy Odonnell  [address]  [facility] [address]      Dear Ledy,    I am a clinic care coordinator who works with Macarena Vinson PA-C. I wanted to thank you for spending the time to talk with me.     Included is the information about the waiver programs we had discussed. There is instructions on how to set up the assessment, what is available, what to expect, and income guidelines.  I also included information about [facility] Your [facility] Service (i included their other services too just in case).    Please feel free to contact me at [phone], with any questions or concerns.     Sincerely,     Madelin Ontiveros

## 2018-05-30 NOTE — PROGRESS NOTES
Clinic Care Coordination Contact  [facility]/Voicemail    Referral Source: PCP  Clinical Data: Care Coordinator Outreach  Outreach attempted x 1.  Left message on voicemail with call back information and requested return call.  Plan:Care Coordinator will try to reach patient again in 3-5 business days.    Madelin Ontiveros HEMANTH  Clinic Care Coordinator  [phone]  [email or web address]

## 2018-05-30 NOTE — LETTER
[facility] Home  Complex Care Plan  About Me  Patient Name:  Ledy Odonnell    YOB: 1945  Age:     72 year old   [facility] MRN:   [id number] Telephone Information:    Home Phone [phone]   Mobile [phone]       Address:    [address] [place] [address] Email address:  No e-mail address on record      Emergency Contact(s)  Name Relationship Lgl Grd Work Phone Home Phone Mobile Phone   NEISHA TARIQ Sister   [phone]           Primary language:  English     needed? No   [facility] [employer] [facility]:  [phone] op. 1  Other communication barriers:    Preferred Method of Communication:  Mail  Current living arrangement: I live alone, I live in a private home  Mobility Status/ Medical Equipment: Independent w/Device    Health Maintenance  Health Maintenance Reviewed: Due/Overdue     My Access Plan  Medical Emergency 911   Primary Clinic Line [facility] - [phone]   24 Hour Appointment Line [phone] or  [phone]) (toll-free)   24 Hour Nurse Line [phone] (toll-free)   Preferred Urgent Care     [facility]  [phone]   Preferred Pharmacy [facility] - PHARMACY CLOSED - [facility] TO [facility]     Behavioral Health Crisis Line The National Suicide Prevention Lifeline at 1-842.902.8132 or 911     My Care Team Members    Patient Care Team       Relationship Specialty Notifications Start End    Macarena Vinson PA-C PCP - General Physician Assistant  3/15/10     Phone: [phone] Fax: [phone]         [address] [facility] [address]    Eagle Patel MD MD Internal Medicine  10/24/12     Comment:  see GI at [facility]    Phone: [phone] Fax: [phone]         [facility] [address] [place] [address]    Madelin Ontiveros, W Clinic Care Coordinator Primary Care -  CC Admissions 3/22/18     Phone: [phone] Fax: [phone]                My Care Plans  Self Management and Treatment Plan  Goals   Goals        General    Psychosocial (pt-stated)     Notes - Note created  5/30/2018  3:44 PM by Madelin Ontiveros LSW    Goal Statement: I would like to apply for MA and waiver services by 10/2018.  Measure of Success: [place] and [place] completed  Supportive Steps to Achieve:  CC will mail out how to  Date to Achieve By: 10/2018               Advance Care Plans/Directives Type:   Type Advanced Care Plans/Directives: Advanced Directive - On File    My Medical and Care Information  Problem List   Patient Active Problem List   Diagnosis     Esophageal reflux     Insomnia     FAMILY HX DIABETES MELLITUS brother     Tobacco use disorder     Obesity     Generalized anxiety disorder     Moderate major depression (H)     COPD (chronic obstructive pulmonary disease) (H)     Elevated fasting glucose     Chronic pain disorder     Idiopathic peripheral neuropathy     Hyperlipidemia LDL goal <130     Degenerative arthritis of hip     Hip arthrosis - left, severe     Trochanteric bursitis     Status post hip replacement     Advanced directives, counseling/discussion     Tobacco abuse     Gastroparesis     DDD (degenerative disc disease), lumbar     Delayed gastric emptying     Health Care Home     Candidal intertrigo     Ventral hernia     Femur fracture (H)     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    Care Coordination Start Date: No linked episodes   Frequency of Care Coordination: monthly   Form Last Updated: 05/30/2018

## 2018-05-30 NOTE — PROGRESS NOTES
Clinic Care Coordination Contact    Clinic Care Coordination Contact  OUTREACH    Referral Information:  Referral Source: PCP    Primary Diagnosis: Psychosocial    Chief Complaint   Patient presents with     Clinic Care Coordination - Follow-up     Community Support Resources      Universal Utilization: No concerns  Utilization    Last refreshed: 5/30/2018  2:04 PM:  No Show Count (past year) 0       Last refreshed: 5/30/2018  2:04 PM:  ED visits 0       Last refreshed: 5/30/2018  2:04 PM:  Hospital admissions 0          Current as of: 5/30/2018  2:04 PM         Clinical Concerns:  Current Medical Concerns:  Pt is having a difficult time managing household tasks and has limited finances. Discussed [facility] services. Pt is now open to being screened for medical assistance and waiver services.    Education Provided to patient: [place] Cho[place] through the [facility], educated on MA and EW- and services provided-will mail out information and follow-up with the patient  Health Maintenance Reviewed: Due/Overdue     Functional Status:  Dependent ADLs:: Wheelchair-independent, Ambulation-walker  Dependent IADLs:: Meal Preparation, Grocery Shopping  Mobility Status: Independent w/Device- difficulty maintaining her household and keeping up with chores    Living Situation:  Current living arrangement:: I live alone, I live in a private home    Diet/Exercise/Sleep:  Diet:: Regular  Inadequate nutrition: Yes- Pt is at 122 lbs. (pt had weighed 234 lbs.) Pt discusses excess skin and clothes not fitting difficulty getting meals made. Pt has been provided with various meal delivery, shelter, grocery delivery information. Pt will qualify for meal services once approved for the EW.  Food Insecurity: Yes- pt has difficulty getting to and from the grocery store and making her food budget last throughout the month     Psychosocial:  Hinduism or spiritual beliefs that impact treatment:: No  Informal Support system:: Friends,  Neighbors  Financial/Insurance: [facility], Pt is on a fixed low income, Pt is almost out of her [employer] account and is concerned about keeping her household up and being able to pay for everything. Pt still has a mortgage out on the home and is unsure she will be able to make payments going forward. Will send information about [facility] financial program.    Goals:   Goals        General    Psychosocial (pt-stated)     Notes - Note created  5/30/2018  3:44 PM by Madelin Ontiveros LSW    Goal Statement: I would like to apply for MA and waiver services by 10/2018.  Measure of Success: [place] and [place] completed  Supportive Steps to Achieve: ALEXIA CC will mail out how to  Date to Achieve By: 10/2018             Resources and Interventions:  Current Resources: Community Resources: Volunteer, Meals on Wheels  Equipment Currently Used at Home: shower chair, walker, rolling, wheelchair, manual  Advanced Care Plans/Directives on file:: Yes  Type Advanced Care Plans/Directives: Advanced Directive - On File  Patient/Caregiver understanding: Pt reports understanding and denies any additional questions or concerns at this times. ALEXIA CC engaged in AIDET communication during encounter.  Outreach Frequency: monthly    Plan: ALEXIA CC will mail out information in the mail. Pt to call and [place] and [place] application. ALEXIA CC will follow-up in 4 weeks.    ANGEL Scott  Clinic Care Coordinator  [phone]  [email or web address]

## 2018-06-26 ENCOUNTER — TELEPHONE (OUTPATIENT)
Dept: FAMILY MEDICINE | Facility: CLINIC | Age: 73
End: 2018-06-26

## 2018-06-26 DIAGNOSIS — G89.4 CHRONIC PAIN DISORDER: ICD-10-CM

## 2018-06-26 DIAGNOSIS — B37.0 ORAL THRUSH: ICD-10-CM

## 2018-06-26 RX ORDER — NYSTATIN 100000/ML
500000 SUSPENSION, ORAL (FINAL DOSE FORM) ORAL 4 TIMES DAILY
Qty: 473 ML | Refills: 1 | Status: SHIPPED | OUTPATIENT
Start: 2018-06-26 | End: 2018-07-06

## 2018-06-26 RX ORDER — OXYCODONE AND ACETAMINOPHEN 10; 325 MG/1; MG/1
TABLET ORAL
Qty: 120 TABLET | Refills: 0 | Status: SHIPPED | OUTPATIENT
Start: 2018-07-09 | End: 2018-07-06

## 2018-06-26 NOTE — TELEPHONE ENCOUNTER
Reason for Call:  Other prescription    Detailed comments: patient is calling wondering if she can get 3 months supply,  Due for refills on 7/10/18 of oxyCODONE-acetaminophen (PERCOCET)  MG per tablet mailed to your house instead of patient picking it up. Inform patient when mailed out. Also she needs a RX for nystatin (MYCOSTATIN) 544014 UNIT/ML suspension Pharmacy Pended     Phone Number Patient can be reached at: Home number on file [phone] (home)    Best Time: any    Can we leave a detailed message on this number? YES    Call taken on 6/26/2018 at 11:14 AM by ANDI BURCIAGA

## 2018-07-02 ENCOUNTER — OFFICE VISIT (OUTPATIENT)
Dept: ORTHOPEDICS | Facility: CLINIC | Age: 73
End: 2018-07-02
Payer: COMMERCIAL

## 2018-07-02 ENCOUNTER — TELEPHONE (OUTPATIENT)
Dept: FAMILY MEDICINE | Facility: CLINIC | Age: 73
End: 2018-07-02

## 2018-07-02 VITALS — RESPIRATION RATE: 18 BRPM | HEIGHT: 65 IN | DIASTOLIC BLOOD PRESSURE: 73 MMHG | SYSTOLIC BLOOD PRESSURE: 115 MMHG

## 2018-07-02 DIAGNOSIS — M17.11 PRIMARY OSTEOARTHRITIS OF RIGHT KNEE: ICD-10-CM

## 2018-07-02 DIAGNOSIS — Q66.02 CONGENITAL TALIPES EQUINOVARUS DEFORMITY OF LEFT FOOT: ICD-10-CM

## 2018-07-02 DIAGNOSIS — B37.2 CANDIDAL INTERTRIGO: ICD-10-CM

## 2018-07-02 DIAGNOSIS — M75.41 IMPINGEMENT SYNDROME OF BOTH SHOULDERS: Primary | ICD-10-CM

## 2018-07-02 DIAGNOSIS — M75.42 IMPINGEMENT SYNDROME OF BOTH SHOULDERS: Primary | ICD-10-CM

## 2018-07-02 PROCEDURE — 20610 DRAIN/INJ JOINT/BURSA W/O US: CPT | Mod: 50 | Performed by: ORTHOPAEDIC SURGERY

## 2018-07-02 RX ORDER — TRIAMCINOLONE ACETONIDE 40 MG/ML
160 INJECTION, SUSPENSION INTRA-ARTICULAR; INTRAMUSCULAR ONCE
Qty: 4 ML | Refills: 0 | Status: CANCELLED | OUTPATIENT
Start: 2018-07-02 | End: 2018-07-02

## 2018-07-02 RX ORDER — METHYLPREDNISOLONE ACETATE 80 MG/ML
80 INJECTION, SUSPENSION INTRA-ARTICULAR; INTRALESIONAL; INTRAMUSCULAR; SOFT TISSUE ONCE
Qty: 1 ML | Refills: 0 | OUTPATIENT
Start: 2018-07-02 | End: 2018-07-02

## 2018-07-02 RX ORDER — TRIAMCINOLONE ACETONIDE 40 MG/ML
160 INJECTION, SUSPENSION INTRA-ARTICULAR; INTRAMUSCULAR ONCE
Qty: 4 ML | Refills: 0 | OUTPATIENT
Start: 2018-07-02 | End: 2018-07-02

## 2018-07-02 RX ORDER — NYSTATIN 100000 [USP'U]/G
1 POWDER TOPICAL PRN
Qty: 1 BOTTLE | Refills: 5 | Status: SHIPPED | OUTPATIENT
Start: 2018-07-02 | End: 2019-04-26

## 2018-07-02 RX ORDER — METHYLPREDNISOLONE ACETATE 80 MG/ML
80 INJECTION, SUSPENSION INTRA-ARTICULAR; INTRALESIONAL; INTRAMUSCULAR; SOFT TISSUE ONCE
Qty: 1 ML | Refills: 0 | Status: CANCELLED | OUTPATIENT
Start: 2018-07-02 | End: 2018-07-02

## 2018-07-02 NOTE — PROGRESS NOTES
The patient's right knee was prepped with betadine solution after verification of allergies. Area approximately 10 cm x 10 cm prepped in a sterile fashion. After injection, betadine removed with soap and water and band-aids applied.    1ml depo-medrol with 1% lidocaine plain injected into patient's right knee by Dr. Eagle Gomez  LOT# [id number]  Exp. 06/2020    The patient's left and right shoulders were prepped with betadine solution after verification of allergies. Area approximately 10 cm x 10 cm prepped in a sterile fashion. After injection, betadine removed with soap and water and band-aids applied.    2ml kenalog with 1% lidocaine plain injected into each shoulder (for a total of 4 mL kenalog) by Dr. Eagle Gomez  LOT# [id number]  Exp. 01/2020    Max Juarez PA-C  Supervising physician: Eagle Gomez MD  Dept. of Orthopedics  [facility]

## 2018-07-02 NOTE — PROGRESS NOTES
Follow up bilateral shoulder impingement.  Patient desires injection today of bilateral shoulder.  Risks, benefits, potential complications and alternatives were discussed.   With the patient's consent, sterile prep was performed of bilateral shoulder.  Each shoulder was injected with Kenalog 80 mg and lidocaine at shoulder subacromial space from the posterolateral approach .    Follow up also for right knee primary osteoarthritis.  Last injection 2/21/18.  Range of motion 0-120.    She  desires injection today of right knee(s).  Risks, benefits, potential complications and alternatives were discussed.   With the patient's consent, sterile prep was performed of right knee(s).  Right knee was injected with Depo Medrol 80 mg and lidocaine at anteromedial site.  Return to clinic as needed.    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  This was flexible somewhat for many years, but now became rigid in the last 4 years.  She has skin breakdown on lateral ankle, early on lateral foot, also on great toe.  She has had hammer toe fusions and extensor tenotomies.  Pin came out on great toe and has significant deformity.  It is very difficult for her to walk.  With her fixed deformity, an ankle and hindfoot fusion may be helpful.  We discussed options and will refer to [facility] ortho foot department for evaluation.  silopos pads provided for relief of skin pressure.

## 2018-07-02 NOTE — MR AVS SNAPSHOT
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       The  Representative will assist you in the coordination of your Orthopedic and Musculoskeletal Care as prescribed by your physician.    The  Representative will call you within 1 business day to help schedule your appointment, or you may contact the  Representative at:    All areas ~ [phone]     Type of Referral : Surgical / Specialist - [facility] Orthopedic Surgery/Podiatry - Dr. Arcadio Daley or other foot/ankle specialist - Left club foot - REFERRED BY EVELYN PAREDES MD      Timeframe requested: Routine    Coverage of these services is subject to the terms and limitations of your health insurance plan.  Please call member services at your health plan with any benefit or coverage questions.      If X-rays, CT or MRI's have been performed, please contact the facility where they were done to arrange for , prior to your scheduled appointment.  Please bring this referral request to your appointment and present it to your specialist.                  Your next 10 appointments already scheduled     Jul 11, 2018 11:20 AM CDT   Office Visit with Macarena Vinson PA-C   [facility]    [address] [place] [id number] [phone]  "          Bring a current list of meds and any records pertaining to this visit. For Physicals, please bring immunization records and any forms needing to be filled out. Please arrive 10 minutes early to complete paperwork.              Who to contact     If you have questions or need follow up information about today's clinic visit or your schedule please contact [facility] directly at [phone].  Normal or non-critical lab and imaging results will be communicated to you by MyChart, letter or phone within 4 business days after the clinic has received the results. If you do not hear from us within 7 days, please contact the clinic through MyChart or phone. If you have a critical or abnormal lab result, we will notify you by phone as soon as possible.  Submit refill requests through [employer] or call your pharmacy and they will forward the refill request to us. Please allow 3 business days for your refill to be completed.          Additional Information About Your Visit        Care EveryWhere ID     This is your Care EveryWhere ID. This could be used by other organizations to access your [place] medical records  [id number]        Your Vitals Were     Respirations Height                18 1.651 m (5' 5\")           Blood Pressure from Last 3 Encounters:   07/02/18 115/73   01/10/18 125/73   09/14/17 (!) 159/96    Weight from Last 3 Encounters:   02/21/18 66.7 kg (147 lb)   09/14/17 66.7 kg (147 lb)   03/07/17 77.1 kg (170 lb)              We Performed the Following     ORTHO  REFERRAL          Today's Medication Changes          These changes are accurate as of 7/2/18  1:59 PM.  If you have any questions, ask your nurse or doctor.               These medicines have changed or have updated prescriptions.        Dose/Directions    senna-docusate 8.6-50 MG per tablet   Commonly known as:  SENOKOT-S;PERICOLACE   This may have changed:    - how much to take  - when to take this   Used for:  " Ventral hernia        Dose:  1 tablet   Take 1 tablet by mouth 2 times daily.   Quantity:  40 tablet   Refills:  11                Primary Care Provider Office Phone # Fax #    Macarena Vinson PA-C [phone]       [facility] [facility] [address]        Goals        General    Psychosocial (pt-stated)     Notes - Note created  5/30/2018  3:44 PM by Madelin Ontiveros LSW    Goal Statement: I would like to apply for MA and waiver services by 10/2018.  Measure of Success: [place] and [place] choices [place] completed  Supportive Steps to Achieve:  CC will mail out how to  Date to Achieve By: 10/2018          Equal Access to Services     JENNIFER SCOTT : Hadii jalyn palomo hadasho Soomaali, waaxda luqadaha, qaybta kaalmada adeegyada, cory acevedo. So [facility] [phone].    ATENCIÓN: Si habla español, tiene a michele disposición servicios gratuitos de asistencia lingüística. Llame al [phone].    We comply with applicable federal civil rights laws and [place] laws. We do not discriminate on the basis of race, color, national origin, age, disability, sex, sexual orientation, or gender identity.            Thank you!     Thank you for choosing [facility]  for your care. Our goal is always to provide you with excellent care. Hearing back from our patients is one way we can continue to improve our services. Please take a few minutes to complete the written survey that you may receive in the mail after your visit with us. Thank you!             Your Updated Medication List - Protect others around you: Learn how to safely use, store and throw away your medicines at www.disposemymeds.org.          This list is accurate as of 7/2/18  1:59 PM.  Always use your most recent med list.                   Brand Name Dispense Instructions for use Diagnosis    albuterol 108 (90 Base) MCG/ACT Inhaler    PROAIR HFA/PROVENTIL HFA/VENTOLIN HFA    3 Inhaler    Inhale 2 puffs  into the lungs every 6 hours as needed for shortness of breath / dyspnea    Chronic obstructive pulmonary disease, unspecified COPD type (H)       buPROPion 150 MG 24 hr tablet    WELLBUTRIN XL    90 tablet    Take 1 tablet (150 mg) by mouth every morning    Major depressive disorder, recurrent episode, moderate (H), Tobacco use disorder       cyanocolbalamin 100 MCG tablet    vitamin  B-12     Take 1 tablet (100 mcg) by mouth daily        DULoxetine 60 MG EC capsule    CYMBALTA    90 capsule    Take 1 capsule (60 mg) by mouth daily    Major depressive disorder, recurrent episode, moderate (H), Chronic pain disorder, Generalized anxiety disorder, Other urinary incontinence       fluticasone-salmeterol 250-50 MCG/DOSE diskus inhaler    ADVAIR DISKUS    3 Inhaler    Inhale 1 puff into the lungs 2 times daily    COPD (chronic obstructive pulmonary disease) (H)       hydrOXYzine 25 MG tablet    ATARAX    30 tablet    TAKE 1 TABLET (25 MG) BY MOUTH NIGHTLY AS NEEDED FOR ANXIETY    Other insomnia       miconazole 2 % powder    MICATIN; MICRO GUARD    30 g    Apply topically 2 times daily Apply to groin        multivitamin, therapeutic Tabs tablet      Take 1 tablet by mouth daily        nystatin 407715 UNIT/GM Powd    MYCOSTATIN    1 Bottle    Apply 1 g topically as needed    Candidal intertrigo       nystatin 857912 UNIT/ML suspension    MYCOSTATIN    473 mL    Take 5 mLs (500,000 Units) by mouth 4 times daily    Oral thrush       oxybutynin 10 MG 24 hr tablet    DITROPAN XL    180 tablet    Take 2 tablets (20 mg) by mouth daily    Urgency incontinence       * oxyCODONE-acetaminophen  MG per tablet    PERCOCET    120 tablet    1-2 tablets every 6 hrs prn-to fill on or after 5/10/18    Chronic pain disorder       * oxyCODONE-acetaminophen  MG per tablet    PERCOCET    120 tablet    1-2 tablets every 6 hrs prn-to fill on or after 4/10/18    Chronic pain disorder       * oxyCODONE-acetaminophen  MG per  tablet   Start taking on:  7/9/2018    PERCOCET    120 tablet    1-2 tablets every 6 hrs prn-to fill on or after 6/10/18    Chronic pain disorder       senna-docusate 8.6-50 MG per tablet    SENOKOT-S;PERICOLACE    40 tablet    Take 1 tablet by mouth 2 times daily.    Ventral hernia       sulfamethoxazole-trimethoprim 800-160 MG per tablet    BACTRIM DS/SEPTRA DS    14 tablet    Take 1 tablet by mouth 2 times daily    Decubitus ulcer of left ankle, unspecified ulcer stage, Urinary tract infection without hematuria, site unspecified       traZODone 50 MG tablet    DESYREL    90 tablet    TAKE 1-2 TABLETS BY MOUTH NIGHTLY AS NEEDED FOR SLEEP    Other insomnia       * Notice:  This list has 3 medication(s) that are the same as other medications prescribed for you. Read the directions carefully, and ask your doctor or other care provider to review them with you.

## 2018-07-02 NOTE — LETTER
7/2/2018         RE: Ledy Odonnell  [address]  [facility] [address]        Dear Colleague,    Thank you for referring your patient, Ledy Odonnell, to the [facility]. Please see a copy of my visit note below.    The patient's right knee was prepped with betadine solution after verification of allergies. Area approximately 10 cm x 10 cm prepped in a sterile fashion. After injection, betadine removed with soap and water and band-aids applied.    1ml depo-medrol with 1% lidocaine plain injected into patient's right knee by Dr. Eagle Gomez  LOT# [id number]  Exp. 06/2020    The patient's left and right shoulders were prepped with betadine solution after verification of allergies. Area approximately 10 cm x 10 cm prepped in a sterile fashion. After injection, betadine removed with soap and water and band-aids applied.    2ml kenalog with 1% lidocaine plain injected into each shoulder (for a total of 4 mL kenalog) by Dr. Eagle Gomez  LOT# [id number]  Exp. 01/2020    Max Juarez PA-C  Supervising physician: Eagle Gomez MD  Dept. of Orthopedics  [facility]          Follow up bilateral shoulder impingement.  Patient desires injection today of bilateral shoulder.  Risks, benefits, potential complications and alternatives were discussed.   With the patient's consent, sterile prep was performed of bilateral shoulder.  Each shoulder was injected with Kenalog 80 mg and lidocaine at shoulder subacromial space from the posterolateral approach .    Follow up also for right knee primary osteoarthritis.  Last injection 2/21/18.  Range of motion 0-120.    She  desires injection today of right knee(s).  Risks, benefits, potential complications and alternatives were discussed.   With the patient's consent, sterile prep was performed of right knee(s).  Right knee was injected with Depo Medrol 80 mg and lidocaine at anteromedial site.  Return to clinic as needed.    She  also has fixed deformity of left foot with congenital talipes equinovarus.  This was flexible somewhat for many years, but now became rigid in the last 4 years.  She has skin breakdown on lateral ankle, early on lateral foot, also on great toe.  She has had hammer toe fusions and extensor tenotomies.  Pin came out on great toe and has significant deformity.  It is very difficult for her to walk.  With her fixed deformity, an ankle and hindfoot fusion may be helpful.  We discussed options and will refer to [facility] ortho foot department for evaluation.  silopos pads provided for relief of skin pressure.             Again, thank you for allowing me to participate in the care of your patient.        Sincerely,        Eagle Gomez MD

## 2018-07-02 NOTE — TELEPHONE ENCOUNTER
"Requested Prescriptions   Pending Prescriptions Disp Refills     nystatin (MYCOSTATIN) 366416 UNIT/GM POWD  Last Written Prescription Date:  1/10/2018  Last Fill Quantity: 1 bottle,  # refills: 5   Last office visit: 1/10/2018 with prescribing provider:  Karel   Future Office Visit:   Next 5 appointments (look out 90 days)     2018 11:20 AM CDT   Office Visit with Macarena Vinson PA-C   [facility] ([facility])    [address] [place] [id number] [phone]                  1 Bottle 5     Sig: Apply 1 g topically as needed    Antifungal Agents Passed    2018  6:28 PM       Passed - Recent (12 mo) or future (30 days) visit within the authorizing provider's specialty    Patient had office visit in the last 12 months or has a visit in the next 30 days with authorizing provider or within the authorizing provider's specialty.  See \"Patient Info\" tab in inbasket, or \"Choose Columns\" in Meds & Orders section of the refill encounter.           Passed - Not Fluconazole or Terconazole     If oral Fluconazole or Terconazole, may refill if indicated in progress notes.         Pharmacy note: Patient previously had Nystatin cream filled last May at a different pharmacy that has been . Seeking a new RX we can fill for her.  "

## 2018-07-03 RX ORDER — NYSTATIN 100000 U/G
CREAM TOPICAL 3 TIMES DAILY
Qty: 30 G | Refills: 1 | Status: SHIPPED | OUTPATIENT
Start: 2018-07-03 | End: 2018-07-17

## 2018-07-03 NOTE — TELEPHONE ENCOUNTER
Reason for Call:  Other prescription    Detailed comments: Farhana from [facility] calling stating that the patient is requesting nystatin cream instead of the powder. Can we get a different RX for this? Please advise    Phone Number Patient can be reached at: Other phone number:  [phone]    Best Time: any    Can we leave a detailed message on this number? YES    Call taken on 7/3/2018 at 9:42 AM by ANDI BURCIAGA

## 2018-07-06 ENCOUNTER — OFFICE VISIT (OUTPATIENT)
Dept: FAMILY MEDICINE | Facility: CLINIC | Age: 73
End: 2018-07-06
Payer: COMMERCIAL

## 2018-07-06 ENCOUNTER — TELEPHONE (OUTPATIENT)
Dept: FAMILY MEDICINE | Facility: CLINIC | Age: 73
End: 2018-07-06

## 2018-07-06 VITALS
DIASTOLIC BLOOD PRESSURE: 70 MMHG | OXYGEN SATURATION: 95 % | HEIGHT: 65 IN | TEMPERATURE: 98.4 F | SYSTOLIC BLOOD PRESSURE: 124 MMHG | HEART RATE: 97 BPM

## 2018-07-06 DIAGNOSIS — L03.116 CELLULITIS OF LEFT LOWER EXTREMITY: Primary | ICD-10-CM

## 2018-07-06 DIAGNOSIS — R82.90 ABNORMAL FINDING IN URINE: ICD-10-CM

## 2018-07-06 DIAGNOSIS — G89.4 CHRONIC PAIN DISORDER: ICD-10-CM

## 2018-07-06 DIAGNOSIS — Q66.02 CONGENITAL TALIPES EQUINOVARUS DEFORMITY OF LEFT FOOT: ICD-10-CM

## 2018-07-06 DIAGNOSIS — R32 URINARY INCONTINENCE, UNSPECIFIED TYPE: ICD-10-CM

## 2018-07-06 LAB
ALBUMIN UR-MCNC: NEGATIVE MG/DL
APPEARANCE UR: CLEAR
BACTERIA #/AREA URNS HPF: ABNORMAL /HPF
BILIRUB UR QL STRIP: NEGATIVE
COLOR UR AUTO: YELLOW
GLUCOSE UR STRIP-MCNC: NEGATIVE MG/DL
HGB UR QL STRIP: NEGATIVE
KETONES UR STRIP-MCNC: NEGATIVE MG/DL
LEUKOCYTE ESTERASE UR QL STRIP: ABNORMAL
NITRATE UR QL: NEGATIVE
NON-SQ EPI CELLS #/AREA URNS LPF: ABNORMAL /LPF
PH UR STRIP: 7 PH (ref 5–7)
RBC #/AREA URNS AUTO: ABNORMAL /HPF
SOURCE: ABNORMAL
SP GR UR STRIP: 1.01 (ref 1–1.03)
UROBILINOGEN UR STRIP-ACNC: 0.2 EU/DL (ref 0.2–1)
WBC #/AREA URNS AUTO: >100 /HPF

## 2018-07-06 PROCEDURE — 96372 THER/PROPH/DIAG INJ SC/IM: CPT | Performed by: PHYSICIAN ASSISTANT

## 2018-07-06 PROCEDURE — 87088 URINE BACTERIA CULTURE: CPT | Performed by: PHYSICIAN ASSISTANT

## 2018-07-06 PROCEDURE — 87086 URINE CULTURE/COLONY COUNT: CPT | Performed by: PHYSICIAN ASSISTANT

## 2018-07-06 PROCEDURE — 99214 OFFICE O/P EST MOD 30 MIN: CPT | Mod: 25 | Performed by: PHYSICIAN ASSISTANT

## 2018-07-06 PROCEDURE — 87186 SC STD MICRODIL/AGAR DIL: CPT | Performed by: PHYSICIAN ASSISTANT

## 2018-07-06 PROCEDURE — 81001 URINALYSIS AUTO W/SCOPE: CPT | Performed by: PHYSICIAN ASSISTANT

## 2018-07-06 RX ORDER — OXYCODONE AND ACETAMINOPHEN 10; 325 MG/1; MG/1
TABLET ORAL
Qty: 120 TABLET | Refills: 0 | Status: SHIPPED | OUTPATIENT
Start: 2018-07-09 | End: 2018-08-21

## 2018-07-06 RX ORDER — CEPHALEXIN 500 MG/1
500 CAPSULE ORAL 3 TIMES DAILY
Qty: 30 CAPSULE | Refills: 0 | Status: SHIPPED | OUTPATIENT
Start: 2018-07-06 | End: 2018-07-30

## 2018-07-06 RX ORDER — CEFTRIAXONE 1 G/1
1000 INJECTION, POWDER, FOR SOLUTION INTRAMUSCULAR; INTRAVENOUS ONCE
Qty: 10 ML | Refills: 0 | OUTPATIENT
Start: 2018-07-06 | End: 2018-07-06

## 2018-07-06 RX ORDER — OXYCODONE AND ACETAMINOPHEN 10; 325 MG/1; MG/1
TABLET ORAL
Qty: 120 TABLET | Refills: 0 | Status: SHIPPED | OUTPATIENT
Start: 2018-07-06 | End: 2018-10-08

## 2018-07-06 RX ORDER — OXYBUTYNIN CHLORIDE 10 MG/1
30 TABLET, EXTENDED RELEASE ORAL DAILY
Qty: 180 TABLET | Refills: 3 | Status: SHIPPED | OUTPATIENT
Start: 2018-07-06 | End: 2019-05-23

## 2018-07-06 NOTE — TELEPHONE ENCOUNTER
Reason for Call:  Other [facility] pharmacy called     Detailed comments: they need to know the quantity for the prescription oxybutynin (DITROPAN XL) 10 MG 24 hr tablet    Phone Number Patient can be reached at: Other phone number: [phone]    Best Time: any    Can we leave a detailed message on this number? YES    Call taken on 7/6/2018 at 1:16 PM by Cathleen Garcia

## 2018-07-06 NOTE — NURSING NOTE
Patient walked in with possible left lower leg cellulitis since yesterday. It is red, warm and slightly swollen from her medial mid calf down to her ankle. She has a cat scratch on her left lateral leg but it appears normal and denies other injury. She denies a fever. PCP will see patient.   Grisel Flaherty RN

## 2018-07-06 NOTE — TELEPHONE ENCOUNTER
Medication used to be ordered for 2 tabs per day with 180 at a time, and now was increased to 3 tabs per day, but still 180.  Pharmacist is thinking provider meant to prescribe 270 pills for 3 months worth.  RN provided verbal to dispense 3 months worth at a time.      Shruthi Vernon RN

## 2018-07-06 NOTE — MR AVS SNAPSHOT
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  Antibiotics 3 times daily x 10 days  Follow up on Wednesday as scheduled  Macarena to call on Monday.    To the ED over the weekend if worsening or no improvement!    Macarena Vinson PA-C    [facility]   Discharged by : Sharlene MUÑOZ Certified Medical Assistant (AAMA)   Paper scripts provided to patient : 2   If you have any questions regarding to your visit please contact your care team:     Team Silver              Clinic Hours Telephone Number     Dr. Jay Vinson PA-C   7am-7pm  Monday - Thursday   7am-5pm  Fridays  [phone]   (Appointment scheduling available 24/7)     RN Line  [phone] option 2     Urgent Care - [place] - 11am-9pm Monday-Friday Saturday-Sunday- 9am-5pm     [place] -   5pm-9pm Monday-Friday Saturday-Sunday- 9am-5pm    [phone] - [place]    [phone] - [place]     For a Price Quote for your services, please call our Consumer Price Line at [phone].     What options do I have for visits at the clinic other than the traditional office visit?     To expand how we care for you, many of our providers are utilizing electronic visits (e-visits) and telephone visits, when medically appropriate, for interactions with their patients rather than a visit in the clinic. We  also offer nurse visits for many medical concerns. Just like any other service, we will bill your insurance company for this type of visit based on time spent on the phone with your provider. Not all insurance companies cover these visits. Please check with your medical insurance if this type of visit is covered. You will be responsible for any charges that are not paid by your insurance.   E-visits via Lagoonhart: generally incur a $35.00 fee.     Telephone visits:   Time spent on the phone: *charged based on time that is spent on the phone in increments of 10 minutes. Estimated cost:   5-10 mins $30.00   11-20 mins. $59.00   21-30 mins. $85.00     Use Endorset (secure email communication and access to your chart) to send your primary care provider a message or make an appointment. Ask someone on your Team how to sign up for CloudSponge.     As always, Thank you for trusting us with your health care needs!      [facility]:    Scheduling Appointments  [facility]  Call: [phone]    [facility]  Call: [phone]    [facility]  Call: [phone]    For Gastroenterology referrals   [facility]   Clinics and Surgery Center, 4th Floor   [address] [place] [address]   Appointments: [phone]    WHERE TO GO FOR CARE?  Clinic    Make an appointment if you:       Are sick (cold, cough, flu, sore throat, earache or in pain).       Have a small injury (sprain, small cut, burn or broken bone).       Need a physical exam, Pap smear, vaccine or prescription refill.       Have questions about your health or medicines.    To reach us:      Call [phone] ([phone]). Open 24 hours every day. (For counseling services, call [phone].)    Log into CloudSponge at [email or web address]. (Visit [email or web address] to create an account.) Hospital emergency room    An emergency is a serious or life-  threatening problem that must be treated right away.    Call 911 or get to the hospital if you have:      Very bad or sudden:            - Chest pain or pressure         - Bleeding         - Head or belly pain         - Dizziness or trouble seeing, walking or                          Speaking      Problems breathing      Blood in your vomit or you are coughing up blood      A major injury (knocked out, loss of a finger or limb, rape, broken bone protruding from skin)    A mental health crisis. (Or call the Mental Health Crisis line at 1-716.244.4639 or Suicide Prevention Hotline at 1-849.924.8493.)    Open 24 hours every day. You don't need an appointment.     Urgent care    Visit urgent care for sickness or small injuries when the clinic is closed. You don't need an appointment. To check hours or find an urgent care near you, visit [email or web address]. Online care    Get online care from [email or web address] for more than 70 common problems, like colds, allergies and infections. Open 24 hours every day at:   [email or web address]   Need help deciding?    For advice about where to be seen, you may call your clinic and ask to speak with a nurse. We're here for you 24 hours every day.         If you are deaf or hard of hearing, please let us know. We provide many free services including sign language interpreters, oral interpreters, TTYs, telephone amplifiers, note takers and written materials.               Follow-ups after your visit        Your next 10 appointments already scheduled     Jul 11, 2018 11:20 AM CDT   Office Visit with [facility]-C   [facility]    [address] [place] [id number] [phone]           Bring a current list of meds and any records pertaining to this visit. For Physicals, please bring immunization records and any forms needing to be filled out. Please arrive 10 minutes early to complete paperwork.              Who to contact      "If you have questions or need follow up information about today's clinic visit or your schedule please contact [facility] directly at [phone].  Normal or non-critical lab and imaging results will be communicated to you by MyChart, letter or phone within 4 business days after the clinic has received the results. If you do not hear from us within 7 days, please contact the clinic through MyChart or phone. If you have a critical or abnormal lab result, we will notify you by phone as soon as possible.  Submit refill requests through InitMe or call your pharmacy and they will forward the refill request to us. Please allow 3 business days for your refill to be completed.          Additional Information About Your Visit        Care EveryWhere ID     This is your Care EveryWhere ID. This could be used by other organizations to access your [facility] medical records  [id number]        Your Vitals Were     Pulse Temperature Height Pulse Oximetry          97 98.4  F (36.9  C) (Oral) 5' 5\" (1.651 m) 95%         Blood Pressure from Last 3 Encounters:   07/06/18 124/70   07/02/18 115/73   01/10/18 125/73    Weight from Last 3 Encounters:   02/21/18 147 lb (66.7 kg)   09/14/17 147 lb (66.7 kg)   03/07/17 170 lb (77.1 kg)              We Performed the Following     UA with Microscopic reflex to Culture          Today's Medication Changes          These changes are accurate as of 7/6/18  1:00 PM.  If you have any questions, ask your nurse or doctor.               Start taking these medicines.        Dose/Directions    cefTRIAXone 1 GM vial   Commonly known as:  ROCEPHIN   Used for:  Cellulitis of right lower leg   Started by:  Macarena Vinson PA-C        Dose:  1000 mg   Inject 1 g (1,000 mg) into the muscle once for 1 dose   Quantity:  10 mL   Refills:  0       cephALEXin 500 MG capsule   Commonly known as:  KEFLEX   Used for:  Cellulitis of right lower leg   Started by:  Macarena Vinson PA-C     "    Dose:  500 mg   Take 1 capsule (500 mg) by mouth 3 times daily   Quantity:  30 capsule   Refills:  0         These medicines have changed or have updated prescriptions.        Dose/Directions    * oxybutynin 10 MG 24 hr tablet   Commonly known as:  DITROPAN XL   This may have changed:  Another medication with the same name was added. Make sure you understand how and when to take each.   Used for:  Urgency incontinence   Changed by:  Macarena Vinson PA-C        Dose:  20 mg   Take 2 tablets (20 mg) by mouth daily   Quantity:  180 tablet   Refills:  3       * oxybutynin 10 MG 24 hr tablet   Commonly known as:  DITROPAN XL   This may have changed:  You were already taking a medication with the same name, and this prescription was added. Make sure you understand how and when to take each.   Used for:  Urinary incontinence, unspecified type   Changed by:  Macarena Vinson PA-C        Dose:  30 mg   Take 3 tablets (30 mg) by mouth daily   Quantity:  180 tablet   Refills:  3       * oxyCODONE-acetaminophen  MG per tablet   Commonly known as:  PERCOCET   This may have changed:  Another medication with the same name was changed. Make sure you understand how and when to take each.   Used for:  Chronic pain disorder   Changed by:  Macarena Vinson PA-C        1-2 tablets every 6 hrs prn-to fill on or after 4/10/18   Quantity:  120 tablet   Refills:  0       * oxyCODONE-acetaminophen  MG per tablet   Commonly known as:  PERCOCET   This may have changed:  additional instructions   Used for:  Chronic pain disorder   Changed by:  Macarena Vinson PA-C        1-2 tablets every 6 hrs prn-to fill on or after 9/9/18   Quantity:  120 tablet   Refills:  0       * oxyCODONE-acetaminophen  MG per tablet   Commonly known as:  PERCOCET   This may have changed:    - additional instructions  - These instructions start on 7/9/2018. If you are unsure what to do until then, ask your doctor or other care  provider.   Used for:  Chronic pain disorder   Changed by:  Macarena Vinson PA-C        Start taking on:  7/9/2018   1-2 tablets every 6 hrs prn-to fill on or after 8/9/10   Quantity:  120 tablet   Refills:  0       senna-docusate 8.6-50 MG per tablet   Commonly known as:  SENOKOT-S;PERICOLACE   This may have changed:    - how much to take  - when to take this   Used for:  Ventral hernia        Dose:  1 tablet   Take 1 tablet by mouth 2 times daily.   Quantity:  40 tablet   Refills:  11       * Notice:  This list has 5 medication(s) that are the same as other medications prescribed for you. Read the directions carefully, and ask your doctor or other care provider to review them with you.         Where to get your medicines      These medications were sent to [facility] [address] [place] [address]  [address] [address]     Phone:  [phone]     cephALEXin 500 MG capsule    oxybutynin 10 MG 24 hr tablet         Some of these will need a paper prescription and others can be bought over the counter.  Ask your nurse if you have questions.     Bring a paper prescription for each of these medications     oxyCODONE-acetaminophen  MG per tablet    oxyCODONE-acetaminophen  MG per tablet       You don't need a prescription for these medications     cefTRIAXone 1 GM vial               Information about OPIOIDS     PRESCRIPTION OPIOIDS: WHAT YOU NEED TO KNOW   We gave you an opioid (narcotic) pain medicine. It is important to manage your pain, but opioids are not always the best choice. You should first try all the other options your care team gave you. Take this medicine for as short a time (and as few doses) as possible.     These medicines have risks:    DO NOT drive when on new or higher doses of pain medicine. These medicines can affect your alertness and reaction times, and you could be arrested for driving under the influence (DUI). If you need to use  opioids long-term, talk to your care team about driving.    DO NOT operate heave machinery    DO NOT do any other dangerous activities while taking these medicines.     DO NOT drink any alcohol while taking these medicines.      If the opioid prescribed includes acetaminophen, DO NOT take with any other medicines that contain acetaminophen. Read all labels carefully. Look for the word  acetaminophen  or  Tylenol.  Ask your pharmacist if you have questions or are unsure.    You can get addicted to pain medicines, especially if you have a history of addiction (chemical, alcohol or substance dependence). Talk to your care team about ways to reduce this risk.    Store your pills in a secure place, locked if possible. We will not replace any lost or stolen medicine. If you don t finish your medicine, please throw away (dispose) as directed by your pharmacist. The [place] Pollution Control Agency has more information about safe disposal: https://www.pca.[facility].[place].us/living-green/managing-unwanted-medications.     All opioids tend to cause constipation. Drink plenty of water and eat foods that have a lot of fiber, such as fruits, vegetables, prune juice, apple juice and high-fiber cereal. Take a laxative (Miralax, milk of magnesia, Colace, Senna) if you don t move your bowels at least every other day.          Primary Care Provider Office Phone # Fax #    Macarena Vinson PA-C [phone]       [facility] [address]        Goals        General    Psychosocial (pt-stated)     Notes - Note created  5/30/2018  3:44 PM by Madelin Ontiveros, ANGEL    Goal Statement: I would like to apply for MA and waiver services by 10/2018.  Measure of Success: application and [place] choices assessment completed  Supportive Steps to Achieve: [facility]LEX[facility] [employer] will mail out how to  Date to Achieve By: 10/2018          Equal Access to Services     JENNIFER SCOTT AH: asa Malloy qaybta kaalmada  cory harrismayra agarwal'aan ah. So [facility] [phone].    ATENCIÓN: Si sabas prescott, tiene a michele disposición servicios gratuitos de asistencia lingüística. Jh al [phone].    We comply with applicable federal civil rights laws and [place] laws. We do not discriminate on the basis of race, color, national origin, age, disability, sex, sexual orientation, or gender identity.            Thank you!     Thank you for choosing [facility]  for your care. Our goal is always to provide you with excellent care. Hearing back from our patients is one way we can continue to improve our services. Please take a few minutes to complete the written survey that you may receive in the mail after your visit with us. Thank you!             Your Updated Medication List - Protect others around you: Learn how to safely use, store and throw away your medicines at www.disposemymeds.org.          This list is accurate as of 7/6/18  1:00 PM.  Always use your most recent med list.                   Brand Name Dispense Instructions for use Diagnosis    albuterol 108 (90 Base) MCG/ACT Inhaler    PROAIR HFA/PROVENTIL HFA/VENTOLIN HFA    3 Inhaler    Inhale 2 puffs into the lungs every 6 hours as needed for shortness of breath / dyspnea    Chronic obstructive pulmonary disease, unspecified COPD type (H)       buPROPion 150 MG 24 hr tablet    WELLBUTRIN XL    90 tablet    Take 1 tablet (150 mg) by mouth every morning    Major depressive disorder, recurrent episode, moderate (H), Tobacco use disorder       cefTRIAXone 1 GM vial    ROCEPHIN    10 mL    Inject 1 g (1,000 mg) into the muscle once for 1 dose    Cellulitis of right lower leg       cephALEXin 500 MG capsule    KEFLEX    30 capsule    Take 1 capsule (500 mg) by mouth 3 times daily    Cellulitis of right lower leg       cyanocolbalamin 100 MCG tablet    vitamin  B-12     Take 1 tablet (100 mcg) by mouth daily        DULoxetine 60 MG EC capsule     CYMBALTA    90 capsule    Take 1 capsule (60 mg) by mouth daily    Major depressive disorder, recurrent episode, moderate (H), Chronic pain disorder, Generalized anxiety disorder, Other urinary incontinence       fluticasone-salmeterol 250-50 MCG/DOSE diskus inhaler    ADVAIR DISKUS    3 Inhaler    Inhale 1 puff into the lungs 2 times daily    COPD (chronic obstructive pulmonary disease) (H)       hydrOXYzine 25 MG tablet    ATARAX    30 tablet    TAKE 1 TABLET (25 MG) BY MOUTH NIGHTLY AS NEEDED FOR ANXIETY    Other insomnia       miconazole 2 % powder    MICATIN; MICRO GUARD    30 g    Apply topically 2 times daily Apply to groin        multivitamin, therapeutic Tabs tablet      Take 1 tablet by mouth daily        * nystatin 767426 UNIT/GM Powd    MYCOSTATIN    1 Bottle    Apply 1 g topically as needed    Candidal intertrigo       * nystatin cream    MYCOSTATIN    30 g    Apply topically 3 times daily for 14 days    Candidal intertrigo       * oxybutynin 10 MG 24 hr tablet    DITROPAN XL    180 tablet    Take 2 tablets (20 mg) by mouth daily    Urgency incontinence       * oxybutynin 10 MG 24 hr tablet    DITROPAN XL    180 tablet    Take 3 tablets (30 mg) by mouth daily    Urinary incontinence, unspecified type       * oxyCODONE-acetaminophen  MG per tablet    PERCOCET    120 tablet    1-2 tablets every 6 hrs prn-to fill on or after 4/10/18    Chronic pain disorder       * oxyCODONE-acetaminophen  MG per tablet    PERCOCET    120 tablet    1-2 tablets every 6 hrs prn-to fill on or after 9/9/18    Chronic pain disorder       * oxyCODONE-acetaminophen  MG per tablet   Start taking on:  7/9/2018    PERCOCET    120 tablet    1-2 tablets every 6 hrs prn-to fill on or after 8/9/10    Chronic pain disorder       senna-docusate 8.6-50 MG per tablet    SENOKOT-S;PERICOLACE    40 tablet    Take 1 tablet by mouth 2 times daily.    Ventral hernia       traZODone 50 MG tablet    DESYREL    90 tablet     TAKE 1-2 TABLETS BY MOUTH NIGHTLY AS NEEDED FOR SLEEP    Other insomnia       * Notice:  This list has 7 medication(s) that are the same as other medications prescribed for you. Read the directions carefully, and ask your doctor or other care provider to review them with you.

## 2018-07-06 NOTE — PROGRESS NOTES
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 Yesterday, noted a small spotted red rash started on upper leg. Has since become more read and has spread to the L ankle, foot and great toe. Also has multiple pressure sores on foot.  L great toe with erythema, tenderness and swelling.  Was seen by Ortho earlier this week and was referred to the [facility] ortho foot dept for further evaluation.  Dr. Gomez felt that an ankle and hindfoot fusion may be helpful.  She feels well otherwise, denies any fever or chills.  Notes increase in urinary incontinence without burning, odor.  Character: Dull ache    Intensity: moderate    Progression of Symptoms: worse    Accompanying Signs & Symptoms:  Other symptoms: swelling and redness    History:   Previous similar pain: no       Precipitating factors:   Trauma or overuse: no     Alleviating factors:  Improved by: nothing    Therapies Tried and outcome: Gold bond cream and Nystatin have not helped     ------------------------------------    Problem list and histories reviewed & adjusted, as indicated.  Additional history: as documented    Reviewed and updated as needed this visit by clinical staff       Reviewed and updated as needed this visit by Provider         ROS:  Constitutional, HEENT, cardiovascular, pulmonary, gi and gu systems are negative, except as otherwise noted.    OBJECTIVE:     /70  Pulse 97  Temp 98.4  F (36.9  C) (Oral)  Ht 5' 5\" (1.651 m)  SpO2 95%  There is no height or weight on file to calculate BMI.  GENERAL: healthy, alert and no distress  RESP: lungs clear to auscultation - no rales, rhonchi or wheezes  CV: regular rate and rhythm, normal S1 S2, no S3 or S4, no murmur, click or rub, no peripheral edema and peripheral pulses strong  SKIN:  " L leg with erythema and warmth from mid calf to toe posteriorly, medially and laterally. Scattered ertythemaous maculopapular lesions.  L great toe with erythema and swelling, tender to palpation.  Diagnostic Test Results:  none     Results for orders placed or performed in visit on 07/06/18   UA with Microscopic reflex to Culture   Result Value Ref Range    Color Urine Yellow     Appearance Urine Clear     Glucose Urine Negative NEG^Negative mg/dL    Bilirubin Urine Negative NEG^Negative    Ketones Urine Negative NEG^Negative mg/dL    Specific Gravity Urine 1.010 1.003 - 1.035    pH Urine 7.0 5.0 - 7.0 pH    Protein Albumin Urine Negative NEG^Negative mg/dL    Urobilinogen Urine 0.2 0.2 - 1.0 EU/dL    Nitrite Urine Negative NEG^Negative    Blood Urine Negative NEG^Negative    Leukocyte Esterase Urine Large (A) NEG^Negative    Source Midstream Urine     WBC Urine >100 (A) OTO5^0 - 5 /HPF    RBC Urine O - 2 OTO2^O - 2 /HPF    Squamous Epithelial /LPF Urine Few FEW^Few /LPF    Bacteria Urine Moderate (A) NEG^Negative /HPF         ASSESSMENT/PLAN:   1. Cellulitis of left lower extremity  Area of erythema outlined.  She will closely monitor this for progression.  Given location, recommended very low threshold for presentation to the ED over the weekend.  Phone call on Monday to follow up, may need to be seen.  Does have f/u scheduled for 5 days from now, while podiatry is in clinic.  - cephALEXin (KEFLEX) 500 MG capsule; Take 1 capsule (500 mg) by mouth 3 times daily  Dispense: 30 capsule; Refill: 0  - cefTRIAXone (ROCEPHIN) 1 GM vial; Inject 1 g (1,000 mg) into the muscle once for 1 dose  Dispense: 10 mL; Refill: 0    2. 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    3. Chronic pain disorder  - oxyCODONE-acetaminophen (PERCOCET)  MG per tablet; 1-2 tablets every 6 hrs prn-to fill on or after 8/9/10  Dispense: 120 tablet; Refill: 0  -  oxyCODONE-acetaminophen (PERCOCET)  MG per tablet; 1-2 tablets every 6 hrs prn-to fill on or after 9/9/18  Dispense: 120 tablet; Refill: 0    4. Urinary incontinence, unspecified type  R/o UTI  Increase Oxybutynin pending results.  - oxybutynin (DITROPAN XL) 10 MG 24 hr tablet; Take 3 tablets (30 mg) by mouth daily  Dispense: 180 tablet; Refill: 3  - UA with Microscopic reflex to Culture    Patient Instructions     Increase oxybutynin to max dose of 30 mg per day  We will check for a urinary tract infection today.  Antibiotics 3 times daily x 10 days  Follow up on Wednesday as scheduled  Macarena to call on Monday.    To the ED over the weekend if worsening or no improvement!    Macarena Vinson PA-C    [facility]   Discharged by : Sharlene MUÑOZ Certified Medical Assistant (AAMA)   Paper scripts provided to patient : 2   If you have any questions regarding to your visit please contact your care team:     Team Silver              Clinic Hours Telephone Number     Dr. Jay Vinson PA-C   7am-7pm  Monday - Thursday   7am-5pm  Fridays  [phone]   (Appointment scheduling available 24/7)     RN Line  [phone] option 2     Urgent Care - [place] and [place] - 11am-9pm Monday-Friday Saturday-Sunday- 9am-5pm     [place] -   5pm-9pm Monday-Friday Saturday-Sunday- 9am-5pm    [phone] - [place]    [phone] - [place]     For a Price Quote for your services, please call our Consumer Price Line at [phone].     What options do I have for visits at the clinic other than the traditional office visit?     To expand how we care for you, many of our providers are utilizing electronic visits (e-visits) and telephone visits, when medically appropriate, for interactions with their patients rather than a visit in the clinic. We also offer nurse visits for many medical concerns. Just like any other service, we will  bill your insurance company for this type of visit based on time spent on the phone with your provider. Not all insurance companies cover these visits. Please check with your medical insurance if this type of visit is covered. You will be responsible for any charges that are not paid by your insurance.   E-visits via MedDayhart: generally incur a $35.00 fee.     Telephone visits:   Time spent on the phone: *charged based on time that is spent on the phone in increments of 10 minutes. Estimated cost:   5-10 mins $30.00   11-20 mins. $59.00   21-30 mins. $85.00     Use Benkyo Player (secure email communication and access to your chart) to send your primary care provider a message or make an appointment. Ask someone on your Team how to sign up for Benkyo Player.     As always, Thank you for trusting us with your health care needs!      [place] [place] [facility]:    Scheduling Appointments  [facility]  Call: [phone]    [facility]  Call: [phone]    [facility]  Call: [phone]    For Gastroenterology referrals   [facility] Gastroenterology   Clinics and Surgery Center, 4th Floor   [address] [place] [address]   Appointments: [phone]    WHERE TO GO FOR CARE?  Clinic    Make an appointment if you:       Are sick (cold, cough, flu, sore throat, earache or in pain).       Have a small injury (sprain, small cut, burn or broken bone).       Need a physical exam, Pap smear, vaccine or prescription refill.       Have questions about your health or medicines.    To reach us:      Call [phone] ([phone]). Open 24 hours every day. (For counseling services, call [phone].)    Log into Benkyo Player at [email or web address]. (Visit [email or web address] to create an account.) Hospital emergency room    An emergency is a serious or life- threatening problem that must be treated right away.    Call 130 or get to the hospital if you  have:      Very bad or sudden:            - Chest pain or pressure         - Bleeding         - Head or belly pain         - Dizziness or trouble seeing, walking or                          Speaking      Problems breathing      Blood in your vomit or you are coughing up blood      A major injury (knocked out, loss of a finger or limb, rape, broken bone protruding from skin)    A mental health crisis. (Or call the Mental Health Crisis line at 1-411.160.6450 or Suicide Prevention Hotline at 1-784.164.7846.)    Open 24 hours every day. You don't need an appointment.     Urgent care    Visit urgent care for sickness or small injuries when the clinic is closed. You don't need an appointment. To check hours or find an urgent care near you, visit [email or web address]. Online care    Get [facility] from [facility] for more than 70 common problems, like colds, allergies and infections. Open 24 hours every day at:   [email or web address]   Need help deciding?    For advice about where to be seen, you may call your clinic and ask to speak with a nurse. We're here for you 24 hours every day.         If you are deaf or hard of hearing, please let us know. We provide many free services including sign language interpreters, oral interpreters, TTYs, telephone amplifiers, note takers and written materials.             Macarena Vinson PA-C  [facility]

## 2018-07-06 NOTE — TELEPHONE ENCOUNTER
Patient picking up envelope, ID verified and envelope given to patient.    .Susy Dinero  Patient Representative

## 2018-07-06 NOTE — NURSING NOTE
Prior to injection verified patient identity using patient's name and date of birth.  Due to injection administration, patient instructed to remain in clinic for 15 minutes  afterwards, and to report any adverse reaction to me immediately.    The following medication was given:     MEDICATION: Rocephin 1G and Lidocaine 2.1cc  ROUTE: IM  SITE: RUQ - Gluteus  DOSE: 1G  LOT #: [id number]  :  Hospira  EXPIRATION DATE:  9/1/20 & 10/1/19  NDC#: 3923-4458-59 & [id number]  Sharlene Cook, Certified Medical Assistant (AAMA)

## 2018-07-06 NOTE — PATIENT INSTRUCTIONS
Increase oxybutynin to max dose of 30 mg per day  We will check for a urinary tract infection today.  Antibiotics 3 times daily x 10 days  Follow up on Wednesday as scheduled  Macarena to call on Monday.    To the ED over the weekend if worsening or no improvement!    Macarena Vinson PA-C    [facility]   Discharged by : Sharlene MUÑOZ Certified Medical Assistant (AAMA)   Paper scripts provided to patient : 2   If you have any questions regarding to your visit please contact your care team:     Team Silver              Clinic Hours Telephone Number     Dr. Jay Vinson PA-C   7am-7pm  Monday - Thursday   7am-5pm  Fridays  [phone]   (Appointment scheduling available 24/7)     RN Line  [phone] option 2     Urgent Care - [place] and [facility] - 11am-9pm Monday-Friday Saturday-Sunday- 9am-5pm     [place] -   5pm-9pm Monday-Friday Saturday-Sunday- 9am-5pm    [phone] - [place]    [phone] - [place]     For a Price Quote for your services, please call our Consumer Price Line at [phone].     What options do I have for visits at the clinic other than the traditional office visit?     To expand how we care for you, many of our providers are utilizing electronic visits (e-visits) and telephone visits, when medically appropriate, for interactions with their patients rather than a visit in the clinic. We also offer nurse visits for many medical concerns. Just like any other service, we will bill your insurance company for this type of visit based on time spent on the phone with your provider. Not all insurance companies cover these visits. Please check with your medical insurance if this type of visit is covered. You will be responsible for any charges that are not paid by your insurance.   E-visits via [employer]: generally incur a $35.00 fee.     Telephone visits:   Time spent on the phone: *charged based on  time that is spent on the phone in increments of 10 minutes. Estimated cost:   5-10 mins $30.00   11-20 mins. $59.00   21-30 mins. $85.00     Use [employer] Ex[employer] Displayt (secure email communication and access to your chart) to send your primary care provider a message or make an appointment. Ask someone on your Team how to sign up for DISKOVRe.     As always, Thank you for trusting us with your health care needs!      [place] [facility]:    Scheduling Appointments  [place] [place]  Call: [phone]    [place], [facility]  Call: [phone]    [facility]  Call: [phone]    For Gastroenterology referrals   [facility] Gastroenterology   Clinics and Surgery Center, 4th Floor   [address] [place] [address]   Appointments: [phone]    WHERE TO GO FOR CARE?  Clinic    Make an appointment if you:       Are sick (cold, cough, flu, sore throat, earache or in pain).       Have a small injury (sprain, small cut, burn or broken bone).       Need a physical exam, Pap smear, vaccine or prescription refill.       Have questions about your health or medicines.    To reach us:      Call [phone] ([phone]). Open 24 hours every day. (For counseling services, call [phone].)    Log into DISKOVRe at [email or web address]. (Visit [email or web address] to create an account.) Hospital emergency room    An emergency is a serious or life- threatening problem that must be treated right away.    Call 615 or get to the hospital if you have:      Very bad or sudden:            - Chest pain or pressure         - Bleeding         - Head or belly pain         - Dizziness or trouble seeing, walking or                          Speaking      Problems breathing      Blood in your vomit or you are coughing up blood      A major injury (knocked out, loss of a finger or limb, rape, broken bone protruding from skin)    A mental health crisis. (Or call the Mental  Health Crisis line at 1-395.736.5154 or Suicide Prevention Hotline at 1-295.659.6371.)    Open 24 hours every day. You don't need an appointment.     Urgent care    Visit urgent care for sickness or small injuries when the clinic is closed. You don't need an appointment. To check hours or find an urgent care near you, visit [email or web address]. Online care    Get online care from [facility] for more than 70 common problems, like colds, allergies and infections. Open 24 hours every day at:   [email or web address]   Need help deciding?    For advice about where to be seen, you may call your clinic and ask to speak with a nurse. We're here for you 24 hours every day.         If you are deaf or hard of hearing, please let us know. We provide many free services including sign language interpreters, oral interpreters, TTYs, telephone amplifiers, note takers and written materials.

## 2018-07-07 ASSESSMENT — PATIENT HEALTH QUESTIONNAIRE - PHQ9: SUM OF ALL RESPONSES TO PHQ QUESTIONS 1-9: 5

## 2018-07-08 LAB
BACTERIA SPEC CULT: ABNORMAL
SPECIMEN SOURCE: ABNORMAL

## 2018-07-09 ENCOUNTER — TELEPHONE (OUTPATIENT)
Dept: FAMILY MEDICINE | Facility: CLINIC | Age: 73
End: 2018-07-09

## 2018-07-09 NOTE — TELEPHONE ENCOUNTER
Writer attempted to call patient at home to verify that symptoms are improving and to answer any questions that she might have. Left call back number to nurse triage line if she had any questions or concerns. Pt indicated when leaving message earlier that her symptoms are improving and she will continue antibiotic as prescribed.     Anabel Navarro RN

## 2018-07-09 NOTE — TELEPHONE ENCOUNTER
Reason for Call:  Other FYI    Detailed comments: Patient wants pcp to know that she is doing a lot better. The swelling is down, the redness is almost completely gone and the soreness is gone, patient states she will continue with prescribed medications and will call if any further issues arise.    Phone Number Patient can be reached at: Home number on file [phone] (home)    Best Time: any    Can we leave a detailed message on this number? YES    Call taken on 7/9/2018 at 1:08 PM by Susy Fisher

## 2018-07-12 ENCOUNTER — PATIENT OUTREACH (OUTPATIENT)
Dept: CARE COORDINATION | Facility: CLINIC | Age: 73
End: 2018-07-12

## 2018-07-12 ASSESSMENT — ACTIVITIES OF DAILY LIVING (ADL): DEPENDENT_IADLS:: MEAL PREPARATION

## 2018-07-12 NOTE — PROGRESS NOTES
Clinic Care Coordination Contact  Clinic Care Coordination Contact  OUTREACH    Referral Information:  Referral Source: PCP    Primary Diagnosis: Psychosocial    Chief Complaint   Patient presents with     Clinic Care Coordination - Follow-up     Community Supports/Finances      Universal Utilization: No concerns noted at this time  Utilization    Last refreshed: 7/11/2018  8:04 AM:  No Show Count (past year) 0       Last refreshed: 7/11/2018  8:04 AM:  ED visits 0       Last refreshed: 7/11/2018  8:04 AM:  Hospital admissions 0          Current as of: 7/11/2018  8:04 AM           Clinical Concerns:  Current Medical Concerns: Pt has been struggling with pain and household management. ALEXIA MORA has been encouraging pt to apply for medical assistance and waiver services to better support her in her home. Outreach today to follow-up.         Living Situation:  Current living arrangement:: I live alone, I live in a private home- Pt has a difficult time affording the monthly mortgage. Pt's family will help her with expenses at times.  Type of residence:: Private home - no stairs     Psychosocial:  Zoroastrian or spiritual beliefs that impact treatment:: No  Mental health DX:: No  Informal Support system:: Friends, Neighbors, Family  Financial/Insurance: Yes, pt is just above the income limits for Medical Assistance, but still has a difficult time meeting all of her monthly expenses needs.     Resources and Interventions:  Current Resources: Community Resources: Volunteer, Meals on Wheels  Equipment Currently Used at Home: shower chair, walker, rolling, wheelchair, manual  Advanced Care Plans/Directives on file:: Yes  Type Advanced Care Plans/Directives: Advanced Directive - On File     Goals:   Goals        General    Psychosocial (pt-stated)     Notes - Note created  5/30/2018  3:44 PM by Madelin Ontiveros LSW    Goal Statement: I would like to apply for MA and waiver services by 10/2018.  Measure of Success: application and  [place] choices assessment completed  Supportive Steps to Achieve: ALEXIA CC will mail out how to  Date to Achieve By: 10/2018          Patient/Caregiver understanding: Pt reports understanding and denies any additional questions or concerns at this times. SW CC engaged in AIDET communication during encounter.    Outreach Frequency: monthly  Future Appointments              In 1 month Arcadio Daley MD [facility], [facility]        Plan: Pt reports that she is just a little bit above the asset limits for waiver services. Pt has not pursued the waiver services yet. ALEXIA CC will check back in with pt in 6 weeks to reassess needs.    Madelin Ontiveros [facility]  Clinic Care Coordinator  [phone]  [email or web address]

## 2018-07-13 NOTE — TELEPHONE ENCOUNTER
"Called and spoke with patient. She said within 48 hours the redness and swelling was gone from her cellulitis. She also no longer has UTI symptoms. \"I really feel good.\" She says \"please tell Macarena I said gama and thank you so much for your help. She hit the nail on the head with the diagnosis.\" Route to PCP as SILVERIO Maharaj RN    "

## 2018-07-13 NOTE — TELEPHONE ENCOUNTER
Will route to PCP as FYI. Please let us know if you'd like further information. Looks like she no-showed her 7/11/18 appt. Would you like me to call to reschedule?    Cj Maharaj RN

## 2018-07-13 NOTE — TELEPHONE ENCOUNTER
Yes, please call to see how she is doing.  I had wanted to take a peek at this on Wednesday but she no showed. As long as all of her symptoms have improved, a follow up is not needed.    Macarena Vinson PA-C

## 2018-07-30 ENCOUNTER — TELEPHONE (OUTPATIENT)
Dept: FAMILY MEDICINE | Facility: CLINIC | Age: 73
End: 2018-07-30

## 2018-07-30 DIAGNOSIS — N30.00 ACUTE CYSTITIS WITHOUT HEMATURIA: Primary | ICD-10-CM

## 2018-07-30 RX ORDER — SULFAMETHOXAZOLE/TRIMETHOPRIM 800-160 MG
1 TABLET ORAL 2 TIMES DAILY
Qty: 14 TABLET | Refills: 0 | Status: SHIPPED | OUTPATIENT
Start: 2018-07-30 | End: 2018-08-21

## 2018-07-30 NOTE — TELEPHONE ENCOUNTER
I reviewed her last urine culture result, and given her urinary symptoms it seems reasonable to treat for UTI.  I have prescribed her Bactrim and sent it to her pharmacy.    Please let patient know about potential side effects of upset stomach, diarrhea, nausea.  She can take a probiotic or eat yogurt regularly when on the antibiotic.

## 2018-07-30 NOTE — TELEPHONE ENCOUNTER
"Called and spoke with patient. She states she had a UTI about 2 weeks ago, and she finished abx-symptoms had improved at that time. Now she thinks she needs another round of abx because she has urinary frequency, some incontinence, and some discomfort with urination. This has been going on for about a week and a half and she says \"I need to get some sleep, I was up 11 times last night.\" She denies fever, hematuria, flank pain, abdominal pain, inability to urinate. Huddled with Loulou Ashley, who will address this tonight.    Cj Maharaj RN    "

## 2018-07-30 NOTE — TELEPHONE ENCOUNTER
Reason for Call:  Medication or medication refill:    Do you use a [facility]?  Name of the pharmacy and phone number for the current request:  -[facility], [place] - [place] [facility] [address] [facility]    Name of the medication requested: stronger antibiotics    Other request: pt believes she still has her bladder infection. She used the restroom 11 times over night last night. She is requesting stronger antibiotics. Please call when Rx is faxed over.     Can we leave a detailed message on this number? YES    Phone number patient can be reached at: Home number on file [phone] (home)    Best Time:     Call taken on 7/30/2018 at 4:32 PM by Stacey Rosales

## 2018-08-01 NOTE — TELEPHONE ENCOUNTER
FUTURE VISIT INFORMATION      FUTURE VISIT INFORMATION:    Date: 8/14/18    Time: 1450    Location: ORTHO  REFERRAL INFORMATION:    Referring provider:  DR PAREDES    Referring providers clinic:  [facility]    Reason for visit/diagnosis  LEFT ANKLE DEFORMITY       RECORDS STATUS        RECORDS AND REFERRAL IN CHART

## 2018-08-05 DIAGNOSIS — J44.9 COPD (CHRONIC OBSTRUCTIVE PULMONARY DISEASE) (H): ICD-10-CM

## 2018-08-06 NOTE — TELEPHONE ENCOUNTER
Prescription approved per [facility] Refill Protocol.  Grisel Flaherty RN

## 2018-08-06 NOTE — TELEPHONE ENCOUNTER
"Requested Prescriptions   Pending Prescriptions Disp Refills     ADVAIR DISKUS 250-50 MCG/DOSE diskus inhaler [Pharmacy Med Name: ADVAIR 250-50 DISKUS]  Last Written Prescription Date:  4/18/2018  Last Fill Quantity: 3 INHALER,  # refills: 0   Last office visit: 7/6/2018 with prescribing provider:  JEFFERY LAM   Future Office Visit:      0     Sig: INHALE 1 PUFF INTO THE LUNGS 2 TIMES DAILY    Inhaled Steroids Protocol Passed    8/5/2018  1:43 AM       Passed - Patient is age 12 or older       Passed - Recent (12 mo) or future (30 days) visit within the authorizing provider's specialty    Patient had office visit in the last 12 months or has a visit in the next 30 days with authorizing provider or within the authorizing provider's specialty.  See \"Patient Info\" tab in inbasket, or \"Choose Columns\" in Meds & Orders section of the refill encounter.              "

## 2018-08-12 NOTE — PROGRESS NOTES
"Ledy Odonnell is a 71 year old female who is being evaluated via a telephone visit.      The patient has been notified of following:     \"This telephone visit will be conducted via a call between you and your physician/provider. We have found that certain health care needs can be provided without the need for a physical exam.  This service lets us provide the care you need with a short phone conversation.  If a prescription is necessary we can send it directly to your pharmacy.  If lab work is needed we can place an order for that and you can then stop by our lab to have the test done at a later time.    We will bill your insurance company for this service.  Please check with your medical insurance if this type of visit is covered. You may be responsible for the cost of this type of visit if insurance coverage is denied.  The typical cost is $30 (10min), $59 (11-20min) and $85 (21-30min).  Most often these visits are shorter than 10 minutes.    If during the course of the call the physician/provider feels a telephone visit is not appropriate, you will not be charged for this service.\"       Consent has been obtained for this service by 2 care team members: yes. See the scanned image in the medical record.    Ledy Odonnell complains of  Hospital F/U ([facility] after a fall. She is  and very sore.) and Recheck Medication      I have reviewed and updated the patient's Past Medical History, Social History, Family History and Medication List.    ALLERGIES  No known allergies    Bre Zuluaga CMA ([facility])   (MA signature)    Additional provider notes:  Follow up call to follow up on pain relate to rib fractures and also change from Zoloft to Cymbalta.  Rib pain is improved, but still having pain with rolling her wheelchair and standing up.   Was declined for motorized wheelchair as she \"gets\" around too well.  Now, waiting to get new shoe for her club foot.  Does have OT, PT, RN, .  Working on " "strengthening muscles for her bladder, and this has been helpful. Is able to go someplace for longer than 1 hour.  Dry mouth doesn't seem to be as bad either.  Cymbalta is still going well.  She feels that there has been a personality change, but in a good way.  Feels \"settled\" down.    BP has been lower 117/80    Assessment/Plan:  1. Major depressive disorder, recurrent episode, moderate (H)  4. Generalized anxiety disorder  Stable, doing well with change from Zoloft to Cymbalta.    2. Chronic pain disorder  3. Closed fracture of multiple ribs of right side with routine healing, subsequent encounter  Chronic pain is stable, but still having subacute pain related to her rib fractures.  Pain is improving, just slowly.    5. Candidal intertrigo  - nystatin (MYCOSTATIN) cream; Apply topically daily as needed for dry skin  Dispense: 30 g; Refill: 1      I have reviewed the note as documented above.  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 Found to have large L pleural effusion causing complete atelectasis of L lung. She had 1L thoracentesis 1 week ago as output. CT scan revealed mod pericardial effusion and large pleural effusion pt remained persistently tachycardic and tachypneic 2d echo revealed increased pericardial effusion read as large with  tamponade/RV  collapse. Thoracic surgery consulted.   8/10 left pigtail by surgical resident 1L fluid out. Small PTX on CXR, pt asymptomatic, will continue to monitor. Percardiocentesis in  cc of dark red fluid removed.   8/11: Drains functioning well. CT scan Noted, No pleurix needed. D/w Dr. Mancini  8/12: Continue pleural and pericardial drain for now

## 2018-08-14 ENCOUNTER — PRE VISIT (OUTPATIENT)
Dept: ORTHOPEDICS | Facility: CLINIC | Age: 73
End: 2018-08-14

## 2018-08-15 DIAGNOSIS — Q66.02 CONGENITAL TALIPES EQUINOVARUS DEFORMITY OF LEFT FOOT: Primary | ICD-10-CM

## 2018-08-21 ENCOUNTER — RADIANT APPOINTMENT (OUTPATIENT)
Dept: GENERAL RADIOLOGY | Facility: CLINIC | Age: 73
End: 2018-08-21
Attending: ORTHOPAEDIC SURGERY
Payer: COMMERCIAL

## 2018-08-21 ENCOUNTER — OFFICE VISIT (OUTPATIENT)
Dept: ORTHOPEDICS | Facility: CLINIC | Age: 73
End: 2018-08-21
Payer: COMMERCIAL

## 2018-08-21 DIAGNOSIS — Q66.02 CONGENITAL TALIPES EQUINOVARUS DEFORMITY OF LEFT FOOT: ICD-10-CM

## 2018-08-21 DIAGNOSIS — M21.962 ACQUIRED DEFORMITY OF LEFT FOOT: Primary | ICD-10-CM

## 2018-08-21 NOTE — LETTER
8/21/2018       RE: Ledy Odonnell  [address]  [facility] [address]     Dear Colleague,    Thank you for referring your patient, Ledy Odonnell, to the [facility]. Please see a copy of my visit note below.    CHIEF COMPLAINT:  Left foot chronic deformity.      HISTORY OF PRESENT ILLNESS:  Mrs. Odonnell is a 73-year-old female who presents in the accompaniment of her brother for evaluation of the left foot.  The patient reports to have a history of club foot deformity which has been managed through her whole life by adjusting to different types of shoe wear.  Reports; however, that in 2014, she sustained what seems to be a left hip fracture and a left femur fracture that required surgery from those problems.  She is not clear exactly what happened, but she reports to have developed a deformity on the left foot as a result of the surgery.  The patient has been trying to get around deformity, but now it has reached the point where she is unable to continue going forward.      The patient has already some braces in the form of a CROW brace and in spite of her best efforts, continues having problems and difficulties.  She reports that the most recent brace that she got is still is not fitting well and provoked some ulcerations along the lateral aspect of the ankle joint.      She reports to also have lost a fair amount of weight, which is not clear if it was in intended or not.      She presents today for the possible treatment options in order to improve the alignment of the left foot.      PAST MEDICAL HISTORY:  DJD, COPD, tobacco and chronic pain disorder among others.      PAST SURGICAL HISTORY:  Please refer to encounter form.      DRUG ALLERGIES:  None.      MEDICATIONS:  Please refer to encounter form.      PHYSICAL EXAMINATION:  On today's visit, she presents as a pleasant female in no apparent distress with a height of 5 feet 5  inches and a weight of 147 kilos.  Denies to have any constitutional symptoms.      On today's visit, she presents with a left foot which presents as a non-correctable deformity with an inversion and equinus posture.  Presents with skin that is barely wrinkleable along the medial aspect of the foot.  She also presented with some clawing of the lesser toes as well as a cock-up deformity of the great toe.  Presents also with a significant calf atrophy when compared to the opposite leg.      RADIOGRAPHIC EVALUATION:  Plain x-rays were reviewed today which were significant for showing not so much of a dysplastic foot, but clearly presents with a plantarflexed and inverted posture.  This is difficult to read how dysplastic the bones are on those x-rays; however, did not seem to have much dysplasia present.      ASSESSMENT:     1.  Acquired left foot deformity.   2.  Left club foot.      PLAN:  I discussed with the patient and her brother that at this point I do not believe that we can proceed with a single stitch corrective surgery as I do not think she has enough soft tissues medially in order to allow her to come out of the inversion and equinus.  I believe that from an acute surgical approach, the only option would be a below-the-knee amputation.      I also discussed with them that there is a possibility that Dr. Biswas would be able to apply a frame to her leg and to proceed with some progressive correction.      The patient was very adamant against a below-the-knee amputation today.  We will refer her to Dr. Biswas for an evaluation to see if in fact she is a candidate for an Ilizarov technique type of approach.      All questions were answered.  The patient was pleased with the discussion.      TT 30 minutes, CT 20 minutes.         Again, thank you for allowing me to participate in the care of your patient.      Sincerely,    Arcadio Daley MD

## 2018-08-21 NOTE — MR AVS SNAPSHOT
After Visit Summary   8/21/2018    Ledy Odonnell    MRN: [id number]           Patient Information     Date Of Birth          1945        Visit Information        Provider Department      8/21/2018 12:00 PM Arcadio Daley [facility]        Today's Diagnoses     Acquired deformity of left foot    -  1       Follow-ups after your visit        Additional Services     ORTHOPEDICS ADULT REFERRAL       Your provider has referred you to: [facility]: Orthopaedic Clinic [facility] [phone]   [email or web address]      TO Dr. Royce Biswas for left foot deformity correction.    Please be aware that coverage of these services is subject to the terms and limitations of your health insurance plan.  Call member services at your health plan with any benefit or coverage questions.      Please bring the following to your appointment:    >>   Any x-rays, CTs or MRIs which have been performed.  Contact the facility where they were done to arrange for  prior to your scheduled appointment.    >>   List of current medications   >>   This referral request   >>   Any documents/labs given to you for this referral                  Who to contact     Please call your clinic at [phone] to:    Ask questions about your health    Make or cancel appointments    Discuss your medicines    Learn about your test results    Speak to your doctor            Additional Information About Your Visit        Care EveryWhere ID     This is your Care EveryWhere ID. This could be used by other organizations to access your [facility] medical records  [id number]         Blood Pressure from Last 3 Encounters:   07/06/18 124/70   07/02/18 115/73   01/10/18 125/73    Weight from Last 3 Encounters:   02/21/18 66.7 kg (147 lb)   09/14/17 66.7 kg (147 lb)   03/07/17 77.1 kg (170 lb)              We Performed the Following     ORTHOPEDICS ADULT REFERRAL          Today's  Medication Changes          These changes are accurate as of 8/21/18 11:59 PM.  If you have any questions, ask your nurse or doctor.               These medicines have changed or have updated prescriptions.        Dose/Directions    oxyCODONE-acetaminophen  MG per tablet   Commonly known as:  PERCOCET   This may have changed:  Another medication with the same name was removed. Continue taking this medication, and follow the directions you see here.   Used for:  Chronic pain disorder   Changed by:  Arcadio Daley MD        1-2 tablets every 6 hrs prn-to fill on or after 9/9/18   Quantity:  120 tablet   Refills:  0       senna-docusate 8.6-50 MG per tablet   Commonly known as:  SENOKOT-S;PERICOLACE   This may have changed:    - how much to take  - when to take this   Used for:  Ventral hernia        Dose:  1 tablet   Take 1 tablet by mouth 2 times daily.   Quantity:  40 tablet   Refills:  11         Stop taking these medicines if you haven't already. Please contact your care team if you have questions.     cyanocolbalamin 100 MCG tablet   Commonly known as:  vitamin  B-12   Stopped by:  Arcadio Daley MD           hydrOXYzine 25 MG tablet   Commonly known as:  ATARAX   Stopped by:  Arcadio Daley MD           sulfamethoxazole-trimethoprim 800-160 MG per tablet   Commonly known as:  BACTRIM DS/SEPTRA DS   Stopped by:  Arcadio Daley MD                    Primary Care Provider Office Phone # Fax #    Macarena Queenie Vinson PA-C [phone]       [facility] [address]        Goals        General    Psychosocial (pt-stated)     Notes - Note created  5/30/2018  3:44 PM by Madelin Ontiveros LSW    Goal Statement: I would like to apply for MA and waiver services by 10/2018.  Measure of Success: [place] and [place] choices assessment completed  Supportive Steps to Achieve: ALEXIA CC will mail out how to  Date to Achieve By: 10/2018          Equal Access to  Services     [facility]: Hadii aad ku hadguadalupezeny Dailymerlin, waaxda luqadaha, qaybta kaalmada kimberly, cory trujillo . [facility] [phone].    ATENCIÓN: Si habla genie, tiene a michele disposición servicios gratuitos de asistencia lingüística. Llame al [phone].    We comply with applicable federal civil rights laws and [place] laws. We do not discriminate on the basis of race, color, national origin, age, disability, sex, sexual orientation, or gender identity.            Thank you!     Thank you for choosing [facility]  for your care. Our goal is always to provide you with excellent care. Hearing back from our patients is one way we can continue to improve our services. Please take a few minutes to complete the written survey that you may receive in the mail after your visit with us. Thank you!             Your Updated Medication List - Protect others around you: Learn how to safely use, store and throw away your medicines at www.disposemymeds.org.          This list is accurate as of 8/21/18 11:59 PM.  Always use your most recent med list.                   Brand Name Dispense Instructions for use Diagnosis    ADVAIR DISKUS 250-50 MCG/DOSE diskus inhaler   Generic drug:  fluticasone-salmeterol     3 Inhaler    INHALE 1 PUFF INTO THE LUNGS 2 TIMES DAILY    COPD (chronic obstructive pulmonary disease) (H)       albuterol 108 (90 Base) MCG/ACT inhaler    PROAIR HFA/PROVENTIL HFA/VENTOLIN HFA    3 Inhaler    Inhale 2 puffs into the lungs every 6 hours as needed for shortness of breath / dyspnea    Chronic obstructive pulmonary disease, unspecified COPD type (H)       buPROPion 150 MG 24 hr tablet    WELLBUTRIN XL    90 tablet    Take 1 tablet (150 mg) by mouth every morning    Major depressive disorder, recurrent episode, moderate (H), Tobacco use disorder       DULoxetine 60 MG EC capsule    CYMBALTA    90 capsule    Take 1 capsule (60 mg) by mouth daily    Major  depressive disorder, recurrent episode, moderate (H), Chronic pain disorder, Generalized anxiety disorder, Other urinary incontinence       miconazole 2 % powder    MICATIN; MICRO GUARD    30 g    Apply topically 2 times daily Apply to groin        multivitamin, therapeutic Tabs tablet      Take 1 tablet by mouth daily        nystatin 698332 UNIT/GM Powd    MYCOSTATIN    1 Bottle    Apply 1 g topically as needed    Candidal intertrigo       * oxybutynin 10 MG 24 hr tablet    DITROPAN XL    180 tablet    Take 2 tablets (20 mg) by mouth daily    Urgency incontinence       * oxybutynin 10 MG 24 hr tablet    DITROPAN XL    180 tablet    Take 3 tablets (30 mg) by mouth daily    Urinary incontinence, unspecified type       oxyCODONE-acetaminophen  MG per tablet    PERCOCET    120 tablet    1-2 tablets every 6 hrs prn-to fill on or after 9/9/18    Chronic pain disorder       senna-docusate 8.6-50 MG per tablet    SENOKOT-S;PERICOLACE    40 tablet    Take 1 tablet by mouth 2 times daily.    Ventral hernia       traZODone 50 MG tablet    DESYREL    90 tablet    TAKE 1-2 TABLETS BY MOUTH NIGHTLY AS NEEDED FOR SLEEP    Other insomnia       * Notice:  This list has 2 medication(s) that are the same as other medications prescribed for you. Read the directions carefully, and ask your doctor or other care provider to review them with you.

## 2018-08-21 NOTE — NURSING NOTE
Reason For Visit:   Chief Complaint   Patient presents with     Musculoskeletal Problem     Left club foot           Pain Assessment  Patient Currently in Pain: Yes  0-10 Pain Scale: 6  Primary Pain Location: Foot  Pain Orientation: Left  Pain Descriptors: Throbbing, Aching  Alleviating Factors: Pain medication  Aggravating Factors: Walking

## 2018-08-21 NOTE — PROGRESS NOTES
CHIEF COMPLAINT:  Left foot chronic deformity.      HISTORY OF PRESENT ILLNESS:  Mrs. Odonnell is a 73-year-old female who presents in the accompaniment of her brother for evaluation of the left foot.  The patient reports to have a history of club foot deformity which has been managed through her whole life by adjusting to different types of shoe wear.  Reports; however, that in 2014, she sustained what seems to be a left hip fracture and a left femur fracture that required surgery from those problems.  She is not clear exactly what happened, but she reports to have developed a deformity on the left foot as a result of the surgery.  The patient has been trying to get around deformity, but now it has reached the point where she is unable to continue going forward.      The patient has already some braces in the form of a CROW brace and in spite of her best efforts, continues having problems and difficulties.  She reports that the most recent brace that she got is still is not fitting well and provoked some ulcerations along the lateral aspect of the ankle joint.      She reports to also have lost a fair amount of weight, which is not clear if it was in intended or not.      She presents today for the possible treatment options in order to improve the alignment of the left foot.      PAST MEDICAL HISTORY:  DJD, COPD, tobacco and chronic pain disorder among others.      PAST SURGICAL HISTORY:  Please refer to encounter form.      DRUG ALLERGIES:  None.      MEDICATIONS:  Please refer to encounter form.      PHYSICAL EXAMINATION:  On today's visit, she presents as a pleasant female in no apparent distress with a height of 5 feet 5 inches and a weight of 147 kilos.  Denies to have any constitutional symptoms.      On today's visit, she presents with a left foot which presents as a non-correctable deformity with an inversion and equinus posture.  Presents with skin that is barely wrinkleable along the medial aspect of the  foot.  She also presented with some clawing of the lesser toes as well as a cock-up deformity of the great toe.  Presents also with a significant calf atrophy when compared to the opposite leg.      RADIOGRAPHIC EVALUATION:  Plain x-rays were reviewed today which were significant for showing not so much of a dysplastic foot, but clearly presents with a plantarflexed and inverted posture.  This is difficult to read how dysplastic the bones are on those x-rays; however, did not seem to have much dysplasia present.      ASSESSMENT:     1.  Acquired left foot deformity.   2.  Left club foot.      PLAN:  I discussed with the patient and her brother that at this point I do not believe that we can proceed with a single stitch corrective surgery as I do not think she has enough soft tissues medially in order to allow her to come out of the inversion and equinus.  I believe that from an acute surgical approach, the only option would be a below-the-knee amputation.      I also discussed with them that there is a possibility that Dr. Biswas would be able to apply a frame to her leg and to proceed with some progressive correction.      The patient was very adamant against a below-the-knee amputation today.  We will refer her to Dr. Biswas for an evaluation to see if in fact she is a candidate for an Ilizarov technique type of approach.      All questions were answered.  The patient was pleased with the discussion.      TT 30 minutes, CT 20 minutes.

## 2018-09-24 ENCOUNTER — PATIENT OUTREACH (OUTPATIENT)
Dept: CARE COORDINATION | Facility: CLINIC | Age: 73
End: 2018-09-24

## 2018-09-24 NOTE — PROGRESS NOTES
Clinic Care Coordination Contact  [facility]/Voicemail       Clinical Data: Care Coordinator Outreach  Outreach attempted x 1.  Left message on voicemail with call back information and requested return call.  Plan: Care Coordinator will try to reach patient again in 3-5 business days.    Madelin Ontiveros [facility]  Clinic Care Coordinator  [phone]  [email or web address]

## 2018-09-25 NOTE — PROGRESS NOTES
Clinic Care Coordination Contact  Clinic Care Coordination Contact  OUTREACH    Chief Complaint   Patient presents with     Clinic Care Coordination - Follow-up     Community Support Resources     Clinical Concerns:  Current Medical Concerns:  Pt was recently assessed by orthopedics and is having a difficult time accepting the advice of amputation and prosthesis and has opted not to pursue this route. Otherwise, pt is stable at this time.     Health Maintenance Reviewed: Due/Overdue     Functional Status:  Pt has some in home support on a monthly basis including housekeeping one time a month and lawn service once a month. Pt utilizes the assistance of the grocery store to load up her vehicle and her neighbors typically assist with unloading. ALEXIA MORA has encouraged pt to move on several occasions and obtain waiver services, however pt continues to be reluctant to move. ALEXIA MORA will make a referral to the [facility]  companionship services to try and get some additional assistance.     Living Situation:  I live alone in a home- paying monthly mortgage that is higher than income-dwindling down her savings.    Patient/Caregiver understanding: Pt reports understanding and denies any additional questions or concerns at this times. ALEXIA MORA engaged in AIDET communication during encounter.    Outreach Frequency: Maintenance- 2 months    Plan: ALEXIA MORA to place a referral to S and will follow-up with pt in 2 months.     Madelin Ontiveros [facility]  Clinic Care Coordinator  [phone]  [email or web address]

## 2018-10-03 ENCOUNTER — TELEPHONE (OUTPATIENT)
Dept: FAMILY MEDICINE | Facility: CLINIC | Age: 73
End: 2018-10-03

## 2018-10-03 DIAGNOSIS — G89.4 CHRONIC PAIN DISORDER: ICD-10-CM

## 2018-10-03 NOTE — TELEPHONE ENCOUNTER
Reason for Call:  Medication or medication refill:    Do you use a [facility]?  Name of the pharmacy and phone number for the current request:      Name of the medication requested: oxyCODONE-acetaminophen (PERCOCET)  MG per tablet    Other request: Patient will  at     Can we leave a detailed message on this number? YES    Phone number patient can be reached at: Home number on file [phone] (home)    Best Time: Please call patient when ready to be picked up at       Thank you!  Meenu HUITRON  Patient Representative  [facility]    Call taken on 10/3/2018 at 1:42 PM by Meenu Harden

## 2018-10-03 NOTE — TELEPHONE ENCOUNTER
Route to PCP for Percocet script.  Okay to wait until Monday.    Spoke with patient, who states she has enough Percocet to last until Monday when PCP returns.  She requests a script for this that day and would like to be notified when it is ready for pick-up.    Shruthi Vernon RN

## 2018-10-08 RX ORDER — OXYCODONE AND ACETAMINOPHEN 10; 325 MG/1; MG/1
1-2 TABLET ORAL EVERY 6 HOURS PRN
Qty: 120 TABLET | Refills: 0 | Status: SHIPPED | OUTPATIENT
Start: 2018-10-08 | End: 2018-12-31

## 2018-10-08 RX ORDER — OXYCODONE AND ACETAMINOPHEN 10; 325 MG/1; MG/1
1-2 TABLET ORAL EVERY 6 HOURS PRN
Qty: 120 TABLET | Refills: 0 | Status: SHIPPED | OUTPATIENT
Start: 2018-11-07 | End: 2018-12-31

## 2018-10-08 RX ORDER — OXYCODONE AND ACETAMINOPHEN 10; 325 MG/1; MG/1
1-2 TABLET ORAL EVERY 6 HOURS PRN
Qty: 120 TABLET | Refills: 0 | Status: SHIPPED | OUTPATIENT
Start: 2018-12-07 | End: 2018-12-31

## 2018-10-08 NOTE — TELEPHONE ENCOUNTER
Scripts for next 3 months printed. Please notify pt these are ready to .  Will need appointment prior to next set of refills.    Macarena Vinson PA-C

## 2018-10-08 NOTE — TELEPHONE ENCOUNTER
LMOM advising patient that RX is ready.    RX placed at  in  drawer, documented in book.    Thanks!  Cj Bennett

## 2018-10-10 NOTE — TELEPHONE ENCOUNTER
Patients sister, Akanksha picking up envelope, ID verified and envelope handed to Akanksha    .Susy Dinero  Patient Representative

## 2018-10-10 NOTE — TELEPHONE ENCOUNTER
Patient returned call, spoke with Shannan Avalos, and stated that her sister, Akanksha Baum, will be picking up prescription.    Thanks!  Cj Bennett

## 2018-10-15 ENCOUNTER — TELEPHONE (OUTPATIENT)
Dept: FAMILY MEDICINE | Facility: CLINIC | Age: 73
End: 2018-10-15

## 2018-10-15 ENCOUNTER — OFFICE VISIT (OUTPATIENT)
Dept: FAMILY MEDICINE | Facility: CLINIC | Age: 73
End: 2018-10-15
Payer: COMMERCIAL

## 2018-10-15 VITALS
DIASTOLIC BLOOD PRESSURE: 69 MMHG | WEIGHT: 126 LBS | TEMPERATURE: 98.4 F | SYSTOLIC BLOOD PRESSURE: 128 MMHG | HEIGHT: 65 IN | BODY MASS INDEX: 20.99 KG/M2 | OXYGEN SATURATION: 93 % | HEART RATE: 81 BPM

## 2018-10-15 DIAGNOSIS — T14.8XXA ABRASION: Primary | ICD-10-CM

## 2018-10-15 DIAGNOSIS — F33.1 MAJOR DEPRESSIVE DISORDER, RECURRENT EPISODE, MODERATE (H): ICD-10-CM

## 2018-10-15 DIAGNOSIS — N39.41 URGENCY INCONTINENCE: ICD-10-CM

## 2018-10-15 DIAGNOSIS — T14.8XXA ABRASION: ICD-10-CM

## 2018-10-15 DIAGNOSIS — F17.200 TOBACCO USE DISORDER: ICD-10-CM

## 2018-10-15 PROCEDURE — 99214 OFFICE O/P EST MOD 30 MIN: CPT | Performed by: FAMILY MEDICINE

## 2018-10-15 RX ORDER — BUPROPION HYDROCHLORIDE 150 MG/1
150 TABLET ORAL EVERY MORNING
Qty: 90 TABLET | Refills: 3 | Status: SHIPPED | OUTPATIENT
Start: 2018-10-15 | End: 2018-12-31

## 2018-10-15 RX ORDER — CEPHALEXIN 500 MG/1
500 CAPSULE ORAL 3 TIMES DAILY
Qty: 21 CAPSULE | Refills: 0 | Status: SHIPPED | OUTPATIENT
Start: 2018-10-15 | End: 2018-12-31

## 2018-10-15 RX ORDER — CEPHALEXIN 500 MG/1
500 CAPSULE ORAL 3 TIMES DAILY
Qty: 14 CAPSULE | Refills: 0 | Status: SHIPPED | OUTPATIENT
Start: 2018-10-15 | End: 2018-10-15

## 2018-10-15 RX ORDER — OXYBUTYNIN CHLORIDE 10 MG/1
20 TABLET, EXTENDED RELEASE ORAL DAILY
Qty: 180 TABLET | Refills: 3 | Status: SHIPPED | OUTPATIENT
Start: 2018-10-15 | End: 2018-10-15

## 2018-10-15 ASSESSMENT — ANXIETY QUESTIONNAIRES
6. BECOMING EASILY ANNOYED OR IRRITABLE: SEVERAL DAYS
1. FEELING NERVOUS, ANXIOUS, OR ON EDGE: SEVERAL DAYS
GAD7 TOTAL SCORE: 7
3. WORRYING TOO MUCH ABOUT DIFFERENT THINGS: SEVERAL DAYS
5. BEING SO RESTLESS THAT IT IS HARD TO SIT STILL: SEVERAL DAYS
7. FEELING AFRAID AS IF SOMETHING AWFUL MIGHT HAPPEN: NOT AT ALL
2. NOT BEING ABLE TO STOP OR CONTROL WORRYING: SEVERAL DAYS
IF YOU CHECKED OFF ANY PROBLEMS ON THIS QUESTIONNAIRE, HOW DIFFICULT HAVE THESE PROBLEMS MADE IT FOR YOU TO DO YOUR WORK, TAKE CARE OF THINGS AT HOME, OR GET ALONG WITH OTHER PEOPLE: SOMEWHAT DIFFICULT

## 2018-10-15 ASSESSMENT — PATIENT HEALTH QUESTIONNAIRE - PHQ9: 5. POOR APPETITE OR OVEREATING: MORE THAN HALF THE DAYS

## 2018-10-15 NOTE — TELEPHONE ENCOUNTER
Updated keflex order. It should be 21 tablet    Orders Placed This Encounter     cephALEXin (KEFLEX) 500 MG capsule     Sig: Take 1 capsule (500 mg) by mouth 3 times daily     Dispense:  21 capsule     Refill:  0         The refill I sent for the oxybutynin was the order pended for refill. If she had been on 3 tablers daily ok to refill at 3 tablets per day. Upon review she should not have needed a refill because the last was written in July. Ok to discontinue the order from today. Update med list when completed

## 2018-10-15 NOTE — TELEPHONE ENCOUNTER
Called and spoke with pharmacist. She wanted to clarify on meds below:    Oxybutynin, wants to verify that patient should be decreasing dose. Previously was getting 3 tablets daily and now decreased to 2 tablets daily. OV note states to continue present course.     Cephalexin, wants to verify that  Patient should take 1 capsule 3 times a day with a quantity of 14 tablets, so for for 4.5 days.    Will route to provider to advise.     Cj Maharaj RN

## 2018-10-15 NOTE — TELEPHONE ENCOUNTER
Reason for Call:  Other / Medications scripts questions (Oxybutynin and cephalexin)    Detailed comments: Lars from [facility] called and requested a call back to clarify some questions that she has regarding scripts received today for Oxybutynin and cephalexin. Lars would appreciate a call back today since patient has an antibiotic that needs to be started ASAP.    Phone Number Patient can be reached at: [phone] (Lars, [facility])    Best Time: Anytime    Can we leave a detailed message on this number? YES    Call taken on 10/15/2018 at 2:53 PM by Sindhu Stark

## 2018-10-15 NOTE — PROGRESS NOTES
SUBJECTIVE:   Ledy Odonnell is a 73 year old female who presents to clinic today for the following health issues:      Patient is here today because of facial injury she received after falling 1 week ago.  Patient states that she thinks thinks she fell out of her wheelchair after she may have fallen asleep.  Patient concerned that maybe she had some type of stroke.        Fell on floor. Occurred seven days ago. Unsure of how she fell, believed fell asleep. Unable to stand up, called ambulance. Believes rashes are due to rug burn.    Of note: Patient ambulates in wheelchair        Problem list and histories reviewed & adjusted, as indicated.  Additional history: as documented    Patient Active Problem List   Diagnosis     Esophageal reflux     Insomnia     FAMILY HX DIABETES MELLITUS brother     Tobacco use disorder     Obesity     Generalized anxiety disorder     Moderate major depression (H)     COPD (chronic obstructive pulmonary disease) (H)     Elevated fasting glucose     Chronic pain disorder     Idiopathic peripheral neuropathy     Hyperlipidemia LDL goal <130     Degenerative arthritis of hip     Hip arthrosis - left, severe     Trochanteric bursitis     Status post hip replacement     Advanced directives, counseling/discussion     Tobacco abuse     Gastroparesis     DDD (degenerative disc disease), lumbar     Delayed gastric emptying     Health Care Home     Candidal intertrigo     Ventral hernia     Femur fracture (H)     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     Past Surgical History:   Procedure Laterality Date     C HAND/FINGER SURGERY UNLISTED       C NONSPECIFIC PROCEDURE  2001    Shoulder Surgery     C NONSPECIFIC PROCEDURE  1975    Gastric Bypass     C NONSPECIFIC PROCEDURE      Cholecystectomy     C SHOULDER SURG PROC UNLISTED       C TOTAL HIP ARTHROPLASTY  1/4/2011     Lt VALERIE     HERNIORRHAPHY VENTRAL  5/14/2013    Procedure: HERNIORRHAPHY VENTRAL;  Open Ventral Hernia Repair;  Surgeon: Jhoan Rogers MD;  Location:  OR       Social History   Substance Use Topics     Smoking status: Current Every Day Smoker     Packs/day: 0.50     Years: 43.00     Types: Cigarettes     Smokeless tobacco: Never Used     Alcohol use No     Family History   Problem Relation Age of Onset     Cancer Father      pancreatic     HEART DISEASE Mother      Unknown specifics     Dementia Mother      Arthritis Paternal Grandmother      RA     Dementia Maternal Grandmother          Current Outpatient Prescriptions   Medication Sig Dispense Refill     ADVAIR DISKUS 250-50 MCG/DOSE diskus inhaler INHALE 1 PUFF INTO THE LUNGS 2 TIMES DAILY 3 Inhaler 3     albuterol (PROAIR HFA/PROVENTIL HFA/VENTOLIN HFA) 108 (90 BASE) MCG/ACT Inhaler Inhale 2 puffs into the lungs every 6 hours as needed for shortness of breath / dyspnea 3 Inhaler 1     buPROPion (WELLBUTRIN XL) 150 MG 24 hr tablet Take 1 tablet (150 mg) by mouth every morning 90 tablet 3     DULoxetine (CYMBALTA) 60 MG EC capsule Take 1 capsule (60 mg) by mouth daily 90 capsule 3     miconazole (MICATIN; MICRO GUARD) 2 % powder Apply topically 2 times daily Apply to groin 30 g 0     multivitamin, therapeutic (THERA-VIT) TABS tablet Take 1 tablet by mouth daily       nystatin (MYCOSTATIN) 933839 UNIT/GM POWD Apply 1 g topically as needed 1 Bottle 5     oxybutynin (DITROPAN XL) 10 MG 24 hr tablet Take 3 tablets (30 mg) by mouth daily 180 tablet 3     oxybutynin (DITROPAN XL) 10 MG 24 hr tablet Take 2 tablets (20 mg) by mouth daily 180 tablet 3     [START ON 12/7/2018] oxyCODONE-acetaminophen (PERCOCET)  MG per tablet Take 1-2 tablets by mouth every 6 hours as needed for severe pain 120 tablet 0     oxyCODONE-acetaminophen (PERCOCET)  MG per tablet Take 1-2 tablets by mouth every 6 hours as needed for severe pain 120 tablet 0     [START ON  11/7/2018] oxyCODONE-acetaminophen (PERCOCET)  MG per tablet Take 1-2 tablets by mouth every 6 hours as needed for severe pain 120 tablet 0     senna-docusate (SENOKOT-S;PERICOLACE) 8.6-50 MG per tablet Take 1 tablet by mouth 2 times daily. (Patient taking differently: Take 2 tablets by mouth daily ) 40 tablet 11     traZODone (DESYREL) 50 MG tablet TAKE 1-2 TABLETS BY MOUTH NIGHTLY AS NEEDED FOR SLEEP 90 tablet 2     Allergies   Allergen Reactions     No Known Allergies      Recent Labs   Lab Test  01/10/18   1204  07/21/17   1106  03/08/17   0607  03/07/17   1542  02/01/17   1011  09/13/16   1057   07/30/14   1149   08/12/10   0900   A1C   --    --    --    --    --    --    --   5.3   --   5.8   LDL  55  109*   --    --   124*  114*   < >  72   < >  58   HDL  52  52   --    --   49*  38*   < >  49*   < >  33*   TRIG  127  114   --    --   157*  218*   < >  193*   < >  196*   ALT  18   --    --   52*   --   24   < >   --    < >  27   CR  0.63  0.70  0.57  0.60   --    --    < >   --    < >  0.78   GFRESTIMATED  >90  83  >90  Non  GFR Calc    >90  Non  GFR Calc     --    --    < >   --    < >  74   GFRESTBLACK  >90  >90  African American GFR Calc    >90   GFR Calc    >90   GFR Calc     --    --    < >   --    < >  90   POTASSIUM  3.5   --   3.4  3.3*   --    --    < >   --    < >  4.8   TSH  0.60   --    --   0.42   --    --    < >   --    < >   --     < > = values in this interval not displayed.      BP Readings from Last 3 Encounters:   10/15/18 128/69   07/06/18 124/70   07/02/18 115/73    Wt Readings from Last 3 Encounters:   10/15/18 57.2 kg (126 lb)   02/21/18 66.7 kg (147 lb)   09/14/17 66.7 kg (147 lb)                  Labs reviewed in EPIC    Reviewed and updated as needed this visit by clinical staff       Reviewed and updated as needed this visit by Provider         ROS:  Constitutional, HEENT, cardiovascular, pulmonary, GI, ,  "musculoskeletal, neuro, skin, endocrine and psych systems are negative, except as otherwise noted.      This document serves as a record of the services and decisions personally performed and made by Jay Florez MD. It was created on their behalf by Raffaele Gamboa, a trained medical scribe. The creation of this document is based the provider's statements to the medical scribe.  Raffaele Gamboa October 15, 2018 1:35 PM    OBJECTIVE:     /69 (BP Location: Left arm, Cuff Size: Adult Regular)  Pulse 81  Temp 98.4  F (36.9  C) (Oral)  Ht 1.651 m (5' 5\")  Wt 57.2 kg (126 lb)  SpO2 93%  BMI 20.97 kg/m2  Body mass index is 20.97 kg/(m^2).  GENERAL: healthy, alert and no distress  NECK: no adenopathy, no asymmetry, masses, or scars and thyroid normal to palpation  HENT: No sceral icterus  RESP: lungs clear to auscultation - no rales, rhonchi or wheezes  CV: regular rate and rhythm, normal S1 S2, no S3 or S4, no murmur, click or rub, no peripheral edema and peripheral pulses strong  ABDOMEN: soft, nontender, no hepatosplenomegaly, no masses and bowel sounds normal  MS: no gross musculoskeletal defects noted, no edema  NEURO: mentation appears normal, affect normal/bright, normal strength and tone  SKIN:  abrasions on forehead, left hand, and left knee. No obvious cellulitis, left hand suspicious of early onset autism  PSYCH: mentation appears normal, affect normal/bright    Diagnostic Test Results:  none     ASSESSMENT/PLAN:       (T14.8XXA) Abrasion  (primary encounter diagnosis)  Comment: Patient fell onto fell. Abrasions noted on forehead, left hand and left knee  Plan: cephALEXin (KEFLEX) 500 MG capsule        Take medication as directed, monitor for signs of cellulitis    (N39.41) Urgency incontinence  Comment: Stable  Plan: oxybutynin (DITROPAN XL) 10 MG 24 hr tablet        Continue present course    (F33.1) Major depressive disorder, recurrent episode, moderate (H)  Comment: Stable  Plan: buPROPion (WELLBUTRIN " XL) 150 MG 24 hr tablet        Continue present course    (F17.200) Tobacco use disorder  Comment: Stable  Plan: buPROPion (WELLBUTRIN XL) 150 MG 24 hr tablet        Continue present course          Saji Florez MD, MD  [facility]    The information in this document, created by the medical scribe for me, accurately reflects the services I personally performed and the decisions made by me. I have reviewed and approved this document for accuracy prior to leaving the patient care area.

## 2018-10-15 NOTE — TELEPHONE ENCOUNTER
"Ledy Odonnell is a 73 year old female who calls with fall, facial injury.    NURSING ASSESSMENT:  Description: Patient states that a week ago she fell out of her wheelchair, she thinks maybe she fell asleep or wondering if she had a mini stroke or a seizure but has fallen asleep in the past and thinks that's probably what happened. \"It really tore up the flesh on my face.\" She reports yellow colored drainage, and the injury is all over her face. She states it's very painful.  She denies any neuro symptoms, vision changes, facial drooping, persistent vomiting, headache, issues moving limps, severe neck pain or fever.   Onset/duration:  1 week  Precip. factors:  fall  Associated symptoms:  See description  Improves/worsens symptoms:  n/a  Allergies:   Allergies   Allergen Reactions     No Known Allergies      NURSING PLAN: Nursing advice to patient seek medical care within 2-4 hours    RECOMMENDED DISPOSITION:  See in 4 hours, another person to drive   Will comply with recommendation: Yes  If further questions/concerns or if symptoms do not improve, worsen or new symptoms develop, call your PCP or [facility] as soon as possible.      Guideline used:  Telephone Triage Protocols for Nurses, Fifth Edition, Loli Estrada \"facial problems\" and \"head injury\"    Cj Maharaj RN    "

## 2018-10-15 NOTE — TELEPHONE ENCOUNTER
Called pharmacist Tere from [facility] and gave her message below. She verbalized understanding. Updated med list.    Cj Maharaj RN

## 2018-10-15 NOTE — MR AVS SNAPSHOT
"              After Visit Summary   10/15/2018    Ledy Odonnell    MRN: [id number]           Patient Information     Date Of Birth          1945        Visit Information        Provider Department      10/15/2018 1:40 PM Saji Florez MD [facility]        Today's Diagnoses     Abrasion    -  1    Urgency incontinence        Major depressive disorder, recurrent episode, moderate (H)        Tobacco use disorder           Follow-ups after your visit        Follow-up notes from your care team     Return in about 7 days (around 10/22/2018).      Who to contact     If you have questions or need follow up information about today's clinic visit or your schedule please contact [facility] directly at [phone].  Normal or non-critical lab and imaging results will be communicated to you by MyChart, letter or phone within 4 business days after the clinic has received the results. If you do not hear from us within 7 days, please contact the clinic through MyChart or phone. If you have a critical or abnormal lab result, we will notify you by phone as soon as possible.  Submit refill requests through [employer] or call your pharmacy and they will forward the refill request to us. Please allow 3 business days for your refill to be completed.          Additional Information About Your Visit        Care EveryWhere ID     This is your Care EveryWhere ID. This could be used by other organizations to access your [place] medical records  [id number]        Your Vitals Were     Pulse Temperature Height Pulse Oximetry BMI (Body Mass Index)       81 98.4  F (36.9  C) (Oral) 5' 5\" (1.651 m) 93% 20.97 kg/m2        Blood Pressure from Last 3 Encounters:   10/15/18 128/69   07/06/18 124/70   07/02/18 115/73    Weight from Last 3 Encounters:   10/15/18 126 lb (57.2 kg)   02/21/18 147 lb (66.7 kg)   09/14/17 147 lb (66.7 kg)              Today, you had the following     No orders found for " display         Today's Medication Changes          These changes are accurate as of 10/15/18  2:19 PM.  If you have any questions, ask your nurse or doctor.               Start taking these medicines.        Dose/Directions    cephALEXin 500 MG capsule   Commonly known as:  KEFLEX   Used for:  Abrasion   Started by:  Saji Florez MD        Dose:  500 mg   Take 1 capsule (500 mg) by mouth 3 times daily   Quantity:  14 capsule   Refills:  0         These medicines have changed or have updated prescriptions.        Dose/Directions    senna-docusate 8.6-50 MG per tablet   Commonly known as:  SENOKOT-S;PERICOLACE   This may have changed:    - how much to take  - when to take this   Used for:  Ventral hernia        Dose:  1 tablet   Take 1 tablet by mouth 2 times daily.   Quantity:  40 tablet   Refills:  11            Where to get your medicines      These medications were sent to [facility] [address]  [address] [facility] [address]     Phone:  [phone]     buPROPion 150 MG 24 hr tablet    cephALEXin 500 MG capsule    oxybutynin 10 MG 24 hr tablet                Primary Care Provider Office Phone # Fax #    Macarena Vinson PA-C [phone]       [facility] [facility] [address]        Goals        General    Psychosocial (pt-stated)     Notes - Note created  5/30/2018  3:44 PM by Madelin Ontiveros LSW    Goal Statement: I would like to apply for MA and waiver services by 10/2018.  Measure of Success: [place] and [place] choices assessment completed  Supportive Steps to Achieve: ALEXIA [employer] will mail out how to  Date to Achieve By: 10/2018          Equal Access to Services     [facility] SONIA AH: Hadii jalyn palomo haddean Somerlin, waaxda luqadaha, qaybta kaalmada cory harris. So [facility] [phone].    ATENCIÓN: Si habla español, tiene a michele disposición servicios gratuitos de asistencia lingüística. Llame  al [phone].    We comply with applicable federal civil rights laws and [place] laws. We do not discriminate on the basis of race, color, national origin, age, disability, sex, sexual orientation, or gender identity.            Thank you!     Thank you for choosing [facility]  for your care. Our goal is always to provide you with excellent care. Hearing back from our patients is one way we can continue to improve our services. Please take a few minutes to complete the written survey that you may receive in the mail after your visit with us. Thank you!             Your Updated Medication List - Protect others around you: Learn how to safely use, store and throw away your medicines at www.disposemymeds.org.          This list is accurate as of 10/15/18  2:19 PM.  Always use your most recent med list.                   Brand Name Dispense Instructions for use Diagnosis    ADVAIR DISKUS 250-50 MCG/DOSE diskus inhaler   Generic drug:  fluticasone-salmeterol     3 Inhaler    INHALE 1 PUFF INTO THE LUNGS 2 TIMES DAILY    COPD (chronic obstructive pulmonary disease) (H)       albuterol 108 (90 Base) MCG/ACT inhaler    PROAIR HFA/PROVENTIL HFA/VENTOLIN HFA    3 Inhaler    Inhale 2 puffs into the lungs every 6 hours as needed for shortness of breath / dyspnea    Chronic obstructive pulmonary disease, unspecified COPD type (H)       buPROPion 150 MG 24 hr tablet    WELLBUTRIN XL    90 tablet    Take 1 tablet (150 mg) by mouth every morning    Major depressive disorder, recurrent episode, moderate (H), Tobacco use disorder       cephALEXin 500 MG capsule    KEFLEX    14 capsule    Take 1 capsule (500 mg) by mouth 3 times daily    Abrasion       DULoxetine 60 MG EC capsule    CYMBALTA    90 capsule    Take 1 capsule (60 mg) by mouth daily    Major depressive disorder, recurrent episode, moderate (H), Chronic pain disorder, Generalized anxiety disorder, Other urinary incontinence       miconazole 2 %  powder    MICATIN; MICRO GUARD    30 g    Apply topically 2 times daily Apply to groin        multivitamin, therapeutic Tabs tablet      Take 1 tablet by mouth daily        nystatin 607461 UNIT/GM Powd    MYCOSTATIN    1 Bottle    Apply 1 g topically as needed    Candidal intertrigo       * oxybutynin 10 MG 24 hr tablet    DITROPAN XL    180 tablet    Take 3 tablets (30 mg) by mouth daily    Urinary incontinence, unspecified type       * oxybutynin 10 MG 24 hr tablet    DITROPAN XL    180 tablet    Take 2 tablets (20 mg) by mouth daily    Urgency incontinence       * oxyCODONE-acetaminophen  MG per tablet    PERCOCET    120 tablet    Take 1-2 tablets by mouth every 6 hours as needed for severe pain    Chronic pain disorder       * oxyCODONE-acetaminophen  MG per tablet   Start taking on:  11/7/2018    PERCOCET    120 tablet    Take 1-2 tablets by mouth every 6 hours as needed for severe pain    Chronic pain disorder       * oxyCODONE-acetaminophen  MG per tablet   Start taking on:  12/7/2018    PERCOCET    120 tablet    Take 1-2 tablets by mouth every 6 hours as needed for severe pain    Chronic pain disorder       senna-docusate 8.6-50 MG per tablet    SENOKOT-S;PERICOLACE    40 tablet    Take 1 tablet by mouth 2 times daily.    Ventral hernia       traZODone 50 MG tablet    DESYREL    90 tablet    TAKE 1-2 TABLETS BY MOUTH NIGHTLY AS NEEDED FOR SLEEP    Other insomnia       * Notice:  This list has 5 medication(s) that are the same as other medications prescribed for you. Read the directions carefully, and ask your doctor or other care provider to review them with you.

## 2018-10-15 NOTE — TELEPHONE ENCOUNTER
Reason for call:  Patient reporting a symptom    Symptom or request: patient fell, hurt face, states she has a riced cauliflower thing going on with her face    Duration (how long have symptoms been present): 1 week    Have you been treated for this before? No    Additional comments: patient states she woke up under the dining room table at 3am, she declined to go to the hospital, but now she doesn't think she'll be alive in a week. She states that 8:40 am on Wednesday is too early for her to come in    Phone Number patient can be reached at:  Home number on file [phone] (home)    Best Time:  asap    Can we leave a detailed message on this number:  YES    Call taken on 10/15/2018 at 10:38 AM by Cj Bennett

## 2018-10-16 ASSESSMENT — PATIENT HEALTH QUESTIONNAIRE - PHQ9: SUM OF ALL RESPONSES TO PHQ QUESTIONS 1-9: 10

## 2018-10-16 ASSESSMENT — ANXIETY QUESTIONNAIRES: GAD7 TOTAL SCORE: 7

## 2018-11-01 ENCOUNTER — OFFICE VISIT (OUTPATIENT)
Dept: ORTHOPEDICS | Facility: CLINIC | Age: 73
End: 2018-11-01
Payer: COMMERCIAL

## 2018-11-01 VITALS
BODY MASS INDEX: 20.99 KG/M2 | DIASTOLIC BLOOD PRESSURE: 79 MMHG | HEIGHT: 65 IN | WEIGHT: 126 LBS | SYSTOLIC BLOOD PRESSURE: 123 MMHG | RESPIRATION RATE: 12 BRPM

## 2018-11-01 DIAGNOSIS — M75.42 IMPINGEMENT SYNDROME OF BOTH SHOULDERS: Primary | ICD-10-CM

## 2018-11-01 DIAGNOSIS — M75.41 IMPINGEMENT SYNDROME OF BOTH SHOULDERS: Primary | ICD-10-CM

## 2018-11-01 DIAGNOSIS — M17.11 PRIMARY OSTEOARTHRITIS OF RIGHT KNEE: ICD-10-CM

## 2018-11-01 PROCEDURE — 20610 DRAIN/INJ JOINT/BURSA W/O US: CPT | Mod: 50 | Performed by: ORTHOPAEDIC SURGERY

## 2018-11-01 RX ORDER — METHYLPREDNISOLONE ACETATE 80 MG/ML
80 INJECTION, SUSPENSION INTRA-ARTICULAR; INTRALESIONAL; INTRAMUSCULAR; SOFT TISSUE ONCE
Qty: 1 ML | Refills: 0 | OUTPATIENT
Start: 2018-11-01 | End: 2019-04-26

## 2018-11-01 RX ORDER — TRIAMCINOLONE ACETONIDE 40 MG/ML
160 INJECTION, SUSPENSION INTRA-ARTICULAR; INTRAMUSCULAR ONCE
Qty: 4 ML | Refills: 0 | OUTPATIENT
Start: 2018-11-01 | End: 2019-04-26

## 2018-11-01 NOTE — LETTER
11/1/2018         RE: Ledy Odonnell  [address]  [place] [address]        Dear Colleague,    Thank you for referring your patient, Ledy Odonnell, to the [facility]. Please see a copy of my visit note below.    The patient's left and right shoulders were prepped with betadine solution after verification of allergies. Area approximately 10 cm x 10 cm prepped in a sterile fashion. After injection, betadine removed with soap and water and band-aids applied.    2ml kenalog with 1% lidocaine plain injected into each shoulder (for a total of 4 mL kenalog) by Dr. Eagle Gomez  LOT# [id number]  Exp. 03/2020    The patient's right knee was prepped with betadine solution after verification of allergies. Area approximately 10 cm x 10 cm prepped in a sterile fashion. After injection, betadine removed with soap and water and band-aids applied.    1ml depo-medrol with 1% lidocaine plain injected into patient's right knee by Dr. Eagle Gomez  LOT# [id number]  Exp. 11/19    Max Juarez PA-C  Supervising physician: Eagle Gomez MD  Dept. of Orthopedics  [facility]          Follow up bilateral shoulder impingement.  Last injection 7/2/18.  Patient desires injection today of bilateral shoulder.  Risks, benefits, potential complications and alternatives were discussed.   With the patient's consent, sterile prep was performed of bilateral shoulder.  Each shoulder was injected with Kenalog 80 mg and lidocaine at shoulder subacromial space from the posterolateral approach .    Follow up also for right knee primary osteoarthritis.  Last injection 7/2/18.  Range of motion 0-120.    She  desires injection today of right knee(s).  Risks, benefits, potential complications and alternatives were discussed.   With the patient's consent, sterile prep was performed of right knee(s).  Right knee was injected with Depo Medrol 80 mg and lidocaine at anteromedial site.  Return to  clinic as needed.            Again, thank you for allowing me to participate in the care of your patient.        Sincerely,        Eagle Gomez MD

## 2018-11-01 NOTE — PROGRESS NOTES
Follow up bilateral shoulder impingement.  Last injection 7/2/18.  Patient desires injection today of bilateral shoulder.  Risks, benefits, potential complications and alternatives were discussed.   With the patient's consent, sterile prep was performed of bilateral shoulder.  Each shoulder was injected with Kenalog 80 mg and lidocaine at shoulder subacromial space from the posterolateral approach .    Follow up also for right knee primary osteoarthritis.  Last injection 7/2/18.  Range of motion 0-120.    She  desires injection today of right knee(s).  Risks, benefits, potential complications and alternatives were discussed.   With the patient's consent, sterile prep was performed of right knee(s).  Right knee was injected with Depo Medrol 80 mg and lidocaine at anteromedial site.  Return to clinic as needed.

## 2018-11-01 NOTE — MR AVS SNAPSHOT
"              After Visit Summary   11/1/2018    Ledy Odonnell    MRN: [id number]           Patient Information     Date Of Birth          1945        Visit Information        Provider Department      11/1/2018 2:15 PM Eagle Gomez MD [facility]        Today's Diagnoses     Impingement syndrome of both shoulders    -  1    Primary osteoarthritis of right knee           Follow-ups after your visit        Who to contact     If you have questions or need follow up information about today's clinic visit or your schedule please contact [facility] directly at [phone].  Normal or non-critical lab and imaging results will be communicated to you by MyChart, letter or phone within 4 business days after the clinic has received the results. If you do not hear from us within 7 days, please contact the clinic through MyChart or phone. If you have a critical or abnormal lab result, we will notify you by phone as soon as possible.  Submit refill requests through [employer] or call your pharmacy and they will forward the refill request to us. Please allow 3 business days for your refill to be completed.          Additional Information About Your Visit        Care EveryWhere ID     This is your Care EveryWhere ID. This could be used by other organizations to access your [place] medical records  [id number]        Your Vitals Were     Respirations Height BMI (Body Mass Index)             12 1.651 m (5' 5\") 20.97 kg/m2          Blood Pressure from Last 3 Encounters:   11/01/18 123/79   10/15/18 128/69   07/06/18 124/70    Weight from Last 3 Encounters:   11/01/18 57.2 kg (126 lb)   10/15/18 57.2 kg (126 lb)   02/21/18 66.7 kg (147 lb)              We Performed the Following     DRAIN/INJECT LARGE JOINT/BURSA     DRAIN/INJECT LARGE JOINT/BURSA     METHYLPREDNISOLONE 80 MG INJ     TRIAMCINOLONE ACET INJ NOS          Today's Medication Changes          These changes are accurate as of " 11/1/18  6:20 PM.  If you have any questions, ask your nurse or doctor.               Start taking these medicines.        Dose/Directions    methylPREDNISolone acetate 80 MG/ML injection   Commonly known as:  DEPO-MEDROL   Used for:  Primary osteoarthritis of right knee   Started by:  Eagle Gomez MD        Dose:  80 mg   1 mL (80 mg) by INTRA-ARTICULAR route once for 1 dose   Quantity:  1 mL   Refills:  0       triamcinolone acetonide 40 MG/ML injection   Commonly known as:  KENALOG   Used for:  Impingement syndrome of both shoulders   Started by:  Eagle Gomez MD        Dose:  160 mg   4 mLs (160 mg) by INTRA-ARTICULAR route once for 1 dose   Quantity:  4 mL   Refills:  0         These medicines have changed or have updated prescriptions.        Dose/Directions    senna-docusate 8.6-50 MG per tablet   Commonly known as:  SENOKOT-S;PERICOLACE   This may have changed:    - how much to take  - when to take this   Used for:  Ventral hernia        Dose:  1 tablet   Take 1 tablet by mouth 2 times daily.   Quantity:  40 tablet   Refills:  11            Where to get your medicines      Some of these will need a paper prescription and others can be bought over the counter.  Ask your nurse if you have questions.     You don't need a prescription for these medications     methylPREDNISolone acetate 80 MG/ML injection    triamcinolone acetonide 40 MG/ML injection                Primary Care Provider Office Phone # Fax #    Macarena Vinson PA-C [phone]       [facility] [address]        Goals        General    Psychosocial (pt-stated)     Notes - Note created  5/30/2018  3:44 PM by Madelin Ontiveros LSW    Goal Statement: I would like to apply for MA and waiver services by 10/2018.  Measure of Success: [place] and [place] choices assessment completed  Supportive Steps to Achieve: [facility] will mail out how to  Date to Achieve By: 10/2018          Equal Access to Services      JENNIFER SCOTT : Hadii aad ku rayna Epps, waaxda luqadaha, qaybta kaalmada aderebel, cory kassie darwinkulwinder beard gracielaankit trujillo . So [facility] [phone].    ATENCIÓN: Si habla español, tiene a michele disposición servicios gratuitos de asistencia lingüística. Llame al [phone].    We comply with applicable federal civil rights laws and [place] laws. We do not discriminate on the basis of race, color, national origin, age, disability, sex, sexual orientation, or gender identity.            Thank you!     Thank you for choosing [facility]  for your care. Our goal is always to provide you with excellent care. Hearing back from our patients is one way we can continue to improve our services. Please take a few minutes to complete the written survey that you may receive in the mail after your visit with us. Thank you!             Your Updated Medication List - Protect others around you: Learn how to safely use, store and throw away your medicines at www.disposemymeds.org.          This list is accurate as of 11/1/18  6:20 PM.  Always use your most recent med list.                   Brand Name Dispense Instructions for use Diagnosis    ADVAIR DISKUS 250-50 MCG/DOSE diskus inhaler   Generic drug:  fluticasone-salmeterol     3 Inhaler    INHALE 1 PUFF INTO THE LUNGS 2 TIMES DAILY    COPD (chronic obstructive pulmonary disease) (H)       albuterol 108 (90 Base) MCG/ACT inhaler    PROAIR HFA/PROVENTIL HFA/VENTOLIN HFA    3 Inhaler    Inhale 2 puffs into the lungs every 6 hours as needed for shortness of breath / dyspnea    Chronic obstructive pulmonary disease, unspecified COPD type (H)       buPROPion 150 MG 24 hr tablet    WELLBUTRIN XL    90 tablet    Take 1 tablet (150 mg) by mouth every morning    Major depressive disorder, recurrent episode, moderate (H), Tobacco use disorder       cephALEXin 500 MG capsule    KEFLEX    21 capsule    Take 1 capsule (500 mg) by mouth 3 times daily    Abrasion        DULoxetine 60 MG EC capsule    CYMBALTA    90 capsule    Take 1 capsule (60 mg) by mouth daily    Major depressive disorder, recurrent episode, moderate (H), Chronic pain disorder, Generalized anxiety disorder, Other urinary incontinence       methylPREDNISolone acetate 80 MG/ML injection    DEPO-MEDROL    1 mL    1 mL (80 mg) by INTRA-ARTICULAR route once for 1 dose    Primary osteoarthritis of right knee       miconazole 2 % powder    MICATIN; MICRO GUARD    30 g    Apply topically 2 times daily Apply to groin        multivitamin, therapeutic Tabs tablet      Take 1 tablet by mouth daily        nystatin 080236 UNIT/GM Powd    MYCOSTATIN    1 Bottle    Apply 1 g topically as needed    Candidal intertrigo       oxybutynin 10 MG 24 hr tablet    DITROPAN XL    180 tablet    Take 3 tablets (30 mg) by mouth daily    Urinary incontinence, unspecified type       * oxyCODONE-acetaminophen  MG per tablet    PERCOCET    120 tablet    Take 1-2 tablets by mouth every 6 hours as needed for severe pain    Chronic pain disorder       * oxyCODONE-acetaminophen  MG per tablet   Start taking on:  11/7/2018    PERCOCET    120 tablet    Take 1-2 tablets by mouth every 6 hours as needed for severe pain    Chronic pain disorder       * oxyCODONE-acetaminophen  MG per tablet   Start taking on:  12/7/2018    PERCOCET    120 tablet    Take 1-2 tablets by mouth every 6 hours as needed for severe pain    Chronic pain disorder       senna-docusate 8.6-50 MG per tablet    SENOKOT-S;PERICOLACE    40 tablet    Take 1 tablet by mouth 2 times daily.    Ventral hernia       traZODone 50 MG tablet    DESYREL    90 tablet    TAKE 1-2 TABLETS BY MOUTH NIGHTLY AS NEEDED FOR SLEEP    Other insomnia       triamcinolone acetonide 40 MG/ML injection    KENALOG    4 mL    4 mLs (160 mg) by INTRA-ARTICULAR route once for 1 dose    Impingement syndrome of both shoulders       * Notice:  This list has 3 medication(s) that are the same as  other medications prescribed for you. Read the directions carefully, and ask your doctor or other care provider to review them with you.

## 2018-11-01 NOTE — PROGRESS NOTES
The patient's left and right shoulders were prepped with betadine solution after verification of allergies. Area approximately 10 cm x 10 cm prepped in a sterile fashion. After injection, betadine removed with soap and water and band-aids applied.    2ml kenalog with 1% lidocaine plain injected into each shoulder (for a total of 4 mL kenalog) by Dr. Eagle Gomez  LOT# [id number]  Exp. 03/2020    The patient's right knee was prepped with betadine solution after verification of allergies. Area approximately 10 cm x 10 cm prepped in a sterile fashion. After injection, betadine removed with soap and water and band-aids applied.    1ml depo-medrol with 1% lidocaine plain injected into patient's right knee by Dr. Eagle Gomez  LOT# [id number]  Exp. 11/19    Max Juarez PA-C  Supervising physician: Eagle Gomez MD  Dept. of Orthopedics  [facility]

## 2018-11-20 ENCOUNTER — TELEPHONE (OUTPATIENT)
Dept: FAMILY MEDICINE | Facility: CLINIC | Age: 73
End: 2018-11-20

## 2018-11-20 DIAGNOSIS — Q66.02 CONGENITAL TALIPES EQUINOVARUS DEFORMITY OF LEFT FOOT: Primary | ICD-10-CM

## 2018-11-20 NOTE — TELEPHONE ENCOUNTER
Reason for Call: Request for an order or referral:    Order or referral being requested: Orthotics    Date needed: as soon as possible    Has the patient been seen by the PCP for this problem? YES    Additional comments: Patient called and stated she has had the same shoes for 5 years and they are falling apart. She needs new orthotics. She would like to use [facility] and needs an order faxed to: [phone].    Phone number Patient can be reached at:  Home number on file [phone] (home)    Best Time:  Anytime    Can we leave a detailed message on this number?  YES    Call taken on 11/20/2018 at 2:26 PM by Ana Mustafa

## 2018-11-29 DIAGNOSIS — G47.09 OTHER INSOMNIA: ICD-10-CM

## 2018-11-29 NOTE — TELEPHONE ENCOUNTER
"Requested Prescriptions   Pending Prescriptions Disp Refills     traZODone (DESYREL) 50 MG tablet [Pharmacy Med Name: TRAZODONE HCL 50MG TABS]  Last Written Prescription Date:  4/24/2018  Last Fill Quantity: 90 tabs,  # refills: 2   Last office visit: 10/15/2018 with prescribing provider:  Gautam   Future Office Visit:     90 tablet 1     Sig: TAKE ONE TO TWO TABLETS BY MOUTH EVERY DAY AT BEDTIME AS NEEDED FOR SLEEP    Serotonin Modulators Passed    11/29/2018  3:51 PM       Passed - Recent (12 mo) or future (30 days) visit within the authorizing provider's specialty    Patient had office visit in the last 12 months or has a visit in the next 30 days with authorizing provider or within the authorizing provider's specialty.  See \"Patient Info\" tab in inbasket, or \"Choose Columns\" in Meds & Orders section of the refill encounter.             Passed - Patient is age 18 or older       Passed - No active pregnancy on record       Passed - No positive pregnancy test in past 12 months          "

## 2018-12-04 RX ORDER — TRAZODONE HYDROCHLORIDE 50 MG/1
TABLET, FILM COATED ORAL
Qty: 90 TABLET | Refills: 1 | Status: SHIPPED | OUTPATIENT
Start: 2018-12-04 | End: 2019-05-31

## 2018-12-04 NOTE — TELEPHONE ENCOUNTER
Prescription approved per [facility] [place] [place] Refill Protocol. Sapna Wilson RN

## 2018-12-12 ENCOUNTER — TELEPHONE (OUTPATIENT)
Dept: FAMILY MEDICINE | Facility: CLINIC | Age: 73
End: 2018-12-12

## 2018-12-12 NOTE — TELEPHONE ENCOUNTER
Forms received from [employer]: Confirmation of order for Macarena Vinson PA-C.  Forms placed in provider 'sign me' folder.  Please fax forms to [phone] after completion.    Thanks!  Cj Bennett

## 2018-12-20 ENCOUNTER — TELEPHONE (OUTPATIENT)
Dept: FAMILY MEDICINE | Facility: CLINIC | Age: 73
End: 2018-12-20

## 2018-12-28 ENCOUNTER — TELEPHONE (OUTPATIENT)
Dept: FAMILY MEDICINE | Facility: CLINIC | Age: 73
End: 2018-12-28

## 2018-12-28 ENCOUNTER — PATIENT OUTREACH (OUTPATIENT)
Dept: CARE COORDINATION | Facility: CLINIC | Age: 73
End: 2018-12-28

## 2018-12-28 NOTE — TELEPHONE ENCOUNTER
Reason for Call:  Other call back    Detailed comments: Patient called in regarding her appt. Patient states that she now has a ride for her appt on Monday 12/31 and that she is no longer in need of the phone appointment. Please call patient with any questions.     Phone Number Patient can be reached at: Cell number on file:    Telephone Information:   Mobile [phone]       Best Time: any     Can we leave a detailed message on this number? YES    Call taken on 12/28/2018 at 4:23 PM by Laurie Lagos

## 2018-12-28 NOTE — LETTER
[facility]  [address] [place] [address]    January 2, 2019    Ledy Odonnell  [address]  [facility] [address]      Dear Ledy,    I am a clinic care coordinator who works with Macarena Vinson PA-C at [facility]. I wanted to thank you for spending the time to talk with me.  I wanted to follow-up with you and see how you have been doing and if there is anything else I can assist you with.       Please feel free to contact me at [phone], with any questions or concerns.     Sincerely,     Madelin Russell

## 2018-12-28 NOTE — TELEPHONE ENCOUNTER
Reason for Call:  Other     Detailed comments: Patient is confined to a wheelchair at this time and states that she needs a lot of assistance getting out of the house and into the car. She said that they are expecting more snow again on Monday which will make it more difficult to get out. Patient is wondering if PCP would be willing to convert the office visit to a telephone visit.     Phone Number Patient can be reached at: Home number on file [phone] (home)    Best Time: any    Can we leave a detailed message on this number? YES    Call taken on 12/28/2018 at 12:59 PM by Christelle Vo

## 2018-12-28 NOTE — PROGRESS NOTES
Clinic Care Coordination Contact  [facility]/Voicemail       Clinical Data: Care Coordinator Outreach  Outreach attempted x 2.  Left message on voicemail with call back information and requested return call.  Plan: Care Coordinator will mail out care coordination unable to contact letter with care coordinator contact information and explanation of care coordination services. Care Coordinator will do no further outreaches at this time.    ANGEL Smith  Clinic Care Coordinator  [phone]  [email or web address]

## 2018-12-28 NOTE — TELEPHONE ENCOUNTER
Okay for phone follow up as has been in several times with other providers in past 6 months.  Please schedule phone f/u for chronic pain follow up.    Thanks  Macarena Vinson PA-C

## 2018-12-31 ENCOUNTER — OFFICE VISIT (OUTPATIENT)
Dept: FAMILY MEDICINE | Facility: CLINIC | Age: 73
End: 2018-12-31
Payer: COMMERCIAL

## 2018-12-31 VITALS
OXYGEN SATURATION: 98 % | HEART RATE: 85 BPM | WEIGHT: 136.13 LBS | DIASTOLIC BLOOD PRESSURE: 78 MMHG | BODY MASS INDEX: 22.68 KG/M2 | TEMPERATURE: 97.8 F | SYSTOLIC BLOOD PRESSURE: 128 MMHG | HEIGHT: 65 IN

## 2018-12-31 DIAGNOSIS — G89.4 CHRONIC PAIN DISORDER: ICD-10-CM

## 2018-12-31 DIAGNOSIS — J44.9 CHRONIC OBSTRUCTIVE PULMONARY DISEASE, UNSPECIFIED COPD TYPE (H): ICD-10-CM

## 2018-12-31 DIAGNOSIS — B37.2 CANDIDAL INTERTRIGO: ICD-10-CM

## 2018-12-31 DIAGNOSIS — F41.1 GENERALIZED ANXIETY DISORDER: ICD-10-CM

## 2018-12-31 DIAGNOSIS — F33.1 MAJOR DEPRESSIVE DISORDER, RECURRENT EPISODE, MODERATE (H): Primary | ICD-10-CM

## 2018-12-31 DIAGNOSIS — N39.0 URINARY TRACT INFECTION WITHOUT HEMATURIA, SITE UNSPECIFIED: ICD-10-CM

## 2018-12-31 DIAGNOSIS — M79.672 LEFT FOOT PAIN: ICD-10-CM

## 2018-12-31 DIAGNOSIS — F17.200 TOBACCO USE DISORDER: ICD-10-CM

## 2018-12-31 DIAGNOSIS — E78.5 HYPERLIPIDEMIA LDL GOAL <130: ICD-10-CM

## 2018-12-31 DIAGNOSIS — N39.498 OTHER URINARY INCONTINENCE: ICD-10-CM

## 2018-12-31 LAB
ALBUMIN UR-MCNC: 30 MG/DL
APPEARANCE UR: ABNORMAL
BACTERIA #/AREA URNS HPF: ABNORMAL /HPF
BILIRUB UR QL STRIP: NEGATIVE
COLOR UR AUTO: YELLOW
ERYTHROCYTE [DISTWIDTH] IN BLOOD BY AUTOMATED COUNT: 12.5 % (ref 10–15)
ERYTHROCYTE [SEDIMENTATION RATE] IN BLOOD BY WESTERGREN METHOD: 18 MM/H (ref 0–30)
GLUCOSE UR STRIP-MCNC: NEGATIVE MG/DL
HCT VFR BLD AUTO: 39.7 % (ref 35–47)
HGB BLD-MCNC: 13.3 G/DL (ref 11.7–15.7)
HGB UR QL STRIP: ABNORMAL
KETONES UR STRIP-MCNC: NEGATIVE MG/DL
LEUKOCYTE ESTERASE UR QL STRIP: ABNORMAL
MCH RBC QN AUTO: 30.6 PG (ref 26.5–33)
MCHC RBC AUTO-ENTMCNC: 33.5 G/DL (ref 31.5–36.5)
MCV RBC AUTO: 91 FL (ref 78–100)
NITRATE UR QL: POSITIVE
NON-SQ EPI CELLS #/AREA URNS LPF: ABNORMAL /LPF
PH UR STRIP: 7 PH (ref 5–7)
PLATELET # BLD AUTO: 344 10E9/L (ref 150–450)
RBC # BLD AUTO: 4.35 10E12/L (ref 3.8–5.2)
RBC #/AREA URNS AUTO: ABNORMAL /HPF
SOURCE: ABNORMAL
SP GR UR STRIP: 1.01 (ref 1–1.03)
UROBILINOGEN UR STRIP-ACNC: 0.2 EU/DL (ref 0.2–1)
WBC # BLD AUTO: 9.5 10E9/L (ref 4–11)
WBC #/AREA URNS AUTO: >100 /HPF

## 2018-12-31 PROCEDURE — 80061 LIPID PANEL: CPT | Performed by: PHYSICIAN ASSISTANT

## 2018-12-31 PROCEDURE — 36415 COLL VENOUS BLD VENIPUNCTURE: CPT | Performed by: PHYSICIAN ASSISTANT

## 2018-12-31 PROCEDURE — 85027 COMPLETE CBC AUTOMATED: CPT | Performed by: PHYSICIAN ASSISTANT

## 2018-12-31 PROCEDURE — 85652 RBC SED RATE AUTOMATED: CPT | Performed by: PHYSICIAN ASSISTANT

## 2018-12-31 PROCEDURE — 87086 URINE CULTURE/COLONY COUNT: CPT | Performed by: PHYSICIAN ASSISTANT

## 2018-12-31 PROCEDURE — 87186 SC STD MICRODIL/AGAR DIL: CPT | Performed by: PHYSICIAN ASSISTANT

## 2018-12-31 PROCEDURE — 81001 URINALYSIS AUTO W/SCOPE: CPT | Performed by: PHYSICIAN ASSISTANT

## 2018-12-31 PROCEDURE — 99214 OFFICE O/P EST MOD 30 MIN: CPT | Performed by: PHYSICIAN ASSISTANT

## 2018-12-31 PROCEDURE — 87088 URINE BACTERIA CULTURE: CPT | Performed by: PHYSICIAN ASSISTANT

## 2018-12-31 RX ORDER — SULFAMETHOXAZOLE/TRIMETHOPRIM 800-160 MG
1 TABLET ORAL 2 TIMES DAILY
Status: DISPENSED | OUTPATIENT
Start: 2018-12-31 | End: 2019-01-10

## 2018-12-31 RX ORDER — NYSTATIN 100000/ML
500000 SUSPENSION, ORAL (FINAL DOSE FORM) ORAL 4 TIMES DAILY
Qty: 200 ML | Refills: 0 | Status: SHIPPED | OUTPATIENT
Start: 2018-12-31 | End: 2019-02-15

## 2018-12-31 RX ORDER — OXYCODONE AND ACETAMINOPHEN 10; 325 MG/1; MG/1
1-2 TABLET ORAL EVERY 6 HOURS PRN
Qty: 120 TABLET | Refills: 0 | Status: SHIPPED | OUTPATIENT
Start: 2019-01-30 | End: 2019-03-05

## 2018-12-31 RX ORDER — OXYCODONE AND ACETAMINOPHEN 10; 325 MG/1; MG/1
1-2 TABLET ORAL EVERY 6 HOURS PRN
Qty: 120 TABLET | Refills: 0 | Status: SHIPPED | OUTPATIENT
Start: 2018-12-31 | End: 2019-03-05

## 2018-12-31 RX ORDER — BUPROPION HYDROCHLORIDE 300 MG/1
300 TABLET ORAL EVERY MORNING
Qty: 90 TABLET | Refills: 3 | Status: SHIPPED | OUTPATIENT
Start: 2018-12-31 | End: 2019-12-15

## 2018-12-31 RX ORDER — DULOXETIN HYDROCHLORIDE 60 MG/1
60 CAPSULE, DELAYED RELEASE ORAL DAILY
Qty: 90 CAPSULE | Refills: 3 | Status: SHIPPED | OUTPATIENT
Start: 2018-12-31 | End: 2020-02-17

## 2018-12-31 RX ORDER — ALBUTEROL SULFATE 90 UG/1
2 AEROSOL, METERED RESPIRATORY (INHALATION) EVERY 6 HOURS PRN
Qty: 3 INHALER | Refills: 1 | Status: SHIPPED | OUTPATIENT
Start: 2018-12-31 | End: 2021-04-29

## 2018-12-31 ASSESSMENT — ANXIETY QUESTIONNAIRES
6. BECOMING EASILY ANNOYED OR IRRITABLE: SEVERAL DAYS
1. FEELING NERVOUS, ANXIOUS, OR ON EDGE: SEVERAL DAYS
7. FEELING AFRAID AS IF SOMETHING AWFUL MIGHT HAPPEN: NOT AT ALL
5. BEING SO RESTLESS THAT IT IS HARD TO SIT STILL: NOT AT ALL
GAD7 TOTAL SCORE: 5
2. NOT BEING ABLE TO STOP OR CONTROL WORRYING: SEVERAL DAYS
IF YOU CHECKED OFF ANY PROBLEMS ON THIS QUESTIONNAIRE, HOW DIFFICULT HAVE THESE PROBLEMS MADE IT FOR YOU TO DO YOUR WORK, TAKE CARE OF THINGS AT HOME, OR GET ALONG WITH OTHER PEOPLE: SOMEWHAT DIFFICULT
3. WORRYING TOO MUCH ABOUT DIFFERENT THINGS: SEVERAL DAYS

## 2018-12-31 ASSESSMENT — MIFFLIN-ST. JEOR: SCORE: 1128.34

## 2018-12-31 ASSESSMENT — PATIENT HEALTH QUESTIONNAIRE - PHQ9
SUM OF ALL RESPONSES TO PHQ QUESTIONS 1-9: 6
5. POOR APPETITE OR OVEREATING: SEVERAL DAYS

## 2018-12-31 NOTE — PATIENT INSTRUCTIONS
Increase Wellbutrin to 300 mg per day  Antibiotics as directed x 10 days for foot and presumed UTI.  Macarena to call on Wednesday.  To ER if worsens.    Macarena Vinson PA-C

## 2018-12-31 NOTE — PROGRESS NOTES
b  SUBJECTIVE:   Ledy Odonnell is a 73 year old female who presents to clinic today for the following health issues:    Depression and Anxiety Follow-Up    Status since last visit: Worsened for depression    Other associated symptoms:Doesn't want to do anything, could sleep all day and all night.    Complicating factors:     Significant life event: Yes-  All started about 4 months ago when had to start using a wheelchair more often.       Current substance abuse: None    Had done mental health therapy via phone in the past, but doesn't recall this being helpful.  Weight is really down, has no appetite, not eating traditional large meals  Has lost 50 lbs in the past 2 years.  Feels she has been trying slightly to lose weight, but appetite is down, due to mood and isolation. 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    Having pain in her R foot.  She is having swelling and redness of the dorsum of her foot as was as her great toe.  Has been like this for several months.  Has met with ortho and podiatry, below the knee amputation has been discussed. She is not interested in this. Has an upcoming appointment to get new show and brace. Also having pain in her bilateral shoulders and R hip.    She did have a fall last year, fell asleep in her wheelchair and fell to the floor. She feels safe in her home, just very isolated.  She has lots of support from her neighbors but needs to be getting out of the house. Neighbors help with mowing the lawn, taking out the garbage, etc.  She continues to try and vacuum.  Feels like she would benefit from help with cleaning, laundry, running errands, etc. She has had Home care in past, but per her no longer meets criteria.  She is homebound. She is not able to leave her house without assistance.    Is no longer able to be a social person and doesn't get much social interaction. This is what she did in the past. Has one sister, but she works and is busy. Friends check on her on  occasion.    Bladder accident every night. Denies any pain, odor.  Would like to discuss Ditropan because it is not working. Is having a lot of leaking, especially through the night.  Has had UTI in past when this has occurred.    PHQ 7/6/2018 10/15/2018 12/31/2018   PHQ-9 Total Score 5 10 6   Q9: Suicide Ideation Not at all Several days Not at all     LESTER-7 SCORE 1/10/2018 10/15/2018 12/31/2018   Total Score - - -   Total Score 7 7 5     In the past two weeks have you had thoughts of suicide or self-harm?  No.    Do you have concerns about your personal safety or the safety of others?   No  PHQ-9  English  PHQ-9   Any Language  LESTER-7  Suicide Assessment Five-step Evaluation and Treatment (SAFE-T)    COPD Follow-Up    Symptoms are currently: stable    Current fatigue or dyspnea with ambulation: none    Shortness of breath: improved    Increased or change in Cough/Sputum: No    Fever(s): No    Baseline ambulation without stopping to rest:  Currently unable to walk alone but can walk a little with walker due to strength issues. Able to walk up NONE flights of stairs without stopping to rest.    Any ER/UC or hospital admissions since your last visit? No     History   Smoking Status     Current Every Day Smoker     Packs/day: 0.50     Years: 43.00     Types: Cigarettes   Smokeless Tobacco     Never Used     No results found for: FEV1, BPT0GSL    Amount of exercise or physical activity: None    Problems taking medications regularly: No    Medication side effects: none    Diet: regular (no restrictions)    Medication Followup of Percocet    Taking Medication as prescribed: yes    Side Effects:  None    Medication Helping Symptoms:  yes     Problem list and histories reviewed & adjusted, as indicated.  Additional history: as documented    Reviewed and updated as needed this visit by clinical staff  Tobacco  Allergies  Meds  Med Hx  Surg Hx  Fam Hx  Soc Hx      Reviewed and updated as needed this visit by Provider      "    ROS:  Constitutional, HEENT, cardiovascular, pulmonary, gi and gu systems are negative, except as otherwise noted.    OBJECTIVE:     /78 (BP Location: Left arm, Cuff Size: Adult Regular)   Pulse 85   Temp 97.8  F (36.6  C) (Oral)   Ht 1.651 m (5' 5\")   Wt 61.7 kg (136 lb 2 oz)   SpO2 98%   BMI 22.65 kg/m    Body mass index is 22.65 kg/m .  GENERAL: alert, no distress and in wheelchair  NECK: no adenopathy, no asymmetry, masses, or scars and thyroid normal to palpation  RESP: lungs clear to auscultation - no rales, rhonchi or wheezes  CV: regular rate and rhythm, normal S1 S2, no S3 or S4, no murmur, click or rub, no peripheral edema and peripheral pulses strong  ABDOMEN: soft, nontender, no hepatosplenomegaly, no masses and bowel sounds normal  MS: club foot deformity of L foot. Dorsum of L foot at base of great toe and great toe with tenderness, swelling and erythema.  No areas of drainage or fluctuance but there is a healing ulceration to her posterior heel.   PSYCH: mentation appears normal, affect normal/bright    Diagnostic Test Results:  none     ASSESSMENT/PLAN:   1. Major depressive disorder, recurrent episode, moderate (H)  2. Generalized anxiety disorder  Not doing well from a depression standpoint.  Is not able to get out of the house as much as she used to, very few social interactions., unable to chores arround the house, etc.  Referral to Care Coordination placed to assist with resources.  Increase Wellbutrin to 300 mg per day.  F/u in 1 month via phone.  - DULoxetine (CYMBALTA) 60 MG capsule; Take 1 capsule (60 mg) by mouth daily  Dispense: 90 capsule; Refill: 3  - buPROPion (WELLBUTRIN XL) 300 MG 24 hr tablet; Take 1 tablet (300 mg) by mouth every morning  Dispense: 90 tablet; Refill: 3    3. Chronic pain disorder  Refills of below medications for stable chronic conditions.  - DULoxetine (CYMBALTA) 60 MG capsule; Take 1 capsule (60 mg) by mouth daily  Dispense: 90 capsule; Refill: " 3  - oxyCODONE-acetaminophen (PERCOCET)  MG per tablet; Take 1-2 tablets by mouth every 6 hours as needed for severe pain  Dispense: 120 tablet; Refill: 0  - oxyCODONE-acetaminophen (PERCOCET)  MG per tablet; Take 1-2 tablets by mouth every 6 hours as needed for severe pain  Dispense: 120 tablet; Refill: 0  - oxyCODONE-acetaminophen (PERCOCET)  MG per tablet; Take 1-2 tablets by mouth every 6 hours as needed for severe pain  Dispense: 120 tablet; Refill: 0    4. Urinary tract infection without hematuria, site unspecified.  5. Other urinary incontinence  +UTI  Below medication as directed with full discussion of risks, benefits, and possible adverse effects.  - sulfamethoxazole-trimethoprim (BACTRIM DS/SEPTRA DS) 800-160 MG tablet; Take 1 tablet by mouth 2 times daily for 10 days  Dispense: 20 tablet; Refill: 0  - UA with Microscopic reflex to Culture    6. Left foot pain  Recommended x-ray and consultation with Podiatry.  Nml WBC and ESR  Pt declined x-ray and referral due to transportation issues. Sister in law was  and was upset she was going to have to wait longer.  Discussed warning s/s for which to seek emergent care.  Discussed with her that if this doesn't improve will need to f/u in clinic.  - CBC with platelets  - ESR: Erythrocyte sedimentation rate  - sulfamethoxazole-trimethoprim (BACTRIM DS/SEPTRA DS) 800-160 MG per tablet 1 tablet; Take 1 tablet by mouth 2 times daily    7. Candidal intertrigo  Refills of below medications for stable chronic conditions.  - nystatin (MYCOSTATIN) 571781 UNIT/ML suspension; Take 5 mLs (500,000 Units) by mouth 4 times daily for 10 days  Dispense: 200 mL; Refill: 0    8. Chronic obstructive pulmonary disease, unspecified COPD type (H)  9. Tobacco use disorder  Stable, overall well controlled.  - albuterol (PROAIR HFA/PROVENTIL HFA/VENTOLIN HFA) 108 (90 Base) MCG/ACT inhaler; Inhale 2 puffs into the lungs every 6 hours as needed for shortness of breath  / dyspnea  Dispense: 3 Inhaler; Refill: 1    10. Hyperlipidemia LDL goal <130  - Lipid panel reflex to direct LDL Fasting    Patient Instructions   Increase Wellbutrin to 300 mg per day  Antibiotics as directed x 10 days for foot and presumed UTI.  Macarena to call on Wednesday.  To ER if worsens.    WOLF Callahan PA-C  [facility]

## 2018-12-31 NOTE — NURSING NOTE
Handed patient 3 printed out prescription for:    3 for: oxyCODONE-acetaminophen (PERCOCET)  MG per tablet    Kim Flaherty CMA

## 2019-01-01 ENCOUNTER — TELEPHONE (OUTPATIENT)
Dept: FAMILY MEDICINE | Facility: CLINIC | Age: 74
End: 2019-01-01

## 2019-01-01 DIAGNOSIS — N39.0 URINARY TRACT INFECTION WITHOUT HEMATURIA, SITE UNSPECIFIED: Primary | ICD-10-CM

## 2019-01-01 LAB
CHOLEST SERPL-MCNC: 136 MG/DL
HDLC SERPL-MCNC: 45 MG/DL
LDLC SERPL CALC-MCNC: 73 MG/DL
NONHDLC SERPL-MCNC: 91 MG/DL
TRIGL SERPL-MCNC: 88 MG/DL

## 2019-01-01 RX ORDER — CEFDINIR 300 MG/1
300 CAPSULE ORAL 2 TIMES DAILY
Qty: 14 CAPSULE | Refills: 0 | Status: SHIPPED | OUTPATIENT
Start: 2019-01-01 | End: 2019-04-26

## 2019-01-01 ASSESSMENT — ANXIETY QUESTIONNAIRES: GAD7 TOTAL SCORE: 5

## 2019-01-02 ENCOUNTER — TELEPHONE (OUTPATIENT)
Dept: FAMILY MEDICINE | Facility: CLINIC | Age: 74
End: 2019-01-02

## 2019-01-02 LAB
BACTERIA SPEC CULT: ABNORMAL
SPECIMEN SOURCE: ABNORMAL

## 2019-01-02 RX ORDER — SULFAMETHOXAZOLE/TRIMETHOPRIM 800-160 MG
1 TABLET ORAL 2 TIMES DAILY
Qty: 20 TABLET | Refills: 0 | Status: SHIPPED | OUTPATIENT
Start: 2019-01-02 | End: 2019-01-25

## 2019-01-02 NOTE — TELEPHONE ENCOUNTER
Please call pt with her lab results.  Her urine did show an infection.  I sent an antibiotic on Monday that her urine is resistant to.  She will need to add an antibiotic.  I would recommend that she continue the Bactrim for her foot and start the new one that I will send to the pharmacy, called Cefdinir to treat her UTI.    Please see if her foot has improved at all.  If she has not seen any improvement after 24-48 hours after starting the abx, then she needs to be seen by Podiatry in the very near future. If it has worsened in any way, then she needs to present to the ED.    Her complete blood count and inflammatory marker were negative. Cholesterol looks good.    Macarena Vinson PA-C

## 2019-01-02 NOTE — TELEPHONE ENCOUNTER
Phone call to pharmacy to clarify. They were wanting to confirm that she needs both antibiotics and clarified that yes, she does.    Allen Haq RN

## 2019-01-02 NOTE — TELEPHONE ENCOUNTER
Bactrim was sent on 12/31/18, so I would recommend that she check with them. She will need to take both.  If not, I can send a new one.     sulfamethoxazole-trimethoprim (BACTRIM DS/SEPTRA DS) 800-160 MG per tablet 1 tablet   [[id number]]   Order Details   Ordered Dose: 1 tablet Route: Oral Frequency: 2 TIMES DAILY   Administration Dose: 1 tablet      Scheduled Start Date/Time: 12/31/18 2000 End Date/Time: 01/10/19 1959 after 20 doses       Macarena Vinson PA-C

## 2019-01-02 NOTE — TELEPHONE ENCOUNTER
"PCP resent medication as it was previously ordered as \"administered in clinic.\"  Patient notified that she will need to take both antibiotics. She will keep us informed of symptoms if not improving/worsening,    Allen Haq RN    "

## 2019-01-02 NOTE — TELEPHONE ENCOUNTER
Reason for Call:  Other prescription    Detailed comments: Please callback to clarify prescription for sulfamethoxazole-trimethoprim (BACTRIM DS/SEPTRA DS) 800-160 MG tablet - pharmacy has questions.    Phone Number Patient can be reached at: Other phone number:  [phone]    Best Time: as soon as possible    Can we leave a detailed message on this number? YES    Call taken on 1/2/2019 at 1:21 PM by Tanna Brown

## 2019-01-02 NOTE — TELEPHONE ENCOUNTER
Route patient update to PCP.    Phone call to patient who states her pharmacy never received Bactrim. I checked but didn't find a recent script for this.    She states her foot is about the same - no changes.   She did not wear a boot at all yesterday as recommended.    She verbalized understanding of lab results including urine test results and will  and start cefdinir as prescribed.  She will call if foot or urinary symptoms worsen, or if unimproved despite antibiotics. To ED as advised if foot worsens.    Allen Haq RN

## 2019-01-04 ENCOUNTER — TELEPHONE (OUTPATIENT)
Dept: FAMILY MEDICINE | Facility: CLINIC | Age: 74
End: 2019-01-04

## 2019-01-04 NOTE — TELEPHONE ENCOUNTER
Called Ev to f/u on her UTI and L foot pain/infection.  Urine is back to normal, foot pain and redness are improving.  Got a new shoe for her deformity this am and is very happy about this.  Doing really well overall.  Will notify me if symptoms are not resolved after antibiotics course.    Macarena Vinson PA-C

## 2019-01-25 DIAGNOSIS — N39.0 URINARY TRACT INFECTION WITHOUT HEMATURIA, SITE UNSPECIFIED: ICD-10-CM

## 2019-01-25 NOTE — TELEPHONE ENCOUNTER
"Requested Prescriptions   Pending Prescriptions Disp Refills     sulfamethoxazole-trimethoprim (BACTRIM DS/SEPTRA DS) 800-160 MG tablet [Pharmacy Med Name: SULFAMETHOXAZOLE--160TABS]  Last Written Prescription Date:  1/2/2019  Last Fill Quantity: 20 tablet,  # refills: 0   Last office visit: 12/31/2018 with prescribing provider:  JEFFERY Vinson   Future Office Visit:     20 tablet 0     Sig: TAKE ONE TABLET BY MOUTH TWICE A DAY FOR 10 DAYS    Oral Acne/Rosacea Medications Protocol Failed - 1/25/2019  3:01 PM       Failed - Confirmation of diagnosis is required    Please confirm diagnosis is acne or rosacea.     If NOT acne or rosacea; refer request to provider for further evaluation.    If diagnosis IS acne or rosacea, OK to refill BASED ON PREVIOUS REFILL CLINICAL NOTE RECOMMENDATION.         Failed - Medication is active on med list       Passed - Patient is 12 years of age or older       Passed - Recent (12 mo) or future (30 days) visit within the authorizing provider's specialty    Patient had office visit in the last 12 months or has a visit in the next 30 days with authorizing provider or within the authorizing provider's specialty.  See \"Patient Info\" tab in inbasket, or \"Choose Columns\" in Meds & Orders section of the refill encounter.             Passed - No active pregnancy on record       Passed - No positive prenancy test is past 12 months          "

## 2019-01-28 NOTE — TELEPHONE ENCOUNTER
"  I called patient to discuss.  She says foot is not warm or red.   The toe is still sore but not red or warm.   She says 2 other \"hot spots\" on her ankle have resolved.    No bladder symptoms.    Routing refill request to provider for review/approval because:  Drug not active on patient's medication list    Elma Garcia RN  [facility]  "

## 2019-01-29 RX ORDER — SULFAMETHOXAZOLE/TRIMETHOPRIM 800-160 MG
TABLET ORAL
Qty: 20 TABLET | Refills: 0 | Status: SHIPPED | OUTPATIENT
Start: 2019-01-29 | End: 2019-04-26

## 2019-01-29 NOTE — TELEPHONE ENCOUNTER
What symptoms is she requesting the refill for?  Per note below, appears that symptoms have resolved.    Thanks,  Macarena Vinson PA-C

## 2019-01-29 NOTE — TELEPHONE ENCOUNTER
Will route to RN that spoke with patient. Did patient say why she is requesting this med even though there are no symptoms?    Cj Maharaj RN

## 2019-01-29 NOTE — TELEPHONE ENCOUNTER
Okay to try one more course. If doesn't completely resolve, then I would like her to see Podiatry.    Thanks  Macarena Vinson PA-C

## 2019-01-29 NOTE — TELEPHONE ENCOUNTER
The patient feels the soreness might still be a problem and wondered if another round of antibiotic might completely resolve that.    Routed to provider to advise.    Elma Garcia RN  [facility]

## 2019-02-04 ENCOUNTER — TELEPHONE (OUTPATIENT)
Dept: FAMILY MEDICINE | Facility: CLINIC | Age: 74
End: 2019-02-04

## 2019-02-04 ASSESSMENT — PATIENT HEALTH QUESTIONNAIRE - PHQ9: SUM OF ALL RESPONSES TO PHQ QUESTIONS 1-9: 2

## 2019-02-04 NOTE — TELEPHONE ENCOUNTER
"Called and spoke with patient, she states that she \"has to evaluate it at a later date\" since she's been dealing with her foot issue. We did the PHQ-9 and her score improved from a 6 to a 2. Updated PCP. Will close.    Cj Maharaj RN    "

## 2019-02-04 NOTE — TELEPHONE ENCOUNTER
Please call Guillermina to see how she is doing with the higher dose of Wellbutrin. Please repeat PHQ-9 over the phone.    Thanks  Macarena Vinson PA-C

## 2019-02-15 DIAGNOSIS — B37.2 CANDIDAL INTERTRIGO: ICD-10-CM

## 2019-02-15 NOTE — TELEPHONE ENCOUNTER
Requested Prescriptions   Pending Prescriptions Disp Refills     nystatin (MYCOSTATIN) 874868 UNIT/ML suspension [Pharmacy Med Name: NYSTATIN [id number]/ML SUSP]  Last Written Prescription Date:  12/31/18  Last Fill Quantity: 200mL,  # refills: 0   Last office visit: 12/31/2018 with prescribing provider:  Karel   Future Office Visit:     200 mL 0     Sig: TAKE 5 ML BY MOUTH 4 TIMES A DAY FOR 10 DAYS    There is no refill protocol information for this order

## 2019-02-17 RX ORDER — NYSTATIN 100000/ML
SUSPENSION, ORAL (FINAL DOSE FORM) ORAL
Qty: 200 ML | Refills: 0 | Status: SHIPPED | OUTPATIENT
Start: 2019-02-17 | End: 2019-04-03

## 2019-02-18 ENCOUNTER — TELEPHONE (OUTPATIENT)
Dept: FAMILY MEDICINE | Facility: CLINIC | Age: 74
End: 2019-02-18

## 2019-02-18 DIAGNOSIS — B37.0 ORAL THRUSH: Primary | ICD-10-CM

## 2019-02-18 RX ORDER — CLOTRIMAZOLE 10 MG/1
10 LOZENGE ORAL
Qty: 50 TROCHE | Refills: 1 | Status: SHIPPED | OUTPATIENT
Start: 2019-02-18 | End: 2019-06-26

## 2019-02-18 NOTE — TELEPHONE ENCOUNTER
Reason for Call:  Other     Detailed comments:  [facility] pharmacy in [place] is calling to get a different an alternative for the nystatin (MYCOSTATIN) [id number] UNIT/ML suspension. This medication is on back order.    Phone Number Patient can be reached at: Other phone number:  [phone]    Best Time: any    Can we leave a detailed message on this number? YES    Call taken on 2/18/2019 at 11:45 AM by Ana Mullen

## 2019-02-19 NOTE — TELEPHONE ENCOUNTER
F/U done and does not appear patient was ever notified of this.  RN left VM for patient and reached out to pharmacy to see if patient ever picked this up.  [facility] states patient did pick it up on 1/30/19, so will close encounter at this time.    Shruthi Vernon RN

## 2019-03-04 ENCOUNTER — TELEPHONE (OUTPATIENT)
Dept: FAMILY MEDICINE | Facility: CLINIC | Age: 74
End: 2019-03-04

## 2019-03-04 DIAGNOSIS — G89.4 CHRONIC PAIN DISORDER: ICD-10-CM

## 2019-03-04 NOTE — TELEPHONE ENCOUNTER
Reason for Call:  Medication or medication refill:    Do you use a [facility]?  Name of the pharmacy and phone number for the current request:  [facility]     Name of the medication requested: oxyCODONE-acetaminophen     Other request: Patient stated that her refill is not due yet, however, since she is in a wheelchair and cannot drive anymore and she has a ride on Wednesday when she is seeing the [facility] doctor, she is requesting her PCP to refill her oxyCODONE-acetaminophen so that she can it up on Wednesday.  Please call patient back to confirm.    Can we leave a detailed message on this number? YES    Phone number patient can be reached at: Cell number on file:    Telephone Information:   Mobile [phone]     Best Time: ASAP    Call taken on 3/4/2019 at 4:00 PM by Sindhu Stark

## 2019-03-05 ENCOUNTER — OFFICE VISIT (OUTPATIENT)
Dept: ORTHOPEDICS | Facility: CLINIC | Age: 74
End: 2019-03-05
Payer: COMMERCIAL

## 2019-03-05 VITALS
BODY MASS INDEX: 22.65 KG/M2 | HEART RATE: 94 BPM | RESPIRATION RATE: 13 BRPM | DIASTOLIC BLOOD PRESSURE: 76 MMHG | OXYGEN SATURATION: 100 % | HEIGHT: 65 IN | SYSTOLIC BLOOD PRESSURE: 131 MMHG

## 2019-03-05 DIAGNOSIS — M17.11 LOCALIZED OSTEOARTHRITIS OF RIGHT KNEE: ICD-10-CM

## 2019-03-05 DIAGNOSIS — M75.40 IMPINGEMENT SYNDROME OF SHOULDER REGION, UNSPECIFIED LATERALITY: Primary | ICD-10-CM

## 2019-03-05 PROCEDURE — 20610 DRAIN/INJ JOINT/BURSA W/O US: CPT | Mod: 59 | Performed by: ORTHOPAEDIC SURGERY

## 2019-03-05 PROCEDURE — 20610 DRAIN/INJ JOINT/BURSA W/O US: CPT | Mod: 50 | Performed by: ORTHOPAEDIC SURGERY

## 2019-03-05 RX ORDER — OXYCODONE AND ACETAMINOPHEN 10; 325 MG/1; MG/1
1-2 TABLET ORAL EVERY 6 HOURS PRN
Qty: 120 TABLET | Refills: 0 | Status: SHIPPED | OUTPATIENT
Start: 2019-05-04 | End: 2019-05-31

## 2019-03-05 RX ORDER — OXYCODONE AND ACETAMINOPHEN 10; 325 MG/1; MG/1
1-2 TABLET ORAL EVERY 6 HOURS PRN
Qty: 120 TABLET | Refills: 0 | Status: SHIPPED | OUTPATIENT
Start: 2019-04-04 | End: 2019-04-26

## 2019-03-05 RX ORDER — OXYCODONE AND ACETAMINOPHEN 10; 325 MG/1; MG/1
1-2 TABLET ORAL EVERY 6 HOURS PRN
Qty: 120 TABLET | Refills: 0 | Status: SHIPPED | OUTPATIENT
Start: 2019-03-05 | End: 2019-04-26

## 2019-03-05 RX ADMIN — LIDOCAINE HYDROCHLORIDE 4 ML: 10 INJECTION, SOLUTION INFILTRATION; PERINEURAL at 13:43

## 2019-03-05 RX ADMIN — LIDOCAINE HYDROCHLORIDE 4 ML: 10 INJECTION, SOLUTION INFILTRATION; PERINEURAL at 13:41

## 2019-03-05 RX ADMIN — METHYLPREDNISOLONE ACETATE 80 MG: 80 INJECTION, SUSPENSION INTRA-ARTICULAR; INTRALESIONAL; INTRAMUSCULAR; SOFT TISSUE at 13:41

## 2019-03-05 RX ADMIN — METHYLPREDNISOLONE ACETATE 80 MG: 80 INJECTION, SUSPENSION INTRA-ARTICULAR; INTRALESIONAL; INTRAMUSCULAR; SOFT TISSUE at 13:43

## 2019-03-05 NOTE — TELEPHONE ENCOUNTER
Called [facility] pharmacy to clarify. Last time she filled it was 2/1/19. She should be able to fill this again since it has been 30 days from her last fill.     Called and spoke with patient, she just needs a refill on these meds for the next 3 months.      Route to PCP.    Cj Maharaj RN

## 2019-03-05 NOTE — LETTER
3/5/2019         RE: Ledy Odonnell  [address]  [facility] [address]        Dear Colleague,    Thank you for referring your patient, Ledy Odonnell, to the [facility]. Please see a copy of my visit note below.    Follow up, patient of Dr. Gomez's for bilateral shoulder impingement.  Last injection 11/10/18. Injection lasted 6 weeks. Patient does great during these 6 weeks. However uses her arms to get around in wheel chair.   Patient desires injection today of bilateral shoulder.  Risks, benefits, potential complications and alternatives were discussed.   With the patient's consent, sterile prep was performed of bilateral shoulder.  Each shoulder was injected with Kenalog 80 mg and lidocaine at shoulder subacromial space from the posterolateral approach .       Follow up also for right knee primary osteoarthritis.  Last injection  11/10/18. ~2.5 to 3 months. These injections go for longer because she doesn't do much walking. Occasional standing.   Range of motion 0-120.    She desires injection today of bilateral shoulders and right knee(s).  Risks, benefits, potential complications and alternatives were discussed.   With the patient's consent, sterile prep was performed of right knee(s).  Right knee was injected with Depo Medrol 80 mg and lidocaine at anteromedial site.  Return to clinic with Dr. Gomez as needed.            Prior to injection, verified patient identity using patient's name and date of birth.  Due to injection administration, patient instructed to remain in clinic for 15 minutes  afterwards, and to report any adverse reaction to me immediately.    Joint injection was performed.  mult large joint    Drug Amount Wasted:  None.  Vial/Syringe: Syringe  Expiration Date:  Jan 2020        Wolfgang Angeles OPA        Again, thank you for allowing me to participate in the care of your patient.        Sincerely,        Rex Woods MD

## 2019-03-05 NOTE — PROGRESS NOTES
Prior to injection, verified patient identity using patient's name and date of birth.  Due to injection administration, patient instructed to remain in clinic for 15 minutes  afterwards, and to report any adverse reaction to me immediately.    Joint injection was performed.  mult large joint    Drug Amount Wasted:  None.  Vial/Syringe: Syringe  Expiration Date:  Jan 2020        Wolfgang ULRICH

## 2019-03-05 NOTE — PROGRESS NOTES
Follow up, patient of Dr. Gomez's for bilateral shoulder impingement.  Last injection 11/10/18. Injection lasted 6 weeks. Patient does great during these 6 weeks. However uses her arms to get around in wheel chair.   Patient desires injection today of bilateral shoulder.  Risks, benefits, potential complications and alternatives were discussed.   With the patient's consent, sterile prep was performed of bilateral shoulder.  Each shoulder was injected with Kenalog 80 mg and lidocaine at shoulder subacromial space from the posterolateral approach .       Follow up also for right knee primary osteoarthritis.  Last injection 11/10/18. ~2.5 to 3 months. These injections go for longer because she doesn't do much walking. Occasional standing.   Range of motion 0-120.    She desires injection today of bilateral shoulders and right knee(s).  Risks, benefits, potential complications and alternatives were discussed.   With the patient's consent, sterile prep was performed of right knee(s).  Right knee was injected with Depo Medrol 80 mg and lidocaine at anteromedial site.  Return to clinic with Dr. Gomez as needed.

## 2019-03-05 NOTE — TELEPHONE ENCOUNTER
Scripts for next 3 month printed. Please place up front so patient can  tomorrow.    Macarena Vinson PA-C

## 2019-03-06 ENCOUNTER — OFFICE VISIT (OUTPATIENT)
Dept: PODIATRY | Facility: CLINIC | Age: 74
End: 2019-03-06
Payer: COMMERCIAL

## 2019-03-06 VITALS
DIASTOLIC BLOOD PRESSURE: 76 MMHG | SYSTOLIC BLOOD PRESSURE: 131 MMHG | HEART RATE: 94 BPM | HEIGHT: 65 IN | BODY MASS INDEX: 22.65 KG/M2

## 2019-03-06 DIAGNOSIS — M21.962 ACQUIRED DEFORMITY OF LEFT FOOT: Primary | ICD-10-CM

## 2019-03-06 PROCEDURE — 99203 OFFICE O/P NEW LOW 30 MIN: CPT | Performed by: PODIATRIST

## 2019-03-06 RX ORDER — METHYLPREDNISOLONE ACETATE 80 MG/ML
80 INJECTION, SUSPENSION INTRA-ARTICULAR; INTRALESIONAL; INTRAMUSCULAR; SOFT TISSUE
Status: DISCONTINUED | OUTPATIENT
Start: 2019-03-05 | End: 2020-01-14

## 2019-03-06 RX ORDER — LIDOCAINE HYDROCHLORIDE 10 MG/ML
4 INJECTION, SOLUTION INFILTRATION; PERINEURAL
Status: DISCONTINUED | OUTPATIENT
Start: 2019-03-05 | End: 2020-01-14

## 2019-03-06 RX ORDER — METHYLPREDNISOLONE ACETATE 80 MG/ML
80 INJECTION, SUSPENSION INTRA-ARTICULAR; INTRALESIONAL; INTRAMUSCULAR; SOFT TISSUE ONCE
Qty: 1 ML | Refills: 3 | OUTPATIENT
Start: 2019-03-06 | End: 2019-04-26

## 2019-03-06 NOTE — PROGRESS NOTES
PATIENT HISTORY:  Ledy Odonnell is a 73 year old female who presents to clinic for a chronic L foot deformity with recent worsening.  Reports issues with this for most of her life.  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  She basically walks on the side of her L foot.  She wears custom orthotics/shoes.  She has had a brace/CROW boot in the past but this did not work well for her.  Recent visit with Dr. Daley.  Reports hx of wounds, intermittent infection.  No on abx currently.  Nondiabetic.  Smoker.      Review of Systems:  Patient denies fever, chills, rash, stiffness, limping, numbness, weakness, heart burn, blood in stool, chest pain with activity, calf pain when walking, shortness of breath with activity, chronic cough, easy bleeding/bruising, fatigue, depression, anxiety.  Patient admits to wound, thirst, swelling of ankle.     PAST MEDICAL HISTORY:   Past Medical History:   Diagnosis Date     Depressive disorder, not elsewhere classified      GENERALIZED ANXIETY DIS 6/21/2007     Inflammatory arthritis      Osteoarthrosis, unspecified whether generalized or localized, unspecified site      Other and unspecified alcohol dependence, unspecified drinking behavior      Unspecified essential hypertension         PAST SURGICAL HISTORY:   Past Surgical History:   Procedure Laterality Date     C HAND/FINGER SURGERY UNLISTED       C NONSPECIFIC PROCEDURE  2001    Shoulder Surgery     C NONSPECIFIC PROCEDURE  1975    Gastric Bypass     C NONSPECIFIC PROCEDURE      Cholecystectomy     C SHOULDER SURG PROC UNLISTED       C TOTAL HIP ARTHROPLASTY  1/4/2011    Lt VALERIE     HERNIORRHAPHY VENTRAL  5/14/2013    Procedure: HERNIORRHAPHY VENTRAL;  Open Ventral Hernia Repair;  Surgeon: Jhoan Rogers MD;  Location:  OR        MEDICATIONS:   Current Outpatient Medications:      ADVAIR DISKUS 250-50 MCG/DOSE diskus inhaler, INHALE 1 PUFF INTO THE LUNGS 2 TIMES DAILY, Disp: 3 Inhaler, Rfl: 3     albuterol (PROAIR HFA/PROVENTIL  HFA/VENTOLIN HFA) 108 (90 Base) MCG/ACT inhaler, Inhale 2 puffs into the lungs every 6 hours as needed for shortness of breath / dyspnea, Disp: 3 Inhaler, Rfl: 1     buPROPion (WELLBUTRIN XL) 300 MG 24 hr tablet, Take 1 tablet (300 mg) by mouth every morning, Disp: 90 tablet, Rfl: 3     DULoxetine (CYMBALTA) 60 MG capsule, Take 1 capsule (60 mg) by mouth daily, Disp: 90 capsule, Rfl: 3     methylPREDNISolone (DEPO-MEDROL) 80 MG/ML injection, Inject 1 mL (80 mg) into the muscle once for 1 dose, Disp: 1 mL, Rfl: 3     miconazole (MICATIN; MICRO GUARD) 2 % powder, Apply topically 2 times daily Apply to groin, Disp: 30 g, Rfl: 0     multivitamin, therapeutic (THERA-VIT) TABS tablet, Take 1 tablet by mouth daily, Disp: , Rfl:      nystatin (MYCOSTATIN) [id number] UNIT/GM POWD, Apply 1 g topically as needed, Disp: 1 Bottle, Rfl: 5     nystatin (MYCOSTATIN) [id number] UNIT/ML suspension, TAKE 5 ML BY MOUTH 4 TIMES A DAY FOR 10 DAYS, Disp: 200 mL, Rfl: 0     oxybutynin (DITROPAN XL) 10 MG 24 hr tablet, Take 3 tablets (30 mg) by mouth daily, Disp: 180 tablet, Rfl: 3     [START ON 5/4/2019] oxyCODONE-acetaminophen (PERCOCET)  MG per tablet, Take 1-2 tablets by mouth every 6 hours as needed for severe pain, Disp: 120 tablet, Rfl: 0     [START ON 4/4/2019] oxyCODONE-acetaminophen (PERCOCET)  MG per tablet, Take 1-2 tablets by mouth every 6 hours as needed for severe pain, Disp: 120 tablet, Rfl: 0     oxyCODONE-acetaminophen (PERCOCET)  MG per tablet, Take 1-2 tablets by mouth every 6 hours as needed for severe pain, Disp: 120 tablet, Rfl: 0     senna-docusate (SENOKOT-S;PERICOLACE) 8.6-50 MG per tablet, Take 1 tablet by mouth 2 times daily. (Patient taking differently: Take 2 tablets by mouth daily ), Disp: 40 tablet, Rfl: 11     sulfamethoxazole-trimethoprim (BACTRIM DS/SEPTRA DS) 800-160 MG tablet, TAKE ONE TABLET BY MOUTH TWICE A DAY FOR 10 DAYS, Disp: 20 tablet, Rfl: 0     traZODone (DESYREL) 50 MG tablet, TAKE  ONE TO TWO TABLETS BY MOUTH EVERY DAY AT BEDTIME AS NEEDED FOR SLEEP, Disp: 90 tablet, Rfl: 1    Current Facility-Administered Medications:      lidocaine 1 % injection 4 mL, 4 mL, , , Rex Woods MD, 4 mL at 03/05/19 1341     lidocaine 1 % injection 4 mL, 4 mL, , , Rex Woods MD, 4 mL at 03/05/19 1341     lidocaine 1 % injection 4 mL, 4 mL, , , Rex Woods MD, 4 mL at 03/05/19 1343     methylPREDNISolone (DEPO-MEDROL) injection 80 mg, 80 mg, , , Rex Woods MD, 80 mg at 03/05/19 1341     methylPREDNISolone (DEPO-MEDROL) injection 80 mg, 80 mg, , , Rex Woods MD, 80 mg at 03/05/19 1341     methylPREDNISolone (DEPO-MEDROL) injection 80 mg, 80 mg, , , Rex Woods MD, 80 mg at 03/05/19 1343     ALLERGIES:    Allergies   Allergen Reactions     No Known Allergies         SOCIAL HISTORY:   Social History     Socioeconomic History     Marital status:      Spouse name: Not on file     Number of children: Not on file     Years of education: Not on file     Highest education level: Not on file   Occupational History     Not on file   Social Needs     Financial resource strain: Not on file     Food insecurity:     Worry: Not on file     Inability: Not on file     Transportation needs:     Medical: Not on file     Non-medical: Not on file   Tobacco Use     Smoking status: Current Every Day Smoker     Packs/day: 0.50     Years: 43.00     Pack years: 21.50     Types: Cigarettes     Smokeless tobacco: Never Used   Substance and Sexual Activity     Alcohol use: No     Alcohol/week: 0.0 oz     Drug use: No     Sexual activity: No   Lifestyle     Physical activity:     Days per week: Not on file     Minutes per session: Not on file     Stress: Not on file   Relationships     Social connections:     Talks on phone: Not on file     Gets together: Not on file     Attends Orthodoxy service: Not on file     Active member of club or organization: Not on file     Attends  "meetings of clubs or organizations: Not on file     Relationship status: Not on file     Intimate partner violence:     Fear of current or ex partner: Not on file     Emotionally abused: Not on file     Physically abused: Not on file     Forced sexual activity: Not on file   Other Topics Concern     Parent/sibling w/ CABG, MI or angioplasty before 65F 55M? No   Social History Narrative     Not on file        FAMILY HISTORY:   Family History   Problem Relation Age of Onset     Cancer Father         pancreatic     Heart Disease Mother         Unknown specifics     Dementia Mother      Arthritis Paternal Grandmother         RA     Dementia Maternal Grandmother         EXAM:Vitals: /76   Pulse 94   Ht 1.651 m (5' 5\")   BMI 22.65 kg/m    BMI= Body mass index is 22.65 kg/m .    General appearance: Patient is alert and fully cooperative with history & exam.  No sign of distress is noted during the visit.     Psychiatric: Affect is pleasant & appropriate.  Patient appears motivated to improve health.     Respiratory: Breathing is regular & unlabored while sitting.     HEENT: Hearing is intact to spoken word.  Speech is clear.  No gross evidence of visual impairment that would impact ambulation.     Dermatologic: Small pea sized eschars to L lateral foot/ankle over talar head and distal fibula area.  Dried blood at tip of L hallux nail which is adhered.  L 5th toenail bed also with some eschar.  R dorsal lesser toes with evidence of skin irritation but no elliott wounds.  No paronychia or evidence of soft tissue infection is noted.     Vascular: DP & PT pulses are 1/4 b/l.  L foot dependent rubor noted.  No significant edema or varicosities noted.  CFT and skin temperature are normal to both lower extremities.     Neurologic: Lower extremity sensation is intact to light touch.       Musculoskeletal: L foot rigid equinovarus deformity.  Patient is ambulatory without assistive device or brace.  No gross ankle deformity " noted.  No foot or ankle joint effusion is noted.    XRs of L foot reviewed with pt:    3 views left foot radiographs 8/21/2018 1:12 PM     History: AP/lateral/Oblique L foot. 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 Marked talipes equinovarus deformity with progressive  degenerative changes in the midfoot since comparison 2/10/2009. The  bones appear significantly more osteopenic, likely due to loss of use.  No displaced fracture.     Soft tissue is unremarkable.                                                                      Impression:  Marked worsening of talipes equinovarus deformity of the left foot  with scattered degenerative changes and disuse osteopenia.     I have personally reviewed the examination and initial interpretation  and I agree with the findings.     JYOTI NGUYEN MD     ASSESSMENT: L foot chronic rigid equinovarus deformity     PLAN:  Reviewed patient's chart in epic.  Discussed condition and treatment options including pros and cons.    Recent notes reviewed, including Dr. Daley's recent visit.  I would agree with his assessment/plan.      Pt has a severe rigid deformity with no great solution.  I don't see traditional surgical reconstruction as a good option as I don't think her soft tissue envelope will likely accommodate deformity reduction.  Below knee amputation is likely the best way to address this deformity if she wants to treat it surgically.  Pt is quite against amputation.  I am doubtful external fixation can help with this.    I don't see bracing as being able to adequately accommodate/offload this extremity.  Discussed risk of ulceration, infection, which can also lead to  amputation.    Dr. Daley's note mentions consultation with Dr. Biswas of [facility] to consider Ilizarov/external fixation.  I think it would be worth the visit to discuss the viability of this option, as this is not a surgical technique I perform.  Referral placed.    Discussed wound care.    Wound Care Recommendations:    1)  Keep the wound covered by a bandage when bathing.    2)  Gently clean the wound with soap water, separate from bath/shower water.      3)  Each day, apply a topical antibiotic ointment to the wound (Neosporin, Triple antibiotic, Bacitracin).   Cover with large band-aid or gauze.      4)  Please seek immediate medical attention if any increasing redness, swelling, drainage, smell, or pain related to the wound.       All questions answered.  Monitor wounds closely.  Pt will follow-up on an as-needed basis.      Ramone Pineda DPM, FACFAS    Weight management plan Patient was referred to their PCP to discuss a diet and exercise plan.

## 2019-03-06 NOTE — PATIENT INSTRUCTIONS
Thank you for choosing [facility]!    Follow up as needed    DR. IBARRA'S CLINIC LOCATIONS     MONDAY  [place] TUESDAY & FRIDAY AM  [place]   [address]   [address]   [place] [address] A[place]JAN[place]O [place] [address]   [phone]  [phone]  [phone]       WEDNESDAY  [place] SCHEDULE SURGERY: [phone]   [address] [facility] APPOINTMENTS: [phone]   [place] [address] BILLING QUESTIONS: [phone]   [phone]         SMOKING CESSATION  What's in cigarette smoke? - Cigarette smoke contains over 4,000 chemicals. Nicotine is one of the main ingredients which is an insecticide/herbicide. It is poisonous to our nervous system, increases blood clotting risk, and decreases the body's defenses to fight off infection. Another chemical is Carbon Monoxide is an asphyxiating gas that permanently binds to blood cells and blocks the transport of oxygen. This leads to tissue death and decreases your metabolism. Tar is a chemical that coats your lungs and trachea which impairs new oxygen coming in and carbon dioxide getting out of your body.   How does smoking impact surgery? - Smoking is particularly hazardous with regards to surgery. Surgery puts stress on the body and a smoker's body is already under strain from these chemicals. Putting the two together, especially for an elective surgery, could be a recipe for disaster. Smoking before and after surgery increases your risk of heart problems, slow wound healing, delayed bone healing, blood clots, wound infection and anesthesia complications.   What are the benefits of quitting? - In 20 minutes your blood pressure will drop back down to normal. In 8 hours the carbon monoxide (a toxic gas) levels in your blood stream will drop by half, and oxygen levels will return to normal. In 48 hours your chance of having a heart attack will have decreased. All nicotine will have  left your body. Your sense of taste and smell will return to a normal level. In 72 hours your bronchial tubes will relax, and your energy levels will increase. In 2 weeks your circulation will increase, and it will continue to improve for the next 10 weeks.    Recommendations for elective surgery - Ideally, patients should quit smoking 8 weeks before and at least 2 weeks after elective surgery in order to avoid complications. Simply cutting back on the amount of cigarettes smoked per day does not offer any benefit or decrease the risk of poor wound healing, heart problems, and infection. Smokers should also start taking Vitamin C and B for two weeks before surgery and two weeks after surgery.    Ways to Stop Smokin. Nicotine patches, lozenges, or gum  2. Support Groups  3. Medications (see below)    List of Medications:  1. Varenicline Tartrate (CHANTIX)   2. Bupropion HCL (WELLBUTRIN, ZYBAN) - note: make sure Wellbutrin is for smoking cessation and not other issues   3. Nicotine Patch (NICODERM)   4. Nicotine Inhaler (NICOTROL)   5. Nicotine Gum Nicotine Polacrilex   6. Nicotine Lozenge: Nicotine Polacrilex (COMMIT)   * [place] offers a smoking support group as well!  Please visit: [email or web address]  If you are interested in these, ask about getting a prescription or talk to your primary care doctor about what may be the best way for you to quit.       Wound Care Recommendations:    1)  Keep the wound covered by a bandage when bathing.    2)  Gently clean the wound with soap water, separate from bath/shower water.      3)  Each day, apply a topical antibiotic ointment to the wound (Neosporin, Triple antibiotic, Bacitracin).   Cover with large band-aid or gauze.      4)  Please seek immediate medical attention if any increasing redness, swelling, drainage, smell, or pain related to the wound.         Body Mass Index (BMI)  Many things can cause foot and ankle problems. Foot structure,  activity level, foot mechanics and injuries are common causes of pain.  One very important issue that often goes unmentioned, is body weight. Extra weight can cause increased stress on muscles, ligaments, bones and tendons.  Sometimes just a few extra pounds is all it takes to put one over her/his threshold. Without reducing that stress, it can be difficult to alleviate pain. Some people are uncomfortable addressing this issue, but we feel it is important for you to think about it. As Foot &  Ankle specialists, our job is addressing the lower extremity problem and possible causes. Regarding extra body weight, we encourage patients to discuss diet and weight management plans with their primary care doctors. It is this team approach that gives you the best opportunity for pain relief and getting you back on your feet.

## 2019-03-06 NOTE — LETTER
3/6/2019         RE: Ledy Odonnell  [address]  [facility] [address]        Dear Colleague,    Thank you for referring your patient, Ledy Odonnell, to the [facility]. Please see a copy of my visit note below.    PATIENT HISTORY:  Ledy Odonnell is a 73 year old female who presents to clinic for a chronic L foot deformity with recent worsening.  Reports issues with this for most of her life.  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  She basically walks on the side of her L foot.  She wears custom orthotics/shoes.  She has had a brace/CROW boot in the past but this did not work well for her.  Recent visit with Dr. Daley.  Reports hx of wounds, intermittent infection.  No on abx currently.  Nondiabetic.  Smoker.      Review of Systems:  Patient denies fever, chills, rash, stiffness, limping, numbness, weakness, heart burn, blood in stool, chest pain with activity, calf pain when walking, shortness of breath with activity, chronic cough, easy bleeding/bruising, fatigue, depression, anxiety.  Patient admits to wound, thirst, swelling of ankle.     PAST MEDICAL HISTORY:   Past Medical History:   Diagnosis Date     Depressive disorder, not elsewhere classified      GENERALIZED ANXIETY DIS 6/21/2007     Inflammatory arthritis      Osteoarthrosis, unspecified whether generalized or localized, unspecified site      Other and unspecified alcohol dependence, unspecified drinking behavior      Unspecified essential hypertension         PAST SURGICAL HISTORY:   Past Surgical History:   Procedure Laterality Date     C HAND/FINGER SURGERY UNLISTED       C NONSPECIFIC PROCEDURE  2001    Shoulder Surgery     C NONSPECIFIC PROCEDURE  1975    Gastric Bypass     C NONSPECIFIC PROCEDURE      Cholecystectomy     C SHOULDER SURG PROC UNLISTED       C TOTAL HIP ARTHROPLASTY  1/4/2011    Lt VALERIE     HERNIORRHAPHY VENTRAL  5/14/2013    Procedure: HERNIORRHAPHY VENTRAL;  Open Ventral Hernia Repair;   Surgeon: Jhoan Rogers MD;  Location:  OR        MEDICATIONS:   Current Outpatient Medications:      ADVAIR DISKUS 250-50 MCG/DOSE diskus inhaler, INHALE 1 PUFF INTO THE LUNGS 2 TIMES DAILY, Disp: 3 Inhaler, Rfl: 3     albuterol (PROAIR HFA/PROVENTIL HFA/VENTOLIN HFA) 108 (90 Base) MCG/ACT inhaler, Inhale 2 puffs into the lungs every 6 hours as needed for shortness of breath / dyspnea, Disp: 3 Inhaler, Rfl: 1     buPROPion (WELLBUTRIN XL) 300 MG 24 hr tablet, Take 1 tablet (300 mg) by mouth every morning, Disp: 90 tablet, Rfl: 3     DULoxetine (CYMBALTA) 60 MG capsule, Take 1 capsule (60 mg) by mouth daily, Disp: 90 capsule, Rfl: 3     methylPREDNISolone (DEPO-MEDROL) 80 MG/ML injection, Inject 1 mL (80 mg) into the muscle once for 1 dose, Disp: 1 mL, Rfl: 3     miconazole (MICATIN; MICRO GUARD) 2 % powder, Apply topically 2 times daily Apply to groin, Disp: 30 g, Rfl: 0     multivitamin, therapeutic (THERA-VIT) TABS tablet, Take 1 tablet by mouth daily, Disp: , Rfl:      nystatin (MYCOSTATIN) 701842 UNIT/GM POWD, Apply 1 g topically as needed, Disp: 1 Bottle, Rfl: 5     nystatin (MYCOSTATIN) 799861 UNIT/ML suspension, TAKE 5 ML BY MOUTH 4 TIMES A DAY FOR 10 DAYS, Disp: 200 mL, Rfl: 0     oxybutynin (DITROPAN XL) 10 MG 24 hr tablet, Take 3 tablets (30 mg) by mouth daily, Disp: 180 tablet, Rfl: 3     [START ON 5/4/2019] oxyCODONE-acetaminophen (PERCOCET)  MG per tablet, Take 1-2 tablets by mouth every 6 hours as needed for severe pain, Disp: 120 tablet, Rfl: 0     [START ON 4/4/2019] oxyCODONE-acetaminophen (PERCOCET)  MG per tablet, Take 1-2 tablets by mouth every 6 hours as needed for severe pain, Disp: 120 tablet, Rfl: 0     oxyCODONE-acetaminophen (PERCOCET)  MG per tablet, Take 1-2 tablets by mouth every 6 hours as needed for severe pain, Disp: 120 tablet, Rfl: 0     senna-docusate (SENOKOT-S;PERICOLACE) 8.6-50 MG per tablet, Take 1 tablet by mouth 2 times daily. (Patient taking  differently: Take 2 tablets by mouth daily ), Disp: 40 tablet, Rfl: 11     sulfamethoxazole-trimethoprim (BACTRIM DS/SEPTRA DS) 800-160 MG tablet, TAKE ONE TABLET BY MOUTH TWICE A DAY FOR 10 DAYS, Disp: 20 tablet, Rfl: 0     traZODone (DESYREL) 50 MG tablet, TAKE ONE TO TWO TABLETS BY MOUTH EVERY DAY AT BEDTIME AS NEEDED FOR SLEEP, Disp: 90 tablet, Rfl: 1    Current Facility-Administered Medications:      lidocaine 1 % injection 4 mL, 4 mL, , , Rex Woods MD, 4 mL at 03/05/19 1341     lidocaine 1 % injection 4 mL, 4 mL, , , Rex Woods MD, 4 mL at 03/05/19 1341     lidocaine 1 % injection 4 mL, 4 mL, , , Rex Woods MD, 4 mL at 03/05/19 1343     methylPREDNISolone (DEPO-MEDROL) injection 80 mg, 80 mg, , , Rex Woods MD, 80 mg at 03/05/19 1341     methylPREDNISolone (DEPO-MEDROL) injection 80 mg, 80 mg, , , Rex Woods MD, 80 mg at 03/05/19 1341     methylPREDNISolone (DEPO-MEDROL) injection 80 mg, 80 mg, , , Rex Woods MD, 80 mg at 03/05/19 1343     ALLERGIES:    Allergies   Allergen Reactions     No Known Allergies         SOCIAL HISTORY:   Social History     Socioeconomic History     Marital status:      Spouse name: Not on file     Number of children: Not on file     Years of education: Not on file     Highest education level: Not on file   Occupational History     Not on file   Social Needs     Financial resource strain: Not on file     Food insecurity:     Worry: Not on file     Inability: Not on file     Transportation needs:     Medical: Not on file     Non-medical: Not on file   Tobacco Use     Smoking status: Current Every Day Smoker     Packs/day: 0.50     Years: 43.00     Pack years: 21.50     Types: Cigarettes     Smokeless tobacco: Never Used   Substance and Sexual Activity     Alcohol use: No     Alcohol/week: 0.0 oz     Drug use: No     Sexual activity: No   Lifestyle     Physical activity:     Days per week: Not on file      "Minutes per session: Not on file     Stress: Not on file   Relationships     Social connections:     Talks on phone: Not on file     Gets together: Not on file     Attends Restorationism service: Not on file     Active member of club or organization: Not on file     Attends meetings of clubs or organizations: Not on file     Relationship status: Not on file     Intimate partner violence:     Fear of current or ex partner: Not on file     Emotionally abused: Not on file     Physically abused: Not on file     Forced sexual activity: Not on file   Other Topics Concern     Parent/sibling w/ CABG, MI or angioplasty before 65F 55M? No   Social History Narrative     Not on file        FAMILY HISTORY:   Family History   Problem Relation Age of Onset     Cancer Father         pancreatic     Heart Disease Mother         Unknown specifics     Dementia Mother      Arthritis Paternal Grandmother         RA     Dementia Maternal Grandmother         EXAM:Vitals: /76   Pulse 94   Ht 1.651 m (5' 5\")   BMI 22.65 kg/m     BMI= Body mass index is 22.65 kg/m .    General appearance: Patient is alert and fully cooperative with history & exam.  No sign of distress is noted during the visit.     Psychiatric: Affect is pleasant & appropriate.  Patient appears motivated to improve health.     Respiratory: Breathing is regular & unlabored while sitting.     HEENT: Hearing is intact to spoken word.  Speech is clear.  No gross evidence of visual impairment that would impact ambulation.     Dermatologic: Small pea sized eschars to L lateral foot/ankle over talar head and distal fibula area.  Dried blood at tip of L hallux nail which is adhered.  L 5th toenail bed also with some eschar.  R dorsal lesser toes with evidence of skin irritation but no elliott wounds.  No paronychia or evidence of soft tissue infection is noted.     Vascular: DP & PT pulses are 1/4 b/l.  L foot dependent rubor noted.  No significant edema or varicosities noted.  CFT " and skin temperature are normal to both lower extremities.     Neurologic: Lower extremity sensation is intact to light touch.       Musculoskeletal: L foot rigid equinovarus deformity.  Patient is ambulatory without assistive device or brace.  No gross ankle deformity noted.  No foot or ankle joint effusion is noted.    XRs of L foot reviewed with pt:    3 views left foot radiographs 8/21/2018 1:12 PM     History: AP/lateral/Oblique L foot. 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 Marked talipes equinovarus deformity with progressive  degenerative changes in the midfoot since comparison 2/10/2009. The  bones appear significantly more osteopenic, likely due to loss of use.  No displaced fracture.     Soft tissue is unremarkable.                                                                      Impression:  Marked worsening of talipes equinovarus deformity of the left foot  with scattered degenerative changes and disuse osteopenia.     I have personally reviewed the examination and initial interpretation  and I agree with the findings.     JYOTI NGUYEN MD     ASSESSMENT: L foot chronic rigid equinovarus deformity     PLAN:  Reviewed patient's chart in epic.  Discussed condition and treatment options including pros and cons.    Recent notes reviewed, including Dr. Daley's recent visit.  I would agree with his assessment/plan.      Pt has a severe rigid deformity with no great solution.  I don't see traditional surgical reconstruction as a good option as I don't think her soft tissue envelope will likely accommodate deformity reduction.  Below knee amputation is likely the best way to address this deformity if she wants to  treat it surgically.  Pt is quite against amputation.  I am doubtful external fixation can help with this.    I don't see bracing as being able to adequately accommodate/offload this extremity.  Discussed risk of ulceration, infection, which can also lead to amputation.    Dr. Daley's note mentions consultation with Dr. Biswas of [facility] to consider Ilizarov/external fixation.  I think it would be worth the visit to discuss the viability of this option, as this is not a surgical technique I perform.  Referral placed.    Discussed wound care.    Wound Care Recommendations:    1)  Keep the wound covered by a bandage when bathing.    2)  Gently clean the wound with soap water, separate from bath/shower water.      3)  Each day, apply a topical antibiotic ointment to the wound (Neosporin, Triple antibiotic, Bacitracin).   Cover with large band-aid or gauze.      4)  Please seek immediate medical attention if any increasing redness, swelling, drainage, smell, or pain related to the wound.       All questions answered.  Monitor wounds closely.  Pt will follow-up on an as-needed basis.      Ramone Pineda DPM, FACFAS    Weight management plan Patient was referred to their PCP to discuss a diet and exercise plan.      Again, thank you for allowing me to participate in the care of your patient.        Sincerely,        Ramone Pineda DPM

## 2019-03-06 NOTE — TELEPHONE ENCOUNTER
An Item was picked up at the [facility] :   Date it was picked up?  03/06/2019  What was picked up?  Envelope/scripts  Who picked them up?  Ledy Odonnell  Relationship to patient? Patient   Did they show ID?  Yes  Was it logged in book? Yes  Who gave it to the patient?  Junie Dominguez  Patient Representative

## 2019-04-03 DIAGNOSIS — B37.2 CANDIDAL INTERTRIGO: ICD-10-CM

## 2019-04-03 NOTE — TELEPHONE ENCOUNTER
Pharmacist calling to say that patient was wanting to refill the clotrimazole 10mg corbin, but it has been on back order for the past month and a half. She is wondering if someone will send over the nystatin suspension in place of the clotrimazole.    Thanks!  Cj Bennett

## 2019-04-03 NOTE — TELEPHONE ENCOUNTER
Requested Prescriptions   Pending Prescriptions Disp Refills     nystatin (MYCOSTATIN) [id number] UNIT/ML suspension [Pharmacy Med Name: NYSTATIN [id number]]  Last Written Prescription Date:  2/17/19  Last Fill Quantity: 200mL,  # refills: 0   Last office visit: 12/31/2018 with prescribing provider:  Karel   Future Office Visit:     200 mL 0     Sig: TAKE 5 ML BY MOUTH 4 TIMES A DAY FOR 10 DAYS    There is no refill protocol information for this order

## 2019-04-04 NOTE — TELEPHONE ENCOUNTER
The Nystatin was on back order on the 2/18/19 call which is why they requested the Clotrimazole in the first place - so both are back order but they want Nystatin?     Find out from our pharmacy what they have available and tell the patient to consider using our pharmacy instead.    Okay to verbally phone/call in to our pharmacy either option for her that is available - whichever she prefers/ uses most often.    SERGO Muñoz, PA-C

## 2019-04-05 NOTE — TELEPHONE ENCOUNTER
Called patient's pharmacy, they said that the Nystatin is available again and patient would prefer to use that. She prefers to use her own pharmacy. Ok with sending nystatin there?    Cj Maharaj RN

## 2019-04-06 RX ORDER — NYSTATIN 100000/ML
SUSPENSION, ORAL (FINAL DOSE FORM) ORAL
Qty: 200 ML | Refills: 0 | Status: SHIPPED | OUTPATIENT
Start: 2019-04-06 | End: 2019-07-09

## 2019-04-22 ENCOUNTER — TELEPHONE (OUTPATIENT)
Dept: FAMILY MEDICINE | Facility: CLINIC | Age: 74
End: 2019-04-22

## 2019-04-22 DIAGNOSIS — N39.0 RECURRENT UTI: Primary | ICD-10-CM

## 2019-04-22 NOTE — TELEPHONE ENCOUNTER
Reason for Call: Request for an order or referral:    Order or referral being requested: Urologist    Date needed: as soon as possible    Has the patient been seen by the PCP for this problem? YES    Additional comments: Patient states that she has seen Macarena for her urinary issues many times and she now would like a referral for a urologist.    Phone number Patient can be reached at:  Cell number on file:    Telephone Information:   Mobile [phone]       Best Time:  anytime    Can we leave a detailed message on this number?  YES    Call taken on 4/22/2019 at 10:34 AM by Ag Chadwick

## 2019-04-22 NOTE — TELEPHONE ENCOUNTER
Patient/family was instructed to return call to [facility] RN directly on the RN Call back line at [phone].     Need to know if patient is having any current UTI symptoms at this time.      Jorge Simental RN

## 2019-04-23 NOTE — TELEPHONE ENCOUNTER
Okay for Urology referral, please provide her with these options.  Dr. Wayne at [facility] would be my recommendation.  I have also placed an order for a urine if she would like to come leave this prior to Friday.    Thanks  Macarena Vinson PA-C

## 2019-04-23 NOTE — TELEPHONE ENCOUNTER
Information and recommendation from provider below relayed to patient with understanding voiced.  She states she lives in [place], so likely will not make an extra trip this week to leave a sample. I instructed she could do so at any [facility] that is accessible for her.  She voices understanding, is looking forward to seeing PCP on Friday and will reach out to urology to establish as well.    Shruthi Vernon RN

## 2019-04-23 NOTE — TELEPHONE ENCOUNTER
Route to PCP to advise please.  Patient scheduled for Friday for urinary sx, as it is the earliest you had open. She declined seeing another provider or going to urgent care.  Are you able to fit her in sooner, or are you willing to have her come in for lab only to leave a sample and treat, or provide a urology referral?    Patient reports repeated urinary issues. She had last seen PCP in December, was tx for UTI with abx at that time. She reports she has had these issues ongoing for a long time now, is wondering if she should see a urologist, as incontinence is worsening. She sleeps with an adult brief and pad under her, and she sometimes soaks the bed. She reports incontinence for about the past year, but it has been worsening.  She reports she has cut back her fluids due to this. She denies other acute UTI sx such as pain with urinating, blood in urine, back/flank pain, abdominal pain or fever. I recommended visit today or tomorrow, but she declined to see anyone accept for PCP.    Shruthi Vernon RN

## 2019-04-26 ENCOUNTER — OFFICE VISIT (OUTPATIENT)
Dept: FAMILY MEDICINE | Facility: CLINIC | Age: 74
End: 2019-04-26
Payer: COMMERCIAL

## 2019-04-26 ENCOUNTER — TELEPHONE (OUTPATIENT)
Dept: FAMILY MEDICINE | Facility: CLINIC | Age: 74
End: 2019-04-26

## 2019-04-26 VITALS
TEMPERATURE: 97.4 F | OXYGEN SATURATION: 96 % | SYSTOLIC BLOOD PRESSURE: 102 MMHG | BODY MASS INDEX: 22.65 KG/M2 | DIASTOLIC BLOOD PRESSURE: 70 MMHG | HEART RATE: 85 BPM | HEIGHT: 65 IN

## 2019-04-26 DIAGNOSIS — R63.4 UNINTENTIONAL WEIGHT LOSS: ICD-10-CM

## 2019-04-26 DIAGNOSIS — R32 URINARY INCONTINENCE, UNSPECIFIED TYPE: Primary | ICD-10-CM

## 2019-04-26 DIAGNOSIS — L98.9 SKIN LESION: ICD-10-CM

## 2019-04-26 DIAGNOSIS — N89.8 VAGINAL LESION: ICD-10-CM

## 2019-04-26 DIAGNOSIS — B37.2 CANDIDAL INTERTRIGO: ICD-10-CM

## 2019-04-26 LAB
ALBUMIN SERPL-MCNC: 2.9 G/DL (ref 3.4–5)
ALBUMIN UR-MCNC: NEGATIVE MG/DL
ALP SERPL-CCNC: 103 U/L (ref 40–150)
ALT SERPL W P-5'-P-CCNC: 26 U/L (ref 0–50)
ANION GAP SERPL CALCULATED.3IONS-SCNC: 6 MMOL/L (ref 3–14)
APPEARANCE UR: CLEAR
AST SERPL W P-5'-P-CCNC: 20 U/L (ref 0–45)
BACTERIA #/AREA URNS HPF: ABNORMAL /HPF
BILIRUB SERPL-MCNC: 0.3 MG/DL (ref 0.2–1.3)
BILIRUB UR QL STRIP: NEGATIVE
BUN SERPL-MCNC: 10 MG/DL (ref 7–30)
CALCIUM SERPL-MCNC: 9.5 MG/DL (ref 8.5–10.1)
CHLORIDE SERPL-SCNC: 90 MMOL/L (ref 94–109)
CO2 SERPL-SCNC: 31 MMOL/L (ref 20–32)
COLOR UR AUTO: YELLOW
CREAT SERPL-MCNC: 0.59 MG/DL (ref 0.52–1.04)
ERYTHROCYTE [DISTWIDTH] IN BLOOD BY AUTOMATED COUNT: 13.2 % (ref 10–15)
GFR SERPL CREATININE-BSD FRML MDRD: >90 ML/MIN/{1.73_M2}
GLUCOSE SERPL-MCNC: 86 MG/DL (ref 70–99)
GLUCOSE UR STRIP-MCNC: NEGATIVE MG/DL
HCT VFR BLD AUTO: 39.6 % (ref 35–47)
HGB BLD-MCNC: 13.7 G/DL (ref 11.7–15.7)
HGB UR QL STRIP: ABNORMAL
KETONES UR STRIP-MCNC: NEGATIVE MG/DL
LEUKOCYTE ESTERASE UR QL STRIP: ABNORMAL
MCH RBC QN AUTO: 30.5 PG (ref 26.5–33)
MCHC RBC AUTO-ENTMCNC: 34.6 G/DL (ref 31.5–36.5)
MCV RBC AUTO: 88 FL (ref 78–100)
NITRATE UR QL: NEGATIVE
NON-SQ EPI CELLS #/AREA URNS LPF: ABNORMAL /LPF
PH UR STRIP: 7 PH (ref 5–7)
PLATELET # BLD AUTO: 409 10E9/L (ref 150–450)
POTASSIUM SERPL-SCNC: 4.3 MMOL/L (ref 3.4–5.3)
PROT SERPL-MCNC: 7.3 G/DL (ref 6.8–8.8)
RBC # BLD AUTO: 4.49 10E12/L (ref 3.8–5.2)
RBC #/AREA URNS AUTO: ABNORMAL /HPF
SODIUM SERPL-SCNC: 127 MMOL/L (ref 133–144)
SOURCE: ABNORMAL
SP GR UR STRIP: 1.01 (ref 1–1.03)
TSH SERPL DL<=0.005 MIU/L-ACNC: 0.48 MU/L (ref 0.4–4)
UROBILINOGEN UR STRIP-ACNC: 0.2 EU/DL (ref 0.2–1)
WBC # BLD AUTO: 13.8 10E9/L (ref 4–11)
WBC #/AREA URNS AUTO: ABNORMAL /HPF

## 2019-04-26 PROCEDURE — 0064U ANTB TP TOTAL&RPR IA QUAL: CPT | Performed by: PHYSICIAN ASSISTANT

## 2019-04-26 PROCEDURE — 17110 DESTRUCTION B9 LES UP TO 14: CPT | Performed by: PHYSICIAN ASSISTANT

## 2019-04-26 PROCEDURE — 36415 COLL VENOUS BLD VENIPUNCTURE: CPT | Performed by: PHYSICIAN ASSISTANT

## 2019-04-26 PROCEDURE — 85027 COMPLETE CBC AUTOMATED: CPT | Performed by: PHYSICIAN ASSISTANT

## 2019-04-26 PROCEDURE — 80053 COMPREHEN METABOLIC PANEL: CPT | Performed by: PHYSICIAN ASSISTANT

## 2019-04-26 PROCEDURE — 84443 ASSAY THYROID STIM HORMONE: CPT | Performed by: PHYSICIAN ASSISTANT

## 2019-04-26 PROCEDURE — 87389 HIV-1 AG W/HIV-1&-2 AB AG IA: CPT | Performed by: PHYSICIAN ASSISTANT

## 2019-04-26 PROCEDURE — 81001 URINALYSIS AUTO W/SCOPE: CPT | Performed by: PHYSICIAN ASSISTANT

## 2019-04-26 PROCEDURE — 99214 OFFICE O/P EST MOD 30 MIN: CPT | Mod: 25 | Performed by: PHYSICIAN ASSISTANT

## 2019-04-26 RX ORDER — NYSTATIN 100000 [USP'U]/G
1 POWDER TOPICAL PRN
Qty: 1 BOTTLE | Refills: 5 | Status: SHIPPED | OUTPATIENT
Start: 2019-04-26 | End: 2019-06-05

## 2019-04-26 RX ORDER — TRAZODONE HYDROCHLORIDE 50 MG/1
TABLET, FILM COATED ORAL
Qty: 90 TABLET | Refills: 1 | Status: CANCELLED | OUTPATIENT
Start: 2019-04-26

## 2019-04-26 NOTE — PROGRESS NOTES
SUBJECTIVE:   Ledy Odonnell is a 73 year old female who presents to clinic today for the following   health issues:  URINARY TRACT SYMPTOMS  Onset: 4 years    Description: Patient has been using an overnight pad plus a diaper, towel or washcloth and underwear and she still soaks through at nighttime    In the past, she has had UTI and after treatment her symptoms typically improve back to baseline.  With this episode, it seems that she is losing more urine that usual.  Painful urination (Dysuria): not anymore  Blood in urine (Hematuria): no   Delay in urine (Hesitency): no     Intensity: moderate, severe    Progression of Symptoms:  worsening    Accompanying Signs & Symptoms:  Fever/chills: no   Flank pain no   Nausea and vomiting: no   Any vaginal symptoms: found a lump about the size of a silver dollar by her vagina.  The lump will move around, doesn't bother her.  Abdominal/Pelvic Pain: no     History:   History of frequent UTI's: YES- 4-6 months ago.  History of kidney stones: no   Sexually Active: no   Possibility of pregnancy: No    Precipitating factors:   She has dry mouth so she is always taking a sip of water    Has lost a lot of weight.  Is now down to 120 lbs.  27 lbs in the past 2 years.  Has never noticed this lump in her vagina before.  Noticed this with cleaning herself. Has been having lots of urinary incontinence and is now mostly confined to a wheel chair.   When stands, urine just starts coming out.   Has no control of her urine  Doesn't eat enough to have regular bowel movements but denies any fecal incontinence. Reports that her stools tend to be runny and dark.   Has no appetite.  Denies any vomiting, nausea.     Therapies Tried and outcome: none    -------------------------------------    Additional history: as documented    Reviewed  and updated as needed this visit by clinical staff  Tobacco  Allergies  Meds  Med Hx  Surg Hx  Fam Hx  Soc Hx        Reviewed and updated as needed  "this visit by Provider         ROS:  Constitutional, HEENT, cardiovascular, pulmonary, gi and gu systems are negative, except as otherwise noted.    OBJECTIVE:     /70 (Cuff Size: Adult Regular)   Pulse 85   Temp 97.4  F (36.3  C) (Oral)   Ht 1.651 m (5' 5\")   SpO2 96%   BMI 22.65 kg/m    Body mass index is 22.65 kg/m .  GENERAL: alert, no distress, frail and fatigued  NECK: no adenopathy, no asymmetry, masses, or scars and thyroid normal to palpation  RESP: lungs clear to auscultation - no rales, rhonchi or wheezes  CV: regular rate and rhythm, normal S1 S2, no S3 or S4, no murmur, click or rub, no peripheral edema and peripheral pulses strong  ABDOMEN: firm, nontender, no hepatosplenomegaly, no masses and bowel sounds normal  :  L posterior labia with 1-2 cm round, firm lesion with evidence of previous ulceration,.  Diagnostic Test Results:  In process  SKIN: R dorsal wrist with 3 mm firm white nodular scaling lesion    ASSESSMENT/PLAN:   1. Urinary incontinence, unspecified type  2. Unintentional weight loss  UA negative for infection.  I am very concerned about her continued weight loss and worsening incontinence  Below labs and CT scan  F/u pending results.  - CBC with platelets  - Comprehensive metabolic panel  - CT Abdomen Pelvis w Contrast; Future  - TSH with free T4 reflex  - HIV Screening    3. Skin lesion  AK vs other  The treatments, side effects and failure rates are discussed.  Liquid nitrogen was applied to the lesion The expected skin reaction including erythema, pain, scabbing, blistering and hypopigmented scar formation was discussed.  See at intervals until warts resolved.  - DESTRUCT BENIGN LESION, UP TO 14    4. Vaginal lesion  L posterior labia majora.  Pressure ulcer vs irritation from recurrent accidents and now mostly wheelchair bound.  Discussed importance of change of position, keeping area clean/dry, etc.  May need to have her see OBGYN.  Would benefit from a PCA.  - " Treponema Abs w Reflex to RPR and Titer    5. Candidal intertrigo  Refills of below medications for stable chronic conditions.  - nystatin (MYCOSTATIN) 748099 UNIT/GM external powder; Apply 1 g topically as needed  Dispense: 1 Bottle; Refill: 5  - miconazole (MICATIN/MICRO GUARD) 2 % external powder; Apply topically 2 times daily Apply to groin  Dispense: 30 g; Refill: 0    Patient Instructions   Macarena or her team will be in touch with you with results.  CT scan of abdomen/pelvis at [facility] as soon as you are able-10:15 am on Monday  Decrease oxybutynin as I don't feel this is helping, only causing dry mouth    WOLF Callahan PA-C  [facility]

## 2019-04-26 NOTE — PATIENT INSTRUCTIONS
Macarena or her team will be in touch with you with results.  CT scan of abdomen/pelvis at [facility] as soon as you are able-10:15 am on Monday  Decrease oxybutynin as I don't feel this is helping, only causing dry mouth    Macarena Vinson PA-C

## 2019-04-27 ENCOUNTER — NURSE TRIAGE (OUTPATIENT)
Dept: NURSING | Facility: CLINIC | Age: 74
End: 2019-04-27

## 2019-04-27 LAB — T PALLIDUM AB SER QL: NONREACTIVE

## 2019-04-27 NOTE — TELEPHONE ENCOUNTER
Placed another call to Stevie 10:39 am at home phone number/mobile number listed in epic.  Went to voice mail.  I have left two voice mails on my previous attempts to contact Ledy earlier this morning, did not leave a 3rd message.  See previous phone triage/phone encounter.    Attempting to notify patient of lab results and advice from PCP to be seen ER asap for medical management.    Message sent to PCP care team to follow up with patient on Monday.      Savana Diaz, RN  [facility]

## 2019-04-27 NOTE — TELEPHONE ENCOUNTER
Patient calling back, writer gave her the information below about her labs and to be seen in the ED.  She verbalized understanding and had no further questions.     Erika Nichols RN/EVELYNE

## 2019-04-27 NOTE — TELEPHONE ENCOUNTER
"Patient calling back stating she had a message to go into the Emergency Room due to labs. \"How urgent is it?\" Advised patient she should be seen in the Emergency Room now.   Per notes from 4/26/19 Katty/Macarena COLE state. She is significantly hyponatremic and hypochloremic.  I would like her to present to the ED to have these rechecked and managed.   Caller verbalized understanding. Denies further questions.    Sharlene Fuentes RN  [facility]      "

## 2019-04-27 NOTE — TELEPHONE ENCOUNTER
Please call pt with her results. She is significantly hyponatremic and hypochloremic.  I would like her to present to the ED to have these rechecked and managed.     Thank you,  Macarena Vinson PA-C

## 2019-04-27 NOTE — TELEPHONE ENCOUNTER
Clinic Action Needed:Yes, follow up with Ledy as needed (verify that she received this message)    Reason for Call: Called Ledy to give message from Macarena Vinson (See note below) regarding labs.  Left message to return call ASAP to clinic, press option to speak to nurse and call should be routed to 24/7 nurse triage line.  Advised in message that any nurse that received call at triage line will be able to retreive message from her chart. Message left at 6:07am on voice mail for number listed in Epic as home/mobile.     Routed to: NE Team Kyree Diaz, RN  [facility]

## 2019-04-27 NOTE — TELEPHONE ENCOUNTER
Attempted to reach Ledy at 8:35 am to give her message from PCP regarding recent labs.  See prior telephone encounter.  Again, no answer, left message on vm to call clinic and press option to speak to nurse.  Nurse triage line has a message to pass on to her.    Savana Diaz RN  [facility]

## 2019-04-29 LAB — HIV 1+2 AB+HIV1 P24 AG SERPL QL IA: NONREACTIVE

## 2019-04-30 ENCOUNTER — TELEPHONE (OUTPATIENT)
Dept: FAMILY MEDICINE | Facility: CLINIC | Age: 74
End: 2019-04-30

## 2019-04-30 NOTE — TELEPHONE ENCOUNTER
Please call patient to follow up.  Guillermina had to go to the ED over the weekend for new hyponatremia. She was seen her last week for increase in urinary incontinence and weight loss.  I had also recommended she have a CT scan this week so just want to make sure that she is able to do this or if she needs help finding transportation, etc.    Thanks  Macarena Vinson PA-C

## 2019-04-30 NOTE — TELEPHONE ENCOUNTER
"Patient reports she is doing much better and stronger, has stocked her fridge with electrolytes and Ensure and is eating better. She would like to get a bit more on her feet and \"get things\" together prior to the CT, is planning to schedule this in a week or so. I did provide her with the scheduling number again.    She states a family member may be able to bring her, and she will reach out to us if she has any issues with transportation.     She would like to thank PCP and myself for the great care and appreciates PCP very much, states she just got a survey in the mail and rated us 10/10.     Shruthi Vernon RN    "

## 2019-05-20 ENCOUNTER — ANCILLARY PROCEDURE (OUTPATIENT)
Dept: CT IMAGING | Facility: CLINIC | Age: 74
End: 2019-05-20
Attending: PHYSICIAN ASSISTANT
Payer: COMMERCIAL

## 2019-05-20 DIAGNOSIS — R63.4 UNINTENTIONAL WEIGHT LOSS: ICD-10-CM

## 2019-05-20 DIAGNOSIS — R32 URINARY INCONTINENCE, UNSPECIFIED TYPE: ICD-10-CM

## 2019-05-20 PROCEDURE — 74177 CT ABD & PELVIS W/CONTRAST: CPT | Performed by: RADIOLOGY

## 2019-05-20 RX ORDER — IOPAMIDOL 755 MG/ML
84 INJECTION, SOLUTION INTRAVASCULAR ONCE
Status: COMPLETED | OUTPATIENT
Start: 2019-05-20 | End: 2019-05-20

## 2019-05-20 RX ADMIN — IOPAMIDOL 84 ML: 755 INJECTION, SOLUTION INTRAVASCULAR at 14:15

## 2019-05-21 ENCOUNTER — TELEPHONE (OUTPATIENT)
Dept: FAMILY MEDICINE | Facility: CLINIC | Age: 74
End: 2019-05-21

## 2019-05-21 NOTE — TELEPHONE ENCOUNTER
Please call Guillermina with her CT results.   There were no obvious reasons for her weight loss or bladder problems.  I would recommend follow up with Urology as we discussed should her symptoms persist.    Thanks  Macarena Vinson PA-C

## 2019-05-21 NOTE — TELEPHONE ENCOUNTER
Reached out to patient to relay message from PCP with understanding voiced. Patient states that she has an appointment scheduled with urology on Thursday and will be in touch with us again if needed. Closing encounter.     Izzy Mendez RN

## 2019-05-21 NOTE — TELEPHONE ENCOUNTER
Patient/family was instructed to return call to [facility] RN directly on the RN Call back line at [phone].     Izzy Mendez RN

## 2019-05-23 ENCOUNTER — OFFICE VISIT (OUTPATIENT)
Dept: UROLOGY | Facility: CLINIC | Age: 74
End: 2019-05-23
Payer: COMMERCIAL

## 2019-05-23 VITALS — DIASTOLIC BLOOD PRESSURE: 77 MMHG | SYSTOLIC BLOOD PRESSURE: 135 MMHG | HEART RATE: 81 BPM | OXYGEN SATURATION: 97 %

## 2019-05-23 DIAGNOSIS — N39.41 URGENCY INCONTINENCE: Primary | ICD-10-CM

## 2019-05-23 LAB
ALBUMIN UR-MCNC: NEGATIVE MG/DL
APPEARANCE UR: CLEAR
BACTERIA #/AREA URNS HPF: ABNORMAL /HPF
BILIRUB UR QL STRIP: NEGATIVE
COLOR UR AUTO: YELLOW
GLUCOSE UR STRIP-MCNC: NEGATIVE MG/DL
HGB UR QL STRIP: NEGATIVE
KETONES UR STRIP-MCNC: NEGATIVE MG/DL
LEUKOCYTE ESTERASE UR QL STRIP: ABNORMAL
NITRATE UR QL: NEGATIVE
PH UR STRIP: 6.5 PH (ref 5–7)
RBC #/AREA URNS AUTO: ABNORMAL /HPF
SOURCE: ABNORMAL
SP GR UR STRIP: <=1.005 (ref 1–1.03)
UROBILINOGEN UR STRIP-ACNC: 0.2 EU/DL (ref 0.2–1)
WBC #/AREA URNS AUTO: ABNORMAL /HPF

## 2019-05-23 PROCEDURE — 99205 OFFICE O/P NEW HI 60 MIN: CPT | Mod: 25 | Performed by: UROLOGY

## 2019-05-23 PROCEDURE — 52000 CYSTOURETHROSCOPY: CPT | Performed by: UROLOGY

## 2019-05-23 PROCEDURE — 81001 URINALYSIS AUTO W/SCOPE: CPT | Performed by: UROLOGY

## 2019-05-23 RX ORDER — SOLIFENACIN SUCCINATE 5 MG/1
5 TABLET, FILM COATED ORAL DAILY
Qty: 30 TABLET | Refills: 1 | Status: SHIPPED | OUTPATIENT
Start: 2019-05-23 | End: 2019-07-10

## 2019-05-23 NOTE — PROGRESS NOTES
Ledy Odonnell is a 73 year old female seen in consultation for incontinence. Consult from Macarena Vinson.      Pt reports 5+ yr hx progressive massive incontinence, especially at nite, wears thick pads and many layers of towels and rubber pads in bed at nite; slightly better during the day. No significant stress component.     Denies dysuria, gross hematuria, frequency, UTI's, prior  eval, hx bladder surgery. Tried oxybutinin high-dose for 2 yrs; no significant bladder effect but marked dry mouth; has not tried other bladder meds.    Never pregnant, not on HRT. Chronic constipation, chronic opioids for musculoskeletal issues, senna.    Moderate fluids and caffeine. Daily smoker. (Did not complete voiding diary).  Retired.    Has some ortho issues, club feet, wheelchair bound.        Past Medical History:   Diagnosis Date     Depressive disorder, not elsewhere classified      GENERALIZED ANXIETY DIS 6/21/2007     Inflammatory arthritis      Osteoarthrosis, unspecified whether generalized or localized, unspecified site      Other and unspecified alcohol dependence, unspecified drinking behavior      Unspecified essential hypertension        Past Surgical History:   Procedure Laterality Date     C HAND/FINGER SURGERY UNLISTED       C NONSPECIFIC PROCEDURE  2001    Shoulder Surgery     C NONSPECIFIC PROCEDURE  1975    Gastric Bypass     C NONSPECIFIC PROCEDURE      Cholecystectomy     C SHOULDER SURG PROC UNLISTED       C TOTAL HIP ARTHROPLASTY  1/4/2011    Lt VALERIE     HERNIORRHAPHY VENTRAL  5/14/2013    Procedure: HERNIORRHAPHY VENTRAL;  Open Ventral Hernia Repair;  Surgeon: Jhoan Rogers MD;  Location:  OR       Social History     Socioeconomic History     Marital status:      Spouse name: Not on file     Number of children: Not on file     Years of education: Not on file     Highest education level: Not on file   Occupational History     Not on file   Social Needs     Financial resource  strain: Not on file     Food insecurity:     Worry: Not on file     Inability: Not on file     Transportation needs:     Medical: Not on file     Non-medical: Not on file   Tobacco Use     Smoking status: Current Every Day Smoker     Packs/day: 0.50     Years: 43.00     Pack years: 21.50     Types: Cigarettes     Smokeless tobacco: Never Used   Substance and Sexual Activity     Alcohol use: No     Alcohol/week: 0.0 oz     Drug use: No     Sexual activity: Never   Lifestyle     Physical activity:     Days per week: Not on file     Minutes per session: Not on file     Stress: Not on file   Relationships     Social connections:     Talks on phone: Not on file     Gets together: Not on file     Attends [employer] service: Not on file     Active member of club or organization: Not on file     Attends meetings of clubs or organizations: Not on file     Relationship status: Not on file     Intimate partner violence:     Fear of current or ex partner: Not on file     Emotionally abused: Not on file     Physically abused: Not on file     Forced sexual activity: Not on file   Other Topics Concern     Parent/sibling w/ CABG, MI or angioplasty before 65F 55M? No   Social History Narrative     Not on file       Current Outpatient Medications   Medication Sig Dispense Refill     albuterol (PROAIR HFA/PROVENTIL HFA/VENTOLIN HFA) 108 (90 Base) MCG/ACT inhaler Inhale 2 puffs into the lungs every 6 hours as needed for shortness of breath / dyspnea 3 Inhaler 1     buPROPion (WELLBUTRIN XL) 300 MG 24 hr tablet Take 1 tablet (300 mg) by mouth every morning 90 tablet 3     DULoxetine (CYMBALTA) 60 MG capsule Take 1 capsule (60 mg) by mouth daily 90 capsule 3     miconazole (MICATIN/MICRO GUARD) 2 % external powder Apply topically 2 times daily Apply to groin 30 g 0     multivitamin, therapeutic (THERA-VIT) TABS tablet Take 1 tablet by mouth daily       nystatin (MYCOSTATIN) 151056 UNIT/GM external powder Apply 1 g topically as needed 1  Bottle 5     nystatin (MYCOSTATIN) 022669 UNIT/ML suspension TAKE 5 ML BY MOUTH 4 TIMES A DAY FOR 10 DAYS 200 mL 0     oxyCODONE-acetaminophen (PERCOCET)  MG per tablet Take 1-2 tablets by mouth every 6 hours as needed for severe pain 120 tablet 0     senna-docusate (SENOKOT-S;PERICOLACE) 8.6-50 MG per tablet Take 1 tablet by mouth 2 times daily. (Patient taking differently: Take 2 tablets by mouth daily ) 40 tablet 11     traZODone (DESYREL) 50 MG tablet TAKE ONE TO TWO TABLETS BY MOUTH EVERY DAY AT BEDTIME AS NEEDED FOR SLEEP 90 tablet 1       Physical Exam:    GENL: NAD. Wheelchair-bound   ABD: Soft, non-tender, no masses.    EG: Moderately-estrogenized, no masses.    VAGINA: Moderately-estrogenized, no masses.    BN HYPERMOBILITY: None.    CYSTOCELE: None.    APICAL PROLAPSE: None.    RECTOCELE: None.    BIMANUAL: No mass or tenderness.    Cysto:    (Informed consent obtained. Pause for cause performed)   Sterile prep.    17 Fr scope inserted through urethra. Systematic examination w 70 degree lens.   PVR: 50 cc   MUCOSA: Normal without lesion, mild trabeculation   ORIFICES: Normal location and morphology   CAPACITY: 500 cc; no pain with filling >> apparent UDC triggered by laugh >> marked leak around the scope   Scope withdrawn without untoward effect.    (Pt tolerated procedure without difficulty).        Results for orders placed or performed in visit on 05/23/19   UA reflex to Microscopic   Result Value Ref Range    Color Urine Yellow     Appearance Urine Clear     Glucose Urine Negative NEG^Negative mg/dL    Bilirubin Urine Negative NEG^Negative    Ketones Urine Negative NEG^Negative mg/dL    Specific Gravity Urine <=1.005 1.003 - 1.035    Blood Urine Negative NEG^Negative    pH Urine 6.5 5.0 - 7.0 pH    Protein Albumin Urine Negative NEG^Negative mg/dL    Urobilinogen Urine 0.2 0.2 - 1.0 EU/dL    Nitrite Urine Negative NEG^Negative    Leukocyte Esterase Urine Trace (A) NEG^Negative    Source  Midstream Urine    Urine Microscopic   Result Value Ref Range    WBC Urine 0 - 5 OTO5^0 - 5 /HPF    RBC Urine O - 2 OTO2^O - 2 /HPF    Bacteria Urine Few (A) NEG^Negative /HPF         IMP:  1. OAB wet, marked; failed trial oxybutinin  2. Other medical issues      PLAN:  1. Discussed situation with patient in detail.  2. Consider trial Vesicare 5 (Myrbetriq contraindicate); discussed rationale, mech of action, potential side effect, etc; pt elects trial  3. RTC 2 mos to review progress; might consider indwelling moctezuma at that point; introduced concept today  4. Carefully set realistic expectations  5. 60 minutes spent with patient, more than 50% in counseling and coordination of care for OAB, which did not include time spent for the procedure.

## 2019-05-30 ENCOUNTER — TELEPHONE (OUTPATIENT)
Dept: FAMILY MEDICINE | Facility: CLINIC | Age: 74
End: 2019-05-30

## 2019-05-30 DIAGNOSIS — G89.4 CHRONIC PAIN DISORDER: ICD-10-CM

## 2019-05-30 NOTE — TELEPHONE ENCOUNTER
Reason for Call:  Other prescription    Detailed comments: Patient has a few prescription questions about her Percocet prescriptions and is wondering if they can be mailed or if they are ready for pickup yet; as well as a few other questions that need to be addressed.    Phone Number Patient can be reached at: Cell number on file:    Telephone Information:   Mobile [phone]       Best Time: As soon as possible     Can we leave a detailed message on this number? YES    Call taken on 5/30/2019 at 4:30 PM by Thierry Banerjee

## 2019-05-30 NOTE — TELEPHONE ENCOUNTER
Routing Medication refill request for PCP to please advise. Patient is requesting refill prescriptions for percocet for June, July and August.       Reached out to patient to clarify request. Patient states that she got the letter in the mail today that Macarena is leaving the clinic and wants to have her 3 month supply of percocet prescriptions for June, July and August. She also plans on making an appointment with PCP to get advise on who a good provider would be for patient to continue to see.     Izzy Mendez RN

## 2019-05-31 DIAGNOSIS — G47.09 OTHER INSOMNIA: ICD-10-CM

## 2019-05-31 RX ORDER — OXYCODONE AND ACETAMINOPHEN 10; 325 MG/1; MG/1
1-2 TABLET ORAL EVERY 6 HOURS PRN
Qty: 120 TABLET | Refills: 0 | Status: SHIPPED | OUTPATIENT
Start: 2019-08-02 | End: 2019-07-12

## 2019-05-31 RX ORDER — OXYCODONE AND ACETAMINOPHEN 10; 325 MG/1; MG/1
1-2 TABLET ORAL EVERY 6 HOURS PRN
Qty: 120 TABLET | Refills: 0 | Status: SHIPPED | OUTPATIENT
Start: 2019-06-03 | End: 2019-07-03

## 2019-05-31 RX ORDER — OXYCODONE AND ACETAMINOPHEN 10; 325 MG/1; MG/1
1-2 TABLET ORAL EVERY 6 HOURS PRN
Qty: 120 TABLET | Refills: 0 | Status: SHIPPED | OUTPATIENT
Start: 2019-07-03 | End: 2019-08-02

## 2019-05-31 NOTE — TELEPHONE ENCOUNTER
Scripts for next 3 months printed.  Yes, please assist Guillermina in scheduling an appointment with me before I leave!    Thanks  Macarena Vinson PA-C

## 2019-05-31 NOTE — TELEPHONE ENCOUNTER
"Requested Prescriptions   Pending Prescriptions Disp Refills     traZODone (DESYREL) 50 MG tablet [Pharmacy Med Name: TRAZODONE HCL 50MG TABS]  Last Written Prescription Date:  12/4/2018  Last Fill Quantity: 90 tablet,  # refills: 1   Last office visit: 4/26/2019 with prescribing provider:  JEFFERY Vinson   Future Office Visit:   Next 5 appointments (look out 90 days)    Jun 05, 2019 10:40 AM CDT  [facility] with Macarena Vinson PA-C  [facility] [address] [place] [id number]  [phone]   Jul 24, 2019 10:15 AM CDT  Return Visit with Max Wayne MD  [facility] ([address]  [facility] [id number]  [phone]          90 tablet 1     Sig: TAKE ONE TO TWO TABLETS BY MOUTH NIGHTLY AT BEDTIME AS NEEDED FOR SLEEP       Serotonin Modulators Passed - 5/31/2019  3:00 PM        Passed - Recent (12 mo) or future (30 days) visit within the authorizing provider's specialty     Patient had office visit in the last 12 months or has a visit in the next 30 days with authorizing provider or within the authorizing provider's specialty.  See \"Patient Info\" tab in inbasket, or \"Choose Columns\" in Meds & Orders section of the refill encounter.              Passed - Medication is active on med list        Passed - Patient is age 18 or older        Passed - No active pregnancy on record        Passed - No positive pregnancy test in past 12 months          "

## 2019-05-31 NOTE — TELEPHONE ENCOUNTER
Scripts placed at  and documented in book.    Called patient to inform, and scheduled appt for 6/5. Recommended Loulou Ashley or Tatiana Napoles for future providers to consider on monico's suggestion.    Thanks!  Cj Bennett

## 2019-06-04 RX ORDER — TRAZODONE HYDROCHLORIDE 50 MG/1
TABLET, FILM COATED ORAL
Qty: 90 TABLET | Refills: 1 | Status: SHIPPED | OUTPATIENT
Start: 2019-06-04 | End: 2019-11-17

## 2019-06-04 NOTE — TELEPHONE ENCOUNTER
Prescription approved per [facility] Refill Protocol.    Elma Garcia RN  [facility]

## 2019-06-05 ENCOUNTER — OFFICE VISIT (OUTPATIENT)
Dept: FAMILY MEDICINE | Facility: CLINIC | Age: 74
End: 2019-06-05
Payer: COMMERCIAL

## 2019-06-05 VITALS
BODY MASS INDEX: 20.83 KG/M2 | SYSTOLIC BLOOD PRESSURE: 134 MMHG | TEMPERATURE: 98.5 F | DIASTOLIC BLOOD PRESSURE: 82 MMHG | HEART RATE: 100 BPM | HEIGHT: 65 IN | WEIGHT: 125 LBS

## 2019-06-05 DIAGNOSIS — B37.2 CANDIDAL INTERTRIGO: Primary | ICD-10-CM

## 2019-06-05 DIAGNOSIS — Z72.0 TOBACCO ABUSE DISORDER: ICD-10-CM

## 2019-06-05 DIAGNOSIS — G89.4 CHRONIC PAIN DISORDER: ICD-10-CM

## 2019-06-05 DIAGNOSIS — F33.1 MAJOR DEPRESSIVE DISORDER, RECURRENT EPISODE, MODERATE (H): ICD-10-CM

## 2019-06-05 PROCEDURE — 99214 OFFICE O/P EST MOD 30 MIN: CPT | Performed by: PHYSICIAN ASSISTANT

## 2019-06-05 RX ORDER — NYSTATIN 100000 [USP'U]/G
1 POWDER TOPICAL PRN
Qty: 1 BOTTLE | Refills: 5 | Status: SHIPPED | OUTPATIENT
Start: 2019-06-05 | End: 2020-06-09

## 2019-06-05 RX ORDER — NYSTATIN 100000 U/G
CREAM TOPICAL 2 TIMES DAILY
Qty: 30 G | Refills: 3 | Status: SHIPPED | OUTPATIENT
Start: 2019-06-05 | End: 2020-06-09

## 2019-06-05 ASSESSMENT — MIFFLIN-ST. JEOR: SCORE: 1077.88

## 2019-06-05 NOTE — PATIENT INSTRUCTIONS
Thank you for trusting me with your care all of these years, Guillermina!  You hold a very special place in my heart.    Come see me for a skin visit!    Macarena Vinson PA-C

## 2019-06-05 NOTE — PROGRESS NOTES
Subjective     Ledy Odonnell is a 73 year old female who presents to clinic today for the following health issues:      Needs refill of creams for intermittent bouts of candidal intertrigo.     HPI   Chronic Pain Follow-Up       Type / Location of Pain: shoulders and right knee, bilateral feet.   Analgesia/pain control:       Recent changes:  Comes and goes      Overall control: Comfortably manageable  Activity level/function:      Daily activities:  Unable to perform most daily activities - chores, hobbies, social activities, driving.  Needs assitance from friends and family to do almost everything.  Has been working with Home Care, Care Coordination, etc.     Work:  not applicable  Adverse effects:  No  Adherance    Taking medication as directed?  Yes    Participating in other treatments: no  Risk Factors:    Sleep:  Fair    Mood/anxiety:  Comes and goes    Recent family or social stressors:  Not being able to go to her nieces wedding out in [place]    Other aggravating factors: standing up from wheelchair, moving around in wheelchair  PHQ-9 SCORE 10/15/2018 12/31/2018 2/4/2019   PHQ-9 Total Score - - -   PHQ-9 Total Score 10 6 2     LESTER-7 SCORE 1/10/2018 10/15/2018 12/31/2018   Total Score - - -   Total Score 7 7 5     Encounter-Level CSA - 08/25/2015:    Controlled Substance Agreement - Scan on 9/4/2015 10:45 AM: CONTROLLED SUBSTANCE AGREEMENT, 8/25/15 (below)       Patient-Level CSA:    There are no patient-level csa.         Amount of exercise or physical activity: None    Problems taking medications regularly: No    Medication side effects: none    Diet: regular (no restrictions)       Depression and Anxiety Follow-Up    How are you doing with your depression since your last visit? No change    How are you doing with your anxiety since your last visit?  No change    Are you having other symptoms that might be associated with depression or anxiety? Yes:  weight loss continues. Has had negative workup.  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    Have you had a significant life event? No     Do you have any concerns with your use of alcohol or other drugs? No    Social History     Tobacco Use     Smoking status: Current Every Day Smoker     Packs/day: 0.50     Years: 43.00     Pack years: 21.50     Types: Cigarettes     Smokeless tobacco: Never Used   Substance Use Topics     Alcohol use: No     Alcohol/week: 0.0 oz     Drug use: No     PHQ 10/15/2018 12/31/2018 2/4/2019   PHQ-9 Total Score 10 6 2   Q9: Thoughts of better off dead/self-harm past 2 weeks Several days Not at all Not at all     LESTER-7 SCORE 1/10/2018 10/15/2018 12/31/2018   Total Score - - -   Total Score 7 7 5     In the past two weeks have you had thoughts of suicide or self-harm?  No.    Do you have concerns about your personal safety or the safety of others?   No    Suicide Assessment Five-step Evaluation and Treatment (SAFE-T)      History   Smoking Status     Current Every Day Smoker     Packs/day: 0.50     Years: 43.00     Types: Cigarettes   Smokeless Tobacco     Never Used     No results found for: FEV1, [id number]    -------------------------------------  Reviewed and updated as needed this visit by Provider  Problems         Review of Systems   ROS COMP: Constitutional, HEENT, cardiovascular, pulmonary, gi and gu systems are negative, except as otherwise noted.      Objective    /82 (Cuff Size: Adult Regular)   Pulse 100   Temp 98.5  F (36.9  C) (Oral)   Ht 1.651 m (5' 5\")   Wt 56.7 kg (125 lb)   BMI 20.80 kg/m    Body mass index is 20.8 kg/m .  Physical Exam   GENERAL: healthy, alert and no distress  RESP: lungs clear to auscultation - no rales, rhonchi or wheezes  CV: regular rate and rhythm, normal S1 S2, no S3 or S4, no murmur, click or rub, no peripheral edema and peripheral pulses strong  PSYCH: mentation appears normal, affect normal/bright    Diagnostic Test " Results:  Labs reviewed in Epic        Assessment & Plan     1. Candidal intertrigo  Refills of below medications for stable chronic conditions  - nystatin (MYCOSTATIN) 425739 UNIT/GM external powder; Apply 1 g topically as needed  Dispense: 1 Bottle; Refill: 5  - nystatin (MYCOSTATIN) 278025 UNIT/GM external cream; Apply topically 2 times daily  Dispense: 30 g; Refill: 3    2. Major depressive disorder, recurrent episode, moderate (H)  Stable, overall doing okay  Does not like that she has to rely on friends and family for everything.  She has worked with Home Care and CC in the past    3. Chronic pain disorder  Stable, well controlled  Continue current regimen  Follow up in 6 months.    4. Tobacco abuse disorder  Encouraged cessation, not interested.   Declines lung cancer screening.      Tobacco Cessation:   reports that she has been smoking cigarettes.  She has a 21.50 pack-year smoking history. She has never used smokeless tobacco.  Tobacco Cessation Action Plan: Information offered: Patient not interested at this time      No follow-ups on file.    Macarena Vinson PA-C  [facility]

## 2019-06-07 ENCOUNTER — TELEPHONE (OUTPATIENT)
Dept: FAMILY MEDICINE | Facility: CLINIC | Age: 74
End: 2019-06-07

## 2019-06-07 NOTE — TELEPHONE ENCOUNTER
Forms received from [facility]: ICD-10 diagnosis verification form for Macarena Vinson PA-C.  Forms placed in provider 'sign me' folder.  Please fax forms to [phone] after completion.    Thanks!  Cj Bennett

## 2019-06-12 NOTE — TELEPHONE ENCOUNTER
Forms completed, signed, copy made for chart, faxed as requested and mailed in provided envelope.    Thanks!  Cj Bennett

## 2019-06-25 NOTE — PROGRESS NOTES
Follow up, patient of Dr. Gomez's for bilateral shoulder impingement.  Follow up also for right knee primary osteoarthritis.    REVIEW OF SYSTEMS:    CONSTITUTIONAL:NEGATIVE for fever, chills, change in weight  INTEGUMENTARY/SKIN: NEGATIVE for worrisome rashes, moles or lesions  MUSCULOSKELETAL:See HPI above  NEURO: NEGATIVE for weakness, dizziness or paresthesias    Has pain and weakness with rotator cuff testing 4-/5 supraspinatus bilateral.  4-/5 right external rotation, 4/5 left external rotation.  +impingement maneuvers bilateral     Last  Bilateral shoulder injections 3/5/19. Injection lasted only 2 weeks. (Patient usually does great for 6 weeks with these injections). Limited effectiveness is because she uses her arms to get around in wheel chair.   Patient desires injection today of bilateral shoulder.  Risks, benefits, potential complications and alternatives were discussed.   With the patient's consent, sterile prep was performed of bilateral shoulder.  Each shoulder was injected with depomedrol 80 mg and lidocaine at shoulder subacromial space from the posterolateral approach .     Right knee: Last injection 3/5/19:  6 weeks (usually lasts ~2.5 to 3 months. These injections go for longer because she doesn't do much walking. Occasional standing)   Range of motion 0-120.    She desires injection today of right knee  Risks, benefits, potential complications and alternatives were discussed.   With the patient's consent, sterile prep was performed of right knee.  Right knee was injected with Depo Medrol 80 mg and lidocaine at anteromedial site.    Return to clinic with Dr. Gomez as needed.    I think she likely has bilateral rotator cuff tears at this point, but due to her dependence on her arms for wheelchair use, surgery likely not an option.    SALINAS Woods MD  Dept. Orthopedic Surgery  [facility]

## 2019-06-26 ENCOUNTER — OFFICE VISIT (OUTPATIENT)
Dept: ORTHOPEDICS | Facility: CLINIC | Age: 74
End: 2019-06-26
Payer: COMMERCIAL

## 2019-06-26 VITALS
HEART RATE: 87 BPM | OXYGEN SATURATION: 94 % | WEIGHT: 125 LBS | BODY MASS INDEX: 20.83 KG/M2 | HEIGHT: 65 IN | SYSTOLIC BLOOD PRESSURE: 137 MMHG | DIASTOLIC BLOOD PRESSURE: 55 MMHG

## 2019-06-26 DIAGNOSIS — G89.29 CHRONIC PAIN OF BOTH SHOULDERS: ICD-10-CM

## 2019-06-26 DIAGNOSIS — M17.11 PRIMARY OSTEOARTHRITIS OF RIGHT KNEE: ICD-10-CM

## 2019-06-26 DIAGNOSIS — M25.511 CHRONIC PAIN OF BOTH SHOULDERS: ICD-10-CM

## 2019-06-26 DIAGNOSIS — M75.40 IMPINGEMENT SYNDROME OF SHOULDER REGION, UNSPECIFIED LATERALITY: Primary | ICD-10-CM

## 2019-06-26 DIAGNOSIS — M25.512 CHRONIC PAIN OF BOTH SHOULDERS: ICD-10-CM

## 2019-06-26 PROCEDURE — 20610 DRAIN/INJ JOINT/BURSA W/O US: CPT | Mod: 50 | Performed by: ORTHOPAEDIC SURGERY

## 2019-06-26 PROCEDURE — 99213 OFFICE O/P EST LOW 20 MIN: CPT | Mod: 25 | Performed by: ORTHOPAEDIC SURGERY

## 2019-06-26 PROCEDURE — 20610 DRAIN/INJ JOINT/BURSA W/O US: CPT | Mod: 51 | Performed by: ORTHOPAEDIC SURGERY

## 2019-06-26 RX ORDER — METHYLPREDNISOLONE ACETATE 80 MG/ML
80 INJECTION, SUSPENSION INTRA-ARTICULAR; INTRALESIONAL; INTRAMUSCULAR; SOFT TISSUE
Status: DISCONTINUED | OUTPATIENT
Start: 2019-06-26 | End: 2020-01-14

## 2019-06-26 RX ORDER — LIDOCAINE HYDROCHLORIDE 10 MG/ML
4 INJECTION, SOLUTION EPIDURAL; INFILTRATION; INTRACAUDAL; PERINEURAL
Status: DISCONTINUED | OUTPATIENT
Start: 2019-06-26 | End: 2020-01-14

## 2019-06-26 RX ORDER — LIDOCAINE HYDROCHLORIDE 10 MG/ML
4 INJECTION, SOLUTION INFILTRATION; PERINEURAL
Status: DISCONTINUED | OUTPATIENT
Start: 2019-06-26 | End: 2020-01-14

## 2019-06-26 RX ADMIN — LIDOCAINE HYDROCHLORIDE 4 ML: 10 INJECTION, SOLUTION EPIDURAL; INFILTRATION; INTRACAUDAL; PERINEURAL at 14:18

## 2019-06-26 RX ADMIN — METHYLPREDNISOLONE ACETATE 80 MG: 80 INJECTION, SUSPENSION INTRA-ARTICULAR; INTRALESIONAL; INTRAMUSCULAR; SOFT TISSUE at 14:18

## 2019-06-26 RX ADMIN — METHYLPREDNISOLONE ACETATE 80 MG: 80 INJECTION, SUSPENSION INTRA-ARTICULAR; INTRALESIONAL; INTRAMUSCULAR; SOFT TISSUE at 14:19

## 2019-06-26 RX ADMIN — LIDOCAINE HYDROCHLORIDE 4 ML: 10 INJECTION, SOLUTION INFILTRATION; PERINEURAL at 14:19

## 2019-06-26 ASSESSMENT — MIFFLIN-ST. JEOR: SCORE: 1077.88

## 2019-06-26 NOTE — PROGRESS NOTES
Large Joint Injection/Arthocentesis: bilateral subacromial bursa  Date/Time: 6/26/2019 2:18 PM  Performed by: Rex Woods MD  Authorized by: Rex Woods MD     Needle Size:  22 G  Guidance: landmark guided    Approach:  Lateral  Location:  Shoulder  Laterality:  Bilateral      Site:  Bilateral subacromial bursa  Medications (Right):  80 mg methylPREDNISolone 80 MG/ML; 4 mL lidocaine (PF) 1 %  Medications (Left):  80 mg methylPREDNISolone 80 MG/ML; 4 mL lidocaine (PF) 1 %  Outcome:  Tolerated well, no immediate complications  Procedure discussed: discussed risks, benefits, and alternatives    Consent Given by:  Patient  Timeout: timeout called immediately prior to procedure    Prep: patient was prepped and draped in usual sterile fashion    Large Joint Injection/Arthocentesis: R knee joint  Date/Time: 6/26/2019 2:19 PM  Performed by: Rex Woods MD  Authorized by: Rex Woods MD     Indications:  Pain and osteoarthritis  Needle Size:  22 G  Guidance: landmark guided    Approach:  Anterolateral  Location:  Knee      Medications:  80 mg methylPREDNISolone 80 MG/ML; 4 mL lidocaine 1 %  Outcome:  Tolerated well, no immediate complications  Procedure discussed: discussed risks, benefits, and alternatives    Consent Given by:  Patient  Timeout: timeout called immediately prior to procedure    Prep: patient was prepped and draped in usual sterile fashion

## 2019-06-26 NOTE — LETTER
6/26/2019         RE: Ledy Odonnell  [address]  [facility] [address]        Dear Colleague,    Thank you for referring your patient, Ledy Odonnell, to the [facility]. Please see a copy of my visit note below.    Follow up, patient of Dr. Gomez's for bilateral shoulder impingement.  Follow up also for right knee primary osteoarthritis.    REVIEW OF SYSTEMS:    CONSTITUTIONAL:NEGATIVE for fever, chills, change in weight  INTEGUMENTARY/SKIN: NEGATIVE for worrisome rashes, moles or lesions  MUSCULOSKELETAL:See HPI above  NEURO: NEGATIVE for weakness, dizziness or paresthesias    Has pain and weakness with rotator cuff testing 4-/5 supraspinatus bilateral.  4-/5 right external rotation, 4/5 left external rotation.  +impingement maneuvers bilateral     Last  Bilateral shoulder injections 3/5/19. Injection lasted only 2 weeks. (Patient usually does great for 6 weeks with these injections). Limited effectiveness is because she uses her arms to get around in wheel chair.   Patient desires injection today of bilateral shoulder.  Risks, benefits, potential complications and alternatives were discussed.   With the patient's consent, sterile prep was performed of bilateral shoulder.  Each shoulder was injected with depomedrol 80 mg and lidocaine at shoulder subacromial space from the posterolateral approach .     Right knee: Last injection  3/5/19:  6 weeks (usually lasts ~2.5 to 3 months. These injections go for longer because she doesn't do much walking. Occasional standing)   Range of motion 0-120.    She desires injection today of right knee  Risks, benefits, potential complications and alternatives were discussed.   With the patient's consent, sterile prep was performed of right knee.  Right knee was injected with Depo Medrol 80 mg and lidocaine at anteromedial site.    Return to clinic with Dr. Gomez as needed.    I think she likely has bilateral rotator cuff tears at this  point, but due to her dependence on her arms for wheelchair use, surgery likely not an option.    SALINAS Woods MD  Dept. Orthopedic Surgery  [facility]             Large Joint Injection/Arthocentesis: bilateral subacromial bursa  Date/Time: 6/26/2019 2:18 PM  Performed by: Rex Woods MD  Authorized by: Rex Woods MD     Needle Size:  22 G  Guidance: landmark guided    Approach:  Lateral  Location:  Shoulder  Laterality:  Bilateral      Site:  Bilateral subacromial bursa  Medications (Right):  80 mg methylPREDNISolone 80 MG/ML; 4 mL lidocaine (PF) 1 %  Medications (Left):  80 mg methylPREDNISolone 80 MG/ML; 4 mL lidocaine (PF) 1 %  Outcome:  Tolerated well, no immediate complications  Procedure discussed: discussed risks, benefits, and alternatives    Consent Given by:  Patient  Timeout: timeout called immediately prior to procedure    Prep: patient was prepped and draped in usual sterile fashion    Large Joint Injection/Arthocentesis: R knee joint  Date/Time: 6/26/2019 2:19 PM  Performed by: Rex Woods MD  Authorized by: Rex Woods MD     Indications:  Pain and osteoarthritis  Needle Size:  22 G  Guidance: landmark guided    Approach:  Anterolateral  Location:  Knee      Medications:  80 mg methylPREDNISolone 80 MG/ML; 4 mL lidocaine 1 %  Outcome:  Tolerated well, no immediate complications  Procedure discussed: discussed risks, benefits, and alternatives    Consent Given by:  Patient  Timeout: timeout called immediately prior to procedure    Prep: patient was prepped and draped in usual sterile fashion            Again, thank you for allowing me to participate in the care of your patient.        Sincerely,        Rex Woods MD

## 2019-07-09 ENCOUNTER — TELEPHONE (OUTPATIENT)
Dept: FAMILY MEDICINE | Facility: CLINIC | Age: 74
End: 2019-07-09

## 2019-07-09 ENCOUNTER — OFFICE VISIT (OUTPATIENT)
Dept: FAMILY MEDICINE | Facility: CLINIC | Age: 74
End: 2019-07-09
Payer: COMMERCIAL

## 2019-07-09 VITALS
SYSTOLIC BLOOD PRESSURE: 106 MMHG | OXYGEN SATURATION: 96 % | TEMPERATURE: 98.5 F | DIASTOLIC BLOOD PRESSURE: 65 MMHG | HEART RATE: 85 BPM

## 2019-07-09 DIAGNOSIS — Z13.220 SCREENING FOR HYPERLIPIDEMIA: Primary | ICD-10-CM

## 2019-07-09 DIAGNOSIS — G89.4 CHRONIC PAIN DISORDER: ICD-10-CM

## 2019-07-09 DIAGNOSIS — F32.1 MODERATE MAJOR DEPRESSION (H): ICD-10-CM

## 2019-07-09 PROCEDURE — 99214 OFFICE O/P EST MOD 30 MIN: CPT | Performed by: NURSE PRACTITIONER

## 2019-07-09 RX ORDER — SOLIFENACIN SUCCINATE 5 MG/1
5 TABLET, FILM COATED ORAL DAILY
COMMUNITY
End: 2019-08-13

## 2019-07-09 ASSESSMENT — PATIENT HEALTH QUESTIONNAIRE - PHQ9
SUM OF ALL RESPONSES TO PHQ QUESTIONS 1-9: 6
5. POOR APPETITE OR OVEREATING: SEVERAL DAYS

## 2019-07-09 ASSESSMENT — ANXIETY QUESTIONNAIRES
6. BECOMING EASILY ANNOYED OR IRRITABLE: SEVERAL DAYS
IF YOU CHECKED OFF ANY PROBLEMS ON THIS QUESTIONNAIRE, HOW DIFFICULT HAVE THESE PROBLEMS MADE IT FOR YOU TO DO YOUR WORK, TAKE CARE OF THINGS AT HOME, OR GET ALONG WITH OTHER PEOPLE: SOMEWHAT DIFFICULT
3. WORRYING TOO MUCH ABOUT DIFFERENT THINGS: NOT AT ALL
GAD7 TOTAL SCORE: 3
7. FEELING AFRAID AS IF SOMETHING AWFUL MIGHT HAPPEN: NOT AT ALL
1. FEELING NERVOUS, ANXIOUS, OR ON EDGE: NOT AT ALL
5. BEING SO RESTLESS THAT IT IS HARD TO SIT STILL: SEVERAL DAYS
2. NOT BEING ABLE TO STOP OR CONTROL WORRYING: NOT AT ALL

## 2019-07-09 NOTE — PROGRESS NOTES
Subjective     Ledy Odonnell is a 73 year old female who presents to clinic today for the following health issues:    HPI   New Patient/Transfer of Care  Chronic Pain Follow-Up       Type / Location of Pain: all over   Analgesia/pain control:       Recent changes:  same      Overall control: Tolerable with discomfort does notice that her current pain medication regimen is not as effective as it was.  Activity level/function:      Daily activities:  Able to do light housework, cooking and has helped for household chores such as vacuuming given she is limited in her mobility second to a deformed right foot.    Work:  not applicable  Adverse effects:  No  Adherance    Taking medication as directed?  Yes    Participating in other treatments: no -she was seen by Dr. Pereira in pain clinic in 2010.  Risk Factors:    Sleep:  Good    Mood/anxiety:  worsened she is on Zoloft and Wellbutrin does not think it is working well.    Recent family or social stressors:  none noted    Other aggravating factors: Limited mobility.  PHQ-9 SCORE 10/15/2018 12/31/2018 2/4/2019   PHQ-9 Total Score - - -   PHQ-9 Total Score 10 6 2     LESTER-7 SCORE 1/10/2018 10/15/2018 12/31/2018   Total Score - - -   Total Score 7 7 5     Encounter-Level CSA - 08/25/2015:    Controlled Substance Agreement - Scan on 9/4/2015 10:45 AM: CONTROLLED SUBSTANCE AGREEMENT, 8/25/15 (below)       Patient-Level CSA:    There are no patient-level csa.       Patient reports her chronic pain is second to her arthritis and right hip surgery that went wrong years ago(2010).  She has limited mobility but able to ambulate with a walker.  She is still able to drive to her appointments.  All her ADLs are independent.  She does have help for household chores.  She was alone no children.  She is on Percocet 10/325 2 tablets every 6 hours as needed.  She says she averages between 4 to 6 tablets a day.  Some days are worse than others.  Reports she takes ibuprofen and  acetaminophen throughout the day as well.    Patient Active Problem List   Diagnosis     Esophageal reflux     Insomnia     FAMILY HX DIABETES MELLITUS brother     Tobacco use disorder     Obesity     Generalized anxiety disorder     Moderate major depression (H)     COPD (chronic obstructive pulmonary disease) (H)     Elevated fasting glucose     Chronic pain disorder     Idiopathic peripheral neuropathy     Hyperlipidemia LDL goal <130     Degenerative arthritis of hip     Hip arthrosis - left, severe     Trochanteric bursitis     Status post hip replacement     Advanced directives, counseling/discussion     Gastroparesis     DDD (degenerative disc disease), lumbar     Delayed gastric emptying     Health Care Home     Candidal intertrigo     Ventral hernia     Femur fracture (H)     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     Past Surgical History:   Procedure Laterality Date     C HAND/FINGER SURGERY UNLISTED       C NONSPECIFIC PROCEDURE  2001    Shoulder Surgery     C NONSPECIFIC PROCEDURE  1975    Gastric Bypass     C NONSPECIFIC PROCEDURE      Cholecystectomy     C SHOULDER SURG PROC UNLISTED       C TOTAL HIP ARTHROPLASTY  1/4/2011    Lt VALERIE     HERNIORRHAPHY VENTRAL  5/14/2013    Procedure: HERNIORRHAPHY VENTRAL;  Open Ventral Hernia Repair;  Surgeon: Jhoan Rogers MD;  Location:  OR       Social History     Tobacco Use     Smoking status: Current Every Day Smoker     Packs/day: 0.50     Years: 43.00     Pack years: 21.50     Types: Cigarettes     Smokeless tobacco: Never Used   Substance Use Topics     Alcohol use: No     Alcohol/week: 0.0 oz     Family History   Problem Relation Age of Onset     Cancer Father         pancreatic     Heart Disease Mother         Unknown specifics     Dementia Mother      Arthritis Paternal Grandmother         RA     Dementia Maternal Grandmother   "          Reviewed and updated as needed this visit by Provider         Review of Systems   ROS COMP: Constitutional, HEENT, cardiovascular, pulmonary, GI, , musculoskeletal, neuro, skin, endocrine and psych systems are negative, except as otherwise noted.      Objective    /65   Pulse 85   Temp 98.5  F (36.9  C) (Oral)   SpO2 96%   There is no height or weight on file to calculate BMI.  Physical Exam   GENERAL: healthy, alert and no distress  EYES: Eyes grossly normal to inspection, PERRL and conjunctivae and sclerae normal  NECK: no adenopathy, no asymmetry, masses, or scars and thyroid normal to palpation  RESP: lungs clear to auscultation - no rales, rhonchi or wheezes  CV: regular rate and rhythm, normal S1 S2, no S3 or S4, no murmur, click or rub, no peripheral edema and peripheral pulses strong  ABDOMEN: soft, nontender, no hepatosplenomegaly, no masses and bowel sounds normal  MS: Sitting in wheelchair.  Her right foot ankle is severely deformed inward.    Diagnostic Test Results:  Labs reviewed in Epic  none       Assessment & Plan       ICD-10-CM    1. Screening for hyperlipidemia Z13.220    2. Chronic pain disorder G89.4    3. Moderate major depression (H) F32.1      Patient presents to clinic to \" meet and greet\" provider today since her long-term PCP is leaving the clinic.  Majority of today's visit was spent discussing her chronic pain second to arthritis and a deformed right foot s/p hip surgery years ago that she never followed up with surgeon.     She has been maintained on 120 MME/day for the past 10 to 11 years. Her pain medication use and tolerance has been a continued conversation with her PCP over the years. Appears that she was seen last by Dr. Pereira in pain management over 10 years ago.  Given her age, COPD, CDC guidelines <50 MME/day, I discussed the risks of maintaining her on this high of a dose of opiates to treat her chronic pain.  I was clear with patient that I would not " be maintaining her on long-term opiate therapy to treat her chronic pain and I would review alternative medications and/or nonpharmacological methods to treat her pain including referral to pain management clinic.  She repeated on multiple occasions that she was happy that I discussed this with her and that she would be open to this.  She will go home and think about this but she would like to do.  Her PCP has given her postdated scripts to last until September.  We did discuss the new guidelines as of October 1 all postdated scripts will not be honored.    Error to above documentation, new State guidelines take affect as of July. This means that pt's post dated scripts by PCP will not be honored. RN to phone and advise pt of this and recommend she make pain management evaluation as discussed.   As above I will not be refilling her medications at this dose and will start taper if she chooses to establish care with me.     Tobacco Cessation:   reports that she has been smoking cigarettes.  She has a 21.50 pack-year smoking history. She has never used smokeless tobacco.  Tobacco Cessation Action Plan: Not addressed at today's visit.      25 min spent in direct face to face time with this pt establish care and discussing pain management and chronic issues.     Patient Instructions     Today was a meet and greet to establish care.  We discussed chronic medical issues including chronic pain.    I discussed that I would not be maintaining you on your long-term opiates as you have been for the past 10 years.  We discussed the risks of this moving forward.  As well as current guidelines.  If you choose to establish care with me I recommend you see pain clinic for evaluation, tapering of medication, and using alternative medications to treat your chronic pain.,     You were in agreement with this plan and you will make a decision about continued care with me.    It was a pleasure to see you today.     Patient Education        [facility]   Discharged by : Tresa Driver MA    Paper scripts provided to patient : no     If you have any questions regarding your visit please contact your care team:     Team Gold                Clinic Hours Telephone Number     Dr. Mary Napoles, CNP 7am-7pm  Monday - Thursday   7am-5pm  Fridays  [phone]   (Appointment scheduling available 24/7)     RN Line  [phone] option 2     Urgent Care - [place] and [place] - 11am-9pm Monday-Friday Saturday-Sunday- 9am-5pm     [place] -   5pm-9pm Monday-Friday Saturday-Sunday- 9am-5pm    [phone] - [place]    [phone] - [place]     For a Price Quote for your services, please call our [facility] [facility] Price Line at [phone].     What options do I have for visits at the clinic other than the traditional office visit?     To expand how we care for you, many of our providers are utilizing electronic visits (e-visits) and telephone visits, when medically appropriate, for interactions with their patients rather than a visit in the clinic. We also offer nurse visits for many medical concerns. Just like any other service, we will bill your insurance company for this type of visit based on time spent on the phone with your provider. Not all insurance companies cover these visits. Please check with your medical insurance if this type of visit is covered. You will be responsible for any charges that are not paid by your insurance.   E-visits via Sinch: generally incur a $45.00 fee.     Telephone visits:  Time spent on the phone: *charged based on time that is spent on the phone in increments of 10 minutes. Estimated cost:   5-10 mins $30.00   11-20 mins. $59.00   21-30 mins. $85.00     Use Walleriust (secure email communication and access to your chart) to send your primary care provider a message or make an appointment. Ask someone on your Team how to sign up for Walleriust.      As always, Thank you for trusting us with your health care needs!    [facility]:    Scheduling Appointments  [facility]  Call: [phone]    [facility]  Call: [phone]    [facility]  Call: [phone]    For Gastroenterology referrals   [facility]   Clinics and Surgery Center, 4th Floor   [address] [place] [address]   Appointments: [phone]    WHERE TO GO FOR CARE?  Clinic    Make an appointment if you:       Are sick (cold, cough, flu, sore throat, earache or in pain).       Have a small injury (sprain, small cut, burn or broken bone).       Need a physical exam, Pap smear, vaccine or prescription refill.       Have questions about your health or medicines.    To reach us:      Call [phone] ([phone]). Open 24 hours every day. (For counseling services, call [phone].)    Log into Ebook Glue at Bunch. (Visit [email or web address] to create an account.) Hospital emergency room    An emergency is a serious or life- threatening problem that must be treated right away.    Call 451 or get to the hospital if you have:      Very bad or sudden:            - Chest pain or pressure         - Bleeding         - Head or belly pain         - Dizziness or trouble seeing, walking or                          Speaking      Problems breathing      Blood in your vomit or you are coughing up blood      A major injury (knocked out, loss of a finger or limb, rape, broken bone protruding from skin)    A mental health crisis. (Or call the Mental Health Crisis line at 1-560.782.8182 or Suicide Prevention Hotline at 1-869.428.4843.)    Open 24 hours every day. You don't need an appointment.     Urgent care    Visit urgent care for sickness or small injuries when the clinic is closed. You don't need an appointment. To check hours or find an urgent care near you, visit  [email or web address]. Online care    Get online care from [facility] for more than 70 common problems, like colds, allergies and infections. Open 24 hours every day at:   [email or web address]   Need help deciding?    For advice about where to be seen, you may call your clinic and ask to speak with a nurse. We're here for you 24 hours every day.         If you are deaf or hard of hearing, please let us know. We provide many free services including sign language interpreters, oral interpreters, TTYs, telephone amplifiers, note takers and written materials.               No follow-ups on file.    ROSCOE Ridley [facility]

## 2019-07-09 NOTE — TELEPHONE ENCOUNTER
Patient/family was instructed to return call to [facility] RN directly on the RN Call back line at [phone].     Shruthi Vernon RN

## 2019-07-09 NOTE — TELEPHONE ENCOUNTER
Chai Hawkins- please see my note. Please call pt to advise that new State guidelines go into effect immeidately as of July 1 this means her post dated narcotic scripts from [facility] are no longer valid. As discussed in the apt, she will need to make apt with pain clinic and I will start taper if she chooses to establish with me.     Per Vika Napoles NP

## 2019-07-09 NOTE — TELEPHONE ENCOUNTER
Routing to provider to please advise.     Patient returns call to RN line and requests call back at [phone].    Reached out to patient to relay below message from provider with understanding voiced. Patient states that she is more than willing to go to pain management but due to the location patient states that she will need assistance from friends/family to get there so it may take her a little bit to make the appointment. Patient stated that she will work to find a ride to the pain clinic as soon as possible but is concerned about the inbetween time with her pain management.     Izzy Mendez, RN

## 2019-07-09 NOTE — Clinical Note
Chai Hawkins- please see my note. Please call pt to advise that new State guidelines go into effect immeidately as of July 1 this means her post dated narcotic scripts from [facility] are no longer valid. As discussed in the apt, she will need to make apt with pain clinic and I will start taper if she chooses to establish with me.

## 2019-07-09 NOTE — PATIENT INSTRUCTIONS
Today was a meet and greet to establish care.  We discussed chronic medical issues including chronic pain.    I discussed that I would not be maintaining you on your long-term opiates as you have been for the past 10 years.  We discussed the risks of this moving forward.  As well as current guidelines.  If you choose to establish care with me I recommend you see pain clinic for evaluation, tapering of medication, and using alternative medications to treat your chronic pain.,     You were in agreement with this plan and you will make a decision about continued care with me.    It was a pleasure to see you today.     Patient Education       [facility]   Discharged by : Tresa Driver MA    Paper scripts provided to patient : no     If you have any questions regarding your visit please contact your care team:     Team Gold                Clinic Hours Telephone Number     Dr. Mary Napoles, CNP 7am-7pm  Monday - Thursday   7am-5pm  Fridays  [phone]   (Appointment scheduling available 24/7)     RN Line  [phone] option 2     Urgent Care - [place] and [place] - 11am-9pm Monday-Friday Saturday-Sunday- 9am-5pm     [place] -   5pm-9pm Monday-Friday Saturday-Sunday- 9am-5pm    [phone] - [place]    [phone] - [place]     For a Price Quote for your services, please call our Consumer Price Line at [phone].     What options do I have for visits at the clinic other than the traditional office visit?     To expand how we care for you, many of our providers are utilizing electronic visits (e-visits) and telephone visits, when medically appropriate, for interactions with their patients rather than a visit in the clinic. We also offer nurse visits for many medical concerns. Just like any other service, we will bill your insurance company for this type of visit based on time spent on the phone with your provider. Not all  insurance companies cover these visits. Please check with your medical insurance if this type of visit is covered. You will be responsible for any charges that are not paid by your insurance.   E-visits via [employer] Displayhart: generally incur a $45.00 fee.     Telephone visits:  Time spent on the phone: *charged based on time that is spent on the phone in increments of 10 minutes. Estimated cost:   5-10 mins $30.00   11-20 mins. $59.00   21-30 mins. $85.00     Use Combined Power (secure email communication and access to your chart) to send your primary care provider a message or make an appointment. Ask someone on your Team how to sign up for Combined Power.     As always, Thank you for trusting us with your health care needs!    [facility]:    Scheduling Appointments  [facility] [place]  Call: [phone]    [facility]  Call: [phone]    [facility]  Call: [phone]    For Gastroenterology referrals   [facility]   Clinics and Surgery Center, 4th Floor   [address] [place] [id number]   Appointments: [phone]    WHERE TO GO FOR CARE?  Clinic    Make an appointment if you:       Are sick (cold, cough, flu, sore throat, earache or in pain).       Have a small injury (sprain, small cut, burn or broken bone).       Need a physical exam, Pap smear, vaccine or prescription refill.       Have questions about your health or medicines.    To reach us:      Call [phone] ([phone]). Open 24 hours every day. (For counseling services, call [phone].)    Log into Combined Power at [email or web address]. (Visit [email or web address] to create an account.) Hospital emergency room    An emergency is a serious or life- threatening problem that must be treated right away.    Call 107 or get to the hospital if you have:      Very bad or sudden:            - Chest pain or pressure         - Bleeding         - Head or belly pain          - Dizziness or trouble seeing, walking or                          Speaking      Problems breathing      Blood in your vomit or you are coughing up blood      A major injury (knocked out, loss of a finger or limb, rape, broken bone protruding from skin)    A mental health crisis. (Or call the Mental Health Crisis line at 1-705.220.3783 or Suicide Prevention Hotline at 1-141.823.2655.)    Open 24 hours every day. You don't need an appointment.     Urgent care    Visit urgent care for sickness or small injuries when the clinic is closed. You don't need an appointment. To check hours or find an urgent care near you, visit [email or web address]. Online care    Get online care from [facility] for more than 70 common problems, like colds, allergies and infections. Open 24 hours every day at:   [email or web address]   Need help deciding?    For advice about where to be seen, you may call your clinic and ask to speak with a nurse. We're here for you 24 hours every day.         If you are deaf or hard of hearing, please let us know. We provide many free services including sign language interpreters, oral interpreters, TTYs, telephone amplifiers, note takers and written materials.

## 2019-07-10 ENCOUNTER — TELEPHONE (OUTPATIENT)
Dept: UROLOGY | Facility: CLINIC | Age: 74
End: 2019-07-10

## 2019-07-10 ENCOUNTER — PATIENT OUTREACH (OUTPATIENT)
Dept: CARE COORDINATION | Facility: CLINIC | Age: 74
End: 2019-07-10

## 2019-07-10 DIAGNOSIS — N39.41 URGENCY INCONTINENCE: ICD-10-CM

## 2019-07-10 DIAGNOSIS — G89.4 CHRONIC PAIN DISORDER: Primary | ICD-10-CM

## 2019-07-10 RX ORDER — SOLIFENACIN SUCCINATE 5 MG/1
5 TABLET, FILM COATED ORAL DAILY
Qty: 30 TABLET | Refills: 0 | Status: SHIPPED | OUTPATIENT
Start: 2019-07-10 | End: 2019-08-13

## 2019-07-10 ASSESSMENT — ANXIETY QUESTIONNAIRES: GAD7 TOTAL SCORE: 3

## 2019-07-10 ASSESSMENT — ACTIVITIES OF DAILY LIVING (ADL): DEPENDENT_IADLS:: MEAL PREPARATION

## 2019-07-10 NOTE — TELEPHONE ENCOUNTER
Called spoke to patient.   Ok for 1 month refill.   Sent to preferred pharmacy.   Georgia Multani RN

## 2019-07-10 NOTE — PROGRESS NOTES
Clinic Care Coordination Contact--Social Work Initial Call     Clinic Care Coordination Contact  OUTREACH    Referral Information: Per telephone message, Patient is requesting pain clinic closer to her home.  Clinic care team to assist with this need.  PCP will not be taking over prescribing her long term opiates which she has been prescribed for 10 years.  Care Coordination requested by provider to assist with transport needs.    Referral Source: Care Team    Primary Diagnosis: Psychosocial    Chief Complaint   Patient presents with     Clinic Care Coordination - Initial     SW        Universal Utilization: Patient is scheduled to see [facility] on 8/13/2019  Clinic Utilization  Difficulty keeping appointments:: No  Compliance Concerns: No  No-Show Concerns: No  No PCP office visit in Past Year: No  Utilization    Last refreshed: 7/10/2019 12:24 AM:  Hospital Admissions 0           Last refreshed: 7/10/2019 12:24 AM:  ED Visits 0           Last refreshed: 7/10/2019 12:24 AM:  No Show Count (past year) 3              Current as of: 7/10/2019 12:24 AM            Clinical Concerns: SW calling Patient to discuss potential appointment and transport.  Pain clinic appointment scheduled for 8/13/2019.   Patient reports she will utilize the [employer] service where she can park car and they will bring her to appointment.  Patient is questioning if Elle Napoles can phone in a prescription until initial visit.  Patient is very concerned with not having the medications if needed.  She reports she may take more than 4 some days for hip and foot pain.  SW suggested she contact the clinic and ask for message to be sent to her nurse. Patient stated she will do within the next week      Current Medical Concerns:    Patient Active Problem List   Diagnosis     Esophageal reflux     Insomnia     FAMILY HX DIABETES MELLITUS brother     Tobacco use disorder     Obesity     Generalized anxiety disorder     Moderate major  depression (H)     COPD (chronic obstructive pulmonary disease) (H)     Elevated fasting glucose     Chronic pain disorder     Idiopathic peripheral neuropathy     Hyperlipidemia LDL goal <130     Degenerative arthritis of hip     Hip arthrosis - left, severe     Trochanteric bursitis     Status post hip replacement     Advanced directives, counseling/discussion     Gastroparesis     DDD (degenerative disc disease), lumbar     Delayed gastric emptying     Health Care Home     Candidal intertrigo     Ventral hernia     Femur fracture (H)     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     Current Behavioral Concerns: Mood stable per Patient, she does become frustrated with current limitations   Education Provided to patient:  Discussed additional home and community support, Patient already applied with [facility] for potential additional support   Pain  Pain (GOAL):: Yes  Type: Chronic (>3mo)  Location of chronic pain:: hip and left foot pain   Radiating: No  Progression: Constant  Chronic pain severity:: 5  Limitation of routine activities due to chronic pain:: Yes  Description: Able to do moderate activities  Alleviating Factors: Rest  Health Maintenance Reviewed: Due/Overdue   Health Maintenance Due   Topic Date Due     DEPRESSION ACTION PLAN  1945     ADVANCE CARE PLANNING  08/26/2016     MEDICARE ANNUAL WELLNESS VISIT  09/13/2017     URINE DRUG SCREEN  01/10/2019     LIPID  06/30/2019     FALL RISK ASSESSMENT  07/12/2019     Clinical Pathway: None    Medication Management: Patient states she takes as prescribed (except for prn medication on occasion) and can afford    Functional Status:Slipped twice but not falls.  Patient independent in wheelchair in the home.  Patient reports at least daily incontinence of bladder and may have to do laundry daily   Dependent ADL's::  Wheelchair-independent, Incontinence  Dependent IADLs:: Meal Preparation  Bed or wheelchair confined:: No  Mobility Status: Independent w/Device  Fallen 2 or more times in the past year?: No  Any fall with injury in the past year?: No    Living Situation:  Current living arrangement:: I live alone, I live in a private home  Type of residence:: Private home - no stairs    Diet/Exercise/Sleep:  Diet:: Regular  Inadequate nutrition (GOAL):: No  Food Insecurity: No  Tube Feeding: No  Exercise:: Unable to exercise  Inadequate activity/exercise (GOAL):: No  Significant changes in sleep pattern (GOAL): No    Transportation:  Transportation concerns (GOAL):: No  Transportation means:: Regular car     Psychosocial:  Mormon or spiritual beliefs that impact treatment:: No  Mental health DX:: No  Mental health management concern (GOAL):: No  Informal Support system:: Friends, Neighbors, Family     Financial/Insurance: Patient reports being physically unable to work for past 6 years.  As result, she is behind on mortgage, she reports her 4 brothers are realtors and will assist in this process       Financial/Insurance concerns (GOAL):: Yes       Resources and Interventions:Discussed additional resources with [facility]   Current Resources: localbacon, she wears     Community Resources: Meals on Wheels(Once per week)  Supplies used at home:: Diabetic Supplies, Incontinence Supplies  Equipment Currently Used at Home: raised toilet    Advance Care Plan/Directive  Advanced Care Plans/Directives on file:: Yes  Type Advanced Care Plans/Directives: Advanced Directive - On File  Advanced Care Plan/Directive Status: Not Applicable          Goals:   Goals        General    1. Medical (pt-stated)     Notes - Note created  7/10/2019  2:46 PM by Lulu Sood BSW    Goal Statement: I will call [facility] to inquire on a medication refill until pain clinic appt 8/13/2019, to be done within 2 weeks   Measure of Success:  Patient will call to inquire on above/next steps   Supportive Steps to Achieve: Patient call [facility] to inquire on a medication refill until pain clinic appt 8/13/2019, to be done within 2 weeks   Barriers: Pain clinic visit scheduled for 8/13/2019, Patient reports she will run out of PRN medications before that date   Strengths: Patient will call clinic to discuss   Date to Achieve By: 7/24/2019  Patient expressed understanding of goal: Yes        2. Problem Solving (pt-stated)     Notes - Note created  7/10/2019  2:49 PM by Lulu Sood BSW    Goal Statement: I will call [facility] within 2 weeks to discuss status of potential additional services   Measure of Success: Obtain status of this application   Supportive Steps to Achieve: Patient will call [facility] to discuss status/next steps  Barriers: Patient reported desire to have someone attend appointments, however her family work   Strengths: Patient has supportive family, she does for self as able, she will call [facility]   Date to Achieve By: 7/24/2019  Patient expressed understanding of goal: Yes                Patient/Caregiver understanding: Patient will call clinic within 1-2 weeks to discuss medication refill until 8/13/2019 appointment.  Patient will call [facility] to inquire on status of potential additional resources.  provided her contact information to Patient      Outreach Frequency: 2 weeks  Future Appointments              In 2 weeks Max Wayne MD [facility]    In 1 month Belkis Rothman MD [facility]          Plan:  1) Patient will call clinic within 1-2 weeks to discuss medication refill until 8/13/2019 appointment  2) Patient will call [facility] to inquire on status of potential additional resources   3)  will send introduction letter, Access and Complex care plan today  4) ALEXIA will update provider who  recently saw Patient, will call Patient in 2 weeks     ANGEL Powers, MSW   [facility]   [phone]  7/10/2019 3:09 PM

## 2019-07-10 NOTE — PROGRESS NOTES
reviewed per provider request below and report printed and furnished to provider for review and recommendations.     SUMAN Maradiaga Jennifer Elizabeth, APRN CNP at 7/10/2019  4:16 PM     Status: Signed      I will kindly ask RN to review  to see when pain medication was last dispensed.  I will begin a taper on this next fill.  Will await to hear from RN with this info thank you so much.     Lulu, thank you very much for the follow-up.

## 2019-07-10 NOTE — LETTER
[facility] Home  Complex Care Plan  About Me:    Patient Name:  Ledy Odonnell    YOB: 1945  Age:         73 year old   [place] MRN:    [id number] Telephone Information:  Home Phone [phone]   Mobile [phone]       Address:  [address]  [facility] [address] Email address:  No e-mail address on record      Emergency Contact(s)    Name Relationship Lgl Grd Work Phone Home Phone Mobile Phone   1NEISHA HICKEY Sister   [phone]           Primary language:  English     needed? No   [place] [employer]:  [phone] op. 1  Other communication barriers: None  Preferred Method of Communication:  Mail  Current living arrangement: I live alone, I live in a private home  Mobility Status/ Medical Equipment: Independent w/Device    Health Maintenance  Health Maintenance Reviewed: Due/Overdue   Health Maintenance Due   Topic Date Due     DEPRESSION ACTION PLAN  1945     ADVANCE CARE PLANNING  08/26/2016     MEDICARE ANNUAL WELLNESS VISIT  09/13/2017     URINE DRUG SCREEN  01/10/2019     LIPID  06/30/2019     FALL RISK ASSESSMENT  07/12/2019       My Access Plan  Medical Emergency 911   Primary Clinic Line [facility] - [phone]   24 Hour Appointment Line [phone] or  [phone]) (toll-free)   24 Hour Nurse Line [phone] (toll-free)   Preferred Urgent Care [facility] [phone]   Preferred Hospital [facility]  [phone]   [facility] - PHARMACY CLOSED - [facility] TO [facility]     Behavioral Health Crisis Line The National Suicide Prevention Lifeline at 1-916.604.5865 or 911             My Care Team Members  Patient Care Team       Relationship Specialty Notifications Start End    Macarena Vinson PA-C PCP - General Physician Assistant  3/15/10     Phone: [phone] Fax:  [phone]         [address] [facility] [address]    Eagle Patel MD MD Internal Medicine  10/24/12     see GI at [facility]    Phone: [phone] Fax: [phone]         [facility] [address] [place] [address]    Macarena Vinson PA-C Assigned PCP   8/30/15     Phone: [phone] Fax: [phone]         [address] [facility] [address]    Eagle Gomez MD Referring Physician Orthopedics  7/9/18     Referring to Ortho-Dr. Daley    Phone: [phone] Fax: [phone] [address] [facility] [address]    Arcadio Daley MD MD Orthopedics  7/9/18     Phone: [phone] Fax: [phone]         [address] [facility] [address]    Lulu Sood BSW Lead Care Coordinator Primary Care - CC  7/10/19      Care Coordination [facility]            My Care Plans  Self Management and Treatment Plan  Goals and (Comments)  Goals        General    1. Medical (pt-stated)     Notes - Note created  7/10/2019  2:46 PM by Lulu Sood BSW    Goal Statement: I will call [facility] to inquire on a medication refill until pain clinic appt 8/13/2019, to be done within 2 weeks   Measure of Success: Patient will call to inquire on above/next steps   Supportive Steps to Achieve: Patient call [facility] to inquire on a medication refill until pain clinic appt 8/13/2019, to be done within 2 weeks   Barriers: Pain clinic visit scheduled for 8/13/2019, Patient reports she will run out of PRN medications before that date   Strengths: Patient will call clinic to discuss   Date to Achieve By: 7/24/2019  Patient expressed understanding of goal: Yes        2. Problem Solving (pt-stated)     Notes - Note created  7/10/2019  2:49 PM by Lulu Sood BSW    Goal Statement: I will call [facility] within 2 weeks to discuss status of potential additional services    Measure of Success: Obtain status of this application   Supportive Steps to Achieve: Patient will call [facility] to discuss status/next steps  Barriers: Patient reported desire to have someone attend appointments, however her family work   Strengths: Patient has supportive family, she does for self as able, she will call [facility]   Date to Achieve By: 7/24/2019  Patient expressed understanding of goal: Yes               Action Plans on File: none                      Advance Care Plans/Directives Type:   Type Advanced Care Plans/Directives: Advanced Directive - On File    My Medical and Care Information  Problem List   Patient Active Problem List   Diagnosis     Esophageal reflux     Insomnia     FAMILY HX DIABETES MELLITUS brother     Tobacco use disorder     Obesity     Generalized anxiety disorder     Moderate major depression (H)     COPD (chronic obstructive pulmonary disease) (H)     Elevated fasting glucose     Chronic pain disorder     Idiopathic peripheral neuropathy     Hyperlipidemia LDL goal <130     Degenerative arthritis of hip     Hip arthrosis - left, severe     Trochanteric bursitis     Status post hip replacement     Advanced directives, counseling/discussion     Gastroparesis     DDD (degenerative disc disease), lumbar     Delayed gastric emptying     Health Care Home     Candidal intertrigo     Ventral hernia     Femur fracture (H)     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

## 2019-07-10 NOTE — TELEPHONE ENCOUNTER
[facility] [address] [place] [address]    Reason for call:  Other   Patient called regarding (reason for call): prescription - solifenacin (VESICARE) 5 MG tablet  Additional comments: Patient states some of the medication was destroyed by accident and she would like to speak with the team regarding a partial refill. States the medication has been working well and she doesn't want to mess things up.    Phone number to reach patient:  Home number on file [phone] (home)    Best Time:  ASAP    Can we leave a detailed message on this number?  YES

## 2019-07-10 NOTE — LETTER
[place] CARE COORDINATION  [address] [facility] [address]  July 10, 2019    Ledy Odonnell  [address]  [place] [address]      Dear Ledy,    Thank you for your time today.     The clinic care coordinator is a registered nurse and/or  who understand the health care system. The goal of clinic care coordination is to help you manage your health and improve access to the [facility] system in the most efficient manner. The registered nurse can assist you in meeting your health care goals by providing education, coordinating services, and strengthening the communication among your providers. The  can assist you with financial, behavioral, psychosocial, chemical dependency, counseling, and/or psychiatric resources.    Please feel free to contact me at [phone] with any questions or concerns. We at [facility] are focused on providing you with the highest-quality healthcare experience possible and that all starts with you.     Sincerely,     Lulu Sood, ANGEL, MSW    Clinical Care Coordination  [facility]  [phone]    Enclosed: I have enclosed a copy of a 24 Hour Access Plan. This has helpful phone numbers for you to call when needed. Please keep this in an easy to access place to use as needed.

## 2019-07-10 NOTE — LETTER
Health Care Home - Access Care Plan    About Me:    Patient Name:  Ledy Odonnell    YOB: 1945  Age:                      73 year old   [place] MRN:     [id number] Telephone Information:   Home Phone [phone]   Mobile [phone]       Address:  [address]  [facility] [address] Email address:  No e-mail address on record      Emergency Contact(s)   Name Relationship Lgl Grd Work Phone Home Phone Mobile Phone   NEISHA TARIQ Sister   [phone]             Health Maintenance: Routine Health maintenance Reviewed: Due/Overdue   Health Maintenance Due   Topic Date Due     DEPRESSION ACTION PLAN  1945     ADVANCE CARE PLANNING  08/26/2016     MEDICARE ANNUAL WELLNESS VISIT  09/13/2017     URINE DRUG SCREEN  01/10/2019     LIPID  06/30/2019     FALL RISK ASSESSMENT  07/12/2019       My Access Plan  Medical Emergency 911   Questions or concerns during clinic hours Primary Clinic Line, I will call the clinic directly: [facility] - [phone]   24 Hour Appointment Line [phone] or  [phone]) (toll free)   24 Hour Nurse Line [phone] (toll free)   Questions or concerns outside clinic hours 24 Hour Appointment Line, I will call the after-hours on-call line:   [facility] [phone] or [phone]) (toll-free)   Preferred Urgent Care [facility] [phone]   Preferred Hospital [facility]  [phone]   Preferred Pharm[facility] [place] [facility] - PHARMACY CLOSED - [facility]ATED TO [facility]     Behavioral Health Crisis Line The National Suicide Prevention Lifeline at 1-884.411.5570 or 91                     My Care Team Members  Patient Care Team       Relationship Specialty Notifications Start End    Macarena Vinson PA-C PCP - General Physician Assistant  3/15/10     Phone: [phone] Fax: [phone] [address]  [facility] [address]    Eagle Patel MD MD Internal Medicine  10/24/12     see GI at [facility]    Phone: [phone] Fax: [phone]         [facility] [address] [place] [address]    Macarena Vinson PA-C Assigned PCP   8/30/15     Phone: [phone] Fax: [phone]         [address] [facility] [address]    Eagle Gomez MD Referring Physician Orthopedics  7/9/18     Referring to [place]-Dr. Daley    Phone: [phone] Fax: [phone] [address] [facility] [address]    Arcadio Daley MD MD Orthopedics  7/9/18     Phone: [phone] Fax: [phone]         [address] [facility] [address]    Lulu Sood BSW Lead Care Coordinator Primary Care - CC  7/10/19      Care Coordination [facility]           My Medical and Care Information  Problem List   Patient Active Problem List   Diagnosis     Esophageal reflux     Insomnia     FAMILY HX DIABETES MELLITUS brother     Tobacco use disorder     Obesity     Generalized anxiety disorder     Moderate major depression (H)     COPD (chronic obstructive pulmonary disease) (H)     Elevated fasting glucose     Chronic pain disorder     Idiopathic peripheral neuropathy     Hyperlipidemia LDL goal <130     Degenerative arthritis of hip     Hip arthrosis - left, severe     Trochanteric bursitis     Status post hip replacement     Advanced directives, counseling/discussion     Gastroparesis     DDD (degenerative disc disease), lumbar     Delayed gastric emptying     Health Care Home     Candidal intertrigo     Ventral hernia     Femur fracture (H)     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

## 2019-07-10 NOTE — TELEPHONE ENCOUNTER
I will kindly ask RN to review  to see when pain medication was last dispensed.  I will begin a taper on this next fill.  Will await to hear from RN with this info thank you so much.    Lulu, thank you very much for the follow-up.

## 2019-07-11 NOTE — TELEPHONE ENCOUNTER
"Routing care coordination referral to provider pool to please advise.     Izzy Mendez RN    See provider's comment below    \"No problem, let's help her find a pain clinic that is closer to her.  Could you possibly have a look of where she lives and see what pain clinics exist that are close to her perhaps?       As discussed with pt yesterday, I will not be taking over prescribing her long-term opiates that she has been prescribed for the last 10 years.   Her prescription will run out over the next few weeks and she needs to be pro active.  I will begin tapering her dose to a level that is recommended by the CDC guidelines.  It is in her best interest to get in with the pain clinic as soon as possible.       I could also involve care coordination social work to possibly help with transportation.  Lulu Rubi, is a something that you could help with?   Thanks all,   Tatiana\"          "

## 2019-07-12 DIAGNOSIS — G89.4 CHRONIC PAIN DISORDER: ICD-10-CM

## 2019-07-12 RX ORDER — OXYCODONE AND ACETAMINOPHEN 10; 325 MG/1; MG/1
1-2 TABLET ORAL EVERY 6 HOURS PRN
Qty: 12 TABLET | Refills: 0 | Status: SHIPPED | OUTPATIENT
Start: 2019-07-12 | End: 2019-08-16 | Stop reason: ALTCHOICE

## 2019-07-17 ENCOUNTER — VIRTUAL VISIT (OUTPATIENT)
Dept: FAMILY MEDICINE | Facility: CLINIC | Age: 74
End: 2019-07-17
Payer: COMMERCIAL

## 2019-07-17 DIAGNOSIS — G89.4 CHRONIC PAIN DISORDER: Primary | ICD-10-CM

## 2019-07-17 PROCEDURE — G2012 BRIEF CHECK IN BY MD/QHP: HCPCS | Performed by: NURSE PRACTITIONER

## 2019-07-17 RX ORDER — OXYCODONE AND ACETAMINOPHEN 7.5; 325 MG/1; MG/1
1-2 TABLET ORAL EVERY 6 HOURS PRN
Qty: 120 TABLET | Refills: 0 | Status: SHIPPED | OUTPATIENT
Start: 2019-07-17 | End: 2019-08-16

## 2019-07-17 RX ORDER — OXYCODONE AND ACETAMINOPHEN 7.5; 325 MG/1; MG/1
1 TABLET ORAL EVERY 6 HOURS PRN
Qty: 120 TABLET | Refills: 0 | Status: SHIPPED | OUTPATIENT
Start: 2019-07-17 | End: 2019-07-17

## 2019-07-17 NOTE — PROGRESS NOTES
"Ledy Odonnell is a 73 year old female who is being evaluated via a billable telephone visit.      The patient has been notified of following:     \"This telephone visit will be conducted via a call between you and your physician/provider. We have found that certain health care needs can be provided without the need for a physical exam.  This service lets us provide the care you need with a short phone conversation.  If a prescription is necessary we can send it directly to your pharmacy.  If lab work is needed we can place an order for that and you can then stop by our lab to have the test done at a later time.    If during the course of the call the physician/provider feels a telephone visit is not appropriate, you will not be charged for this service.\"     Consent has been obtained for this service by 1 care team member: yes. See the scanned image in the medical record.    Ledy Odonnell complains of    Chief Complaint   Patient presents with     Patient/info Update       I have reviewed and updated the patient's Past Medical History, Social History, Family History and Medication List.    ALLERGIES  No known allergies    Please call patient at: [phone]    Yanet Sadler CMA ([facility])   (MA signature)    Additional provider notes: This is a telephone visit following my initial establish care visit with patient on 7/9/2019  in clinic last week regarding her chronic pain syndrome.  Background is that patient has been taking up to 120MME daily for the past 10 years to treat her chronic pain second to osteoarthritis, chronic shoulder pain status post rotator cuff surgery (~10 yrs ago), chronic hip status post total hip replacement in 2011, and peripheral arterial disease.    She is currently averaging around 4-6 tablets a day Percocet 10 mg / 325 mg.  This however can range from 2 to 8 tablets on any given day given the severity of her pain.  Given her age, fall risk, CDC guidelines for opiate prescribing I " advised patient on my initial establish care visit that I will not be continuing her on long-term opiates to treat her chronic pain given other medications have not been tried.  She is in agreement with this plan.  In the interim I have reached out to Dr. Gabbi Benitez to help me with a taper.  She recommended to either decrease patient's number of tablets daily or decrease her milligrams and patient decided to decrease her milligrams per day.  We discussed the taper would be initiated today and a prescription for Percocet 7.5 mg / 325 mg with instructions to read to take 1 to 2 tablets every 6 hours for moderate to severe pain (maximum number of tablets daily 8).  A prescription for diclofenac gel to be applied to her joints was also prescribed.  Patient has an upcoming appointment with pain clinic with  on 8/13/2019.   Patient will  her prescription today.    I will forward my note to Dr. Rothman so she is aware of plan.      Assessment/Plan:  (G89.4) Chronic pain disorder  (primary encounter diagnosis)  Comment: Taper initiated.  Plan: diclofenac (VOLTAREN) 1 % topical gel,         oxyCODONE-acetaminophen (PERCOCET) 7.5-325 MG         per tablet, DISCONTINUED:         oxyCODONE-acetaminophen (PERCOCET) 7.5-325 MG         per tablet              I have reviewed the note as documented above.  This accurately captures the substance of my conversation with the patient.  ROSCOE Sethi, CNP      Total time of call between patient and provider was 26 minutes     ROSCOE Ridley CNP

## 2019-07-17 NOTE — Clinical Note
Parviz Gaona,You will see this patient in clinic on August 9 for chronic pain eval.  Please see my telephone encounter as well as my establish care note for more details on 7/9/2019.  Essentially, I inherited this patient from a provider that left my clinic, and she has been maintained on high dose opioids for the past 10 years.  No other medication has been tried to treat her chronic pain and I advised patient that I would not be continuing her on long-term opioids at these doses.  I consulted with Gabbi and I initiated a taper plan.  I would love your recommendations on the taper as well as other options medications and/or alternative therapy to treat her pain.Regards,Tatiana

## 2019-07-24 ENCOUNTER — TELEPHONE (OUTPATIENT)
Dept: UROLOGY | Facility: CLINIC | Age: 74
End: 2019-07-24

## 2019-07-24 ENCOUNTER — OFFICE VISIT (OUTPATIENT)
Dept: UROLOGY | Facility: CLINIC | Age: 74
End: 2019-07-24
Payer: COMMERCIAL

## 2019-07-24 VITALS — HEART RATE: 90 BPM | DIASTOLIC BLOOD PRESSURE: 69 MMHG | OXYGEN SATURATION: 97 % | SYSTOLIC BLOOD PRESSURE: 122 MMHG

## 2019-07-24 DIAGNOSIS — N39.41 URGENCY INCONTINENCE: ICD-10-CM

## 2019-07-24 DIAGNOSIS — N39.41 URGENCY INCONTINENCE: Primary | ICD-10-CM

## 2019-07-24 PROCEDURE — 99213 OFFICE O/P EST LOW 20 MIN: CPT | Performed by: UROLOGY

## 2019-07-24 RX ORDER — SOLIFENACIN SUCCINATE 5 MG/1
10 TABLET, FILM COATED ORAL DAILY
Qty: 30 TABLET | Refills: 1 | Status: SHIPPED | OUTPATIENT
Start: 2019-07-24 | End: 2019-07-24

## 2019-07-24 RX ORDER — SOLIFENACIN SUCCINATE 5 MG/1
10 TABLET, FILM COATED ORAL DAILY
Qty: 60 TABLET | Refills: 2 | Status: SHIPPED | OUTPATIENT
Start: 2019-07-24 | End: 2019-10-24

## 2019-07-24 NOTE — PROGRESS NOTES
"F/u OAB wet, Vesicare 5, other medical issues, tobacco.    Compliant; has actually doubled-up on dose of Vesicare, takes it in the evening    Now with marked improvement in bladder control. \"I used to be a shut-in and now I can go out to [employer] and everything.    Denies dysuria, gross hematuria, frequency. Resolution of nocturia (previously 3-5x).     BM's OK with prn senna. No drug side effect noted.        Current Outpatient Medications   Medication     albuterol (PROAIR HFA/PROVENTIL HFA/VENTOLIN HFA) 108 (90 Base) MCG/ACT inhaler     buPROPion (WELLBUTRIN XL) 300 MG 24 hr tablet     diclofenac (VOLTAREN) 1 % topical gel     DULoxetine (CYMBALTA) 60 MG capsule     lidocaine (LIDODERM) 5 % patch     multivitamin, therapeutic (THERA-VIT) TABS tablet     nystatin (MYCOSTATIN) 086198 UNIT/GM external cream     nystatin (MYCOSTATIN) 870004 UNIT/GM external powder     oxyCODONE-acetaminophen (PERCOCET)  MG per tablet     oxyCODONE-acetaminophen (PERCOCET)  MG per tablet     oxyCODONE-acetaminophen (PERCOCET) 7.5-325 MG per tablet     senna-docusate (SENOKOT-S;PERICOLACE) 8.6-50 MG per tablet     solifenacin (VESICARE) 5 MG tablet     solifenacin (VESICARE) 5 MG tablet     traZODone (DESYREL) 50 MG tablet     Current Facility-Administered Medications   Medication     lidocaine (PF) (XYLOCAINE) 1 % injection 4 mL     lidocaine (PF) (XYLOCAINE) 1 % injection 4 mL     lidocaine 1 % injection 4 mL     lidocaine 1 % injection 4 mL     lidocaine 1 % injection 4 mL     lidocaine 1 % injection 4 mL     methylPREDNISolone (DEPO-MEDROL) injection 80 mg     methylPREDNISolone (DEPO-MEDROL) injection 80 mg     methylPREDNISolone (DEPO-MEDROL) injection 80 mg     methylPREDNISolone (DEPO-MEDROL) injection 80 mg     methylPREDNISolone (DEPO-MEDROL) injection 80 mg     methylPREDNISolone (DEPO-MEDROL) injection 80 mg         (Unable to provide urine specimen today)      IMP:  1. Complex OAB, resolved with Vesicare 10  2. " Other medical issues      PLAN:  1. Discussed situation with patient in detail.  2. Continue with Ves 10 (will use single tab in the morning)  3. RTC 6 mos  4. 15 minutes spent with patient,100% spent in counseling and coordination of care for OAB.

## 2019-07-24 NOTE — TELEPHONE ENCOUNTER
Pharmacy is calling for clarification on RX: solifenacin (VESICARE) 5 MG tablet, would like to clarify quantity. Please call to advise. Thank you.

## 2019-08-13 ENCOUNTER — OFFICE VISIT (OUTPATIENT)
Dept: ANESTHESIOLOGY | Facility: CLINIC | Age: 74
End: 2019-08-13
Attending: NURSE PRACTITIONER

## 2019-08-13 VITALS
HEART RATE: 94 BPM | RESPIRATION RATE: 18 BRPM | SYSTOLIC BLOOD PRESSURE: 133 MMHG | DIASTOLIC BLOOD PRESSURE: 76 MMHG | HEIGHT: 64 IN | BODY MASS INDEX: 21.46 KG/M2

## 2019-08-13 DIAGNOSIS — M25.552 CHRONIC LEFT HIP PAIN: Primary | ICD-10-CM

## 2019-08-13 DIAGNOSIS — G89.29 CHRONIC LEFT HIP PAIN: Primary | ICD-10-CM

## 2019-08-13 ASSESSMENT — PAIN SCALES - GENERAL: PAINLEVEL: SEVERE PAIN (6)

## 2019-08-13 NOTE — PROGRESS NOTES
"                      [facility] Consultation    Date of visit: 8/13/2019    Reason for consultation:    Ledy Odonnell is a 74 year old female who is seen in consultation today at the request of her provider, Vika Napoles.    Primary Care Provider is Vika Napoles.  Pain medications are being prescribed by her PCP.    Please see the [facility] health questionnaire which the patient completed and reviewed with me in detail.    Chief Complaint:    Chief Complaint   Patient presents with     Pain Management     New consult        Pain history:  Ledy Odonnell is a 74 year old female who first started having problems with pain in the left hip since intial complete hip replacement in 2011. She underwent a subsequent surgery in 2014 due to fractured femur on the same side. She also has a \"locked left foot\" that has resulted from the multiple surgical procedures on the left extremity. This has significantly limited her mobility.     She has had physical therapy after the surgery and has seen PM&R for recommendations on her left locked foot. She also has pain in both of her shoulders with rotator cuff injury. She has steroid injections in her shoulders every 3 months.  She has done physical therapy for the shoulders and hip and does the exercises on a daily basis.     She is using lidocaine patches at bedtime as well as votlaren gel on both her shoulders and hands. She finds this temporarily relieving.     Pain rating: Averages 6/10 on a 0-10 scale.  Aggravating factors include: ambulating  Relieving factors include: resting, elevating extremity  Any bowel or bladder incontinence: denies    Current treatments include:  Diclofenac Gel  Cymbalta 60mg every day  Lidocaine Patch  Percocet 7.5/325 (1-2 tabs Q6H) (weaned down from 10) - she does not take them daily; but at times will take up to 3 at a time (22.5mg)    Tylenol  Ibuprofen      Other treatments have " included:  Ledy Odonnell has not been seen at a pain clinic in the past.    PT: Does daily PT HEPs  Acupuncture: no  TENs Unit: no  Injections: yes; in shoulders    Past Medical History:  Past Medical History:   Diagnosis Date     Depressive disorder, not elsewhere classified      GENERALIZED ANXIETY DIS 6/21/2007     Inflammatory arthritis      Osteoarthrosis, unspecified whether generalized or localized, unspecified site      Other and unspecified alcohol dependence, unspecified drinking behavior      Unspecified essential hypertension      Patient Active Problem List    Diagnosis Date Noted     Pulmonary nodules 03/14/2017     Priority: Medium     Currently managed with follow up imaging by [facility] result Team.         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  FPA / [facility]        DX V65.8 REPLACED WITH [facility]ALTH CARE HOME (04/08/2013)       Delayed gastric emptying 11/13/2012     Priority: Medium     Gastric Emptying Study 10/24/12: Markedly delayed at 281 mins (nml is )  Tx: eating 5-6 smaller meals throughout the day, and diet low in fat and fiber as both with take longer to empty       DDD (degenerative disc disease), lumbar 10/08/2012     Priority: Medium     Gastroparesis 08/27/2012     Priority: Medium     Pt to have UGI follow through and gastric emptying tests. F/u with [facility] pending results.       Advanced directives, counseling/discussion 08/26/2011      Priority: Medium     Advance Directive Problem List Overview:   Name Relationship Phone    Primary Health Care Agent            Alternative Health Care Agent          Advance Directive received and scanned. Click on Code in the patient header to view. 8/26/2011          Status post hip replacement 02/14/2011     Priority: Medium     (Problem list name updated by automated process. Provider to review and confirm.)       Hip arthrosis - left, severe 10/29/2010     Priority: Medium     Trochanteric bursitis 10/29/2010     Priority: Medium     Degenerative arthritis of hip 10/22/2010     Priority: Medium     L hip, severe arthritis       Hyperlipidemia LDL goal <130 05/09/2010     Priority: Medium     Idiopathic peripheral neuropathy 05/04/2010     Priority: Medium     Moderate major depression (H) 03/01/2010     Priority: Medium     COPD (chronic obstructive pulmonary disease) (H) 03/01/2010     Priority: Medium     Elevated fasting glucose 03/01/2010     Priority: Medium     A1C 6.3 3/1/10         Chronic pain disorder 03/01/2010     Priority: Medium     Patient is followed by JESS LAM for ongoing prescription of pain medication.  All refills should be approved by this provider, or covering partner.    Medication(s): Percocet   Maximum quantity per month: 120  Clinic visit frequency required: Q 6  months     Controlled substance agreement on file: Yes       Date(s): 10/15/13    Pain Clinic evaluation in the past: Yes       Date(s):  5/2010       Location(s):  [facility]    DIRE Total Score(s):    7/8/2015   Total Score 17     Last [facility] website verification:  Maria Elena 10, 2019   [email or web address]             Generalized anxiety disorder 06/21/2007     Priority: Medium     Tobacco use disorder 02/01/2007     Priority: Medium     Tried Chantix-discontinued bc of adverse effects       Obesity 02/01/2007     Priority: Medium     Problem list name updated by automated process. Provider to review        FAMILY HX DIABETES MELLITUS brother 08/29/2006     Priority: Medium     Insomnia 12/02/2005     Priority: Medium     Problem list name updated by automated process. Provider to review       Esophageal reflux 06/28/2005     Priority: Medium       Past Surgical History:  Past Surgical History:   Procedure Laterality Date     C HAND/FINGER SURGERY UNLISTED       C NONSPECIFIC PROCEDURE  2001    Shoulder Surgery     C NONSPECIFIC PROCEDURE  1975    Gastric Bypass     C NONSPECIFIC PROCEDURE      Cholecystectomy     C SHOULDER SURG PROC UNLISTED       C TOTAL HIP ARTHROPLASTY  1/4/2011    Lt VALERIE     HERNIORRHAPHY VENTRAL  5/14/2013    Procedure: HERNIORRHAPHY VENTRAL;  Open Ventral Hernia Repair;  Surgeon: Jhoan Rogers MD;  Location:  OR     Medications:  Current Outpatient Medications   Medication Sig Dispense Refill     albuterol (PROAIR HFA/PROVENTIL HFA/VENTOLIN HFA) 108 (90 Base) MCG/ACT inhaler Inhale 2 puffs into the lungs every 6 hours as needed for shortness of breath / dyspnea 3 Inhaler 1     buPROPion (WELLBUTRIN XL) 300 MG 24 hr tablet Take 1 tablet (300 mg) by mouth every morning 90 tablet 3     diclofenac (VOLTAREN) 1 % topical gel Place 4 g onto the skin 4 times daily 100 g 1     DULoxetine (CYMBALTA) 60 MG capsule Take 1 capsule (60 mg) by mouth daily 90 capsule 3     lidocaine (LIDODERM) 5 % patch Place 1 patch onto the skin every 24 hours To prevent lidocaine toxicity, patient should be patch free for 12 hrs daily. 15 patch 0     multivitamin, therapeutic (THERA-VIT) TABS tablet Take 1 tablet by mouth daily       nystatin (MYCOSTATIN) 037431 UNIT/GM external cream Apply topically 2 times daily 30 g 3     nystatin (MYCOSTATIN) 606046 UNIT/GM external powder Apply 1 g topically as needed 1 Bottle 5     oxyCODONE-acetaminophen (PERCOCET) 7.5-325 MG per tablet Take 1-2 tablets by mouth every 6 hours as needed for severe pain (maximum of 8 tablets in a day) 120 tablet 0     senna-docusate  (SENOKOT-S;PERICOLACE) 8.6-50 MG per tablet Take 1 tablet by mouth 2 times daily. (Patient taking differently: Take 2 tablets by mouth daily ) 40 tablet 11     solifenacin (VESICARE) 5 MG tablet Take 2 tablets (10 mg) by mouth daily 60 tablet 2     traZODone (DESYREL) 50 MG tablet TAKE ONE TO TWO TABLETS BY MOUTH NIGHTLY AT BEDTIME AS NEEDED FOR SLEEP 90 tablet 1     oxyCODONE-acetaminophen (PERCOCET)  MG per tablet Take 1-2 tablets by mouth every 6 hours as needed for severe pain Max 4 tablets per day (Patient not taking: Reported on 8/13/2019) 12 tablet 0     Allergies:     Allergies   Allergen Reactions     No Known Allergies      Social History:  Home situation: lives alone; has someone to help occasionally   Occupation/Schooling: retired  Tobacco use: current daily smoker  Alcohol use: denies  Drug use: denies  History of chemical dependency treatment: denies    Family history:  Family History   Problem Relation Age of Onset     Cancer Father         pancreatic     Heart Disease Mother         Unknown specifics     Dementia Mother      Arthritis Paternal Grandmother         RA     Dementia Maternal Grandmother        Review of Systems:    POSTIVE IN BOLD  GENERAL: fever/chills, fatigue, general unwell feeling, weight gain/loss.  HEAD/EYES:  headache, dizziness, or vision changes.    EARS/NOSE/THROAT:  Nosebleeds, hearing loss, sinus infection, earache, tinnitus.  IMMUNE:  Allergies, cancer, immune deficiency, or infections.  SKIN:  Urticaria, rash, hives  HEME/Lymphatic:   anemia, easy bruising, easy bleeding.  RESPIRATORY:  cough, wheezing, or shortness of breath  CARDIOVASCULAR/Circulation:  Extremity edema, syncope, hypertension, tachycardia, or angina.  GASTROINTESTINAL:  abdominal pain, nausea/emesis, diarrhea, constipation,  hematochezia, or melena.  ENDOCRINE:  Diabetes, steroid use,  thyroid disease or osteoporosis.  MUSCULOSKELETAL: neck pain, back pain, arthralgia, arthritis, or  "gout.  GENITOURINARY:  frequency, urgency, dysuria, difficulty voiding, hematuria or incontinence.  NEUROLOGIC:  weakness, numbness, paresthesias, seizure, tremor, stroke or memory loss.  PSYCHIATRIC:  depression, anxiety, stress, suicidal thoughts or mood swings.     Physical Exam:  Vitals:    08/13/19 1014   BP: 133/76   Pulse: 94   Resp: 18   Height: 1.626 m (5' 4\")     Exam:  Constitutional: healthy, alert and no distress  Head: normocephalic. Atraumatic.   Eyes: no redness or jaundice noted   ENT: oropharnx normal.  MMM.  Neck supple.    Cardiovascular: RRR no m/g/r   Respiratory: clear   Gastrointestinal: soft, non-tender, normoactive bowel sounds   : deferred  Skin: no suspicious lesions or rashes  Psychiatric: mentation appears normal and affect normal/bright    Musculoskeletal exam:  Gait/Station/Posture: left foot angulated and fixed, limited/No ROM; difficulto bear weight; presents in wheelchair  Myofascial tenderness:  Present in bilateral latera hip/thigh    Neurologic exam:  CN:  Cranial nerves 2-12 are normal    Diagnostic tests:  Xray of left foot was completed on 8/21/2018 showing:  \"Findings:     Standing AP, oblique, and lateral  views of the left foot were  obtained. Marked talipes equinovarus deformity with progressive  degenerative changes in the midfoot since comparison 2/10/2009. The  bones appear significantly more osteopenic, likely due to loss of use.  No displaced fracture.     Soft tissue is unremarkable.                                                                      Impression:  Marked worsening of talipes equinovarus deformity of the left foot  with scattered degenerative changes and disuse osteopenia.\"    Personally reviewed imaging on date of visit    Other testing (labs, diagnostics) reviewed:  Labs  Last Comprehensive Metabolic Panel:  Sodium   Date Value Ref Range Status   04/26/2019 127 (L) 133 - 144 mmol/L Final     Potassium   Date Value Ref Range Status   04/26/2019 " 4.3 3.4 - 5.3 mmol/L Final     Chloride   Date Value Ref Range Status   04/26/2019 90 (L) 94 - 109 mmol/L Final     Carbon Dioxide   Date Value Ref Range Status   04/26/2019 31 20 - 32 mmol/L Final     Anion Gap   Date Value Ref Range Status   04/26/2019 6 3 - 14 mmol/L Final     Glucose   Date Value Ref Range Status   04/26/2019 86 70 - 99 mg/dL Final     Urea Nitrogen   Date Value Ref Range Status   04/26/2019 10 7 - 30 mg/dL Final     Creatinine   Date Value Ref Range Status   04/26/2019 0.59 0.52 - 1.04 mg/dL Final     GFR Estimate   Date Value Ref Range Status   04/26/2019 >90 >60 mL/min/[1.73_m2] Final     Comment:     Non  GFR Calc  Starting 12/18/2018, serum creatinine based estimated GFR (eGFR) will be   calculated using the Chronic Kidney Disease Epidemiology Collaboration   (CKD-EPI) equation.       Calcium   Date Value Ref Range Status   04/26/2019 9.5 8.5 - 10.1 mg/dL Final       [place] Prescription Monitoring Program reviewed on date of visit - appropriate    Outside records reviewed      Screening tools:  DIRE Score for ongoing opioid management is calculated as follows:    Diagnosis = 2    Intractability = 2    Risk: Psych = 2  Chem Hlth = 2 Reliability = 2  Social = 3    Efficacy = 2    Total DIRE Score = 15 (14 or higher predicts good candidate for ongoing opioid management; 13 or lower predicts poor candidate for opioid management)         Assessment:  Ledy Odonnell is a 74 year old female who presents with the complaints of chronic hip and shoulder pain, as well as left foot deformity resulting from her multiple hip surgeries that has significantly limited her mobility. She has engaged in physical therapy in order to alleviate some of her musculoskeletal pain. However, she has been using a significant amount of opioid medications and presents to clinic with request to help taper her current dose to a safer level. We had an extensive conversation about her current regimen and she  stated that she does not have a set regimen, but rather will base the quantity of medications that she takes on the severity of pain. At its worst, she will take three tablets of percocet (7.5mg x 3 = 22.5mg). She tries to limit to no more than four tablets per day. We had an extensive conversation about the danger of taking higher than prescribed doses at once and that she should take only one tablet at a time and space the medications throughout the day.  Of note, she has recently been weaned down to 7.5mg tablets from 10mg and has tolerated the decrease well.  Her previous prescription was for 120 tablets in one month. We discussed the goal of limiting to four tablets per day and that her next month's prescription should be for 100 tablets. At that time, she can check in with her PCP for further weaning guidelines. We would recommend that she decrease to 5mg tablets for the next decrease. Currently, her MME if taking 4 tablets per day is 45, which is below the CDC guidelines and therefore safe. If she continues to taper, this will further improve her risk.     We also discussed engaging in other forms of therapy and Ms Odonnell was both motivated and interested in other options than opioids. We discussed the importance of pain psychology in the setting of chronic pain, both for coping skills as well as to learn biofeedback and CBT skills. She expressed great interest, motivation, and was referred for pain psychology.     During our visit we discussed the risks and benefits of opioids. I explained to her the risk of developing hyperalgesia, tolerance, dependence, and the risk of respiratory depression with opioids. She expressed understanding and agreed that tapering to a low dose of opioids would be appropriate.     Plan:  Diagnosis reviewed, treatment option addressed, and risk/benefits discussed.  Self-care instructions given.  I am recommending a multidisciplinary treatment plan to help this patient better  manage her pain.      1. Physical Therapy: Continue HEPs  2. Pain Psychologist to address issues of relaxation, behavioral change, coping style, and other factors important to improvement: Referral for Pain Psychology placed  3. Diagnostic Studies: not indicated  4. Medication Management:   1. Taper to four tablets of percocet 7.5mg per day; next Rx for 100 tablets; then decrease to 5mg tablets Q6H  2. Schedule Tylenol 650mg Q6H  5. Further procedures recommended: none  6. Recommendations/follow-up for PCP:  Please see recommendations for taper plan above  7. Follow up: 8 weeks    Total time spent was 40 minutes, and more than 50% of face to face time was spent in counseling and/or coordination of care regarding principles of multidisciplinary care, medication management, and therapeutic options.     Belkis Rothman MD    Pain Medicine  Department of Anesthesiology  [facility]

## 2019-08-13 NOTE — LETTER
"     RE: Ledy Odonnell  [address]  [facility] [address]     Dear Colleague,    Thank you for referring your patient, Ledy Odonnell, to the [facility] FOR COMPREHENSIVE PAIN MANAGEMENT at [facility]. Please see a copy of my visit note below.                        [facility] Consultation    Date of visit: 8/13/2019    Reason for consultation:    Ledy Odonnell is a 74 year old female who is seen in consultation today at the request of her provider, Vika Napoles.    Primary Care Provider is Vika Napoles.  Pain medications are being prescribed by her PCP.    Please see the [facility] health questionnaire which the patient completed and reviewed with me in detail.    Chief Complaint:    Chief Complaint   Patient presents with     Pain Management     New consult        Pain history:  Ledy Odonnell is a 74 year old female who first started having problems with pain in the left hip since intial complete hip replacement in 2011. She underwent a subsequent surgery in 2014 due to fractured femur on the same side. She also has a \"locked left foot\" that has resulted from the multiple surgical procedures on the left extremity. This has significantly limited her mobility.     She has had physical therapy after the surgery and has seen PM&R for recommendations on her left locked foot. She also has pain in both of her shoulders with rotator cuff injury. She has steroid injections in her shoulders every 3 months.  She has done physical therapy for the shoulders and hip and does the exercises on a daily basis.     She is using lidocaine patches at bedtime as well as votlaren gel on both her shoulders and hands. She finds this temporarily relieving.     Pain rating: Averages 6/10 on a 0-10 scale.  Aggravating factors include: ambulating  Relieving factors include: resting, elevating extremity  Any bowel or " bladder incontinence: denies    Current treatments include:  Diclofenac Gel  Cymbalta 60mg every day  Lidocaine Patch  Percocet 7.5/325 (1-2 tabs Q6H) (weaned down from 10) - she does not take them daily; but at times will take up to 3 at a time (22.5mg)    Tylenol  Ibuprofen    Other treatments have included:  Ledy Odonnell has not been seen at a pain clinic in the past.    PT: Does daily PT HEPs  Acupuncture: no  TENs Unit: no  Injections: yes; in shoulders    Past Medical History:  Past Medical History:   Diagnosis Date     Depressive disorder, not elsewhere classified      GENERALIZED ANXIETY DIS 6/21/2007     Inflammatory arthritis      Osteoarthrosis, unspecified whether generalized or localized, unspecified site      Other and unspecified alcohol dependence, unspecified drinking behavior      Unspecified essential hypertension      Patient Active Problem List    Diagnosis Date Noted     Pulmonary nodules 03/14/2017     Priority: Medium     Currently managed with follow up imaging by [facility] result Team.         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  FPA / G [facility]        DX V65.8 REPLACED WITH [id number] HEALTH CARE HOME (04/08/2013)       Delayed gastric emptying 11/13/2012     Priority: Medium     Gastric Emptying Study 10/24/12: Markedly delayed at 281 mins (nml is )  Tx: eating 5-6 smaller meals throughout the day, and diet low in fat and fiber as both with take longer  to empty       DDD (degenerative disc disease), lumbar 10/08/2012     Priority: Medium     Gastroparesis 08/27/2012     Priority: Medium     Pt to have UGI follow through and gastric emptying tests. F/u with [facility] pending results.       Advanced directives, counseling/discussion 08/26/2011     Priority: Medium               Advance Directive received and scanned. Click on Code in the patient header to view. 8/26/2011

## 2019-08-13 NOTE — PATIENT INSTRUCTIONS
1. Pain Psychology Referral - for biofeedback and cognitive     2. Recommendations to PCP will be provided for weaning opioid dose to safe levels which is a maximum of 4 tablets per day.   (Trial Taking 1 Percocet at a time, no more than 4 per day.)    3. Scheduled Tylenol 650 mg every 8 hours (three times per day)    4. Psychology referral placed- PAIN PSYCHOLOGY REQUESTED. Call to schedule your appointment.   Call any of these providers to make an appointment:    Dr. Hernesto Blunt [phone]  Dr. Malissa Gilliam [phone]  Dr. Abdias Duarte [phone]  Dr. Loulou Cobian [phone]  Dr. Rajwinder Garces [phone]    Follow up: 6-8 weeks in clinic with Provider.     To speak with a nurse, schedule/reschedule/cancel a clinic appointment, or request a medication refill call: [phone]     You can also reach us by [facility]: [email or web address]    For refills, please call on Monday, 1 week before your medication runs out. The doctors are not always in clinic, so this gives us time to get your prescriptions ready.  Please let us know the name of the medication you are requesting a refill of.

## 2019-08-13 NOTE — NURSING NOTE
LPN reviewed AVS with Pt.  Pt verbalized an understanding of information, and was asked to contact clinic with questions.    Vika Weiss LPN

## 2019-08-16 ENCOUNTER — VIRTUAL VISIT (OUTPATIENT)
Dept: FAMILY MEDICINE | Facility: CLINIC | Age: 74
End: 2019-08-16
Payer: COMMERCIAL

## 2019-08-16 DIAGNOSIS — G89.4 CHRONIC PAIN DISORDER: ICD-10-CM

## 2019-08-16 PROCEDURE — G2012 BRIEF CHECK IN BY MD/QHP: HCPCS | Performed by: NURSE PRACTITIONER

## 2019-08-16 RX ORDER — OXYCODONE AND ACETAMINOPHEN 5; 325 MG/1; MG/1
1 TABLET ORAL EVERY 6 HOURS PRN
Qty: 56 TABLET | Refills: 0 | Status: SHIPPED | OUTPATIENT
Start: 2019-08-30 | End: 2019-09-13

## 2019-08-16 RX ORDER — OXYCODONE AND ACETAMINOPHEN 7.5; 325 MG/1; MG/1
1 TABLET ORAL EVERY 6 HOURS PRN
Qty: 56 TABLET | Refills: 0 | Status: SHIPPED | OUTPATIENT
Start: 2019-08-16 | End: 2019-11-20

## 2019-08-16 NOTE — TELEPHONE ENCOUNTER
Patient/family was instructed to return call to [facility] RN directly on the RN Call back line at [phone].     Left a detailed voicemail relaying PCP's below message and requested for patient to call back.     Izzy Mendez, RN

## 2019-08-16 NOTE — TELEPHONE ENCOUNTER
Patient is requesting a refill of Percocet. She did see the pain clinic but their notes are not complete.  Please advise.  Thank you,  Maxwell Partida RN

## 2019-08-16 NOTE — PROGRESS NOTES
"Ledy Odonnell is a 74 year old female who is being evaluated via a billable telephone visit.      The patient has been notified of following:     \"This telephone visit will be conducted via a call between you and your physician/provider. We have found that certain health care needs can be provided without the need for a physical exam.  This service lets us provide the care you need with a short phone conversation.  If a prescription is necessary we can send it directly to your pharmacy.  If lab work is needed we can place an order for that and you can then stop by our lab to have the test done at a later time.    If during the course of the call the physician/provider feels a telephone visit is not appropriate, you will not be charged for this service.\"     Consent has been obtained for this service by 1 care team member: yes. See the scanned image in the medical record.    Ledy Odonnell complains of  No chief complaint on file.      I have reviewed and updated the patient's Past Medical History, Social History, Family History and Medication List.    ALLERGIES  No known allergies    Tresa Driver MA   (MA signature)    Additional provider notes: Chronic pain     Chronic Pain Follow-Up       Type / Location of Pain: shoulders, legs,     Analgesia/pain control:       Recent changes:  Worse for time being when we decreased Percocet to 7.5 mg/325mg       Overall control: Tolerable with discomfort  Activity level/function:      Daily activities:  Unable to perform most daily activities - chores, hobbies, social activities, driving    Work:  Unable to work  Adverse effects:  No  Adherance    Taking medication as directed?  Yes    Participating in other treatments: yes she was recently seen by pain clinic per referral by PCP to discuss pain medications and taper.  This went really well and the recommended biofeedback therapy.  Risk Factors:    Sleep:  Fair    Mood/anxiety:  controlled    Recent family or social " stressors:  none noted    Other aggravating factors: none  PHQ-9 SCORE 12/31/2018 2/4/2019 7/9/2019   PHQ-9 Total Score - - -   PHQ-9 Total Score 6 2 6     LESTER-7 SCORE 10/15/2018 12/31/2018 7/9/2019   Total Score - - -   Total Score 7 5 3     Encounter-Level CSA - 08/25/2015:    Controlled Substance Agreement - Scan on 9/4/2015 10:45 AM: CONTROLLED SUBSTANCE AGREEMENT, 8/25/15 (below)       Patient-Level CSA:    There are no patient-level csa.         Assessment/Plan:   (G89.4) Chronic pain disorder  Comment: Unfortunately pain management documentation was not completed by my time of visit with patient.  I therefore continued my taper and will await recommendations made by pain management.  For now prescribed oxycodone 7.5 mg / 325 mg maximum 4 tablets daily for 2 weeks, then start Percocet 5 mg / 325 mg maximum 4 tablets daily.  To prescription signed and ready for pickup.  Plan: oxyCODONE-acetaminophen (PERCOCET) 7.5-325 MG         per tablet, oxyCODONE-acetaminophen (PERCOCET)         5-325 MG tablet        I have reviewed the note as documented above.  This accurately captures the substance of my conversation with the patient.      Total time of call between patient and provider was 8 minutes     ROSCOE Ridley CNP

## 2019-08-16 NOTE — TELEPHONE ENCOUNTER
Reason for Call:  Medication or medication refill:    [facility] you [facility]?  Name of the pharmacy and phone number for the current request:  Patient     Name of the medication requested:  oxyCODONE-acetaminophen (PERCOCET) 7.5-325 MG per tablet    Other request:  Patient only has 2 pills left & patient saw Pain Management @ the     Can we leave a detailed message on this number? YES    Phone number patient can be reached at: Home number on file [phone] (home)    Best Time:  Anytime     Call taken on 8/16/2019 at 10:51 AM by Shannan Avalos

## 2019-08-16 NOTE — TELEPHONE ENCOUNTER
Patient returns call to RN line and states that the 2:40 pm telephone visit with PCP will work. Appointment has been scheduled.     Izzy Mendez RN

## 2019-08-16 NOTE — TELEPHONE ENCOUNTER
Called patient and it is a constant busy signal (as if the phone is off the hook).  Called patients emergency contact, Akanksha her sister at [phone], and left a VM message that we were trying to get a hold of her sister and to call back the TC line at [phone].    Ana Mullen

## 2019-08-16 NOTE — TELEPHONE ENCOUNTER
Ana, can you try and get a hold of this patient and schedule a phone visit with Vika at 2:40?    Thank you,    Maxwell

## 2019-08-16 NOTE — TELEPHONE ENCOUNTER
Please call patient to let her know that she needs a telephone visit with me today, please put her on my unavailable slot at 2:40 today to discuss her pain medication after seeing pain management as we need to discuss the proposed taper.

## 2019-08-19 NOTE — TELEPHONE ENCOUNTER
Script was taken from clear box by Ana Mullen.  Ana walked it to the  and handed the scripts to Junie Dominguez.  Patient signed for the scripts and they were handed to patient, Ledytamkio Odonnell, on 08/19/2019. Signed sticker was placed in the book.    Junie Dominguez  Patient Representative

## 2019-09-11 ENCOUNTER — PATIENT OUTREACH (OUTPATIENT)
Dept: CARE COORDINATION | Facility: CLINIC | Age: 74
End: 2019-09-11

## 2019-09-11 NOTE — PROGRESS NOTES
"Clinic Care Coordination Contact--Social Work     Follow Up Progress Note      Assessment: Patient has chronic pain, is being seen by pain clinic and their recommendation was biofeedback therapy.   She reports having constant pain in her shoulders and legs and now overall arthritis.  Patient reports pain can be at a 6-8 now, her Percocet was recently decreased.  She reports since this, she is having more difficulty getting dressed as cannot lift her arm higher than her elbow.  She reports she will be out of pain medication tomorrow and does not take more than prescribed.  She reports 5 mg is best for pain management needs and her pain is in \"good shape\" to allow her to complete her daily routine.  Patient reports she is using Lidocaine Gel but this is difficult as must change out of clothing 4 times per day to apply, this is difficult due to current pain.  Patient reports difficulty with shoulder pain as must propel wheelchair.      Patient and SW had discussed additional support for Patient.  Patient reports having met with and had initial assessment for elderly waiver last summer.  She has had difficulty reaching someone to discuss next steps and finally talked with a supervisor, who reported the wait is now for a SW to do the next step.  Patient reports goal for these services to be in place by winter, stating she would be unable to leave the home in winter without assistance.     Patient requested SW update PCP in need for refill in pain medication.  SW will route to PCP.       Goals addressed this encounter:   Goals Addressed                 This Visit's Progress       General      COMPLETED: 1. Medical (pt-stated)        Goal Statement: I will call [facility] to inquire on a medication refill until pain clinic appt 8/13/2019, to be done within 2 weeks   Measure of Success: Patient will call to inquire on above/next steps   Supportive Steps to Achieve: Patient call [facility] " to inquire on a medication refill until pain clinic appt 8/13/2019, to be done within 2 weeks   Barriers: Pain clinic visit scheduled for 8/13/2019, Patient reports she will run out of PRN medications before that date   Strengths: Patient will call clinic to discuss   Date to Achieve By: 7/24/2019  Patient expressed understanding of goal: Yes          2. Problem Solving (pt-stated)        Goal Statement: I will call [facility] within 2 weeks to discuss status of potential additional services   Measure of Success: Obtain status of this application   Supportive Steps to Achieve: Patient will call [facility] to discuss status/next steps  Barriers: Patient reported desire to have someone attend appointments, however her family work   Strengths: Patient has supportive family, she does for self as able, she will call [facility]   Date to Achieve By: 10/24/2019  Patient expressed understanding of goal: Yes    In process, she has called, had assessment and is waiting for SW assessment     ANGEL Powers, RAYNE   [facility]   [phone]  9/11/2019 3:34 PM             Intervention/Education provided during outreach: Discussed status on [facility]      Outreach Frequency: monthly    Plan:     Care Coordinator will follow up in 1 month, will route to PCP    ANGEL Powers, RAYNE   [facility]   [phone]  9/11/2019 3:50 PM

## 2019-09-12 DIAGNOSIS — G89.4 CHRONIC PAIN DISORDER: ICD-10-CM

## 2019-09-12 NOTE — TELEPHONE ENCOUNTER
"Requested Prescriptions   Pending Prescriptions Disp Refills     diclofenac (VOLTAREN) 1 % topical gel  Last Written Prescription Date:  7/17/2019  Last Fill Quantity: 100 g,  # refills: 1   Last office visit: 7/9/2019 with prescribing provider:  ESTEFANÍA Napoles   Future Office Visit:     100 g 1     Sig: Place 4 g onto the skin 4 times daily       Topical Steroids and Nonsteroidals Protocol Passed - 9/12/2019  7:59 AM        Passed - Patient is age 6 or older        Passed - Authorizing prescriber's most recent note related to this medication read.     If refill request is for ophthalmic use, please forward request to provider for approval.          Passed - High potency steroid not ordered        Passed - Recent (12 mo) or future (30 days) visit within the authorizing provider's specialty     Patient had office visit in the last 12 months or has a visit in the next 30 days with authorizing provider or within the authorizing provider's specialty.  See \"Patient Info\" tab in inbasket, or \"Choose Columns\" in Meds & Orders section of the refill encounter.              Passed - Medication is active on med list          "

## 2019-09-12 NOTE — TELEPHONE ENCOUNTER
Prescription approved per [facility]ill [facility].  Maxwell Partida RN

## 2019-09-13 ENCOUNTER — TELEPHONE (OUTPATIENT)
Dept: FAMILY MEDICINE | Facility: CLINIC | Age: 74
End: 2019-09-13

## 2019-09-13 DIAGNOSIS — G89.4 CHRONIC PAIN DISORDER: ICD-10-CM

## 2019-09-13 RX ORDER — OXYCODONE AND ACETAMINOPHEN 5; 325 MG/1; MG/1
1 TABLET ORAL EVERY 6 HOURS PRN
Qty: 90 TABLET | Refills: 0 | Status: SHIPPED | OUTPATIENT
Start: 2019-09-13 | End: 2019-10-22

## 2019-09-13 NOTE — TELEPHONE ENCOUNTER
Thank you for the update Lulu and for your work.  In the future would you be so kind to forward this request to RN staff and CC me  so they can T up medication if it is up for refill so it helps me in my work.    Thank you

## 2019-09-13 NOTE — TELEPHONE ENCOUNTER
Routing below message to team RN.            She is requesting refill of pain medication and wanted me to update you.  She reports she is not taking more than prescribed     Routing comment          Kit SUMAN Simental

## 2019-09-13 NOTE — TELEPHONE ENCOUNTER
"Routing refill request for Percocet to provider. Please see note below. Your note from virtual visit on 8/16/19 is pasted below. I reached out to patient to see how she is doing with the wean. Initially was taking Percocet with 10mg oxycodone, then decreased to 7.5mg, now 5mg. Patient reports that she has been doing okay, although pain has \"acted up\" somewhat again. She states her torn rotator cuffs are most difficult to deal with. The L hip that was fixed always had pain, even after surgery, but now R hip is \"heating up\" as well. She states she is being very gentle with herself and reports only taking the Percocet when she feels she really needs it, which is still about 4 times per day.  She is concerned about taking the oxycodone as well and doesn't want to be on them until she's 89.  She is wondering what provider recommends and if she could stay on the 5mg oxycodone for another month to see if her body adjusts to this dose and pain decreases.  Okay to leave her a message regarding the outcome/decision of provider.     Shruthi Vernon RN      \"(G89.4) Chronic pain disorder  Comment: Unfortunately pain management documentation was not completed by my time of visit with patient.  I therefore continued my taper and will await recommendations made by pain management.  For now prescribed oxycodone 7.5 mg / 325 mg maximum 4 tablets daily for 2 weeks, then start Percocet 5 mg / 325 mg maximum 4 tablets daily.  To prescription signed and ready for pickup.  Plan: oxyCODONE-acetaminophen (PERCOCET) 7.5-325 MG         per tablet, oxyCODONE-acetaminophen (PERCOCET)         5-325 MG tablet\"       "

## 2019-09-13 NOTE — TELEPHONE ENCOUNTER
Huddled with provider, who would like an Rx for Percocet 5mg-325 up to TID pended for signature. Done and routing back to provider to sign.     Shruthi Vernon RN

## 2019-09-16 NOTE — TELEPHONE ENCOUNTER
Patient notified with understanding voiced. She is agreeable to continued weaning.  Rx placed up front for pick-up.    Shruthi Vernon RN

## 2019-09-17 NOTE — TELEPHONE ENCOUNTER
An Item was picked up at the [facility] :    Date it was picked up?  09/17/2019    What was picked up? Envelope/script     Who picked them up?  Ledy Odonnell    Relationship to patient? patient    Did they show ID?  Yes    Was it logged in book? Yes    Who gave it to the patient?    Junie Dominguez  Patient Representative

## 2019-10-11 ENCOUNTER — PATIENT OUTREACH (OUTPATIENT)
Dept: CARE COORDINATION | Facility: CLINIC | Age: 74
End: 2019-10-11

## 2019-10-11 NOTE — PROGRESS NOTES
Clinic Care Coordination Contact  Care Team Conversations    Patient left return message for SW today.  Per that message, she reported that she is in process if Elderly Waiver services and now has a .  She stated no other needs at this time.  Will close to Care Coordination and update PCP.    Lulu Sood, ANGEL, MSW   [facility]   [phone]  10/11/2019 2:28 PM

## 2019-10-11 NOTE — PROGRESS NOTES
Clinic Care Coordination Contact  [facility]/Voicemail    Clinical Data: Care Coordinator Outreach.  SW was checking on status of Patient's potential elderly waiver services, she has applied with [facility]    Outreach attempted x 1.  Left message on patient's voicemail with call back information and requested return call.  Plan: Care Coordinator will try to reach patient again in 3-5 business days.    Lulu Sood, ANGEL, MSW   [facility]   [phone]  10/11/2019 9:03 AM

## 2019-10-21 ENCOUNTER — TELEPHONE (OUTPATIENT)
Dept: FAMILY MEDICINE | Facility: CLINIC | Age: 74
End: 2019-10-21

## 2019-10-21 DIAGNOSIS — G89.4 CHRONIC PAIN DISORDER: ICD-10-CM

## 2019-10-21 DIAGNOSIS — M16.12 OSTEOARTHRITIS OF LEFT HIP, UNSPECIFIED OSTEOARTHRITIS TYPE: Primary | ICD-10-CM

## 2019-10-21 DIAGNOSIS — Z96.649 STATUS POST HIP REPLACEMENT, UNSPECIFIED LATERALITY: ICD-10-CM

## 2019-10-21 NOTE — TELEPHONE ENCOUNTER
Reason for Call: Request for an order or referral:    Order or referral being requested: Wheelchair    Date needed: as soon as possible    Has the patient been seen by the PCP for this problem? YES    Additional comments: Patient has been granted a new wheelchair by Medicare, she needs to have an order put in for one. Also patient needs to discuss medication renewal. Please call to discuss further with patient.     Phone number Patient can be reached at:  Home number on file [phone] (home)    Best Time:  Anytime    Can we leave a detailed message on this number?  YES    Call taken on 10/21/2019 at 1:41 PM by Tanna Brown

## 2019-10-21 NOTE — TELEPHONE ENCOUNTER
Routing request for a pain medication and wheelchair order to PCP. Patient reports it has been a tough month with this last Percocet taper of 5mg, between her hip, foot, shoulder pain. She appreciates the Voltaren gel, usually uses at night, and does help, but is very temporary relief and cumbersome to try to apply to herself 4x daily. She states she just can't do surgery, as it would put her in a nursing home. She is wondering about a refill on the 5mg Percocet if able and is also open to any other suggestions provider may have.  Patient also requesting DME order for a new wheelchair.  Order pended for signature-will need to be printed, then patient would like mailed to her home when signed so she can bring to the medical supply location of her choice.    Patient aware provider out and may not get a response until tomorrow.     Shruthi Vernon RN

## 2019-10-22 RX ORDER — OXYCODONE AND ACETAMINOPHEN 5; 325 MG/1; MG/1
1 TABLET ORAL EVERY 6 HOURS PRN
Qty: 90 TABLET | Refills: 0 | Status: SHIPPED | OUTPATIENT
Start: 2019-10-22 | End: 2019-11-20

## 2019-10-22 NOTE — TELEPHONE ENCOUNTER
Wheelchair DME ordered as well as Percocet 5/325 mg take 1 tablet every 6 hours as needed for moderate to severe pain maximum 3 tablets daily continue taper as discussed and using Voltaren gel.

## 2019-10-22 NOTE — TELEPHONE ENCOUNTER
Patient returns call to RN line was informed of Rxs and will pick them both up at the clinic.  Rxs placed up front for patient .    Shruthi Vernon RN

## 2019-10-22 NOTE — TELEPHONE ENCOUNTER
Patient/family was instructed to return call to [facility], directly on the RN Call back line at [phone].  Rxs for med and w/c are on team gold in misc.folder.  Patient previously wanted w/c order mailed to her home, but since she will need to  med Rx anyway may want to just pick w/c Rx up also.    Shruthi Vernon RN

## 2019-10-22 NOTE — TELEPHONE ENCOUNTER
An Item was picked up at the [facility] :    Date it was picked up?  10/22/2019    What was picked up?  Envelope/Scripts  Who picked them up?  Ledy Odonnell    Relationship to patient? Patient    Did they show ID?  Yes    Was it logged in book? Yes    Who gave it to the patient?    Junie Dominguez- Patient Representative

## 2019-10-23 ENCOUNTER — TELEPHONE (OUTPATIENT)
Dept: UROLOGY | Facility: CLINIC | Age: 74
End: 2019-10-23

## 2019-10-23 DIAGNOSIS — N39.41 URGENCY INCONTINENCE: ICD-10-CM

## 2019-10-23 NOTE — TELEPHONE ENCOUNTER
Reason for call:  Medication     If this is a refill request, has the caller requested the refill from the pharmacy already? Yes     Will the patient be using a [facility]? No     Name of the pharmacy and phone number for the current request: [facility] [phone]    Name of the medication requested: solifenacin (VESICARE) 5 MG tablet    Other request: na    Phone number to reach patient:  Other phone number:  [phone]    Best Time:  any    Can we leave a detailed message on this number?  YES

## 2019-10-23 NOTE — TELEPHONE ENCOUNTER
Requested Prescriptions   Pending Prescriptions Disp Refills     solifenacin (VESICARE) 5 MG tablet 60 tablet 2     Sig: Take 2 tablets (10 mg) by mouth daily       There is no refill protocol information for this order        vesicare      Last Written Prescription Date:  7/24/19  Last Fill Quantity: 60,   # refills: 2  Last Office Visit: 7/24/19  Future Office visit:  none     Routing refill request to provider for review/approval because:  Drug not on the G, [facility] or [facility] refill protocol or controlled substance  Gala Moore RN

## 2019-10-24 RX ORDER — SOLIFENACIN SUCCINATE 5 MG/1
10 TABLET, FILM COATED ORAL DAILY
Qty: 60 TABLET | Refills: 5 | Status: SHIPPED | OUTPATIENT
Start: 2019-10-24 | End: 2020-05-06

## 2019-11-20 ENCOUNTER — OFFICE VISIT (OUTPATIENT)
Dept: FAMILY MEDICINE | Facility: CLINIC | Age: 74
End: 2019-11-20
Payer: COMMERCIAL

## 2019-11-20 VITALS
SYSTOLIC BLOOD PRESSURE: 116 MMHG | OXYGEN SATURATION: 96 % | HEART RATE: 88 BPM | BODY MASS INDEX: 21.46 KG/M2 | DIASTOLIC BLOOD PRESSURE: 69 MMHG | TEMPERATURE: 97.9 F | HEIGHT: 64 IN

## 2019-11-20 DIAGNOSIS — G89.4 CHRONIC PAIN DISORDER: ICD-10-CM

## 2019-11-20 DIAGNOSIS — Z96.649 STATUS POST HIP REPLACEMENT, UNSPECIFIED LATERALITY: ICD-10-CM

## 2019-11-20 DIAGNOSIS — M16.12 OSTEOARTHRITIS OF LEFT HIP, UNSPECIFIED OSTEOARTHRITIS TYPE: ICD-10-CM

## 2019-11-20 DIAGNOSIS — G89.4 CHRONIC PAIN SYNDROME: Primary | ICD-10-CM

## 2019-11-20 PROCEDURE — 99214 OFFICE O/P EST MOD 30 MIN: CPT | Performed by: NURSE PRACTITIONER

## 2019-11-20 RX ORDER — OXYCODONE AND ACETAMINOPHEN 5; 325 MG/1; MG/1
1 TABLET ORAL EVERY 6 HOURS PRN
Qty: 100 TABLET | Refills: 0 | Status: SHIPPED | OUTPATIENT
Start: 2019-11-20 | End: 2020-01-06

## 2019-11-20 NOTE — PROGRESS NOTES
Subjective     Ledy Odonnell is a 74 year old female who presents to clinic today for the following health issues:    Chronic pain follow-up.  Pain started in left hip s/p complete hip ablation 2011 she underwent subsequent surgery in 2014 due to fractured femur on the same side she also has a locked left foot but that has resulted for the multiple surgical procedures on the left extremity.  All of this has significantly hindered her mobility.  She ambulates with a wheelchair.  She lives alone and completes all ADLs independently.  HPI   Chronic Pain Follow-Up       Type / Location of Pain: joints, hands, knees, shoulders  Analgesia/pain control:       Recent changes:  worse      Overall control: Inadequate pain control  Activity level/function:      Daily activities:  Unable to perform most daily activities - chores, hobbies, social activities, driving    Work:  Unable to work  Adverse effects:  No  Adherance    Taking medication as directed?  Yes    Participating in other treatments: yes  Risk Factors:    Sleep:  Poor    Mood/anxiety:  controlled    Recent family or social stressors:  none noted    Other aggravating factors: .temperature, humidity      Here to discuss her pain control.  Says that since decreasing her dose of  oxycodone now on 5/325mg 3 daily her pain has worsened.  Says that she cannot do the thinks she ordinarily does including going to the grocery store or doing household chores.  Says it's all she thinks about all day long is her pain.  She was seen by pain management in August 2019 who agreed with PCP taper plan to start oxycodone 5/325 mg up to a max of 4 daily.   He also recommend CBT for pain patient says she has made this will be helpful in what limits her to do this is that is offered at the [facility] and she can physically get there.  She was recommended to start Tylenol 650 mg every 6 hours regular she has not done this.  PHQ-9 SCORE 12/31/2018 2/4/2019 7/9/2019   PHQ-9  Total Score - - -   PHQ-9 Total Score 6 2 6     LESTER-7 SCORE 10/15/2018 12/31/2018 7/9/2019   Total Score - - -   Total Score 7 5 3     Encounter-Level CSA - 08/25/2015:    Controlled Substance Agreement - Scan on 9/4/2015 10:45 AM: CONTROLLED SUBSTANCE AGREEMENT, 8/25/15     Patient-Level CSA:    There are no patient-level csa.     Abstracted from pain management visit 08/2019 recommendations below  Plan:  Diagnosis reviewed, treatment option addressed, and risk/benefits discussed.  Self-care instructions given.  I am recommending a multidisciplinary treatment plan to help this patient better manage her pain.       1. Physical Therapy: Continue HEPs  2. Pain Psychologist to address issues of relaxation, behavioral change, coping style, and other factors important to improvement: Referral for Pain Psychology placed  3. Diagnostic Studies: not indicated  4. Medication Management:   1. Taper to four tablets of percocet 7.5mg per day; next Rx for 100 tablets; then decrease to 5mg tablets Q6H  2. Schedule Tylenol 650mg Q6H  5. Further procedures recommended: none  6. Recommendations/follow-up for PCP:  Please see recommendations for taper plan above  7. Follow up: 8 weeks         How many servings of fruits and vegetables do you eat daily?  0-1    On average, how many sweetened beverages do you drink each day (soda, juice, sweet tea, etc)?   1    How many days per week do you miss taking your medication? 0      Patient Active Problem List   Diagnosis     Esophageal reflux     Insomnia     FAMILY HX DIABETES MELLITUS brother     Tobacco use disorder     Obesity     Generalized anxiety disorder     Moderate major depression (H)     COPD (chronic obstructive pulmonary disease) (H)     Elevated fasting glucose     Chronic pain disorder     Idiopathic peripheral neuropathy     Hyperlipidemia LDL goal <130     Degenerative arthritis of hip     Hip arthrosis - left, severe     Trochanteric bursitis     Status post hip  "replacement     Advanced directives, counseling/discussion     Gastroparesis     DDD (degenerative disc disease), lumbar     Delayed gastric emptying     Health Care Home     Candidal intertrigo     Ventral hernia     Femur fracture (H)     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     Chronic left hip pain     Past Surgical History:   Procedure Laterality Date     C HAND/FINGER SURGERY UNLISTED       C NONSPECIFIC PROCEDURE  2001    Shoulder Surgery     C NONSPECIFIC PROCEDURE  1975    Gastric Bypass     C NONSPECIFIC PROCEDURE      Cholecystectomy     C SHOULDER SURG PROC UNLISTED       C TOTAL HIP ARTHROPLASTY  1/4/2011    Lt VALERIE     HERNIORRHAPHY VENTRAL  5/14/2013    Procedure: HERNIORRHAPHY VENTRAL;  Open Ventral Hernia Repair;  Surgeon: Jhoan Rogers MD;  Location:  OR       Social History     Tobacco Use     Smoking status: Current Every Day Smoker     Packs/day: 0.50     Years: 43.00     Pack years: 21.50     Types: Cigarettes     Smokeless tobacco: Never Used   Substance Use Topics     Alcohol use: No     Alcohol/week: 0.0 standard drinks     Family History   Problem Relation Age of Onset     Cancer Father         pancreatic     Heart Disease Mother         Unknown specifics     Dementia Mother      Arthritis Paternal Grandmother         RA     Dementia Maternal Grandmother            Reviewed and updated as needed this visit by Provider         Review of Systems   ROS COMP: Constitutional, HEENT, cardiovascular, pulmonary, GI, , musculoskeletal, neuro, skin, endocrine and psych systems are negative, except as otherwise noted.      Objective    /69   Pulse 88   Temp 97.9  F (36.6  C) (Oral)   Ht 1.626 m (5' 4\")   SpO2 96%   BMI 21.46 kg/m    Body mass index is 21.46 kg/m .  Physical Exam   GENERAL: healthy, alert and no distress  EYES: Eyes grossly normal to inspection, " PERRL and conjunctivae and sclerae normal  NECK: no adenopathy, no asymmetry, masses, or scars and thyroid normal to palpation  RESP: lungs clear to auscultation - no rales, rhonchi or wheezes  CV: regular rate and rhythm, normal S1 S2, no S3 or S4, no murmur, click or rub, no peripheral edema and peripheral pulses strong  ABDOMEN: soft, nontender, no hepatosplenomegaly, no masses and bowel sounds normal  MS: no gross musculoskeletal defects noted, no edema  SKIN: no suspicious lesions or rashes  NEURO: Normal strength and tone, mentation intact and speech normal  PSYCH: mentation appears normal, affect normal/bright  LYMPH: no cervical, supraclavicular, axillary, or inguinal adenopathy    Diagnostic Test Results:  Labs reviewed in Epic  No results found for this or any previous visit (from the past 24 hour(s)).        Assessment & Plan       ICD-10-CM    1. Chronic pain syndrome G89.4 MENTAL HEALTH REFERRAL  - Adult; Outpatient Treatment; Individual/Couples/Family/Group Therapy/Health Psychology; [facility]: [facility] [phone]; We will contact you to schedule the appointment or please call with any questions   2. Chronic pain disorder G89.4 oxyCODONE-acetaminophen (PERCOCET) 5-325 MG tablet     PHYSICAL THERAPY REFERRAL   3. Osteoarthritis of left hip, unspecified osteoarthritis type M16.12 PHYSICAL THERAPY REFERRAL   4. Status post hip replacement, unspecified laterality Z96.649 PHYSICAL THERAPY REFERRAL     This is a chronic pain follow-up.  Patient is on a taper of oxycodone and has seen  pain clinic.  We were able to taper her down to 22.5 mg MEDD but patient reports her pain has significantly worsened and her quality of life is impacted.  I recommend we continue oxycodone 5/325 mg but increase up to a maximum of 4 tablets daily if needed.  I recommend she start CBT therapy for pain referral was placed.  Start acetaminophen 650 mg every 6 hours regular.  Return in 3 months.  As provider is  leaving room patient says that she took a Valium from her brother and this helped her pain a lot.  Advised patient this is not recommended and violates pain policy contract, this is a benzodiazepine combined with opiates and her underlying COPD she is at very high risk for injury and respiratory depression.    Tobacco Cessation:   reports that she has been smoking cigarettes. She has a 21.50 pack-year smoking history. She has never used smokeless tobacco.  Tobacco Cessation Action Plan: Information offered: Patient not interested at this time          Return in about 3 months (around 2/20/2020).    ROSCOE Ridley [facility]

## 2019-11-20 NOTE — PATIENT INSTRUCTIONS
Recommend starting CBT for pain    Oxycodone 5/325 mg take 1 tablet every 6 hours as needed for moderate to severe pain maximum 4 tablets daily 100 tablets dispensed  Start taking Tylenol 650 mg every 6 hours regularly.    Encourage [place] referral placed for new wheelchair.

## 2019-12-05 ENCOUNTER — TELEPHONE (OUTPATIENT)
Dept: FAMILY MEDICINE | Facility: CLINIC | Age: 74
End: 2019-12-05

## 2019-12-05 NOTE — TELEPHONE ENCOUNTER
Panel Management Review      Patient has the following on her problem list:     Depression / Dysthymia review    Measure:  Needs PHQ-9 score of 4 or less during index window.  Administer PHQ-9 and if score is 5 or more, send encounter to provider for next steps.    5 - 7 month window range:     PHQ-9 SCORE 12/31/2018 2/4/2019 7/9/2019   PHQ-9 Total Score - - -   PHQ-9 Total Score 6 2 6       If PHQ-9 recheck is 5 or more, route to provider for next steps.    Patient is due for:  DAP      Composite cancer screening  Chart review shows that this patient is due/due soon for the following None  Summary:    Patient is due/failing the following:   DAP, LDL and PHYSICAL    Action needed:   Patient needs office visit for physical.    Type of outreach:    Sent letter.    Questions for provider review:    None                                                                                                                                    Yanet Sadler CMA (AAMA)             \

## 2019-12-05 NOTE — LETTER
December 5, 2019      Ledy Odonnell  [address]  [place] [address]        Dear Ledy,     We care about your health and have reviewed your health plan. We have reviewed your medical conditions, medication list, and lab results and are making recommendations based on this review, to better manage your health.    You are in particular need of attention regarding:  - Depression  - Scheduling an Annual Physical / Wellness Visit with your primary care provider  - Fasting Labs    Here is a list of Health Maintenance topics that are due now or due soon:  Health Maintenance Due   Topic Date Due     DEPRESSION ACTION PLAN  1945     ADVANCE CARE PLANNING  08/26/2016     MEDICARE ANNUAL WELLNESS VISIT  09/13/2017     URINE DRUG SCREEN  01/10/2019     LIPID  06/30/2019       Please call us at [phone] (or use [employer]) to address the above recommendations.     Thank you for trusting [facility] with your healthcare needs. We appreciate the opportunity to serve you and look forward to supporting you in the future.    Healthy Regards,      Sincerely,    ROSCOE Ridley CNP

## 2019-12-15 DIAGNOSIS — F33.1 MAJOR DEPRESSIVE DISORDER, RECURRENT EPISODE, MODERATE (H): ICD-10-CM

## 2019-12-16 RX ORDER — BUPROPION HYDROCHLORIDE 300 MG/1
TABLET ORAL
Qty: 90 TABLET | Refills: 0 | Status: SHIPPED | OUTPATIENT
Start: 2019-12-16 | End: 2020-03-16

## 2019-12-16 NOTE — TELEPHONE ENCOUNTER
"Requested Prescriptions   Pending Prescriptions Disp Refills     buPROPion (WELLBUTRIN XL) 300 MG 24 hr tablet [Pharmacy Med Name: BUPROPION HCL ER (XL) 300MG TB24]  Last Written Prescription Date:  12/31/2018  Last Fill Quantity: 90 tablet,  # refills: 3   Last office visit: 6/5/2019 with prescribing provider:  JEFFERY Vinson   Future Office Visit:   90 tablet 3     Sig: TAKE ONE TABLET BY MOUTH EVERY MORNING       SSRIs Protocol Failed - 12/15/2019  3:32 PM        Failed - PHQ-9 score less than 5 in past 6 months     Please review last PHQ-9 score.   PHQ-9 SCORE 12/31/2018 2/4/2019 7/9/2019   PHQ-9 Total Score - - -   PHQ-9 Total Score 6 2 6     LESTER-7 SCORE 10/15/2018 12/31/2018 7/9/2019   Total Score - - -   Total Score 7 5 3             Passed - Medication is Bupropion     If the medication is Bupropion (Wellbutrin), and the patient is taking for smoking cessation; OK to refill.          Passed - Medication is active on med list        Passed - Patient is age 18 or older        Passed - No active pregnancy on record        Passed - No positive pregnancy test in last 12 months        Passed - Recent (6 mo) or future (30 days) visit within the authorizing provider's specialty     Patient had office visit in the last 6 months or has a visit in the next 30 days with authorizing provider or within the authorizing provider's specialty.  See \"Patient Info\" tab in inbasket, or \"Choose Columns\" in Meds & Orders section of the refill encounter.            "

## 2019-12-16 NOTE — TELEPHONE ENCOUNTER
Routing refill request to provider for review/approval because:  PHQ-9 score:    PHQ-9 SCORE 7/9/2019   PHQ-9 Total Score -   PHQ-9 Total Score 6             Oralia Justice RN, BSN, PHN  [facility]: [place]

## 2020-01-03 ENCOUNTER — TELEPHONE (OUTPATIENT)
Dept: FAMILY MEDICINE | Facility: CLINIC | Age: 75
End: 2020-01-03

## 2020-01-03 DIAGNOSIS — G89.4 CHRONIC PAIN DISORDER: ICD-10-CM

## 2020-01-03 NOTE — TELEPHONE ENCOUNTER
Reason for Call:  Medication or medication refill:    Do you use a [facility]?  Name of the pharmacy and phone number for the current request:  [place] [address] [place] [address]    Name of the medication requested: oxyCODONE-acetaminophen (PERCOCET) 5-325 MG tablet    Other request: call back when ready     Can we leave a detailed message on this number? YES    Phone number patient can be reached at: Home number on file [phone] (home)    Best Time: any     Call taken on 1/3/2020 at 2:47 PM by Alia Guillen

## 2020-01-06 RX ORDER — OXYCODONE AND ACETAMINOPHEN 5; 325 MG/1; MG/1
1 TABLET ORAL EVERY 6 HOURS PRN
Qty: 100 TABLET | Refills: 0 | Status: SHIPPED | OUTPATIENT
Start: 2020-01-06 | End: 2020-02-05

## 2020-01-06 NOTE — TELEPHONE ENCOUNTER
states last refill was 11/20/19.  Okay for refill at this time.    At the 11/20/19 visit Tatiana recommended follow up with her in 3 months.  I do not see anything scheduled - so please consider assisting patient with scheduling Feb followup appointment.  We will be able to provide refill in 30 days of her medication.  But after that we would want her to be seen to review pain control before additional refills can be provided.

## 2020-01-08 NOTE — TELEPHONE ENCOUNTER
Called patient and left a VM message to schedule a pain management follow up with Tatiana Napoles in February.    Ana Mullen

## 2020-01-08 NOTE — TELEPHONE ENCOUNTER
Patient called back and she is about to lose her house and she is having major financial difficulties.  Patient understands that she needs to see Tatiana Napoles in February.  Patient said her insurance does not cover the mental health appointment that Tatiana Napoles gave her a referral for.  Patient also said she has major problems getting rides to appointments.    Ana Mullen

## 2020-01-09 NOTE — PROGRESS NOTES
"SUBJECTIVE:  Ledy Odonnell is here for follow up of bilateral shoulder impingement, possible rotator cuff tears and right knee osteoarthritis. She is wheel chair dependant and cannot function during recovery time for a rotator cuff repair. She is being treated non-operatively. She has been having trouble wheeling with her wheel chair due to significant shoulder pains. She has tried to get an electric wheelchair and has been unable due to insurance limitations.     Previous injections: 6/26/2019, Right knee and bilateral shoulder steroid injection: The right shoulder injection was effective for 4 weeks. Despite the fact that she doesn't have much arthritis in her right knee, she feels the steroid injections do help her.     Review of Systems:  Constitutional/General: Negative for fever, chills, change in weight  Integumentary/Skin: Negative for worrisome rashes, moles, or lesions  Neuro: Negative for weakness, dizziness, or paresthesias   Psychiatric: negative for changes in mood or affect    This document serves as a record of the services and decisions personally performed and made by SALINAS Woods MD. It was created on his behalf by Brook Tomlinson, a trained medical scribe. The creation of this document is based the provider's statements to the medical scribe.    Scribe Brook Tomlinson 11:37 AM 1/14/2020       OBJECTIVE:  Physical Exam:  BP (!) 145/80   Pulse 103   Ht 1.626 m (5' 4\")   SpO2 98%   BMI 21.46 kg/m    General Appearance: healthy, alert and no distress   Skin: no suspicious lesions or rashes  Neuro: Normal strength and tone, mentation intact and speech normal  Vascular: good pulses, and capillary refill   Lymph: no lymphadenopathy   Psych:  mentation appears normal and affect normal/bright  Resp: no increased work of breathing    ASSESSMENT:  1. Bilateral shoulder impingement --- probable rotator cuff tears  2. Right Knee Osteoarthritis--mild     Encounter Diagnoses   Name Primary?     " Localized osteoarthritis of right knee Yes     Impingement syndrome of both shoulders      suspected Nontraumatic tear of rotator cuff, bilateral, unspecified tear extent         PLAN:  In my opinion, she requires a motorized wheel chair or scooter of some sort. I've asked her again, to look into what is needed for insurance to cover such a device.     Patient desires repeat injection of the right knee. Risks, benefits, potential complications and alternatives were discussed. With the patient's consent, sterile prep was performed. Right knee was injected with Depo Medrol 80 mg and lidocaine at anteromedial site.    Patient desires injection today of bilateral shoulders. Risks, benefits, potential complications and alternatives were discussed.   With the patient's consent, sterile prep was performed of bilateral shoulder. Each shoulder was injected with depomedrol 80 mg and lidocaine in the shoulder subacromial spaces from the lateral approach.    Return to clinic: AS NEEDED     I told her that I feel that there is a definite need for a motorized scooter or electric wheelchair because of her inability to ambulate coupled with her suspected rotator cuff tears.  I would support this.    The information in this document, created by a scribe for me, accurately reflects the services I personally performed and the decisions made by me. I have reviewed and approved this document for accuracy.     SALINAS Woods MD  Dept. Orthopedic Surgery  [facility]

## 2020-01-14 ENCOUNTER — OFFICE VISIT (OUTPATIENT)
Dept: ORTHOPEDICS | Facility: CLINIC | Age: 75
End: 2020-01-14
Payer: COMMERCIAL

## 2020-01-14 VITALS
OXYGEN SATURATION: 98 % | BODY MASS INDEX: 21.46 KG/M2 | HEIGHT: 64 IN | SYSTOLIC BLOOD PRESSURE: 145 MMHG | DIASTOLIC BLOOD PRESSURE: 80 MMHG | HEART RATE: 103 BPM

## 2020-01-14 DIAGNOSIS — M75.41 IMPINGEMENT SYNDROME OF BOTH SHOULDERS: ICD-10-CM

## 2020-01-14 DIAGNOSIS — M75.42 IMPINGEMENT SYNDROME OF BOTH SHOULDERS: ICD-10-CM

## 2020-01-14 DIAGNOSIS — M17.11 LOCALIZED OSTEOARTHRITIS OF RIGHT KNEE: Primary | ICD-10-CM

## 2020-01-14 DIAGNOSIS — M75.100 NONTRAUMATIC TEAR OF ROTATOR CUFF, UNSPECIFIED LATERALITY, UNSPECIFIED TEAR EXTENT: ICD-10-CM

## 2020-01-14 PROCEDURE — 99213 OFFICE O/P EST LOW 20 MIN: CPT | Mod: 25 | Performed by: ORTHOPAEDIC SURGERY

## 2020-01-14 PROCEDURE — 20610 DRAIN/INJ JOINT/BURSA W/O US: CPT | Mod: 50 | Performed by: ORTHOPAEDIC SURGERY

## 2020-01-14 PROCEDURE — 20610 DRAIN/INJ JOINT/BURSA W/O US: CPT | Mod: 59 | Performed by: ORTHOPAEDIC SURGERY

## 2020-01-14 RX ORDER — METHYLPREDNISOLONE ACETATE 80 MG/ML
80 INJECTION, SUSPENSION INTRA-ARTICULAR; INTRALESIONAL; INTRAMUSCULAR; SOFT TISSUE
Status: DISCONTINUED | OUTPATIENT
Start: 2020-01-14 | End: 2022-07-21

## 2020-01-14 RX ORDER — LIDOCAINE HYDROCHLORIDE 10 MG/ML
4 INJECTION, SOLUTION INFILTRATION; PERINEURAL
Status: DISCONTINUED | OUTPATIENT
Start: 2020-01-14 | End: 2022-07-21

## 2020-01-14 RX ADMIN — LIDOCAINE HYDROCHLORIDE 4 ML: 10 INJECTION, SOLUTION INFILTRATION; PERINEURAL at 11:59

## 2020-01-14 RX ADMIN — METHYLPREDNISOLONE ACETATE 80 MG: 80 INJECTION, SUSPENSION INTRA-ARTICULAR; INTRALESIONAL; INTRAMUSCULAR; SOFT TISSUE at 11:57

## 2020-01-14 RX ADMIN — LIDOCAINE HYDROCHLORIDE 4 ML: 10 INJECTION, SOLUTION INFILTRATION; PERINEURAL at 11:57

## 2020-01-14 RX ADMIN — METHYLPREDNISOLONE ACETATE 80 MG: 80 INJECTION, SUSPENSION INTRA-ARTICULAR; INTRALESIONAL; INTRAMUSCULAR; SOFT TISSUE at 11:59

## 2020-01-14 ASSESSMENT — PAIN SCALES - GENERAL: PAINLEVEL: SEVERE PAIN (7)

## 2020-01-14 NOTE — PROGRESS NOTES
Large Joint Injection/Arthocentesis: bilateral glenohumeral  Date/Time: 1/14/2020 11:59 AM  Performed by: Rex Woods MD  Authorized by: Rex Woods MD     Indications:  Pain  Needle Size:  22 G  Guidance: landmark guided    Location:  Shoulder  Laterality:  Bilateral      Site:  Bilateral glenohumeral  Medications (Right):  80 mg methylPREDNISolone 80 MG/ML; 4 mL lidocaine 1 %  Medications (Left):  80 mg methylPREDNISolone 80 MG/ML; 4 mL lidocaine 1 %  Outcome:  Tolerated well, no immediate complications  Consent Given by:  Patient  Timeout: timeout called immediately prior to procedure    Prep: patient was prepped and draped in usual sterile fashion

## 2020-01-14 NOTE — LETTER
"    1/14/2020         RE: Ledy Odonnell  [address]  [facility] [address]        Dear Colleague,    Thank you for referring your patient, Ledy Odonnell, to the [facility]. Please see a copy of my visit note below.    SUBJECTIVE:  Ledy Odonnell is here for follow up of bilateral shoulder impingement, possible rotator cuff tears and right knee osteoarthritis. She is wheel chair dependant and cannot function during recovery time for a rotator cuff repair. She is being treated non-operatively. She has been having trouble wheeling with her wheel chair due to significant shoulder pains. She has tried to get an electric wheelchair and has been unable due to insurance limitations.     Previous injections: 6/26/2019, Right knee and bilateral shoulder steroid injection: The right shoulder injection was effective for 4 weeks. Despite the fact that she doesn't have much arthritis in her right knee, she feels the steroid injections do help her.     Review of Systems:  Constitutional/General: Negative for fever, chills, change in weight  Integumentary/Skin: Negative for worrisome rashes, moles, or lesions  Neuro: Negative for weakness, dizziness, or paresthesias   Psychiatric: negative for changes in mood or affect    This document serves as a record of the services and decisions personally performed and made by SALINAS Woods MD. It was created on his behalf by Brook Tomlinson, a trained medical scribe. The creation of this document is based the provider's statements to the medical scribe.    Scribe Brook Kang Said 11:37 AM 1/14/2020       OBJECTIVE:  Physical Exam:  BP (!) 145/80   Pulse 103   Ht 1.626 m (5' 4\")   SpO2 98%   BMI 21.46 kg/m     General Appearance: healthy, alert and no distress   Skin: no suspicious lesions or rashes  Neuro: Normal strength and tone, mentation intact and speech normal  Vascular: good pulses, and capillary refill   Lymph: no lymphadenopathy   Psych:  " mentation appears normal and affect normal/bright  Resp: no increased work of breathing    ASSESSMENT:  1. Bilateral shoulder impingement --- probable rotator cuff tears  2. Right Knee Osteoarthritis--mild     Encounter Diagnoses   Name Primary?     Localized osteoarthritis of right knee Yes     Impingement syndrome of both shoulders      suspected Nontraumatic tear of rotator cuff, bilateral, unspecified tear extent         PLAN:  In my opinion, she requires a motorized wheel chair or scooter of some sort. I've asked her again, to look into what is needed for insurance to cover such a device.     Patient desires repeat injection of the right knee. Risks, benefits, potential complications and alternatives were discussed. With the patient's consent, sterile prep was performed. Right knee was injected with Depo Medrol 80 mg and lidocaine at anteromedial site.    Patient desires injection today of bilateral shoulders. Risks, benefits, potential complications and alternatives were discussed.   With the patient's consent, sterile prep was performed of bilateral shoulder. Each shoulder was injected with depomedrol 80 mg and lidocaine in the shoulder subacromial spaces from the lateral approach.    Return to clinic: AS NEEDED     I told her that I feel that there is a definite need for a motorized scooter or electric wheelchair because of her inability to ambulate coupled with her suspected rotator cuff tears.  I would support this.    The information in this document, created by a scribe for me, accurately reflects the services I personally performed and the decisions made by me. I have reviewed and approved this document for accuracy.     SALINAS Woods MD  Dept. Orthopedic Surgery  [facility]      Large Joint Injection/Arthocentesis: R knee joint  Date/Time: 1/14/2020 11:57 AM  Performed by: Rex Woods MD  Authorized by: Rex Woods MD     Indications:  Pain  Needle Size:  22  G  Guidance: landmark guided    Approach:  Medial  Location:  Knee      Medications:  80 mg methylPREDNISolone 80 MG/ML; 4 mL lidocaine 1 %  Consent Given by:  Patient  Timeout: timeout called immediately prior to procedure    Prep: patient was prepped and draped in usual sterile fashion            Large Joint Injection/Arthocentesis: bilateral glenohumeral  Date/Time: 1/14/2020 11:59 AM  Performed by: Rex Woods MD  Authorized by: Rex Woods MD     Indications:  Pain  Needle Size:  22 G  Guidance: landmark guided    Location:  Shoulder  Laterality:  Bilateral      Site:  Bilateral glenohumeral  Medications (Right):  80 mg methylPREDNISolone 80 MG/ML; 4 mL lidocaine 1 %  Medications (Left):  80 mg methylPREDNISolone 80 MG/ML; 4 mL lidocaine 1 %  Outcome:  Tolerated well, no immediate complications  Consent Given by:  Patient  Timeout: timeout called immediately prior to procedure    Prep: patient was prepped and draped in usual sterile fashion            Again, thank you for allowing me to participate in the care of your patient.        Sincerely,        Rex Woods MD

## 2020-01-28 ENCOUNTER — TELEPHONE (OUTPATIENT)
Dept: FAMILY MEDICINE | Facility: CLINIC | Age: 75
End: 2020-01-28

## 2020-01-28 NOTE — TELEPHONE ENCOUNTER
Forms received from [employer]: Supplies Requested/ adult Diaper/ brief large, pad/ liner/ shield/ undergarment, incontinence wipes, per pack  for Tatiana Napoles CNP.  Forms placed in provider 'sign me' folder.  Please fax forms to [phone] after completion.    Ana Mullen  Patient Representative

## 2020-01-30 ENCOUNTER — TELEPHONE (OUTPATIENT)
Dept: UROLOGY | Facility: CLINIC | Age: 75
End: 2020-01-30

## 2020-01-30 ENCOUNTER — OFFICE VISIT (OUTPATIENT)
Dept: UROLOGY | Facility: CLINIC | Age: 75
End: 2020-01-30
Payer: COMMERCIAL

## 2020-01-30 VITALS — SYSTOLIC BLOOD PRESSURE: 122 MMHG | HEART RATE: 87 BPM | OXYGEN SATURATION: 97 % | DIASTOLIC BLOOD PRESSURE: 88 MMHG

## 2020-01-30 DIAGNOSIS — N39.41 URGENCY INCONTINENCE: Primary | ICD-10-CM

## 2020-01-30 PROCEDURE — 99213 OFFICE O/P EST LOW 20 MIN: CPT | Performed by: UROLOGY

## 2020-01-30 NOTE — PROGRESS NOTES
"F/u OAB wet, Vesicare 5+5, other medical issues, tobacco.    Still taking Vesicare two x 5 mg tabs in the morning. Control generally pretty good but has periodic \"breakthroughs\" with increased urgency.    Denies dysuria, gross hematuria, frequency today.     Drinks one caf coffee, 2 caf soda, 3 bottles of [place] and some Ensure every day; has dry mouth.    Still on Percocett for her chronic pain; also getting injections of her shoulders.    BM's are satisfactory.       Current Outpatient Medications   Medication     albuterol (PROAIR HFA/PROVENTIL HFA/VENTOLIN HFA) 108 (90 Base) MCG/ACT inhaler     buPROPion (WELLBUTRIN XL) 300 MG 24 hr tablet     diclofenac (VOLTAREN) 1 % topical gel     DULoxetine (CYMBALTA) 60 MG capsule     lidocaine (LIDODERM) 5 % patch     multivitamin, therapeutic (THERA-VIT) TABS tablet     nystatin (MYCOSTATIN) 283030 UNIT/GM external cream     nystatin (MYCOSTATIN) 574231 UNIT/GM external powder     order for DME     oxyCODONE-acetaminophen (PERCOCET) 5-325 MG tablet     senna-docusate (SENOKOT-S;PERICOLACE) 8.6-50 MG per tablet     solifenacin (VESICARE) 5 MG tablet     traZODone (DESYREL) 50 MG tablet     Current Facility-Administered Medications   Medication     lidocaine 1 % injection 4 mL     lidocaine 1 % injection 4 mL     lidocaine 1 % injection 4 mL     methylPREDNISolone (DEPO-MEDROL) injection 80 mg     methylPREDNISolone (DEPO-MEDROL) injection 80 mg     methylPREDNISolone (DEPO-MEDROL) injection 80 mg       IMP:  1. OAB wet, some room for improvement  2. Complex medical issues      PLAN:  1. Discussed situation with patient in detail.  2. Consider Botox; discussed in detail rationale, mech of action, risks, complications, results, need for repeat injection, retention, etc; pt expresses understanding and elects to proceed  3. 15 minutes spent with patient, 100% spent in counseling and coordination of care for OAB      "

## 2020-01-30 NOTE — TELEPHONE ENCOUNTER
Surgery Pre-Certification    Medical Record Number: [id number]  Ledy Odonnell  YOB: 1945   Phone: [phone] (home)   Primary Provider: Vika Napoles    Diagnosis:  urger incontinence N32.41  CPT 61106,     Surgeon: alfonso  Surgical Procedure: cysto botox   BMI:  na  ICD-10 Coded: N32.41  Hospital: In clinic  In-Patient/ Out-Patient status: Outpatient  Length of surgery: 20 min  Anesthesia: local  Implanted Cardiac Device: No    Date of Surgery:  2/12/2020  When post-op appointment needed:  2 weeks   Special Requests/ Equipment:: botox  CPM Needed: na  Requestor:  Vika Colon CMA    No pre cert needed, for [facility]    Thank you,   Judith Aragon   Referral Department  [phone]

## 2020-02-05 DIAGNOSIS — G89.4 CHRONIC PAIN DISORDER: ICD-10-CM

## 2020-02-05 RX ORDER — OXYCODONE AND ACETAMINOPHEN 5; 325 MG/1; MG/1
1 TABLET ORAL EVERY 6 HOURS PRN
Qty: 100 TABLET | Refills: 0 | Status: SHIPPED | OUTPATIENT
Start: 2020-02-05 | End: 2020-03-06

## 2020-02-05 NOTE — TELEPHONE ENCOUNTER
Requested Prescriptions   Pending Prescriptions Disp Refills     oxyCODONE-acetaminophen (PERCOCET) 5-325 MG tablet        Last Written Prescription Date:  1/6/2020  Last Fill Quantity: 100 tabs,   # refills: 0  Last Office Visit: 11/20/2019  Future Office visit:    Next 5 appointments (look out 90 days)    Feb 12, 2020  1:00 PM CST  Return Visit with Max Wayne MD, FRIROBBIN CYSTO PROC ROOM  [facility] [address] [place] [id number]  [phone]   Feb 19, 2020 11:15 AM CST  Return Visit with Max Wayne MD  [facility] ([address] [address]  [phone]           Routing refill request to provider for review/approval because:  Drug not on the [facility], [facility] or  Health refill protocol or controlled substance 100 tablet 0     Sig: Take 1 tablet by mouth every 6 hours as needed for pain Maximum of 4 daily.

## 2020-02-05 NOTE — TELEPHONE ENCOUNTER
Reason for Call:  Medication or medication refill:    Do you use a [facility]?  Name of the pharmacy and phone number for the current request:  [facility]    Name of the medication requested: oxyCODONE-acetaminophen (PERCOCET) 5-325 MG tablet    Other request:     Can we leave a detailed message on this number? YES    Phone number patient can be reached at: Home number on file [phone] (home)    Best Time: any    Call taken on 2/5/2020 at 10:10 AM by Christelle Vo

## 2020-02-06 NOTE — TELEPHONE ENCOUNTER
Received form back because the ICD 10 code is not correct.    Placed in Tatiana Napoles's sign me folder to correct.    Ana Mullen

## 2020-02-12 ENCOUNTER — OFFICE VISIT (OUTPATIENT)
Dept: UROLOGY | Facility: CLINIC | Age: 75
End: 2020-02-12
Payer: COMMERCIAL

## 2020-02-12 DIAGNOSIS — N32.81 OAB (OVERACTIVE BLADDER): Primary | ICD-10-CM

## 2020-02-12 PROCEDURE — 52287 CYSTOSCOPY CHEMODENERVATION: CPT | Performed by: UROLOGY

## 2020-02-12 RX ORDER — CIPROFLOXACIN 500 MG/1
500 TABLET, FILM COATED ORAL ONCE
Status: COMPLETED | OUTPATIENT
Start: 2020-02-12 | End: 2020-02-12

## 2020-02-12 RX ADMIN — CIPROFLOXACIN 500 MG: 500 TABLET, FILM COATED ORAL at 13:30

## 2020-02-12 NOTE — PROGRESS NOTES
Clinic Administered Medication Documentation     MEDICATION LIST:   Injectable Medication Documentation     Patient was given botox. Prior to medication administration, verified patients identity using patient s name and date of birth. Please see MAR and medication order for additional information. Patient instructed to remain in clinic for 15 minutes.        Was entire vial of medication used? Yes  Vial/Syringe: Single dose vial  Expiration Date:  05/22  Was this medication supplied by the patient? No    lot [id number]  NDC 9789-5271-84     The following medication was given:      MEDICATION: Botox   ROUTE: IM  SITE: Bladder wall  DOSE: 100 u  LOT #: [id number]  :  [employer]  EXPIRATION DATE:  05/22  NDC#: 2559-6527-50            MEDICATION:  Ciprofloxin  ROUTE: PO  SITE: mouth  DOSE: 500  LOT #:[id number]  : [employer]  EXPIRATION DATE: 11/2020  NDC#: 47111-692-53   Was there drug waste? No

## 2020-02-12 NOTE — PROGRESS NOTES
F/u OAB wet, Vesicare 5+5, other medical issues, tobacco.    Here for Botox #1. Again discussed rationale, mech of action, potential side effect, etc; pt elects to proceed. Informed consent obtained.    Urojet for 10 minutes. Then Botox A 100 units in 10 ml saline, injected into posterior wall at 3 sites. Excellent blebs and hemostasis.      IMP:  1. Botox A 100 units #1 for refractory OAB wet      PLAN:  1. Discussed situation with patient in detail.  2. Cipro x 1  3. RTC 2-3 wks  4. Precautions  5. Continue Vesicare until f/u visit

## 2020-02-12 NOTE — TELEPHONE ENCOUNTER
Faye with [facility] calling to check on the status of form that was faxed requesting ICD-10 code, they need the underlying cause for incontinence. Please contact her to discuss if needed at [phone].

## 2020-02-12 NOTE — TELEPHONE ENCOUNTER
Ledy called in to check on the status of order for Depends diapers and Poise pads. Patient states that form is from [facility].   Patient is in need of the diapers/pads.     She would like to have a callback when form/order has been sent over.     Thank you,  Tanna Brown  Patient Rep.  [facility]

## 2020-02-13 ENCOUNTER — TELEPHONE (OUTPATIENT)
Dept: FAMILY MEDICINE | Facility: CLINIC | Age: 75
End: 2020-02-13

## 2020-02-13 NOTE — TELEPHONE ENCOUNTER
Forms received from [employer]/ Detailed Written Order - Out[employer] Wedge, custom made from raw materials for Tatiana Napoles CNP.  Forms placed in provider 'sign me' folder.  Please fax forms to [phone] after completion.    Ana Mullen  Patient Representative

## 2020-02-13 NOTE — TELEPHONE ENCOUNTER
"Called [facility] and spoke with a Parvin. He said that we need to put an underlying cause of the incontinence (for example: diabetes).  He said we can put it in one of the \"other spots\" on the form.  Be sure and date and initial it.    Also called patient and left a VM message letting her know that Tatiana Napoles is out of the clinic today but will be back tomorrow and I will have her correct form and we will send back to [facility] tomorrow.    Ana Mullen      "

## 2020-02-17 ENCOUNTER — TELEPHONE (OUTPATIENT)
Dept: FAMILY MEDICINE | Facility: CLINIC | Age: 75
End: 2020-02-17

## 2020-02-17 NOTE — TELEPHONE ENCOUNTER
Reason for Call:  Other     Detailed comments: [facility] called back and said they need a ICD 10 code because N39.41 is not correct they said they still need the underlying cause of the incontinence please fax to [phone]    Phone Number Patient can be reached at: Other phone number:  [phone]    Best Time: any    Can we leave a detailed message on this number? YES    Call taken on 2/17/2020 at 1:49 PM by Cathleen Garcia

## 2020-02-17 NOTE — TELEPHONE ENCOUNTER
Tatiana Napoles had added the underlying causes of incontinence to form but forgot to initial and date.  She will not be in clinic until Tuesday so will have to have her initial and date on Tuesday. 2/18/20.    Ana Mullen

## 2020-02-18 NOTE — TELEPHONE ENCOUNTER
Spoke with TC, and she states there is another encounter open that is being worked on, so can close this one.    Shruthi Vernon RN

## 2020-03-02 DIAGNOSIS — G89.4 CHRONIC PAIN DISORDER: ICD-10-CM

## 2020-03-03 NOTE — TELEPHONE ENCOUNTER
"Requested Prescriptions   Pending Prescriptions Disp Refills     diclofenac (VOLTAREN) 1 % topical gel  Last Written Prescription Date:  9/12/2019  Last Fill Quantity: 100 g,  # refills: 1   Last office visit: 11/20/2019 with prescribing provider:  Dony   Future Office Visit:   Next 5 appointments (look out 90 days)    Mar 04, 2020  2:30 PM CST  Return Visit with Max Wayne MD  [facility] ([address] [address]  [phone]        100 g 1     Sig: Place 4 g onto the skin 4 times daily       Topical Steroids and Nonsteroidals Protocol Passed - 3/2/2020  4:26 PM        Passed - Patient is age 6 or older        Passed - Authorizing prescriber's most recent note related to this medication read.     If refill request is for ophthalmic use, please forward request to provider for approval.          Passed - High potency steroid not ordered        Passed - Recent (12 mo) or future (30 days) visit within the authorizing provider's specialty     Patient has had an office visit with the authorizing provider or a provider within the authorizing providers department within the previous 12 mos or has a future within next 30 days. See \"Patient Info\" tab in inbasket, or \"Choose Columns\" in Meds & Orders section of the refill encounter.              Passed - Medication is active on med list         "

## 2020-03-04 NOTE — TELEPHONE ENCOUNTER
Prescription approved per [facility] Refill [place].    ANGELA RyanN, RN  [facility]

## 2020-03-05 DIAGNOSIS — G89.4 CHRONIC PAIN DISORDER: ICD-10-CM

## 2020-03-05 NOTE — TELEPHONE ENCOUNTER
Reason for Call:  Medication or medication refill:    Do you use a [facility]?  Name of the pharmacy and phone number for the current request:  [facility]    Name of the medication requested: oxyCODONE-acetaminophen (PERCOCET) 5-325 MG tablet    Other request: Have 4 pills left, about 1 1/2 days worth.     Can we leave a detailed message on this number? YES    Phone number patient can be reached at: Home number on file [phone] (home)    Best Time: anytime    Call taken on 3/5/2020 at 3:00 PM by Tanna Brown

## 2020-03-06 RX ORDER — OXYCODONE AND ACETAMINOPHEN 5; 325 MG/1; MG/1
1 TABLET ORAL EVERY 6 HOURS PRN
Qty: 100 TABLET | Refills: 0 | Status: SHIPPED | OUTPATIENT
Start: 2020-03-06 | End: 2020-04-07

## 2020-03-06 NOTE — TELEPHONE ENCOUNTER
Requested Prescriptions   Pending Prescriptions Disp Refills     oxyCODONE-acetaminophen (PERCOCET) 5-325 MG tablet 100 tablet 0     Sig: Take 1 tablet by mouth every 6 hours as needed for pain Maximum of 4 daily.       There is no refill protocol information for this order        Last Written Prescription Date:  2/5/20  Last Fill Quantity: 100,  # refills: 0   Last office visit: 11/20/2019 with prescribing provider:  11/20/19   Future Office Visit:   Next 5 appointments (look out 90 days)    Mar 11, 2020 11:30 AM CDT  Return Visit with Max Wayne MD  [facility] ([facility]) [address] [place] [phone]  [phone]         See plan at 11/20/19 visit with Vika Napoles:    Instructions         Return in about 3 months (around 2/20/2020).   Recommend starting CBT for pain        Not done, not yet scheduled.    Routing refill request to provider for review/approval because:  Drug not on the [facility] refill protocol     Elma Garcia RN  [facility]

## 2020-03-11 ENCOUNTER — OFFICE VISIT (OUTPATIENT)
Dept: UROLOGY | Facility: CLINIC | Age: 75
End: 2020-03-11
Payer: COMMERCIAL

## 2020-03-11 VITALS — OXYGEN SATURATION: 92 % | SYSTOLIC BLOOD PRESSURE: 151 MMHG | HEART RATE: 84 BPM | DIASTOLIC BLOOD PRESSURE: 79 MMHG

## 2020-03-11 DIAGNOSIS — N39.41 URGENCY INCONTINENCE: ICD-10-CM

## 2020-03-11 DIAGNOSIS — N32.81 OAB (OVERACTIVE BLADDER): Primary | ICD-10-CM

## 2020-03-11 LAB
ALBUMIN UR-MCNC: NEGATIVE MG/DL
APPEARANCE UR: CLEAR
BACTERIA #/AREA URNS HPF: ABNORMAL /HPF
BILIRUB UR QL STRIP: NEGATIVE
COLOR UR AUTO: YELLOW
GLUCOSE UR STRIP-MCNC: NEGATIVE MG/DL
HGB UR QL STRIP: NEGATIVE
KETONES UR STRIP-MCNC: NEGATIVE MG/DL
LEUKOCYTE ESTERASE UR QL STRIP: ABNORMAL
NITRATE UR QL: NEGATIVE
PH UR STRIP: 6.5 PH (ref 5–7)
RBC #/AREA URNS AUTO: ABNORMAL /HPF
SOURCE: ABNORMAL
SP GR UR STRIP: 1.01 (ref 1–1.03)
UROBILINOGEN UR STRIP-ACNC: 0.2 EU/DL (ref 0.2–1)
WBC #/AREA URNS AUTO: ABNORMAL /HPF

## 2020-03-11 PROCEDURE — 51798 US URINE CAPACITY MEASURE: CPT | Performed by: UROLOGY

## 2020-03-11 PROCEDURE — 99213 OFFICE O/P EST LOW 20 MIN: CPT | Mod: 25 | Performed by: UROLOGY

## 2020-03-11 PROCEDURE — 81001 URINALYSIS AUTO W/SCOPE: CPT | Performed by: UROLOGY

## 2020-03-11 NOTE — PROGRESS NOTES
"F/u OAB wet, Vesicare 5+5, other medical issues, tobacco; here for f/u Botox A #1 on 2/12/20    Control is \"a whole lot better,\" grades her improvement as a \"7\" from 1-10. Using many fewer pads.    Denies dysuria, gross hematuria, frequency; nocturia x 2.     Feels as though stream is \"a little weak but I think I get it done.\"     Still taking her Vesicare 5 BID    Still drinking 32 oz of soda and 4 bottles of [place] per day.      Current Outpatient Medications   Medication     albuterol (PROAIR HFA/PROVENTIL HFA/VENTOLIN HFA) 108 (90 Base) MCG/ACT inhaler     buPROPion (WELLBUTRIN XL) 300 MG 24 hr tablet     diclofenac (VOLTAREN) 1 % topical gel     DULoxetine (CYMBALTA) 60 MG capsule     lidocaine (LIDODERM) 5 % patch     multivitamin, therapeutic (THERA-VIT) TABS tablet     nystatin (MYCOSTATIN) 110400 UNIT/GM external cream     nystatin (MYCOSTATIN) 004409 UNIT/GM external powder     order for DME     oxyCODONE-acetaminophen (PERCOCET) 5-325 MG tablet     senna-docusate (SENOKOT-S;PERICOLACE) 8.6-50 MG per tablet     solifenacin (VESICARE) 5 MG tablet     traZODone (DESYREL) 50 MG tablet     Current Facility-Administered Medications   Medication     lidocaine 1 % injection 4 mL     lidocaine 1 % injection 4 mL     lidocaine 1 % injection 4 mL     methylPREDNISolone (DEPO-MEDROL) injection 80 mg     methylPREDNISolone (DEPO-MEDROL) injection 80 mg     methylPREDNISolone (DEPO-MEDROL) injection 80 mg         Results for orders placed or performed in visit on 03/11/20   UA reflex to Microscopic     Status: Abnormal   Result Value Ref Range    Color Urine Yellow     Appearance Urine Clear     Glucose Urine Negative NEG^Negative mg/dL    Bilirubin Urine Negative NEG^Negative    Ketones Urine Negative NEG^Negative mg/dL    Specific Gravity Urine 1.010 1.003 - 1.035    Blood Urine Negative NEG^Negative    pH Urine 6.5 5.0 - 7.0 pH    Protein Albumin Urine Negative NEG^Negative mg/dL    Urobilinogen Urine 0.2 0.2 - 1.0 " EU/dL    Nitrite Urine Negative NEG^Negative    Leukocyte Esterase Urine Moderate (A) NEG^Negative    Source Midstream Urine    Urine Microscopic     Status: Abnormal   Result Value Ref Range    WBC Urine 25-50 (A) OTO5^0 - 5 /HPF    RBC Urine O - 2 OTO2^O - 2 /HPF    Bacteria Urine Few (A) NEG^Negative /HPF     PVR; 850      IMP:  1. Complex OAB; symptomatically improving but residual a bit high      PLAN:  1. Discussed situation with patient in detail.  2. Stop Vesicare  3. RTC 1 wk to review progress, repeat PVR  4. Precautions  5. 15 minutes spent with patient, more than 50% spent in counseling and coordination of care for OAB.

## 2020-03-16 DIAGNOSIS — F33.1 MAJOR DEPRESSIVE DISORDER, RECURRENT EPISODE, MODERATE (H): ICD-10-CM

## 2020-03-17 NOTE — TELEPHONE ENCOUNTER
"Requested Prescriptions   Pending Prescriptions Disp Refills     buPROPion (WELLBUTRIN XL) 300 MG 24 hr tablet  Last Written Prescription Date:  12/16/2019  Last Fill Quantity: 90 tabs,  # refills: 0   Last office visit: 11/20/2019 with prescribing provider:  Dony Watson Office Visit:   90 tablet 0     Sig: Take 1 tablet (300 mg) by mouth every morning       [facility] Protocol Failed - 3/16/2020  4:24 PM   PHQ-9 SCORE 12/31/2018 2/4/2019 7/9/2019   PHQ-9 Total Score - - -   PHQ-9 Total Score 6 2 6     LESTER-7 SCORE 10/15/2018 12/31/2018 7/9/2019   Total Score - - -   Total Score 7 5 3            Failed - PHQ-9 score less than 5 in past 6 months     Please review last PHQ-9 score.           Passed - Medication is Bupropion     If the medication is Bupropion (Wellbutrin), and the patient is taking for smoking cessation; OK to refill.          Passed - Medication is active on med list        Passed - Patient is age 18 or older        Passed - No active pregnancy on record        Passed - No positive pregnancy test in last 12 months        Passed - Recent (6 mo) or future (30 days) visit within the authorizing provider's specialty     Patient had office visit in the last 6 months or has a visit in the next 30 days with authorizing provider or within the authorizing provider's specialty.  See \"Patient Info\" tab in inbasket, or \"Choose Columns\" in Meds & Orders section of the refill encounter.                "

## 2020-03-18 ENCOUNTER — TELEPHONE (OUTPATIENT)
Dept: FAMILY MEDICINE | Facility: CLINIC | Age: 75
End: 2020-03-18

## 2020-03-18 DIAGNOSIS — R32 URINARY INCONTINENCE, UNSPECIFIED TYPE: Primary | ICD-10-CM

## 2020-03-18 RX ORDER — BUPROPION HYDROCHLORIDE 300 MG/1
300 TABLET ORAL EVERY MORNING
Qty: 90 TABLET | Refills: 0 | Status: SHIPPED | OUTPATIENT
Start: 2020-03-18 | End: 2020-06-09

## 2020-03-18 NOTE — TELEPHONE ENCOUNTER
Reason for Call: Request for an order or referral:    Order or referral being requested: Adult pull-up diapers    Date needed: as soon as possible    Has the patient been seen by the PCP for this problem? YES    Additional comments: Patient stated she needs a prescription send for these as soon as possible to [facility]. She did not have a fax number, but provided the phone number: [phone]. Please call patient back to discuss if any questions.    Phone number Patient can be reached at:  Home number on file [phone] (home)    Best Time:  Anytime    Can we leave a detailed message on this number?  YES    Call taken on 3/18/2020 at 3:05 PM by Ana Mustafa

## 2020-03-18 NOTE — TELEPHONE ENCOUNTER
Prescription approved per [facility] Refill Protocol.    Brenda Murdock RN   [facility]

## 2020-03-19 NOTE — TELEPHONE ENCOUNTER
Order or referral being requested: Adult pull-up diapers     Date needed: as soon as possible     Has the patient been seen by the PCP for this problem? YES     Additional comments: Patient stated she needs a prescription send for these as soon as possible to [facility]. She did not have a fax number, but provided the phone number: [phone]. Please call patient back to discuss if any questions.    Routed to PCP to review and advise, no prior DME on record for this.     Oralia Justice RN, BSN, PHN  [facility]: [place]

## 2020-03-24 ENCOUNTER — TELEPHONE (OUTPATIENT)
Dept: FAMILY MEDICINE | Facility: CLINIC | Age: 75
End: 2020-03-24

## 2020-03-24 NOTE — TELEPHONE ENCOUNTER
Forms received from [facility]/ [facility] [employer]: Supplies Request - adult protective underwear, pad-liner-shield-undergarment  for Tatiana Napoles CNP.  Forms placed in provider 'sign me' folder.  Please fax forms to [phone] after completion.    Ana Mullen  Patient Representative

## 2020-03-26 ENCOUNTER — TELEPHONE (OUTPATIENT)
Dept: UROLOGY | Facility: CLINIC | Age: 75
End: 2020-03-26

## 2020-03-26 NOTE — TELEPHONE ENCOUNTER
Left message for patient to call [phone]. Patient left OVM stating she has UTI symptoms. Will route to [place] for RN to address.  Vika Colon, CMA

## 2020-03-27 NOTE — TELEPHONE ENCOUNTER
Spoke to patient.   Patient states that she recently received Botox and has had outstanding results.   Patient is calling to report cloudy urine.   Patient denies fever, dysuria, pain or frequency.   Per Dr. Wayne this is ok.   Patient to reach out if symptoms of UTI surface. Patient reassured.   Georgia Multani RN

## 2020-04-08 NOTE — TELEPHONE ENCOUNTER
Manjeetdled with Tatiana Napoles and she said that I could fax over the patients problem list which has the diagnosis code and incontinence code.    Faxed problem list to [facility].    Ana Mullen

## 2020-04-08 NOTE — TELEPHONE ENCOUNTER
Reason for Call: Request for an order or referral:    Order or referral being requested: Incontinence Supplies     Date needed: as soon as possible    Has the patient been seen by the PCP for this problem? YES    Additional comments: Hnog from [facility] called in regarding the status of the request they sent over for the patient's incontinence supplies. She states they need to get a Diagnosis code and Incontinence code added to the prescription and that they need to have that faxed back to them. The ax number is [phone]  Please call with any questions.     Phone number Patient can be reached at:  [phone]    Best Time:  Any     Can we leave a detailed message on this number?  YES    Call taken on 4/8/2020 at 12:14 PM by Laurie Lagos

## 2020-04-15 NOTE — TELEPHONE ENCOUNTER
Patient called stating the [facility] still hasn't received the forms back from Dr. Napoles. Patient states the [facility] is willing to help pay for supplies being they are expensive and worried that they are going to decline the offer if they don't receive it. Patient was informed that the forms had been faxed 04/08/20, but Dr. Napoles's team will resend it ASAP.. Patient states you can call them at [phone]

## 2020-04-16 NOTE — TELEPHONE ENCOUNTER
Called [facility] and spoke to them about the forms and why we keep getting the forms back.  He looked at patients account and see that the form we need they have not sent to us yet.  He is going to fax to us today.    Ana Mullen

## 2020-04-17 NOTE — TELEPHONE ENCOUNTER
Received another form but it is identical to the form from before.    Placed in Tatiana Napoles's sign me folder.    Ana Mullen

## 2020-04-22 ENCOUNTER — TELEPHONE (OUTPATIENT)
Dept: UROLOGY | Facility: CLINIC | Age: 75
End: 2020-04-22

## 2020-04-22 ENCOUNTER — VIRTUAL VISIT (OUTPATIENT)
Dept: UROLOGY | Facility: CLINIC | Age: 75
End: 2020-04-22
Payer: COMMERCIAL

## 2020-04-22 VITALS — TEMPERATURE: 97.6 F | DIASTOLIC BLOOD PRESSURE: 76 MMHG | SYSTOLIC BLOOD PRESSURE: 119 MMHG

## 2020-04-22 DIAGNOSIS — R33.9 URINARY RETENTION: ICD-10-CM

## 2020-04-22 DIAGNOSIS — R30.0 DYSURIA: Primary | ICD-10-CM

## 2020-04-22 PROCEDURE — 99213 OFFICE O/P EST LOW 20 MIN: CPT | Mod: 95 | Performed by: UROLOGY

## 2020-04-22 NOTE — TELEPHONE ENCOUNTER
Reason for Call:  Other Orders    Detailed comments: [facility] calling, states they received a referral to do a one time visit for a nurse to go out and help the patient. Unfortunately they don't go out for one time visits. It needs to be on going skill, if they order is not an on going skill they will not proceed with the referral.    Phone: [phone]    Best Time: any    Can we leave a detailed message on this number? YES    Call taken on 4/22/2020 at 12:18 PM by Ramone Mast

## 2020-04-22 NOTE — TELEPHONE ENCOUNTER
Joie with [facility] calling stating they did not receive the fax. They want to make sure that the order includes patient's diagnosis code for incontinence products and their primary diagnosis code as well. Please fax to [phone]. Please contact her with any questions.

## 2020-04-22 NOTE — PROGRESS NOTES
"The patient has been notified of the following:      \"We have found that certain health care needs can be provided without the need for a face to face visit.  This service lets us provide the care you need with a phone conversation.       I will have full access to your [facility] medical record during this entire phone call.   I will be taking notes for your medical record.      Since this is like an office visit, we will bill your insurance company for this service.       There are potential benefits and risks of telephone visits (e.g. limits to patient confidentiality) that differ from in-person visits.?  Confidentiality still applies for telephone services, and nobody will record the visit.  It is important to be in a quiet, private space that is free of distractions (including cell phone or other devices) during the visit.??      If during the course of the call I believe a telephone visit is not appropriate, you will not be charged for this service\"     Consent has been obtained for this service by care team member: Yes       F/u OAB wet, Vesicare 5+5, other medical issues, tobacco; here for f/u Botox A #1 on 2/12/20, seen on 3/11/20 ()    Pt voiding relatively frequently in small amounts, quite wet.     Denies dysuria, gross hematuria, systemic toxicity.    Drinking 1-2 bottles of soda and 3-4 [place] per day.     Not taking Vesicare since Botox injection.     Nocturia x 4, quite wet at nite.    Continues with limited mobility.        Current Outpatient Medications   Medication     albuterol (PROAIR HFA/PROVENTIL HFA/VENTOLIN HFA) 108 (90 Base) MCG/ACT inhaler     buPROPion (WELLBUTRIN XL) 300 MG 24 hr tablet     diclofenac (VOLTAREN) 1 % topical gel     DULoxetine (CYMBALTA) 60 MG capsule     lidocaine (LIDODERM) 5 % patch     multivitamin, therapeutic (THERA-VIT) TABS tablet     nystatin (MYCOSTATIN) 582671 UNIT/GM external cream     nystatin (MYCOSTATIN) 638779 UNIT/GM external powder     order for DME "     order for DME     oxyCODONE-acetaminophen (PERCOCET) 5-325 MG tablet     senna-docusate (SENOKOT-S;PERICOLACE) 8.6-50 MG per tablet     solifenacin (VESICARE) 5 MG tablet     traZODone (DESYREL) 50 MG tablet     Current Facility-Administered Medications   Medication     lidocaine 1 % injection 4 mL     lidocaine 1 % injection 4 mL     lidocaine 1 % injection 4 mL     methylPREDNISolone (DEPO-MEDROL) injection 80 mg     methylPREDNISolone (DEPO-MEDROL) injection 80 mg     methylPREDNISolone (DEPO-MEDROL) injection 80 mg       IMP:  1. Probable incomplete emptying, Botox 2 mos ago, limited mobility      PLAN:  1. Discussed situation with patient in detail.  2. Will have Home Health Nurse visit patient; check UA and cath PVR; leave moctezuma if PVR > 350 ml  3. F/u 2 mos  4. Precautions given  5. 15 minutes spent with patient,100% spent in counseling and coordination of care for incontinence

## 2020-04-23 NOTE — TELEPHONE ENCOUNTER
Called and spoke to home care.   Clarified orders as patient will need catheter care going forward.   Caller agrees and verbal order was provided per Dr. Wayne.   Georgia Multani RN

## 2020-04-24 ENCOUNTER — MEDICAL CORRESPONDENCE (OUTPATIENT)
Dept: HEALTH INFORMATION MANAGEMENT | Facility: CLINIC | Age: 75
End: 2020-04-24

## 2020-04-24 ENCOUNTER — DOCUMENTATION ONLY (OUTPATIENT)
Dept: CARE COORDINATION | Facility: CLINIC | Age: 75
End: 2020-04-24

## 2020-04-24 PROCEDURE — 87086 URINE CULTURE/COLONY COUNT: CPT

## 2020-04-24 PROCEDURE — 81001 URINALYSIS AUTO W/SCOPE: CPT

## 2020-04-24 NOTE — PROGRESS NOTES
[facility] now requests orders and shares plan of care/discharge summaries for some patients through [employer].  Please REPLY TO THIS MESSAGE OR ROUTE BACK TO THE AUTHOR in order to give authorization for orders when needed.  This is considered a verbal order, you will still receive a faxed copy of orders for signature.  Thank you for your assistance in improving collaboration for our patients.    ORDER  SN visits 1x/week x4, 3 PRN  SN to check post void residuals via straight cath as pt allows.     MD SUMMARY/PLAN OF CARE  SUMMARY TO MD  SITUATION: Patient referred to [facility] from urology for moctezuma catheter insertion and maintence.  Patient is alert and oriented. VS stable, /60, HR 80, R 16, T 97.1 temporal, chronic arthritic pain to shoulder, neck, hands, foot rating currently at a 6/10 but increases throughout the day to 8-9/10.  She currently uses oxycodone-acetaminiphen for pain managment with little to no releif. She pivot transfers, denies recent falls, reports last fall approximently 2 years ago.  She completes own personal cares with modified independence but should have assistance for safety.  She fatigues and has increaed pain with activity.  She manages her own medications and is knowledgable of medications, uses a mediplanner and reports compliance.  She has yeast infection to abdominal/groin folds and uses nystatin for treatment.  She is incontinent of urine, reports primary incontinence in is evening and night.  Reports having urgency and frequency with urination, she reports she has these symptoms with UTIs in past.  Urine sample collected for UA/UC, urine was cloudy and strong odor present, pt noted to be drinking water and reports primary intake of fluids in water.  Patient declined to have moctezuma cathter inserted and wants to see what urine sample shows first and work on other options to manage incontinence.   Instructed catheter is to assist in emptying bladder due to concerns of  urinary retention.  Patient agreed to have catheter inserted to check post void residuals but SN was unsuccesfull, patient agreeable to have SN try again next week.  Patient is open to having moctezuma catheter inserted as last resort and open to continued education regarding catheter.  She resides in single family home that has been adapted to meet her needs.  Her sister is involved and assists as needed.  Has a homemaker but is currently on hold due to pandemic at this time.    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  OT recommended for incontinence managment but declined, declined PT, SW, and HA.

## 2020-04-25 LAB
BACTERIA SPEC CULT: NORMAL
SPECIMEN SOURCE: NORMAL

## 2020-04-28 NOTE — TELEPHONE ENCOUNTER
Called and spoke to home care nurse.   Verbal order for requested ongoing appointments.   Georgia Multani RN

## 2020-04-29 ENCOUNTER — TELEPHONE (OUTPATIENT)
Dept: UROLOGY | Facility: CLINIC | Age: 75
End: 2020-04-29

## 2020-04-29 NOTE — TELEPHONE ENCOUNTER
Reason for call:  Results   Name of test or procedure: labs  Date of test or procedure: 4/24/20  Location of test or procedure: [facility]    Additional comments: na    Phone number to reach patient:  Home number on file [phone] (home)    Best Time:  any    Can we leave a detailed message on this number?  YES    Travel screening: Not Applicable

## 2020-05-04 LAB
ALBUMIN UR-MCNC: 30 MG/DL
APPEARANCE UR: ABNORMAL
BACTERIA #/AREA URNS HPF: ABNORMAL /HPF
BILIRUB UR QL STRIP: NEGATIVE
COLOR UR AUTO: YELLOW
GLUCOSE UR STRIP-MCNC: NEGATIVE MG/DL
HGB UR QL STRIP: ABNORMAL
KETONES UR STRIP-MCNC: NEGATIVE MG/DL
LEUKOCYTE ESTERASE UR QL STRIP: ABNORMAL
NITRATE UR QL: NEGATIVE
PH UR STRIP: 5.5 PH (ref 5–7)
RBC #/AREA URNS AUTO: 0 /HPF (ref 0–2)
SOURCE: ABNORMAL
SP GR UR STRIP: 1.01 (ref 1–1.03)
SQUAMOUS #/AREA URNS AUTO: 1 /HPF (ref 0–1)
UROBILINOGEN UR STRIP-MCNC: NORMAL MG/DL (ref 0–2)
WBC #/AREA URNS AUTO: >182 /HPF (ref 0–5)
WBC CLUMPS #/AREA URNS HPF: PRESENT /HPF

## 2020-05-05 ENCOUNTER — TELEPHONE (OUTPATIENT)
Dept: FAMILY MEDICINE | Facility: CLINIC | Age: 75
End: 2020-05-05

## 2020-05-05 NOTE — TELEPHONE ENCOUNTER
Reason for Call:  Medication or medication refill:    Do you use a [facility]?  Name of the pharmacy and phone number for the current request:  [facility] in [place]    Name of the medication requested: oxycodone    Other request: Please call patient if any problems with her request.  She states she called pharmacy and they told her she had to call the docs office.    Can we leave a detailed message on this number? YES    Phone number patient can be reached at: Home number on file [phone] (home)    Best Time: anytime    Call taken on 5/5/2020 at 4:15 PM by Rhianna Jonas

## 2020-05-05 NOTE — TELEPHONE ENCOUNTER
Routing refill request to provider for review/approval because:  Drug not on the [facility] refill protocol     Last refill was 4/7/20 for 100tabs        Brigid Prajapati RN

## 2020-05-06 ENCOUNTER — VIRTUAL VISIT (OUTPATIENT)
Dept: FAMILY MEDICINE | Facility: CLINIC | Age: 75
End: 2020-05-06
Payer: COMMERCIAL

## 2020-05-06 DIAGNOSIS — R32 URINARY INCONTINENCE, UNSPECIFIED TYPE: Primary | ICD-10-CM

## 2020-05-06 DIAGNOSIS — G89.4 CHRONIC PAIN DISORDER: ICD-10-CM

## 2020-05-06 PROCEDURE — 99213 OFFICE O/P EST LOW 20 MIN: CPT | Mod: 95 | Performed by: NURSE PRACTITIONER

## 2020-05-06 RX ORDER — OXYCODONE AND ACETAMINOPHEN 5; 325 MG/1; MG/1
1 TABLET ORAL EVERY 6 HOURS PRN
Qty: 100 TABLET | Refills: 0 | Status: SHIPPED | OUTPATIENT
Start: 2020-05-06 | End: 2020-06-10

## 2020-05-06 NOTE — TELEPHONE ENCOUNTER
Called and left message advising patient of message below. Asked that she call back at [phone] to set up a virtual visit with provider.    Cj Bennett

## 2020-05-06 NOTE — PROGRESS NOTES
"Ledy Odonnell is a 74 year old female who is being evaluated via a billable telephone visit.      The patient has been notified of following:     \"This telephone visit will be conducted via a call between you and your physician/provider. We have found that certain health care needs can be provided without the need for a physical exam.  This service lets us provide the care you need with a short phone conversation.  If a prescription is necessary we can send it directly to your pharmacy.  If lab work is needed we can place an order for that and you can then stop by our lab to have the test done at a later time.    Telephone visits are billed at different rates depending on your insurance coverage. During this emergency period, for some insurers they may be billed the same as an in-person visit.  Please reach out to your insurance provider with any questions.    If during the course of the call the physician/provider feels a telephone visit is not appropriate, you will not be charged for this service.\"    Patient has given verbal consent for Telephone visit?  Yes    What phone number would you like to be contacted at? [phone]    How would you like to obtain your AVS? Mail a copy     =======================================================================    Subjective     Ledy Odonnell is a 74 year old female who presents to clinic today for the following health issues:    HPI   Chronic pain follow-up.  Pain started in left hip s/p complete hip ablation 2011 she underwent subsequent surgery in 2014 due to fractured femur on the same side she also has a locked left foot but that has resulted for the multiple surgical procedures on the left extremity.  All of this has significantly hindered her mobility.  She ambulates with a wheelchair.  She lives alone and completes all ADLs independently. Has meals on wheels 3 times weekly.     Has a  [facility] out of [facility].     Chronic Pain " "Follow-Up    Where in your body do you have pain? Hip, leg, club foot  How has your pain affected your ability to work? Not applicable  Which of these pain treatments have you tried since your last clinic visit? None  How well are you sleeping? Fair  How has your mood been since your last visit? Better  Have you had a significant life event? No  Other aggravating factors: prolonged standing  Taking medication as directed? Yes    She is much better than she imagined. She is coping and dealing with the \"new normal\".   She lives alone. And she is confined to a wheelchair. Says she cannot get out to get groceries. She says she is extremely supported, she has people delivering food and groceries.     Says she used the Voltaren gel to shoulders and it is really helpful. Says she take 1 tablet in the early afternoon and the evening.    Still takes tylenol between doses.       Says she sees urologist for her incontinence. Says that she is going through personal supplies like M[employer] and she cannot afford them so now the state is helping her pay for them.   Getting botox injections.       PHQ-9 SCORE 12/31/2018 2/4/2019 7/9/2019   PHQ-9 Total Score - - -   PHQ-9 Total Score 6 2 6     LESTER-7 SCORE 10/15/2018 12/31/2018 7/9/2019   Total Score - - -   Total Score 7 5 3     No flowsheet data found.  Encounter-Level CSA - 08/25/2015:    Controlled Substance Agreement - Scan on 9/4/2015 10:45 AM: CONTROLLED SUBSTANCE AGREEMENT, 8/25/15     Patient-Level CSA:    There are no patient-level csa.            Patient Active Problem List   Diagnosis     Esophageal reflux     Insomnia     FAMILY HX DIABETES MELLITUS brother     Tobacco use disorder     Obesity     Generalized anxiety disorder     Moderate major depression (H)     COPD (chronic obstructive pulmonary disease) (H)     Elevated fasting glucose     Chronic pain disorder     Idiopathic peripheral neuropathy     Hyperlipidemia LDL goal <130     Degenerative arthritis of hip     Hip " arthrosis - left, severe     Trochanteric bursitis     Status post hip replacement     Advanced directives, counseling/discussion     Gastroparesis     DDD (degenerative disc disease), lumbar     Delayed gastric emptying     Health Care Home     Candidal intertrigo     Ventral hernia     Femur fracture (H)     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     Chronic left hip pain     Past Surgical History:   Procedure Laterality Date     C HAND/FINGER SURGERY UNLISTED       C NONSPECIFIC PROCEDURE  2001    Shoulder Surgery     C NONSPECIFIC PROCEDURE  1975    Gastric Bypass     C NONSPECIFIC PROCEDURE      Cholecystectomy     C SHOULDER SURG PROC UNLISTED       C TOTAL HIP ARTHROPLASTY  1/4/2011    Lt VALERIE     HERNIORRHAPHY VENTRAL  5/14/2013    Procedure: HERNIORRHAPHY VENTRAL;  Open Ventral Hernia Repair;  Surgeon: Jhoan Rogers MD;  Location:  OR       Social History     Tobacco Use     Smoking status: Current Every Day Smoker     Packs/day: 0.50     Years: 43.00     Pack years: 21.50     Types: Cigarettes     Smokeless tobacco: Never Used   Substance Use Topics     Alcohol use: No     Alcohol/week: 0.0 standard drinks     Family History   Problem Relation Age of Onset     Cancer Father         pancreatic     Heart Disease Mother         Unknown specifics     Dementia Mother      Arthritis Paternal Grandmother         RA     Dementia Maternal Grandmother            Reviewed and updated as needed this visit by Provider         Review of Systems   ROS COMP: Constitutional, HEENT, cardiovascular, pulmonary, GI, , musculoskeletal, neuro, skin, endocrine and psych systems are negative, except as otherwise noted.       Objective   Reported vitals:  There were no vitals taken for this visit.   healthy, alert and no distress  PSYCH: Alert and oriented times 3; coherent speech, normal   rate and  volume, able to articulate logical thoughts, able   to abstract reason, no tangential thoughts, no hallucinations   or delusions  Her affect is normal  RESP: No cough, no audible wheezing, able to talk in full sentences  Remainder of exam unable to be completed due to telephone visits    Diagnostic Test Results:  Labs reviewed in Epic  none         Assessment/Plan:  1. Chronic pain disorder  Stable with appropriate treatment of Diclofenac gel, and percocet, and tylenol.   - oxyCODONE-acetaminophen (PERCOCET) 5-325 MG tablet; Take 1 tablet by mouth every 6 hours as needed for pain Maximum of 4 daily.  Dispense: 100 tablet; Refill: 0    (R32) Urinary incontinence, unspecified type  (primary encounter diagnosis)  Followed by urology and getting botox injections   All personal supplies forms must go through specialist.     Return in about 3 months (around 8/6/2020), or if symptoms worsen or fail to improve.      Phone call duration:  16:23 minutes    ROSCOE Ridley CNP

## 2020-05-06 NOTE — TELEPHONE ENCOUNTER
Patient called back and an telephone appointment was scheduled with Vika Napoles at 12:20 05/06/20

## 2020-05-11 ENCOUNTER — TELEPHONE (OUTPATIENT)
Dept: UROLOGY | Facility: CLINIC | Age: 75
End: 2020-05-11

## 2020-05-11 NOTE — TELEPHONE ENCOUNTER
Reason for call:  Other   Patient called regarding (reason for call): jorge    Additional comments:     HC calling stating that patient doesn't want to any street catheter changes @ all, Mi wants to know what she should do for the meantime. Please call to advise.     Phone number to reach patient:  Other phone number:  [phone]    Best Time:  Any     Can we leave a detailed message on this number?  YES    Travel screening: Not Applicable

## 2020-05-13 ENCOUNTER — VIRTUAL VISIT (OUTPATIENT)
Dept: UROLOGY | Facility: CLINIC | Age: 75
End: 2020-05-13
Payer: COMMERCIAL

## 2020-05-13 DIAGNOSIS — N32.81 OAB (OVERACTIVE BLADDER): Primary | ICD-10-CM

## 2020-05-13 PROCEDURE — 99213 OFFICE O/P EST LOW 20 MIN: CPT | Mod: 95 | Performed by: UROLOGY

## 2020-05-13 NOTE — PROGRESS NOTES
"Ledy Odonnell is a 74 year old female who is being evaluated via a billable telephone visit.      The patient has been notified of following:     \"This telephone visit will be conducted via a call between you and your physician/provider. We have found that certain health care needs can be provided without the need for a physical exam.  This service lets us provide the care you need with a short phone conversation.  If a prescription is necessary we can send it directly to your pharmacy.  If lab work is needed we can place an order for that and you can then stop by our lab to have the test done at a later time.    Telephone visits are billed at different rates depending on your insurance coverage. During this emergency period, for some insurers they may be billed the same as an in-person visit.  Please reach out to your insurance provider with any questions.    If during the course of the call the physician/provider feels a telephone visit is not appropriate, you will not be charged for this service.\"    Patient has given verbal consent for Telephone visit?  YES    What phone number would you like to be contacted at?         F/u OAB wet, Vesicare 5+5, other medical issues, tobacco; s/p Botox A #1 on 2/12/20, seen on 3/11/20 (), attempt at PVR check by Home Health Nurse on 4/24/20 not successful      Pt reports continuing improvement in bladder control both day and nite. Still wearing heavy pad and bed cover. Feels as though she is emptying her bladder in a satisfactory manner; does not feel full at the present time.    Has adopted timed voiding q 2 hrs during the day; found that this markedly improves bladder control. Nocturia x 1.    Denies dysuria, hematuria.    BM's are satisfactory, now somewhat loose (takes senna in addition to pain meds).    \"I'm going to fight an indwelling catheter tooth and nail because I know that would make me a complete shut-in and I'd never leave my house for the rest of my " "life.\"    Overall pleased with her present bladder situation.      IMP:  1. OAB wet, stable  2. Other significant medical conditions      PLAN:  1. Discussed situation with patient in detail.  2. I don't think we need to bring pt in to check PVR at this point  3. Proceed with next Botox in August per protocol  4. RTC prior to that only PRN  5. 15 minutes spent with patient, 100% spent in counseling and coordination of care for OAB          How would you like to obtain your AVS?     Phone call duration: 15 minutes          "

## 2020-05-21 DIAGNOSIS — F33.1 MAJOR DEPRESSIVE DISORDER, RECURRENT EPISODE, MODERATE (H): ICD-10-CM

## 2020-05-21 DIAGNOSIS — G89.4 CHRONIC PAIN DISORDER: ICD-10-CM

## 2020-05-21 DIAGNOSIS — F41.1 GENERALIZED ANXIETY DISORDER: ICD-10-CM

## 2020-05-21 DIAGNOSIS — N39.498 OTHER URINARY INCONTINENCE: ICD-10-CM

## 2020-05-26 ENCOUNTER — MEDICAL CORRESPONDENCE (OUTPATIENT)
Dept: HEALTH INFORMATION MANAGEMENT | Facility: CLINIC | Age: 75
End: 2020-05-26

## 2020-05-26 NOTE — PROGRESS NOTES
SUBJECTIVE:  Ledy Odonnell is a 74 year old female who is seen in follow-up for bilateral shoulder rotator cuff tear arthropathy..  She is wheel chair dependant and cannot function during recovery time for a rotator cuff repair. She is being treated non-operatively. She has been having trouble wheeling with her wheel chair due to significant shoulder pains. She has tried to get an electric wheelchair and has been unable  due to insurance limitations.   The knee is also very painful as well..  She has mild osteoarthritis in the knee as of the 2016 xrays, but is probably worse now.  Surgery on any of her joints is not an option in her mind..  Also multiple other joint pain complaints--hands and severely deformed left foot and ankle as well.  Mentions a large radial wrist mass the past month, and is painful if bumped.  Wants it drained.    Corticosteroid injections last visit: (y/n) 1/14/20.  Amount of relief: very good.  Length of time of relief 6 weeks for bilateral shoulders and right knee  .  Doing therapy/exercises: (y/n): yes occasionally.    GENERAL: healthy, alert and no distress  GAIT: normal   SKIN: no suspicious lesions or rashes  NEURO: Normal strength and tone, mentation intact and speech normal  VASCULAR:  normal pulses and cappillary refill   PSYCH:  mentation appears normal and affect normal/bright    SHOULDER:  Shoulder Inspection: no swelling, bruising, discoloration, or obvious deformity or asymmetry  marked muscle atrophy of rotator cuff     Range of Motion:   Active:forward flexion 70 degrees    Has a 1cm ganglion on radial right wrist    Right knee:  Some valgus  Moderate effusion  Range of motion 0-120   Diffuse tenderness.     Xrays :2/21/18 bilateral shoulders: FINDINGS: Complete loss of the acromiohumeral interval bilaterally  compatible with chronic rotator cuff tear. Degenerative change at the  glenohumeral joints. No acute fracture or acute malalignment.       Impression:   Encounter  Diagnoses   Name Primary?     Rotator cuff arthropathy of both shoulders Yes     Primary osteoarthritis of right knee      Ganglion, right wrist         PLAN: With the patient's consent, right knee(s) injected intra-articularly with 80mg of Depomedrol and 4cc of local anesthetic after sterile prep.  Procedure Note:   Informed consent obtained. Risks, benefits and complications of the injection were discussed with the patient and the patient elected to proceed. Bilateral shoulder injected into the subacromial space after sterile prep, using 80mg Depomedrol and 4cc local anesthetic.    Procedure Note:  The risks, benefits and potential complications of aspiration were discussed with the patient (including but not limited to, bleeding, infection, pain, scar, damage to adjacent structures, failure to relieve symptoms) . Questions were addressed and answered.The patient elected to proceed. Written, informed consent was obtained. The correct procedural site was identified and confirmed. A Right Wrist cyst aspiration was performed using 2mL of local anesthetic after sterile prep to the correct procedural site.  Approximately 2 cc of gelatinous fluid resulted/extruded from the needle insertion site.  I impaled the mass a few times, no fluid came up into the syringe.  I manipulated the area manually to try to extrude more fluid.   Sterile bandaid applied. This was tolerated well by the patient. No apparent complications.       SALINAS Woods MD  Dept. Orthopedic Surgery  [facility]

## 2020-05-27 ENCOUNTER — OFFICE VISIT (OUTPATIENT)
Dept: ORTHOPEDICS | Facility: CLINIC | Age: 75
End: 2020-05-27
Payer: COMMERCIAL

## 2020-05-27 VITALS
WEIGHT: 130 LBS | DIASTOLIC BLOOD PRESSURE: 69 MMHG | SYSTOLIC BLOOD PRESSURE: 111 MMHG | HEART RATE: 92 BPM | BODY MASS INDEX: 22.31 KG/M2

## 2020-05-27 DIAGNOSIS — M17.11 PRIMARY OSTEOARTHRITIS OF RIGHT KNEE: ICD-10-CM

## 2020-05-27 DIAGNOSIS — M12.812 ROTATOR CUFF ARTHROPATHY OF BOTH SHOULDERS: Primary | ICD-10-CM

## 2020-05-27 DIAGNOSIS — M12.811 ROTATOR CUFF ARTHROPATHY OF BOTH SHOULDERS: Primary | ICD-10-CM

## 2020-05-27 DIAGNOSIS — M67.431 GANGLION, RIGHT WRIST: ICD-10-CM

## 2020-05-27 PROCEDURE — 20610 DRAIN/INJ JOINT/BURSA W/O US: CPT | Mod: 50 | Performed by: ORTHOPAEDIC SURGERY

## 2020-05-27 PROCEDURE — 20612 ASPIRATE/INJ GANGLION CYST: CPT | Mod: 51 | Performed by: ORTHOPAEDIC SURGERY

## 2020-05-27 PROCEDURE — 20610 DRAIN/INJ JOINT/BURSA W/O US: CPT | Mod: 51 | Performed by: ORTHOPAEDIC SURGERY

## 2020-05-27 RX ORDER — LIDOCAINE HYDROCHLORIDE 10 MG/ML
4 INJECTION, SOLUTION EPIDURAL; INFILTRATION; INTRACAUDAL; PERINEURAL
Status: DISCONTINUED | OUTPATIENT
Start: 2020-05-27 | End: 2022-07-21

## 2020-05-27 RX ORDER — LIDOCAINE HYDROCHLORIDE 10 MG/ML
4 INJECTION, SOLUTION INFILTRATION; PERINEURAL
Status: DISCONTINUED | OUTPATIENT
Start: 2020-05-27 | End: 2022-07-21

## 2020-05-27 RX ORDER — METHYLPREDNISOLONE ACETATE 80 MG/ML
80 INJECTION, SUSPENSION INTRA-ARTICULAR; INTRALESIONAL; INTRAMUSCULAR; SOFT TISSUE
Status: DISCONTINUED | OUTPATIENT
Start: 2020-05-27 | End: 2022-07-21

## 2020-05-27 RX ADMIN — METHYLPREDNISOLONE ACETATE 80 MG: 80 INJECTION, SUSPENSION INTRA-ARTICULAR; INTRALESIONAL; INTRAMUSCULAR; SOFT TISSUE at 12:52

## 2020-05-27 RX ADMIN — METHYLPREDNISOLONE ACETATE 80 MG: 80 INJECTION, SUSPENSION INTRA-ARTICULAR; INTRALESIONAL; INTRAMUSCULAR; SOFT TISSUE at 12:51

## 2020-05-27 RX ADMIN — LIDOCAINE HYDROCHLORIDE 4 ML: 10 INJECTION, SOLUTION INFILTRATION; PERINEURAL at 12:51

## 2020-05-27 RX ADMIN — LIDOCAINE HYDROCHLORIDE 4 ML: 10 INJECTION, SOLUTION EPIDURAL; INFILTRATION; INTRACAUDAL; PERINEURAL at 12:52

## 2020-05-27 ASSESSMENT — PAIN SCALES - GENERAL: PAINLEVEL: SEVERE PAIN (6)

## 2020-05-27 NOTE — PROGRESS NOTES
Large Joint Injection/Arthocentesis: bilateral subacromial bursa    Date/Time: 5/27/2020 12:51 PM  Performed by: Wolfgang Angeles  Authorized by: Rex Woods MD     Indications:  Pain and osteoarthritis  Needle Size:  22 G  Guidance: landmark guided    Approach:  Lateral  Location:  Shoulder  Laterality:  Bilateral      Site:  Bilateral subacromial bursa  Medications (Right):  80 mg methylPREDNISolone 80 MG/ML; 4 mL lidocaine 1 %  Medications (Left):  80 mg methylPREDNISolone 80 MG/ML; 4 mL lidocaine 1 %  Outcome:  Tolerated well, no immediate complications  Procedure discussed: discussed risks, benefits, and alternatives    Consent Given by:  Patient  Timeout: timeout called immediately prior to procedure    Prep: patient was prepped and draped in usual sterile fashion    Large Joint Injection/Arthocentesis: R knee joint    Date/Time: 5/27/2020 12:52 PM  Performed by: Wolfgang Angeles  Authorized by: Rex Woods MD     Indications:  Pain and osteoarthritis  Needle Size:  22 G  Guidance: landmark guided    Approach:  Anterolateral  Location:  Knee      Medications:  80 mg methylPREDNISolone 80 MG/ML; 4 mL lidocaine (PF) 1 %  Outcome:  Tolerated well, no immediate complications  Procedure discussed: discussed risks, benefits, and alternatives    Consent Given by:  Patient  Timeout: timeout called immediately prior to procedure    Prep: patient was prepped and draped in usual sterile fashion

## 2020-05-27 NOTE — LETTER
5/27/2020         RE: Ledy Odonnell  [address]  [facility] [address]        Dear Colleague,    Thank you for referring your patient, Ledy Odonnell, to the [facility]. Please see a copy of my visit note below.    SUBJECTIVE:  Ledy Odonnell is a 74 year old female who is seen in follow-up for bilateral shoulder rotator cuff tear arthropathy..  She is wheel chair dependant and cannot function during recovery time for a rotator cuff repair. She is being treated non-operatively. She has been having trouble wheeling with her wheel chair due to significant shoulder pains. She has tried to get an electric wheelchair and has been unable  due to insurance limitations.   The knee is also very painful as well..  She has mild osteoarthritis in the knee as of the 2016 xrays, but is probably worse now.  Surgery on any of her joints is not an option in her mind..  Also multiple other joint pain complaints--hands and severely deformed left foot and ankle as well.  Mentions a large radial wrist mass the past month, and is painful if bumped.  Wants it drained.    Corticosteroid injections last visit: (y/n) 1/14/20.  Amount of relief: very good.  Length of time of relief 6 weeks for bilateral shoulders and right knee  .  Doing therapy/exercises: (y/n): yes occasionally.    GENERAL: healthy, alert and no distress  GAIT: normal   SKIN: no suspicious lesions or rashes  NEURO: Normal strength and tone, mentation intact and speech normal  VASCULAR:  normal pulses and cappillary refill   PSYCH:  mentation appears normal and affect normal/bright    SHOULDER:  Shoulder Inspection: no swelling, bruising, discoloration, or obvious deformity or asymmetry  marked muscle atrophy of rotator cuff     Range of Motion:   Active:forward flexion 70 degrees    Has a 1cm ganglion on radial right wrist    Right knee:  Some valgus  Moderate effusion  Range of motion 0-120   Diffuse tenderness.     Xrays :2/21/18  bilateral shoulders: FINDINGS: Complete loss of the acromiohumeral interval bilaterally  compatible with chronic rotator cuff tear. Degenerative change at the  glenohumeral joints. No acute fracture or acute malalignment.       Impression:   Encounter Diagnoses   Name Primary?     Rotator cuff arthropathy of both shoulders Yes     Primary osteoarthritis of right knee      Ganglion, right wrist         PLAN: With the patient's consent, right knee(s) injected intra-articularly with 80mg of Depomedrol and 4cc of local anesthetic after sterile prep.  Procedure Note:   Informed consent obtained. Risks, benefits and complications of the injection were discussed with the patient and the patient elected to proceed. Bilateral shoulder injected into the subacromial space after sterile prep, using 80mg Depomedrol and 4cc local anesthetic.    Procedure Note:  The risks, benefits and potential complications of aspiration were discussed with the patient (including but not limited to, bleeding, infection, pain, scar, damage to adjacent structures, failure to relieve symptoms) . Questions were addressed and answered.The patient elected to proceed. Written, informed consent was obtained. The correct procedural site was identified and confirmed. A Right Wrist cyst aspiration was performed using 2mL of local anesthetic after sterile prep to the correct procedural site.  Approximately 2 cc of gelatinous fluid resulted/extruded from the needle insertion site.  I impaled the mass a few times, no fluid came up into the syringe.  I manipulated the area manually to try to extrude more fluid.   Sterile bandaid applied. This was tolerated well by the patient. No apparent complications.       SALINAS Woods MD  Dept. Orthopedic Surgery  [facility]                  Large Joint Injection/Arthocentesis: bilateral subacromial bursa    Date/Time: 5/27/2020 12:51 PM  Performed by: Wolfgang Angeles  Authorized by: Rex Woods,  MD     Indications:  Pain and osteoarthritis  Needle Size:  22 G  Guidance: landmark guided    Approach:  Lateral  Location:  Shoulder  Laterality:  Bilateral      Site:  Bilateral subacromial bursa  Medications (Right):  80 mg methylPREDNISolone 80 MG/ML; 4 mL lidocaine 1 %  Medications (Left):  80 mg methylPREDNISolone 80 MG/ML; 4 mL lidocaine 1 %  Outcome:  Tolerated well, no immediate complications  Procedure discussed: discussed risks, benefits, and alternatives    Consent Given by:  Patient  Timeout: timeout called immediately prior to procedure    Prep: patient was prepped and draped in usual sterile fashion    Large Joint Injection/Arthocentesis: R knee joint    Date/Time: 5/27/2020 12:52 PM  Performed by: Wolfgang Angeles  Authorized by: Rex Woods MD     Indications:  Pain and osteoarthritis  Needle Size:  22 G  Guidance: landmark guided    Approach:  Anterolateral  Location:  Knee      Medications:  80 mg methylPREDNISolone 80 MG/ML; 4 mL lidocaine (PF) 1 %  Outcome:  Tolerated well, no immediate complications  Procedure discussed: discussed risks, benefits, and alternatives    Consent Given by:  Patient  Timeout: timeout called immediately prior to procedure    Prep: patient was prepped and draped in usual sterile fashion            Again, thank you for allowing me to participate in the care of your patient.        Sincerely,        Rex Woods MD

## 2020-05-28 RX ORDER — DULOXETIN HYDROCHLORIDE 60 MG/1
CAPSULE, DELAYED RELEASE ORAL
Qty: 90 CAPSULE | Refills: 1 | Status: SHIPPED | OUTPATIENT
Start: 2020-05-28 | End: 2020-11-10

## 2020-05-28 ASSESSMENT — ANXIETY QUESTIONNAIRES
1. FEELING NERVOUS, ANXIOUS, OR ON EDGE: NOT AT ALL
GAD7 TOTAL SCORE: 2
3. WORRYING TOO MUCH ABOUT DIFFERENT THINGS: SEVERAL DAYS
7. FEELING AFRAID AS IF SOMETHING AWFUL MIGHT HAPPEN: NOT AT ALL
5. BEING SO RESTLESS THAT IT IS HARD TO SIT STILL: NOT AT ALL
IF YOU CHECKED OFF ANY PROBLEMS ON THIS QUESTIONNAIRE, HOW DIFFICULT HAVE THESE PROBLEMS MADE IT FOR YOU TO DO YOUR WORK, TAKE CARE OF THINGS AT HOME, OR GET ALONG WITH OTHER PEOPLE: SOMEWHAT DIFFICULT
6. BECOMING EASILY ANNOYED OR IRRITABLE: SEVERAL DAYS
2. NOT BEING ABLE TO STOP OR CONTROL WORRYING: NOT AT ALL

## 2020-05-28 ASSESSMENT — PATIENT HEALTH QUESTIONNAIRE - PHQ9
5. POOR APPETITE OR OVEREATING: NOT AT ALL
SUM OF ALL RESPONSES TO PHQ QUESTIONS 1-9: 3

## 2020-05-28 NOTE — TELEPHONE ENCOUNTER
Patient called, questions for depression and anxiety.      PHQ9 3  LESTER 2    Will  Need a call when sent to pharmacy, she took last pill today.      Tanya Mazariegos CMA

## 2020-05-29 ASSESSMENT — ANXIETY QUESTIONNAIRES: GAD7 TOTAL SCORE: 2

## 2020-05-30 ENCOUNTER — TRANSFERRED RECORDS (OUTPATIENT)
Dept: HEALTH INFORMATION MANAGEMENT | Facility: CLINIC | Age: 75
End: 2020-05-30

## 2020-06-02 ENCOUNTER — TELEPHONE (OUTPATIENT)
Dept: FAMILY MEDICINE | Facility: CLINIC | Age: 75
End: 2020-06-02

## 2020-06-02 NOTE — TELEPHONE ENCOUNTER
This patient was discharged from [facility] on 05/31/20.    Discharge Diagnosis: acute alcohol intoxication, fall from alcohol intoxication    Follow-up instructions: Follow up with PCP in 1 week    A follow-up visit has not been scheduled.      Number of ED/ER visits in the last 12 months:  0     Please follow-up with patient.    Ana Mullen

## 2020-06-02 NOTE — TELEPHONE ENCOUNTER
"Forwarding to PCP as KARINA re: recent ED visit for alcohol intoxication, as RN unsure if patient will follow-up as instructed and knows you have been managing pain meds, etc.     ED/Discharge Protocol    \"Hi, my name is GENE DAS RN, a registered nurse, and I am calling on behalf of Vika Napoles's office at [facility].  I am calling to follow up and see how things are going for you after your recent visit.\"    \"I see that you were in the (ER/UC/IP) at [facility] on 5/30/20-5/31/20 for alcohol intoxication and fall.  How are you doing now that you are home?\"   Patient reports doing well, suffered no acute trauma/injury from her fall and denies any ongoing symptoms or issues with alcohol, dizziness or falls.  She is coherent on the phone, states she made a mistake, had been sober for awhile and drank, likely the mixture of this and her knee pain and giving out caused the fall.  No concerns at this time.     Is patient experiencing symptoms that may require a hospital visit?  No     Discharge Instructions    \"Let's review your discharge instructions.  What is/are the follow-up recommendations?  Pt. Response: Follow-up with primary. Patient is scheduled for physical in July. RN advised visiting with PCP prior to this to discuss ED visit.    \"Were you instructed to make a follow-up appointment?\"  Pt. Response: See above.  RN offered to assist with scheduling.  Patient states she can't go in for a visit due to transportation, doesn't have video, so RN offered telephone. Patient states she will call back to schedule.     \"When you see the provider, I would recommend that you bring your discharge instructions with you.    Medications    \"How many new medications are you on since your hospitalization/ED visit?\"    0  \"How many of your current medicines changed (dose, timing, name, etc.) while you were in the hospital/ED visit?\"   0  \"Do you have questions about your medications?\"   No  \"Were you newly diagnosed with " "heart failure, COPD, diabetes or did you have a heart attack?\"   No  For patients on insulin: \"Did you start on insulin in the hospital or did you have your insulin dose changed?\"   No  Post Discharge Medication Reconciliation Status: discharge medications reconciled, continue medications without change.    Was MTM referral placed (*Make sure to put transitions as reason for referral)?   No    Call Summary    \"Do you have any questions or concerns about your condition or care plan at the moment?\"    No  Triage nurse advice given: NA    Patient was in ER once in the past year (assess appropriateness of ER visits.)      \"If you have questions or things don't continue to improve, we encourage you contact us through the main clinic number,  [phone].  Even if the clinic is not open, triage nurses are available 24/7 to help you.     We would like you to know that our clinic has extended hours (provide information).  We also have urgent care (provide details on closest location and hours/contact info)\"      \"Thank you for your time and take care!\"      Shruthi Vernon RN    "

## 2020-06-03 NOTE — TELEPHONE ENCOUNTER
We will need to set up a virtual appointment to discuss her ED f/u, ETOH use and opioid use as I will no longer be able to safely prescribe any longer given circumstances.

## 2020-06-03 NOTE — TELEPHONE ENCOUNTER
Provider response noted, and patient was instructed on phone yesterday to schedule f/u to discuss these things.  Closing encounter.    Shruthi Vernon RN

## 2020-06-09 ENCOUNTER — VIRTUAL VISIT (OUTPATIENT)
Dept: FAMILY MEDICINE | Facility: CLINIC | Age: 75
End: 2020-06-09
Payer: COMMERCIAL

## 2020-06-09 ENCOUNTER — TELEPHONE (OUTPATIENT)
Dept: FAMILY MEDICINE | Facility: CLINIC | Age: 75
End: 2020-06-09

## 2020-06-09 DIAGNOSIS — F33.1 MAJOR DEPRESSIVE DISORDER, RECURRENT EPISODE, MODERATE (H): ICD-10-CM

## 2020-06-09 DIAGNOSIS — F10.929 ALCOHOLIC INTOXICATION WITH COMPLICATION (H): ICD-10-CM

## 2020-06-09 DIAGNOSIS — G89.4 CHRONIC PAIN DISORDER: ICD-10-CM

## 2020-06-09 DIAGNOSIS — G89.29 OTHER CHRONIC PAIN: Primary | ICD-10-CM

## 2020-06-09 DIAGNOSIS — Z09 HOSPITAL DISCHARGE FOLLOW-UP: Primary | ICD-10-CM

## 2020-06-09 DIAGNOSIS — B37.2 CANDIDAL INTERTRIGO: ICD-10-CM

## 2020-06-09 PROCEDURE — 99214 OFFICE O/P EST MOD 30 MIN: CPT | Mod: TEL | Performed by: NURSE PRACTITIONER

## 2020-06-09 RX ORDER — OXYCODONE AND ACETAMINOPHEN 5; 325 MG/1; MG/1
1 TABLET ORAL EVERY 6 HOURS PRN
Qty: 100 TABLET | Refills: 0 | Status: CANCELLED | OUTPATIENT
Start: 2020-06-09

## 2020-06-09 RX ORDER — NYSTATIN 100000 [USP'U]/G
1 POWDER TOPICAL PRN
Qty: 1 BOTTLE | Refills: 5 | Status: SHIPPED | OUTPATIENT
Start: 2020-06-09 | End: 2020-10-09

## 2020-06-09 RX ORDER — BUPROPION HYDROCHLORIDE 300 MG/1
300 TABLET ORAL EVERY MORNING
Qty: 90 TABLET | Refills: 3 | Status: SHIPPED | OUTPATIENT
Start: 2020-06-09 | End: 2021-08-13

## 2020-06-09 RX ORDER — NYSTATIN 100000 U/G
CREAM TOPICAL 2 TIMES DAILY
Qty: 30 G | Refills: 3 | Status: SHIPPED | OUTPATIENT
Start: 2020-06-09 | End: 2020-10-09

## 2020-06-09 ASSESSMENT — PATIENT HEALTH QUESTIONNAIRE - PHQ9
5. POOR APPETITE OR OVEREATING: SEVERAL DAYS
SUM OF ALL RESPONSES TO PHQ QUESTIONS 1-9: 2

## 2020-06-09 ASSESSMENT — ANXIETY QUESTIONNAIRES
7. FEELING AFRAID AS IF SOMETHING AWFUL MIGHT HAPPEN: NOT AT ALL
3. WORRYING TOO MUCH ABOUT DIFFERENT THINGS: NOT AT ALL
IF YOU CHECKED OFF ANY PROBLEMS ON THIS QUESTIONNAIRE, HOW DIFFICULT HAVE THESE PROBLEMS MADE IT FOR YOU TO DO YOUR WORK, TAKE CARE OF THINGS AT HOME, OR GET ALONG WITH OTHER PEOPLE: NOT DIFFICULT AT ALL
1. FEELING NERVOUS, ANXIOUS, OR ON EDGE: SEVERAL DAYS
GAD7 TOTAL SCORE: 2
5. BEING SO RESTLESS THAT IT IS HARD TO SIT STILL: NOT AT ALL
6. BECOMING EASILY ANNOYED OR IRRITABLE: NOT AT ALL
2. NOT BEING ABLE TO STOP OR CONTROL WORRYING: NOT AT ALL

## 2020-06-09 NOTE — PROGRESS NOTES
"Ledy Odonnell is a 74 year old female who is being evaluated via a billable telephone visit.      The patient has been notified of following:     \"This telephone visit will be conducted via a call between you and your physician/provider. We have found that certain health care needs can be provided without the need for a physical exam.  This service lets us provide the care you need with a short phone conversation.  If a prescription is necessary we can send it directly to your pharmacy.  If lab work is needed we can place an order for that and you can then stop by our lab to have the test done at a later time.    Telephone visits are billed at different rates depending on your insurance coverage. During this emergency period, for some insurers they may be billed the same as an in-person visit.  Please reach out to your insurance provider with any questions.    If during the course of the call the physician/provider feels a telephone visit is not appropriate, you will not be charged for this service.\"    Patient has given verbal consent for Telephone visit?  Yes    What phone number would you like to be contacted at? [phone]    How would you like to obtain your AVS? Mail a copy    Subjective     Ledy Odonnell is a 74 year old female who presents via phone visit today for the following health issues:    HPI  Depression Followup    How are you doing with your depression since your last visit? No change    Are you having other symptoms that might be associated with depression? No    Have you had a significant life event?  OTHER: COVID 19     Are you feeling anxious or having panic attacks?   No    Do you have any concerns with your use of alcohol or other drugs? Yes:  recent hospital stay     Social History     Tobacco Use     Smoking status: Current Every Day Smoker     Packs/day: 0.50     Years: 43.00     Pack years: 21.50     Types: Cigarettes     Smokeless tobacco: Never Used   Substance Use Topics "     Alcohol use: No     Alcohol/week: 0.0 standard drinks     Drug use: No     PHQ 7/9/2019 5/28/2020 6/9/2020   PHQ-9 Total Score 6 3 2   Q9: Thoughts of better off dead/self-harm past 2 weeks Not at all Not at all Not at all     LESTER-7 SCORE 7/9/2019 5/28/2020 6/9/2020   Total Score - - -   Total Score 3 2 2     Last PHQ-9 6/9/2020   1.  Little interest or pleasure in doing things 1   2.  Feeling down, depressed, or hopeless 0   3.  Trouble falling or staying asleep, or sleeping too much 1   4.  Feeling tired or having little energy 0   5.  Poor appetite or overeating 0   6.  Feeling bad about yourself 0   7.  Trouble concentrating 0   8.  Moving slowly or restless 0   Q9: Thoughts of better off dead/self-harm past 2 weeks 0   PHQ-9 Total Score 2   Difficulty at work, home, or with people Not difficult at all     LESTER-7  6/9/2020   1. Feeling nervous, anxious, or on edge 1   2. Not being able to stop or control worrying 0   3. Worrying too much about different things 0   4. Trouble relaxing 1   5. Being so restless that it is hard to sit still 0   6. Becoming easily annoyed or irritable 0   7. Feeling afraid, as if something awful might happen 0   LESTER-7 Total Score 2   If you checked any problems, how difficult have they made it for you to do your work, take care of things at home, or get along with other people? Not difficult at all         Suicide Assessment Five-step Evaluation and Treatment (SAFE-T)            Hospital Follow-up Visit:        Hospital/Nursing Home/IP Rehab Facility: [facility]  Date of Admission: 5/30/2020  Date of Discharge: 5/31/2020  Reason(s) for Admission: alcohol intoxication       Was your hospitalization related to COVID-19? No   Problems taking medications regularly:  None  Medication changes since discharge: None  Problems adhering to non-medication therapy:  None    Has been home about 10 days. Says that she has shoulder pain and leg pain   She originally says she is  "sober from ETOH 20 years than she says she \"takes a sip here and there\". Says that she only drank 2 glasses of  Vodka which led to her hospitalization although her BAC was 308 . She has gone to inpatient and outpatient treatment in the past. Says she did treatment back in the 1990's. She denies this being an issue.   She is very isolated at home because of COVID 19. She has a  at [facility] and she gets meals on wheels.   Says she is not open to therapy.   Says that in hospital her pain was not addressed. She did not even receive Tylenol.     Urinary incontinence: followed by urology and was getting  botox injections but because of COVID 19 this has stopped.     She got her cortisone injections in her shoulder and knees   Since she got home she restarted her percocet and she was taking percocet 5-325 mg to at bedtime.     Summary of hospitalization: [facility] discharge summary reviewed  Diagnostic Tests/Treatments reviewed.  Follow up needed:   Other Healthcare Providers Involved in Patient s Care:         need pain consult   Update since discharge: fluctuating course.   Post Discharge Medication Reconciliation: discharge medications reconciled and changed, per note/orders (see AVS).  Plan of care communicated with patient            She was seen by Belkis Rothman in pain management in August 2019 for consult.  At this clinic visit it was recommended to continue tapering her to lowest opiate dose which would continue to treat her pain.  Patient has been prescribed Percocet 5/325 mg every 6 hours as needed maximum 4 tablets daily by PCP since this time.  This coupled with steroid joint injections and scheduled Tylenol has been effective in treating patients pain.       Patient Active Problem List   Diagnosis     Esophageal reflux     Insomnia     FAMILY HX DIABETES MELLITUS brother     Tobacco use disorder     Obesity     Generalized anxiety disorder     Moderate major depression (H) "     COPD (chronic obstructive pulmonary disease) (H)     Elevated fasting glucose     Chronic pain disorder     Idiopathic peripheral neuropathy     Hyperlipidemia LDL goal <130     Degenerative arthritis of hip     Hip arthrosis - left, severe     Trochanteric bursitis     Status post hip replacement     Advanced directives, counseling/discussion     Gastroparesis     DDD (degenerative disc disease), lumbar     Delayed gastric emptying     Health Care Home     Candidal intertrigo     Ventral hernia     Femur fracture (H)     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     Chronic left hip pain     Past Surgical History:   Procedure Laterality Date     C HAND/FINGER SURGERY UNLISTED       C NONSPECIFIC PROCEDURE  2001    Shoulder Surgery     C NONSPECIFIC PROCEDURE  1975    Gastric Bypass     C NONSPECIFIC PROCEDURE      Cholecystectomy     C SHOULDER SURG PROC UNLISTED       C TOTAL HIP ARTHROPLASTY  1/4/2011    Lt VALERIE     HERNIORRHAPHY VENTRAL  5/14/2013    Procedure: HERNIORRHAPHY VENTRAL;  Open Ventral Hernia Repair;  Surgeon: Jhoan Rogers MD;  Location:  OR       Social History     Tobacco Use     Smoking status: Current Every Day Smoker     Packs/day: 0.50     Years: 43.00     Pack years: 21.50     Types: Cigarettes     Smokeless tobacco: Never Used   Substance Use Topics     Alcohol use: No     Alcohol/week: 0.0 standard drinks     Family History   Problem Relation Age of Onset     Cancer Father         pancreatic     Heart Disease Mother         Unknown specifics     Dementia Mother      Arthritis Paternal Grandmother         RA     Dementia Maternal Grandmother          Current Outpatient Medications   Medication Sig Dispense Refill     albuterol (PROAIR HFA/PROVENTIL HFA/VENTOLIN HFA) 108 (90 Base) MCG/ACT inhaler Inhale 2 puffs into the lungs every 6 hours as needed for shortness of  breath / dyspnea 3 Inhaler 1     buPROPion (WELLBUTRIN XL) 300 MG 24 hr tablet Take 1 tablet (300 mg) by mouth every morning 90 tablet 3     diclofenac (VOLTAREN) 1 % topical gel Place 4 g onto the skin 4 times daily 100 g 1     DULoxetine (CYMBALTA) 60 MG capsule TAKE ONE CAPSULE BY MOUTH EVERY DAY 90 capsule 1     multivitamin, therapeutic (THERA-VIT) TABS tablet Take 1 tablet by mouth daily       nystatin (MYCOSTATIN) 993583 UNIT/GM external cream Apply topically 2 times daily 30 g 3     nystatin (MYCOSTATIN) 853108 UNIT/GM external powder Apply 1 g topically as needed 1 Bottle 5     order for DME Equipment being ordered: Adult pull ups 1 Box 0     order for DME Equipment being ordered: Wheelchair 1 each 0     oxyCODONE-acetaminophen (PERCOCET) 5-325 MG tablet Take 1 tablet by mouth every 6 hours as needed for pain Maximum of 4 daily. 100 tablet 0     senna-docusate (SENOKOT-S;PERICOLACE) 8.6-50 MG per tablet Take 1 tablet by mouth 2 times daily. (Patient taking differently: Take 2 tablets by mouth daily ) 40 tablet 11     traZODone (DESYREL) 50 MG tablet TAKE ONE TO TWO TABLETS BY MOUTH AT BEDTIME AS NEEDED FOR SLEEP 90 tablet 3       Reviewed and updated as needed this visit by Provider         Review of Systems   Constitutional, HEENT, cardiovascular, pulmonary, GI, , musculoskeletal, neuro, skin, endocrine and psych systems are negative, except as otherwise noted.       Objective   Reported vitals:  There were no vitals taken for this visit.   healthy, alert and no distress  PSYCH: Alert and oriented times 3; coherent speech, normal   rate and volume, able to articulate logical thoughts, able   to abstract reason, no tangential thoughts, no hallucinations   or delusions  Her affect is normal  RESP: No cough, no audible wheezing, able to talk in full sentences  Remainder of exam unable to be completed due to telephone visits    Diagnostic Test Results:  Labs reviewed in Epic  none         Assessment/Plan:     ICD-10-CM    1. Hospital discharge follow-up  Z09    2. Alcoholic intoxication with complication (H)  F10.929    3. Major depressive disorder, recurrent episode, moderate (H)  F33.1 buPROPion (WELLBUTRIN XL) 300 MG 24 hr tablet   4. Chronic pain disorder  G89.4    5. Candidal intertrigo  B37.2 nystatin (MYCOSTATIN) 904742 UNIT/GM external cream     nystatin (MYCOSTATIN) 348417 UNIT/GM external powder       This is a hospital follow-up patient was admitted on 5/30/2020 and discharged on 5/31/2024 acute alcohol intoxication, acute toxic encephalopathy from alcohol intoxication, mild hyponatremia and hypoxia.  Since discharge to home she has been stable.  Majority of today's visit was discussing her chronic pain and how to treat this moving forward. Discussed that with patient's history of chemical dependency and recent events and her age she is at high risk for a poor outcome if we continue opioids.     She was seen by pain management back in August 2019 for evaluation where they recommended the opiate taper that I had initiated and to continue with Percocet 5 mg every 6 hours as needed and scheduled Tylenol.  I would like patient to be seen by  again for non-opioid alternatives to treat patient's chronic pain.  No follow-ups on file.      Phone call duration:  23 minutes    ROSCOE Ridley CNP

## 2020-06-09 NOTE — TELEPHONE ENCOUNTER
Pt called in and stated that she tried making a appointment with Pain Management but they told her a referral has to be placed first from you.    Please advise  Tresa Duarte  Team 3 Coordinator

## 2020-06-09 NOTE — TELEPHONE ENCOUNTER
Referral placed please let pt know but not sure why this as needed since she as seen by them previously

## 2020-06-09 NOTE — Clinical Note
Hi Dr. Rothman,  You saw this pt' back in August 2019 for consult on managing her chronic pain. I have been able to successfully wean her opioid use to percocet 5-325 mg every 6 hrs (max 4 pills daily). Well she was recently admitted for acute alcohol intoxication, acute toxic encephalopathy from alcohol intoxication, mild hyponatremia and hypoxia. She has a history of CD with prior inpatient admissions back in the 90's but she denies any issue now and she also refuses any therapy. I asked her to make an appointment with you to discuss non opioid alternatives to treat her pain. I am not comfortable continuing her on opioids given these recent events. Would you agree ? Can you please weigh in on a specific plan ?  Kind regards,  Tatiana

## 2020-06-10 ENCOUNTER — TELEPHONE (OUTPATIENT)
Dept: FAMILY MEDICINE | Facility: CLINIC | Age: 75
End: 2020-06-10

## 2020-06-10 RX ORDER — OXYCODONE AND ACETAMINOPHEN 5; 325 MG/1; MG/1
TABLET ORAL
Qty: 90 TABLET | Refills: 0 | Status: SHIPPED | OUTPATIENT
Start: 2020-06-10 | End: 2020-07-15

## 2020-06-10 ASSESSMENT — ANXIETY QUESTIONNAIRES: GAD7 TOTAL SCORE: 2

## 2020-06-10 NOTE — TELEPHONE ENCOUNTER
Ok in the interim then I will agree to prescribe a lower number of percocet's until she is evaluated by pain clinic. Shruthi, please reiterate that she cannot drink any alcohol while on these pills.

## 2020-06-10 NOTE — TELEPHONE ENCOUNTER
Reason for Call:  Other prescription    Detailed comments: The pharmacy called stating that they need the frequency and directions of the oxyCODONE-acetaminophen (PERCOCET) 5-325 MG tablet  prescription so they can finally administer the prescription. They would like a call back to clarify as soon as possible.    Phone Number Patient can be reached at: Cell number on file:    Telephone Information:   Pharmacy [phone]       Best Time: As soon as possible    Can we leave a detailed message on this number? YES    Call taken on 6/10/2020 at 4:22 PM by Thierry Banerjee

## 2020-06-10 NOTE — TELEPHONE ENCOUNTER
Patient wanted to let Vika Napoles know that the pain clinic could not get her in until 7/29/20.    Thank you  Ana Mustafa  Patient Representative

## 2020-06-10 NOTE — TELEPHONE ENCOUNTER
Forwarding to PCP to clarify Percocet Rx as requested by pharmacy below please.  I see that you prescribed Percocet 90 tabs at a maximum of 2 tabs daily.  Can I give verbal for instructions similar to previous Rxs-1 tab by mouth every 6 hours as needed for pain, not to exceed 2 tabs daily?    Shruthi Vernon RN

## 2020-06-11 NOTE — TELEPHONE ENCOUNTER
Attempted to call pt x 2, phone just rang and rang.  Unable to leave a message.      Brigid Prajapati RN

## 2020-06-11 NOTE — TELEPHONE ENCOUNTER
Called and relayed the message below and reiterated no drinking any alcohol while taking the percocet. She verbalized understanding.     Christin Gillespie RN

## 2020-06-23 ENCOUNTER — TELEPHONE (OUTPATIENT)
Dept: UROLOGY | Facility: CLINIC | Age: 75
End: 2020-06-23

## 2020-06-23 DIAGNOSIS — N32.81 OAB (OVERACTIVE BLADDER): Primary | ICD-10-CM

## 2020-06-23 NOTE — TELEPHONE ENCOUNTER
Left message for patient to call [phone] to schedule cysto/botox in August per Dr Wayne note.  Vika Colon, CMA

## 2020-06-25 NOTE — TELEPHONE ENCOUNTER
Patient scheduled 8/5/2020.  Vika Colon CMA      Surgery Pre-Certification    Medical Record Number: [id number]  Ledy Odonnell  YOB: 1945   Phone: [phone] (home)   Primary Provider: Vika Napoles    Diagnosis:  N32.81  100U    Surgeon: alfonso  Surgical Procedure: cysto botox  BMI:  na  ICD-10 Coded: N32.81  Hospital: in clinic  In-Patient/ Out-Patient status: Outpatient  Length of surgery: 20 min  Anesthesia: local  Implanted Cardiac Device: N/A    Date of Surgery:  8/5/2020  When post-op appointment needed:  2 weeks   Special Requests/ Equipment:: botox  CPM Needed: na  Requestor:  Vika Colon CMA    No prior auth required with [facility] list. 06/25/2020

## 2020-07-15 ENCOUNTER — TELEPHONE (OUTPATIENT)
Dept: FAMILY MEDICINE | Facility: CLINIC | Age: 75
End: 2020-07-15

## 2020-07-15 DIAGNOSIS — G89.4 CHRONIC PAIN DISORDER: ICD-10-CM

## 2020-07-15 RX ORDER — OXYCODONE AND ACETAMINOPHEN 5; 325 MG/1; MG/1
TABLET ORAL
Qty: 30 TABLET | Refills: 0 | Status: SHIPPED | OUTPATIENT
Start: 2020-07-15 | End: 2020-07-16

## 2020-07-15 NOTE — TELEPHONE ENCOUNTER
Reason for Call:  Medication or medication refill:    Do you use a [facility]?  Name of the pharmacy and phone number for the current request:  [facility] pharmacy/ [address] [facility] [address]     Name of the medication requested: oxyCODONE-acetaminophen (PERCOCET) 5-325 MG tablet     Other request:     Can we leave a detailed message on this number? YES    Phone number patient can be reached at: Home number on file [phone] (home)    Best Time: any    Call taken on 7/15/2020 at 9:35 AM by Ana Mullen

## 2020-07-15 NOTE — TELEPHONE ENCOUNTER
Agree to refill #30 tabs until she sees pain clinic no more refills please make this clear. If she runs out then she will need to use Tylenol.

## 2020-07-15 NOTE — TELEPHONE ENCOUNTER
Reason for Call:  Other prescription    Detailed comments: Pharmacy would like to know how many tablets shes taking for the dosage and the frequency of taking the medication and would like a call back to discuss this as soon as possible.     Phone Number Patient can be reached at: Other phone number:  Arun Ramsey Tawlmjnw-[phone]    Best Time: As soon as possible     Can we leave a detailed message on this number? YES    Call taken on 7/15/2020 at 2:42 PM by Thierry Banerjee

## 2020-07-15 NOTE — TELEPHONE ENCOUNTER
Forwarding to PCP for clarification re: dose and frequency. Please see note from pharmacy below.  Unsure why this is an issue now, as they filled last month.    Shruthi Vernon RN

## 2020-07-15 NOTE — TELEPHONE ENCOUNTER
"Forwarding refill request for Percocet to PCP.  Please see below re: usage.    Reached out to patient for more information, as last Rx states \"no refills until 7/27/20\".  Patient states she was taking Tylenol at provider's suggestion, but it wasn't doing anything.  She is trying to hold to the 2 pills daily max, but some days with the heat and humidity, her pain acts up and she takes a 3rd pill. She currently has 9 pills left, so clearly has been taking 3 per day most days.  It looks like she is scheduled to have a telephone visit with pain management on 7/29/20.  RN instructed will forward request to provider for recommendation.    Shruthi Vernon RN    "

## 2020-07-16 RX ORDER — OXYCODONE AND ACETAMINOPHEN 5; 325 MG/1; MG/1
1 TABLET ORAL EVERY 6 HOURS PRN
Qty: 30 TABLET | Refills: 0 | Status: SHIPPED | OUTPATIENT
Start: 2020-07-16 | End: 2020-08-12

## 2020-07-17 NOTE — TELEPHONE ENCOUNTER
Reason for Call:  Other / Med question    Detailed comments: Bill with [facility] called and stated medication OxyCODONE-acetaminophen (PERCOCET) is early to be refilled, however, they would like to confirm with provider if there is a specific date they would want it refilled for this patient.  Please call Bill to confirm at [phone].    Phone Number Patient can be reached at: Home number on file [phone] (home)    Best Time: ASAP    Can we leave a detailed message on this number? NO    Call taken on 7/17/2020 at 3:04 PM by Sindhu Stark

## 2020-07-17 NOTE — TELEPHONE ENCOUNTER
Pt ran out of medication as she was taking 3 tablets instead of my prescribed 2 tablets daily. Therefore I agreed to refill #30 tabs until she sees pain clinic on July 27th. So they can fill the #30 that I Rx

## 2020-07-17 NOTE — TELEPHONE ENCOUNTER
Detailed comments: Bill with [facility] called and stated medication OxyCODONE-acetaminophen (PERCOCET) is early to be refilled, however, they would like to confirm with provider if there is a specific date they would want it refilled for this patient.  Please call Bill to confirm at [phone].    Routed to PCP to review and advise.     Oralia Justice RN, BSN, PHN  [facility]: [place]

## 2020-07-20 ENCOUNTER — TELEPHONE (OUTPATIENT)
Dept: FAMILY MEDICINE | Facility: CLINIC | Age: 75
End: 2020-07-20

## 2020-07-20 NOTE — TELEPHONE ENCOUNTER
Reason for Call:  Other prescription (PERCOCET)    Detailed comments: [facility] pharmacy would like a call back to discuss if pt's Rx can be filled before 7/27/2020.    Phone Number Patient can be reached at: Other phone number:  [phone]    Best Time: ASAP    Can we leave a detailed message on this number? NO    Call taken on 7/20/2020 at 12:33 PM by Carmella Magallanes

## 2020-07-20 NOTE — TELEPHONE ENCOUNTER
RN spoke with pharmacy, relayed provider's message.   Oralia Justice RN, BSN, PHN  [facility]: [place]

## 2020-07-29 ENCOUNTER — VIRTUAL VISIT (OUTPATIENT)
Dept: ANESTHESIOLOGY | Facility: CLINIC | Age: 75
End: 2020-07-29
Payer: COMMERCIAL

## 2020-07-29 DIAGNOSIS — G60.9 IDIOPATHIC PERIPHERAL NEUROPATHY: ICD-10-CM

## 2020-07-29 DIAGNOSIS — M25.552 CHRONIC LEFT HIP PAIN: Primary | ICD-10-CM

## 2020-07-29 DIAGNOSIS — G89.29 CHRONIC LEFT HIP PAIN: Primary | ICD-10-CM

## 2020-07-29 RX ORDER — GABAPENTIN 300 MG/1
300 CAPSULE ORAL 3 TIMES DAILY
Qty: 90 CAPSULE | Refills: 0 | Status: SHIPPED | OUTPATIENT
Start: 2020-07-29 | End: 2021-02-26

## 2020-07-29 ASSESSMENT — PAIN SCALES - GENERAL: PAINLEVEL: MODERATE PAIN (5)

## 2020-07-29 NOTE — PROGRESS NOTES
"Ledy Odonnell is a 74 year old female who is being evaluated via a billable telephone visit.      The patient has been notified of following:     \"This telephone visit will be conducted via a call between you and your physician/provider. We have found that certain health care needs can be provided without the need for a physical exam.  This service lets us provide the care you need with a short phone conversation.  If a prescription is necessary we can send it directly to your pharmacy.  If lab work is needed we can place an order for that and you can then stop by our lab to have the test done at a later time.    Telephone visits are billed at different rates depending on your insurance coverage. During this emergency period, for some insurers they may be billed the same as an in-person visit.  Please reach out to your insurance provider with any questions.    If during the course of the call the physician/provider feels a telephone visit is not appropriate, you will not be charged for this service.\"    Patient has given verbal consent for Telephone visit?  Yes    What phone number would you like to be contacted at? [phone]    How would you like to obtain your AVS? Mail a copy    Marielena Cabrales CMA        "

## 2020-07-29 NOTE — PROGRESS NOTES
"Ledy Odonnell is a 74 year old female who is being evaluated via a billable telephone visit.      The patient has been notified of following:     \"This telephone visit will be conducted via a call between you and your physician/provider. We have found that certain health care needs can be provided without the need for a physical exam.  This service lets us provide the care you need with a short phone conversation.  If a prescription is necessary we can send it directly to your pharmacy.  If lab work is needed we can place an order for that and you can then stop by our lab to have the test done at a later time.    Telephone visits are billed at different rates depending on your insurance coverage. During this emergency period, for some insurers they may be billed the same as an in-person visit.  Please reach out to your insurance provider with any questions.    If during the course of the call the physician/provider feels a telephone visit is not appropriate, you will not be charged for this service.\"    Patient has given verbal consent for Telephone visit?  Yes    What phone number would you like to be contacted at? See nursing documentation    How would you like to obtain your AVS? Mail a copy    Phone call duration: 18 minutes    Belkis Rothman MD    Interval History:  Referred by PCP, Vika Napoles, due to opioid management as patient has had recent issues with sobriety and therefore PCP wishes to wean down/off Percocet. PCP would like to discuss other non-opioid medication management options.     She remains in a wheelchair and is having difficulty with assistance because of COVID19  Has been in wheelchair for 5 years after history hip surgery  Pain has been increasing in the last several months, especially in rotator cuffs bilaterally and bilateral feet    Has been taking percocet for over 5 years. Discussed with Ledy, that she could likely have developed tolerance to oxycodone. Her goal is to wean " off opioids and we discussed other option    Current Medications:  Percocet 5-325, taking 2 tabs per day; states that she is glad that this dose was reduced  Duloxetine 60 mg every day     Voltaren Gel  Tylenol only taking occasionally     PLAN:  Recommend taking scheduled Tylenol 975 mg scheduled Q8H  Goal to continue to wean off percocet as tolerated      Previous Treatments:  Gabapentin 300 mg TID  (2013)

## 2020-07-30 ENCOUNTER — TELEPHONE (OUTPATIENT)
Dept: UROLOGY | Facility: CLINIC | Age: 75
End: 2020-07-30

## 2020-07-30 DIAGNOSIS — N32.81 OAB (OVERACTIVE BLADDER): Primary | ICD-10-CM

## 2020-07-30 NOTE — TELEPHONE ENCOUNTER
Patient wants to cancel 8/5 appointment for cysto/botox. She states it doesn't work. Patient asking to go back on Vesicare 10mg. [facility] pharmacy. Will route to [place].  Vika Colon, CMA

## 2020-07-31 RX ORDER — SOLIFENACIN SUCCINATE 10 MG/1
10 TABLET, FILM COATED ORAL DAILY
Qty: 90 TABLET | Refills: 0 | Status: SHIPPED | OUTPATIENT
Start: 2020-07-31 | End: 2020-10-19

## 2020-07-31 NOTE — PATIENT INSTRUCTIONS
Medications:    Gabapentin- 300 mg.   -Start with 1 capsule (300 mg)  at bedtime for 3 days.  -Then increase to 300 mg in AM and bedtime for 3 days.  -Then increase to 300 mg in AM, Afternoon and Bedtime. (three times daily.)  Continue at this dose.     Recommend taking scheduled Tylenol 975 mg scheduled Every 8 hours as needed.     Please provide the clinic with a minium of 1 week notice, on all prescription refills.       Recommended Follow up:  2-3 months with Dr. Rothman.       Please call [phone] to schedule this appointment if you don't already have an appointment made.         To speak with a nurse, schedule/reschedule/cancel a clinic appointment, or request a medication refill call: [phone]    You can also reach us by [employer]: [email or web address]

## 2020-08-12 ENCOUNTER — TELEPHONE (OUTPATIENT)
Dept: FAMILY MEDICINE | Facility: CLINIC | Age: 75
End: 2020-08-12

## 2020-08-12 DIAGNOSIS — G89.4 CHRONIC PAIN DISORDER: ICD-10-CM

## 2020-08-12 NOTE — TELEPHONE ENCOUNTER
Reason for Call:  Medication or medication refill:    Do you use a [facility]?  Name of the pharmacy and phone number for the current request:  -[facility] [address] [place] [address]      Name of the medication requested: oxyCODONE-acetaminophen (PERCOCET) 5-325 MG tablet     Other request: will like a call when prescription gets send over to pharmacy     Can we leave a detailed message on this number? YES    Phone number patient can be reached at: Home number on file [phone] (home)    Best Time: any     Call taken on 8/12/2020 at 2:09 PM by Alia Guillen

## 2020-08-12 NOTE — TELEPHONE ENCOUNTER
Requested Prescriptions   Pending Prescriptions Disp Refills     oxyCODONE-acetaminophen (PERCOCET) 5-325 MG tablet 30 tablet 0     Sig: Take 1 tablet by mouth every 6 hours as needed for severe pain Maximum of 2 daily. No refills until 07/27/2020       There is no refill protocol information for this order        Routing refill request to provider for review/approval because:  Drug not on the [facility] refill protocol   Lore Markham RN,BSN  [facility]

## 2020-08-14 RX ORDER — OXYCODONE AND ACETAMINOPHEN 5; 325 MG/1; MG/1
1 TABLET ORAL EVERY 6 HOURS PRN
Qty: 60 TABLET | Refills: 0 | Status: SHIPPED | OUTPATIENT
Start: 2020-08-14 | End: 2020-09-09

## 2020-08-14 NOTE — TELEPHONE ENCOUNTER
Shruthi please call patient to discuss the plan noted and to reiterate pain management plan.  Thank you    Routing comment      Copied from Tatiana Napoles.    Ana Mullen

## 2020-08-14 NOTE — TELEPHONE ENCOUNTER
Called patient  Did patient answer the phone: No, left a message on voicemail to return call to triage line.    Lore MarkhamRN,BSN  [facility]

## 2020-08-14 NOTE — TELEPHONE ENCOUNTER
I reviewed pain management note.  It looks like they recommend patient take scheduled Tylenol 975 mg every 8 hours for pain, and to taper off Percocet.  I will refill Percocet to initiate taper to take 1 tablet every 6 hours as needed maximum 2 tablets daily.

## 2020-08-17 RX ORDER — ACETAMINOPHEN 500 MG
1000 TABLET ORAL EVERY 8 HOURS
COMMUNITY
Start: 2020-08-17 | End: 2023-01-01

## 2020-08-17 NOTE — TELEPHONE ENCOUNTER
I called and spoke to patient, she did not yet start taking 975 mg tylenol TID scheduled yet.   She did  the percocet and is limiting to 2 per day.    She has 500 mg tylenol so will start taking 2 TID, plus the percocet is under 4000 mg.    I updated Epic med list.      Patient verbalized understanding of and agreement with plan.  Elma Garcia RN  [facility]

## 2020-09-04 ENCOUNTER — TELEPHONE (OUTPATIENT)
Dept: PALLIATIVE MEDICINE | Facility: CLINIC | Age: 75
End: 2020-09-04

## 2020-09-04 DIAGNOSIS — G60.9 IDIOPATHIC PERIPHERAL NEUROPATHY: Primary | ICD-10-CM

## 2020-09-04 RX ORDER — GABAPENTIN 600 MG/1
600 TABLET ORAL 3 TIMES DAILY
Qty: 90 TABLET | Refills: 0 | Status: SHIPPED | OUTPATIENT
Start: 2020-09-04 | End: 2020-10-14

## 2020-09-04 NOTE — TELEPHONE ENCOUNTER
LPN called and spoke to Dr. Gonzales, the covering provider as Dr. Rothman is out of the office. Dr. Gonzales was informed that the pt escalated Gabapentin to 600 mg TID. Dr. Gonzales reviewed the labs and stated that if pt is tolerating dose they are able to stay at 600 mg TID.     LPN called pt and updated that prescription for 600 mg TID was sent to the pharmacy.   Pt was assisted to schedule a follow up appointment.     Vika Weiss LPN

## 2020-09-04 NOTE — TELEPHONE ENCOUNTER
Patient calling stating that she is trying a new medication gabapentin and wondering about increasing dosage, Patient has been taking two tablets 3 times a day because it is more helpful than 1 tablet 3 times a day and now patient is currently out of medication, Please call to discuss thank you.

## 2020-09-08 ENCOUNTER — TELEPHONE (OUTPATIENT)
Dept: FAMILY MEDICINE | Facility: CLINIC | Age: 75
End: 2020-09-08

## 2020-09-08 DIAGNOSIS — G89.4 CHRONIC PAIN DISORDER: ICD-10-CM

## 2020-09-08 NOTE — TELEPHONE ENCOUNTER
Requested Prescriptions   Pending Prescriptions Disp Refills     oxyCODONE-acetaminophen (PERCOCET) 5-325 MG tablet 60 tablet 0     Sig: Take 1 tablet by mouth every 6 hours as needed for severe pain Maximum of 2 daily. No refills until September 13, 2020       There is no refill protocol information for this order          Routing refill request to provider for review/approval because:  Drug not on the [facility] refill protocol     Please see message below.    Lore MarkhamRN,BSN  [facility]

## 2020-09-08 NOTE — TELEPHONE ENCOUNTER
Reason for Call:  Medication or medication refill:    Do you use a [facility]?  Name of the pharmacy and phone number for the current request:       -[facility], [place] - [address] [place] [address]      Name of the medication requested: Percocet    Other request: Patient states is due for a refill on 9/13 but wanted to get the request in so that she can get this taken care since the 13th is on a Sunday and she does not want to run out. Please call with any questions.     Can we leave a detailed message on this number? YES    Phone number patient can be reached at: Home number on file [phone] (home)    Best Time: any     Call taken on 9/8/2020 at 6:05 PM by Laurie Lagos

## 2020-09-09 RX ORDER — OXYCODONE AND ACETAMINOPHEN 5; 325 MG/1; MG/1
TABLET ORAL
Qty: 30 TABLET | Refills: 0 | Status: SHIPPED | OUTPATIENT
Start: 2020-09-09 | End: 2020-10-14

## 2020-09-09 NOTE — TELEPHONE ENCOUNTER
Please relay to pt:    It was my understanding from  (pain clinic) note that pt was to wean OFF percocet and she is being prescribed gabapentin and scheduled tylenol instead. I have refilled percocet with instructions as pain clinic noted to wean off now with taking 1 tablet daily.     She does have a f/u with dr. Ospina scheduled. I recommend she keep this and discuss the use of her percocet at this appointment if she understood things differently.

## 2020-09-10 ENCOUNTER — TELEPHONE (OUTPATIENT)
Dept: UROLOGY | Facility: CLINIC | Age: 75
End: 2020-09-10

## 2020-09-10 NOTE — TELEPHONE ENCOUNTER
Patient calling. She is taking 10mg solfenacin daily. The med is helping some. She is asking if she can increase her dose to 2x daily or change to another med. Will route to MD to advise.   Vika Colon, CMA

## 2020-09-14 NOTE — TELEPHONE ENCOUNTER
Patient advised of MD recommendation. She declines at this time for Botox or catheter. She will stay on medication and contact us in the future if needed.  Vika Colon, CMA

## 2020-10-08 ENCOUNTER — TELEPHONE (OUTPATIENT)
Dept: FAMILY MEDICINE | Facility: CLINIC | Age: 75
End: 2020-10-08

## 2020-10-08 DIAGNOSIS — G89.4 CHRONIC PAIN DISORDER: ICD-10-CM

## 2020-10-08 DIAGNOSIS — G47.09 OTHER INSOMNIA: ICD-10-CM

## 2020-10-08 DIAGNOSIS — B37.2 CANDIDAL INTERTRIGO: ICD-10-CM

## 2020-10-08 NOTE — TELEPHONE ENCOUNTER
Reason for Call:  Medication or medication refill:    Do you use a [facility]?  Name of the pharmacy and phone number for the current request:  -[facility], [place] - [address] [place] [address]    Name of the medication requested:   1. oxyCODONE-acetaminophen (PERCOCET) 5-325 MG tablet  2. Nystatin cream and powder  3. diclofenac (VOLTAREN) 1 % topical gel (patient is requesting to have two tubes of this so she can keep one in her car)    Other request: patient is requesting a call when this is sent in    Can we leave a detailed message on this number? YES    Phone number patient can be reached at: Home number on file [phone] (home)    Best Time: anytime    Call taken on 10/8/2020 at 12:39 PM by Cj Bennett

## 2020-10-09 RX ORDER — NYSTATIN 100000 U/G
CREAM TOPICAL 2 TIMES DAILY
Qty: 30 G | Refills: 3 | Status: SHIPPED | OUTPATIENT
Start: 2020-10-09 | End: 2021-08-13

## 2020-10-09 RX ORDER — OXYCODONE AND ACETAMINOPHEN 5; 325 MG/1; MG/1
TABLET ORAL
Qty: 30 TABLET | Refills: 0 | OUTPATIENT
Start: 2020-10-09

## 2020-10-09 RX ORDER — NYSTATIN 100000 [USP'U]/G
1 POWDER TOPICAL PRN
Qty: 1 BOTTLE | Refills: 5 | Status: SHIPPED | OUTPATIENT
Start: 2020-10-09 | End: 2021-08-13

## 2020-10-09 RX ORDER — TRAZODONE HYDROCHLORIDE 50 MG/1
TABLET, FILM COATED ORAL
Qty: 90 TABLET | Refills: 3 | Status: SHIPPED | OUTPATIENT
Start: 2020-10-09 | End: 2021-06-01

## 2020-10-09 NOTE — TELEPHONE ENCOUNTER
Please see my note re: oxycodone from September. If pt would like to discuss further than please schedule tel apt.

## 2020-10-14 ENCOUNTER — VIRTUAL VISIT (OUTPATIENT)
Dept: ANESTHESIOLOGY | Facility: CLINIC | Age: 75
End: 2020-10-14
Payer: COMMERCIAL

## 2020-10-14 DIAGNOSIS — G60.9 IDIOPATHIC PERIPHERAL NEUROPATHY: ICD-10-CM

## 2020-10-14 PROCEDURE — 99213 OFFICE O/P EST LOW 20 MIN: CPT | Mod: 95 | Performed by: ANESTHESIOLOGY

## 2020-10-14 RX ORDER — GABAPENTIN 600 MG/1
600 TABLET ORAL 3 TIMES DAILY
Qty: 90 TABLET | Refills: 0 | Status: SHIPPED | OUTPATIENT
Start: 2020-10-14 | End: 2020-11-20

## 2020-10-14 RX ORDER — OXYCODONE AND ACETAMINOPHEN 5; 325 MG/1; MG/1
TABLET ORAL
Qty: 30 TABLET | Refills: 0 | Status: SHIPPED | OUTPATIENT
Start: 2020-10-14 | End: 2020-11-03

## 2020-10-14 ASSESSMENT — PAIN SCALES - GENERAL: PAINLEVEL: SEVERE PAIN (6)

## 2020-10-14 NOTE — TELEPHONE ENCOUNTER
Patient states pain provider prescribed gabapentin, but then advised that they would let Vika Napoles continue managing oxycodone since she has for so long and such a low dose.  Discussed that pain medication would be specialty's area of expertise, will discuss with Vika to see about a plan going forward, or if she would like to reach out to pain management to discuss further.  Patient will expect a call back with update.    Shruthi Vernon RN

## 2020-10-14 NOTE — TELEPHONE ENCOUNTER
If patient had an appointment with her pain provider this morning who is prescribing her gabapentin why did not she discuss refills of her oxycodone with them?

## 2020-10-14 NOTE — PATIENT INSTRUCTIONS
Medications:    Continue Gabapentin 600 mg Three times daily.     Please provide the clinic with a minium of 1 week notice, on all prescription refills.       Recommended Follow up:      6 months with Dr. Rothman, or earlier if indicated.        Please call [phone], option #1 to schedule your clinic appointment if you don't already have an appointment scheduled.    To speak with a nurse, schedule/reschedule/cancel a clinic appointment, or request a medication refill call: [phone], option #1.    You can also reach us by [email or web address]: [email or web address]

## 2020-10-14 NOTE — TELEPHONE ENCOUNTER
Patient called back regarding oxycodone refill. Telephone visit scheduled for first available on 11/3.    Patient wondering if she can get a refill of this to last until her appointment? She states she has tapered down to 5 mg once a day, and this is very helpful along with her gabapentin. She had a telephone visit today with her pain provider and they sent in her gabapentin, she would like to pick these up at the same time.    Pharmacy: [facility], [place], [place]    Patient is requesting a call when rx is sent or if there are any questions.    Cj Bennett

## 2020-10-14 NOTE — PROGRESS NOTES
"Ledy Odonnell is a 75 year old female who is being evaluated via a billable telephone visit.      The patient has been notified of following:     \"This telephone visit will be conducted via a call between you and your physician/provider. We have found that certain health care needs can be provided without the need for a physical exam.  This service lets us provide the care you need with a short phone conversation.  If a prescription is necessary we can send it directly to your pharmacy.  If lab work is needed we can place an order for that and you can then stop by our lab to have the test done at a later time.    Telephone visits are billed at different rates depending on your insurance coverage. During this emergency period, for some insurers they may be billed the same as an in-person visit.  Please reach out to your insurance provider with any questions.    If during the course of the call the physician/provider feels a telephone visit is not appropriate, you will not be charged for this service.\"    Patient has given verbal consent for Telephone visit?  Yes    What phone number would you like to be contacted at? See nursing documentation    How would you like to obtain your AVS? MyChart    Phone call duration: 16 minutes    Belkis Rothman MD    Interval History:  States that pain is improving. Gabapentin is helping to \"dull\" the pain  Continues to take percocet, weaned down to 1 tablet per day.      Plan:  Refill of Gabapentin sent (600 mg TID)   F/U in 6 months    Belkis Rothman MD    Pain Medicine  Department of Anesthesiology  [facility]    "

## 2020-10-14 NOTE — TELEPHONE ENCOUNTER
Huddled with PCP, who sent Rx for oxycodone 5mg to [facility].  Patient notified and will f/u at 11/3/20 telephone appt.      Shruthi Vernon RN

## 2020-10-14 NOTE — PROGRESS NOTES
"Ledy Odonnell is a 75 year old female who is being evaluated via a billable telephone visit.      The patient has been notified of following:     \"This telephone visit will be conducted via a call between you and your physician/provider. We have found that certain health care needs can be provided without the need for a physical exam.  This service lets us provide the care you need with a short phone conversation.  If a prescription is necessary we can send it directly to your pharmacy.  If lab work is needed we can place an order for that and you can then stop by our lab to have the test done at a later time.    Telephone visits are billed at different rates depending on your insurance coverage. During this emergency period, for some insurers they may be billed the same as an in-person visit.  Please reach out to your insurance provider with any questions.    If during the course of the call the physician/provider feels a telephone visit is not appropriate, you will not be charged for this service.\"    Patient has given verbal consent for Telephone visit?  Yes    What phone number would you like to be contacted at? [phone]    How would you like to obtain your AVS? Mail a copy    Marielena Carbales CMA              "

## 2020-10-14 NOTE — LETTER
"10/14/2020       RE: Ledy Odonnell  [address]  [place] [place] [address]     Dear Colleague,    Thank you for referring your patient, Ledy Odonnell, to the [facility] [place] [facility]. Please see a copy of my visit note below.    Ledy Odonnell is a 75 year old female who is being evaluated via a billable telephone visit.      The patient has been notified of following:     \"This telephone visit will be conducted via a call between you and your physician/provider. We have found that certain health care needs can be provided without the need for a physical exam.  This service lets us provide the care you need with a short phone conversation.  If a prescription is necessary we can send it directly to your pharmacy.  If lab work is needed we can place an order for that and you can then stop by our lab to have the test done at a later time.    Telephone visits are billed at different rates depending on your insurance coverage. During this emergency period, for some insurers they may be billed the same as an in-person visit.  Please reach out to your insurance provider with any questions.    If during the course of the call the physician/provider feels a telephone visit is not appropriate, you will not be charged for this service.\"    Patient has given verbal consent for Telephone visit?  Yes    What phone number would you like to be contacted at? [phone]    How would you like to obtain your AVS? Mail a copy    Marielena Cabrales CMA                Ledy Odonnell is a 75 year old female who is being evaluated via a billable telephone visit.      The patient has been notified of following:     \"This telephone visit will be conducted via a call between you and your physician/provider. We have found that certain health care needs can be provided without the need for a physical exam.  This service lets us " "provide the care you need with a short phone conversation.  If a prescription is necessary we can send it directly to your pharmacy.  If lab work is needed we can place an order for that and you can then stop by our lab to have the test done at a later time.    Telephone visits are billed at different rates depending on your insurance coverage. During this emergency period, for some insurers they may be billed the same as an in-person visit.  Please reach out to your insurance provider with any questions.    If during the course of the call the physician/provider feels a telephone visit is not appropriate, you will not be charged for this service.\"    Patient has given verbal consent for Telephone visit?  Yes    What phone number would you like to be contacted at? See nursing documentation    How would you like to obtain your AVS? MyChart    Phone call duration: 16 minutes    Belkis Rothman MD    Interval History:  States that pain is improving. Gabapentin is helping to \"dull\" the pain  Continues to take percocet, weaned down to 1 tablet per day.      Plan:  Refill of Gabapentin sent (600 mg TID)   F/U in 6 months    Belkis Rothman MD    Pain Medicine  Department of Anesthesiology  [facility]      AVS was mailed to the pt.   Vika Weiss LPN      "

## 2020-10-19 DIAGNOSIS — N32.81 OAB (OVERACTIVE BLADDER): ICD-10-CM

## 2020-10-19 RX ORDER — SOLIFENACIN SUCCINATE 10 MG/1
10 TABLET, FILM COATED ORAL DAILY
Qty: 90 TABLET | Refills: 3 | Status: SHIPPED | OUTPATIENT
Start: 2020-10-19 | End: 2021-08-13

## 2020-10-19 NOTE — TELEPHONE ENCOUNTER
Signed Prescriptions:                        Disp   Refills    solifenacin (VESICARE) 10 MG tablet        90 tab*3        Sig: Take 1 tablet (10 mg) by mouth daily  Authorizing Provider: CHINMAY PIERCE  Ordering User: TRUNG SHARP RN

## 2020-11-03 ENCOUNTER — VIRTUAL VISIT (OUTPATIENT)
Dept: FAMILY MEDICINE | Facility: CLINIC | Age: 75
End: 2020-11-03
Payer: COMMERCIAL

## 2020-11-03 DIAGNOSIS — G89.4 CHRONIC PAIN DISORDER: ICD-10-CM

## 2020-11-03 DIAGNOSIS — Z02.4 ENCOUNTER FOR DEPARTMENT OF TRANSPORTATION (DOT) EXAMINATION FOR DRIVING LICENSE RENEWAL: Primary | ICD-10-CM

## 2020-11-03 PROCEDURE — 99213 OFFICE O/P EST LOW 20 MIN: CPT | Mod: 95 | Performed by: NURSE PRACTITIONER

## 2020-11-03 RX ORDER — OXYCODONE AND ACETAMINOPHEN 5; 325 MG/1; MG/1
TABLET ORAL
Qty: 60 TABLET | Refills: 0 | Status: SHIPPED | OUTPATIENT
Start: 2020-11-03 | End: 2021-01-27
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
## 2020-11-19 DIAGNOSIS — G89.4 CHRONIC PAIN DISORDER: ICD-10-CM

## 2020-11-19 DIAGNOSIS — G60.9 IDIOPATHIC PERIPHERAL NEUROPATHY: ICD-10-CM

## 2020-11-19 RX ORDER — OXYCODONE AND ACETAMINOPHEN 5; 325 MG/1; MG/1
TABLET ORAL
Qty: 60 TABLET | Refills: 0 | Status: CANCELLED | OUTPATIENT
Start: 2020-11-19

## 2020-11-19 NOTE — TELEPHONE ENCOUNTER
Reason for Call:  Medication or medication refill:    Do you use a [facility]?  Name of the pharmacy and phone number for the current request:  [facility], [place], [place] - [place], [place] - [address] [facility]  [phone]    Name of the medication requested: gabapentin (NEURONTIN) 600 MG tablet, oxyCODONE-acetaminophen (PERCOCET) 5-325 MG tablet    Other request: patient needing refill on above medication.     Can we leave a detailed message on this number? YES    Phone number patient can be reached at: Home number on file [phone] (home)    Best Time: anytime    Call taken on 11/19/2020 at 1:44 PM by Choe Crockett

## 2020-11-20 RX ORDER — GABAPENTIN 600 MG/1
600 TABLET ORAL 3 TIMES DAILY
Qty: 90 TABLET | Refills: 3 | Status: SHIPPED | OUTPATIENT
Start: 2020-11-20 | End: 2021-05-25

## 2020-11-20 NOTE — TELEPHONE ENCOUNTER
Forwarding request for gabapentin refill to PCP.  Patient doesn't need the Percocet, so this was removed.     Reached out to patient to discuss request, as our record indicates last Percocet Rx was done 11/3/20 for 60 tabs and once daily PRN use.  Patient reports she is out of this; however, the bottle she has in her home is dated 10/14/20, which was the previous refill.  So she has not yet picked up this most recent Rx.  She will do so.  Will forward to PCP for gabapentin refill.    Shruthi Vernon RN

## 2020-11-23 ENCOUNTER — TRANSFERRED RECORDS (OUTPATIENT)
Dept: HEALTH INFORMATION MANAGEMENT | Facility: CLINIC | Age: 75
End: 2020-11-23

## 2020-12-09 ENCOUNTER — TELEPHONE (OUTPATIENT)
Dept: FAMILY MEDICINE | Facility: CLINIC | Age: 75
End: 2020-12-09

## 2020-12-09 NOTE — TELEPHONE ENCOUNTER
Patient left a message on  voicemail asking for a call back to discuss if Dr. Florez has retired already or not.       attempted to call patient back and phone line was busy. Will try again tomorrow.    Cj Bennett

## 2020-12-10 NOTE — TELEPHONE ENCOUNTER
Called and LMOM informing patient that Dr. Florez has already retired, and to give us a call back with any other questions.    Cj Bennett

## 2020-12-14 ENCOUNTER — TELEPHONE (OUTPATIENT)
Dept: FAMILY MEDICINE | Facility: CLINIC | Age: 75
End: 2020-12-14

## 2020-12-14 NOTE — TELEPHONE ENCOUNTER
Forms received from [employer] Wheelchair cushioin (initial date 12/09/20) for Tatiana Napoles CNP.  Forms placed in provider 'sign me' folder.  Please fax forms to [phone] after completion.    Ana Mullen  Patient Representative

## 2020-12-17 NOTE — TELEPHONE ENCOUNTER
Forms completed for [facility] and faxed to [phone].    Junie Dominguez  Patient Registration   [facility]

## 2021-01-19 NOTE — CONSULTS
Brief Social Work Note    Consult acknowledged, received update from [facility] that pt is discharging to home today and no other sw needs identified at this time. Sw will sign off as [facility] is to assist with d/c to home. Please page if other sw needs are identified.     CHUY Cisneros, MSW  7B   [phone] (pager) [phone]  3/8/2017      
Detail Level: Detailed
Plan: Electrodessication today

## 2021-01-21 ENCOUNTER — TELEPHONE (OUTPATIENT)
Dept: UROLOGY | Facility: CLINIC | Age: 76
End: 2021-01-21

## 2021-01-21 ENCOUNTER — OFFICE VISIT (OUTPATIENT)
Dept: UROLOGY | Facility: CLINIC | Age: 76
End: 2021-01-21
Payer: COMMERCIAL

## 2021-01-21 VITALS
TEMPERATURE: 97.2 F | HEART RATE: 115 BPM | OXYGEN SATURATION: 97 % | DIASTOLIC BLOOD PRESSURE: 74 MMHG | SYSTOLIC BLOOD PRESSURE: 126 MMHG

## 2021-01-21 DIAGNOSIS — N39.46 MIXED STRESS AND URGE URINARY INCONTINENCE: Primary | ICD-10-CM

## 2021-01-21 PROCEDURE — 99214 OFFICE O/P EST MOD 30 MIN: CPT | Performed by: UROLOGY

## 2021-01-21 NOTE — TELEPHONE ENCOUNTER
Returned call to pt. She will come to clinic tomorrow for a nurse visit for writer to go over complete catheter care slowly and thoroughly      Akanksha Singh RN Specialty Triage 1/21/2021 3:41 PM

## 2021-01-21 NOTE — TELEPHONE ENCOUNTER
Patient calling asking if she is able to take a shower with the Cathader in or does she need to remove the tubes. Please advise patient.  Theresa Lazar,

## 2021-01-21 NOTE — PROGRESS NOTES
F/u OAB wet, tobacco, EtOH, hx botox/elevated PVR's, last contact     Pt now essentially completely wet, wears many layers of pads, does a great deal of laundry.    Still wheelchair bound with difficulty in transferring.      IMP:  1. Massive urinary incontinence      PLAN:  1. Discussed situation with patient in detail.  2. Offered pt trial with indwelling moctezuma. Again discussed rationale, mech of action, care, monthly changes, expectations, risk, etc; pt expresses understanding and elects to proceed. Moctezuma 20 Fr placed, about 200 ml returned, connected to sterile collection. Carefully instructed in use.  3. F/u3 weeks with virtual visit; if satisfactory will do Home Health Nurse cath changes  4. 25 minutes spent with patient, more than 50% spent in counseling and coordination of care for incontinence.

## 2021-01-22 ENCOUNTER — TELEPHONE (OUTPATIENT)
Dept: NURSING | Facility: CLINIC | Age: 76
End: 2021-01-22

## 2021-01-22 NOTE — TELEPHONE ENCOUNTER
Question about moctezuma catheter. Was able to answer her. She can use the bands to connect hose to chair or to keep it in place. She understood and will try to do so.

## 2021-01-27 ENCOUNTER — OFFICE VISIT (OUTPATIENT)
Dept: ORTHOPEDICS | Facility: CLINIC | Age: 76
End: 2021-01-27
Payer: COMMERCIAL

## 2021-01-27 VITALS
HEIGHT: 65 IN | WEIGHT: 130 LBS | HEART RATE: 84 BPM | DIASTOLIC BLOOD PRESSURE: 66 MMHG | OXYGEN SATURATION: 95 % | SYSTOLIC BLOOD PRESSURE: 118 MMHG | BODY MASS INDEX: 21.66 KG/M2

## 2021-01-27 DIAGNOSIS — M25.512 CHRONIC PAIN OF BOTH SHOULDERS: ICD-10-CM

## 2021-01-27 DIAGNOSIS — M12.811 ROTATOR CUFF ARTHROPATHY OF BOTH SHOULDERS: Primary | ICD-10-CM

## 2021-01-27 DIAGNOSIS — M25.511 CHRONIC PAIN OF BOTH SHOULDERS: ICD-10-CM

## 2021-01-27 DIAGNOSIS — M12.812 ROTATOR CUFF ARTHROPATHY OF BOTH SHOULDERS: Primary | ICD-10-CM

## 2021-01-27 DIAGNOSIS — G89.29 CHRONIC PAIN OF BOTH SHOULDERS: ICD-10-CM

## 2021-01-27 DIAGNOSIS — M17.11 PRIMARY OSTEOARTHRITIS OF RIGHT KNEE: ICD-10-CM

## 2021-01-27 PROCEDURE — 99213 OFFICE O/P EST LOW 20 MIN: CPT | Performed by: ORTHOPAEDIC SURGERY

## 2021-01-27 PROCEDURE — 20610 DRAIN/INJ JOINT/BURSA W/O US: CPT | Mod: 59 | Performed by: ORTHOPAEDIC SURGERY

## 2021-01-27 RX ORDER — LIDOCAINE HYDROCHLORIDE 10 MG/ML
4 INJECTION, SOLUTION INFILTRATION; PERINEURAL
Status: DISCONTINUED | OUTPATIENT
Start: 2021-01-27 | End: 2022-07-21

## 2021-01-27 RX ORDER — METHYLPREDNISOLONE ACETATE 80 MG/ML
80 INJECTION, SUSPENSION INTRA-ARTICULAR; INTRALESIONAL; INTRAMUSCULAR; SOFT TISSUE
Status: DISCONTINUED | OUTPATIENT
Start: 2021-01-27 | End: 2022-07-21

## 2021-01-27 RX ORDER — LIDOCAINE HYDROCHLORIDE 10 MG/ML
4 INJECTION, SOLUTION EPIDURAL; INFILTRATION; INTRACAUDAL; PERINEURAL
Status: DISCONTINUED | OUTPATIENT
Start: 2021-01-27 | End: 2022-07-21

## 2021-01-27 RX ORDER — METHOCARBAMOL 750 MG/1
750 TABLET, FILM COATED ORAL 4 TIMES DAILY PRN
Qty: 40 TABLET | Refills: 1 | Status: SHIPPED | OUTPATIENT
Start: 2021-01-27 | End: 2021-06-01

## 2021-01-27 RX ADMIN — METHYLPREDNISOLONE ACETATE 80 MG: 80 INJECTION, SUSPENSION INTRA-ARTICULAR; INTRALESIONAL; INTRAMUSCULAR; SOFT TISSUE at 14:01

## 2021-01-27 RX ADMIN — LIDOCAINE HYDROCHLORIDE 4 ML: 10 INJECTION, SOLUTION EPIDURAL; INFILTRATION; INTRACAUDAL; PERINEURAL at 14:03

## 2021-01-27 RX ADMIN — LIDOCAINE HYDROCHLORIDE 4 ML: 10 INJECTION, SOLUTION INFILTRATION; PERINEURAL at 14:01

## 2021-01-27 RX ADMIN — METHYLPREDNISOLONE ACETATE 80 MG: 80 INJECTION, SUSPENSION INTRA-ARTICULAR; INTRALESIONAL; INTRAMUSCULAR; SOFT TISSUE at 14:03

## 2021-01-27 ASSESSMENT — MIFFLIN-ST. JEOR: SCORE: 1090.56

## 2021-01-27 NOTE — LETTER
1/27/2021         RE: Ledy Odonnell  [address]  [facility] [address]        Dear Colleague,    Thank you for referring your patient, Ledy Odonnell, to the [facility]. Please see a copy of my visit note below.    SUBJECTIVE:  Ledy Odonnell is a 74 year old female who is seen in follow-up for bilateral shoulder rotator cuff tear arthropathy..  She is wheel chair dependant and cannot function during recovery time for a rotator cuff repair or RTSA. She is being treated non-operatively. She has been having trouble wheeling with her wheel chair due to significant shoulder pains. She has tried to get an electric wheelchair and has been unable  due to insurance limitations.   The knee is also very painful as well..  She has mild osteoarthritis in the knee as of the 2016 xrays, but is probably worse now.  Surgery on any of her joints is not an option in her mind..    Also multiple other joint pain complaints--hands and severely deformed left foot and ankle as well.    Corticosteroid injections last visit:  5/27/20.  Amount of relief: very good.  Length of time of relief 4- 6 weeks for bilateral shoulders and right knee  .  Doing therapy/exercises: (y/n): yes occasionally.    She wants a Motorized cart.  Wants a muscle relaxer. Says her stress is causing her muscles to tense.   She does not take any narcotics she reports.    GENERAL: healthy, alert and no distress  GAIT: normal   SKIN: no suspicious lesions or rashes  NEURO: Normal strength and tone, mentation intact and speech normal  VASCULAR:  normal pulses and cappillary refill   PSYCH:  mentation appears normal and affect normal/bright    SHOULDER:  Shoulder Inspection: no swelling, bruising, discoloration, or obvious deformity or asymmetry  marked muscle atrophy of rotator cuff     Range of Motion:   Active:forward flexion 70 degrees  Right knee:  Some valgus  Moderate effusion  Range of motion 0-120   Diffuse  tenderness.     Xrays :2/21/18 bilateral shoulders: FINDINGS: Complete loss of the acromiohumeral interval bilaterally  compatible with chronic rotator cuff tear. Degenerative change at the  glenohumeral joints. No acute fracture or acute malalignment.     Impression:   Encounter Diagnoses   Name Primary?     Rotator cuff arthropathy of both shoulders Yes     Primary osteoarthritis of right knee      Chronic pain of both shoulders         PLAN: With the patient's consent, right knee(s) injected intra-articularly with 80mg of Depomedrol and 4cc of local anesthetic after sterile prep.  Procedure Note:   Informed consent obtained. Risks, benefits and complications of the injection were discussed with the patient and the patient elected to proceed. Bilateral shoulder injected into the subacromial space after sterile prep, using 80mg Depomedrol and 4cc local anesthetic.    Motorized cart paperwork started.  Robaxin prescription  E Rx'd.  Discussed that other such meds or refills should be discussed with her primary care provider.    Follow up as needed     SALINAS Woods MD  Dept. Orthopedic Surgery  [facility]                Large Joint Injection/Arthocentesis: bilateral subacromial bursa    Date/Time: 1/27/2021 2:01 PM  Performed by: oWlfgang Angeles  Authorized by: Rex Woods MD     Indications:  Pain  Needle Size:  22 G  Guidance: landmark guided    Approach:  Lateral  Location:  Shoulder  Laterality:  Bilateral      Site:  Bilateral subacromial bursa  Medications (Right):  80 mg methylPREDNISolone 80 MG/ML; 4 mL lidocaine 1 %  Medications (Left):  80 mg methylPREDNISolone 80 MG/ML; 4 mL lidocaine 1 %  Outcome:  Tolerated well, no immediate complications  Procedure discussed: discussed risks, benefits, and alternatives    Consent Given by:  Patient  Timeout: timeout called immediately prior to procedure    Prep: patient was prepped and draped in usual sterile fashion    Large Joint  Injection/Arthocentesis: R knee joint    Date/Time: 1/27/2021 2:03 PM  Performed by: Wolfgang Angeles  Authorized by: Rex Woods MD     Indications:  Pain and osteoarthritis  Needle Size:  22 G  Approach:  Anterolateral  Location:  Knee      Medications:  80 mg methylPREDNISolone 80 MG/ML; 4 mL lidocaine (PF) 1 %  Outcome:  Tolerated well, no immediate complications  Procedure discussed: discussed risks, benefits, and alternatives    Consent Given by:  Patient  Timeout: timeout called immediately prior to procedure    Prep: patient was prepped and draped in usual sterile fashion              Again, thank you for allowing me to participate in the care of your patient.        Sincerely,        Rex Woods MD

## 2021-01-27 NOTE — PROGRESS NOTES
Large Joint Injection/Arthocentesis: bilateral subacromial bursa    Date/Time: 1/27/2021 2:01 PM  Performed by: Wolfgang Angeles  Authorized by: Rex Woods MD     Indications:  Pain  Needle Size:  22 G  Guidance: landmark guided    Approach:  Lateral  Location:  Shoulder  Laterality:  Bilateral      Site:  Bilateral subacromial bursa  Medications (Right):  80 mg methylPREDNISolone 80 MG/ML; 4 mL lidocaine 1 %  Medications (Left):  80 mg methylPREDNISolone 80 MG/ML; 4 mL lidocaine 1 %  Outcome:  Tolerated well, no immediate complications  Procedure discussed: discussed risks, benefits, and alternatives    Consent Given by:  Patient  Timeout: timeout called immediately prior to procedure    Prep: patient was prepped and draped in usual sterile fashion    Large Joint Injection/Arthocentesis: R knee joint    Date/Time: 1/27/2021 2:03 PM  Performed by: Wolfgang Angeles  Authorized by: Rex Woods MD     Indications:  Pain and osteoarthritis  Needle Size:  22 G  Approach:  Anterolateral  Location:  Knee      Medications:  80 mg methylPREDNISolone 80 MG/ML; 4 mL lidocaine (PF) 1 %  Outcome:  Tolerated well, no immediate complications  Procedure discussed: discussed risks, benefits, and alternatives    Consent Given by:  Patient  Timeout: timeout called immediately prior to procedure    Prep: patient was prepped and draped in usual sterile fashion

## 2021-01-27 NOTE — PROGRESS NOTES
SUBJECTIVE:  Ledy Odonnell is a 74 year old female who is seen in follow-up for bilateral shoulder rotator cuff tear arthropathy..  She is wheel chair dependant and cannot function during recovery time for a rotator cuff repair or RTSA. She is being treated non-operatively. She has been having trouble wheeling with her wheel chair due to significant shoulder pains. She has tried to get an electric wheelchair and has been unable  due to insurance limitations.   The knee is also very painful as well..  She has mild osteoarthritis in the knee as of the 2016 xrays, but is probably worse now.  Surgery on any of her joints is not an option in her mind..    Also multiple other joint pain complaints--hands and severely deformed left foot and ankle as well.    Corticosteroid injections last visit:  5/27/20.  Amount of relief: very good.  Length of time of relief 4- 6 weeks for bilateral shoulders and right knee  .  Doing therapy/exercises: (y/n): yes occasionally.    She wants a Motorized cart.  Wants a muscle relaxer. Says her stress is causing her muscles to tense.   She does not take any narcotics she reports.    GENERAL: healthy, alert and no distress  GAIT: normal   SKIN: no suspicious lesions or rashes  NEURO: Normal strength and tone, mentation intact and speech normal  VASCULAR:  normal pulses and cappillary refill   PSYCH:  mentation appears normal and affect normal/bright    SHOULDER:  Shoulder Inspection: no swelling, bruising, discoloration, or obvious deformity or asymmetry  marked muscle atrophy of rotator cuff     Range of Motion:   Active:forward flexion 70 degrees  Right knee:  Some valgus  Moderate effusion  Range of motion 0-120   Diffuse tenderness.     Xrays :2/21/18 bilateral shoulders: FINDINGS: Complete loss of the acromiohumeral interval bilaterally  compatible with chronic rotator cuff tear. Degenerative change at the  glenohumeral joints. No acute fracture or acute  malalignment.     Impression:   Encounter Diagnoses   Name Primary?     Rotator cuff arthropathy of both shoulders Yes     Primary osteoarthritis of right knee      Chronic pain of both shoulders         PLAN: With the patient's consent, right knee(s) injected intra-articularly with 80mg of Depomedrol and 4cc of local anesthetic after sterile prep.  Procedure Note:   Informed consent obtained. Risks, benefits and complications of the injection were discussed with the patient and the patient elected to proceed. Bilateral shoulder injected into the subacromial space after sterile prep, using 80mg Depomedrol and 4cc local anesthetic.    Motorized cart paperwork started.  Robaxin prescription  E Rx'd.  Discussed that other such meds or refills should be discussed with her primary care provider.    Follow up as needed     SALINAS Woods MD  Dept. Orthopedic Surgery  [facility]

## 2021-01-29 DIAGNOSIS — M12.811 ROTATOR CUFF ARTHROPATHY OF BOTH SHOULDERS: Primary | ICD-10-CM

## 2021-01-29 DIAGNOSIS — M25.512 CHRONIC PAIN OF BOTH SHOULDERS: ICD-10-CM

## 2021-01-29 DIAGNOSIS — M25.511 CHRONIC PAIN OF BOTH SHOULDERS: ICD-10-CM

## 2021-01-29 DIAGNOSIS — M12.812 ROTATOR CUFF ARTHROPATHY OF BOTH SHOULDERS: Primary | ICD-10-CM

## 2021-01-29 DIAGNOSIS — G89.29 CHRONIC PAIN OF BOTH SHOULDERS: ICD-10-CM

## 2021-01-29 DIAGNOSIS — M17.11 LOCALIZED OSTEOARTHRITIS OF RIGHT KNEE: ICD-10-CM

## 2021-02-05 ENCOUNTER — TELEPHONE (OUTPATIENT)
Facility: CLINIC | Age: 76
End: 2021-02-05

## 2021-02-05 NOTE — TELEPHONE ENCOUNTER
Dear Dr. Woods,  [facility] process is that all ordered disciplines will be involved in the development of the plan of care.  The following disciplines were unable to see Ledy Odonnell; MRN [id number] within the 5 day evaluation window.  There will be a delay in the evalation visit by PT. Requesting updated order.      Please REPLY TO THIS MESSAGE  This is considered a verbal order.  Thank you for your timely assistance.    Order for Ledy Odonnell; MRN [id number]      Thank you  Ya Oneill, RN, BSN  [facility]  RN Care Coordinator  [phone]

## 2021-02-11 ENCOUNTER — OFFICE VISIT (OUTPATIENT)
Dept: UROLOGY | Facility: CLINIC | Age: 76
End: 2021-02-11
Payer: COMMERCIAL

## 2021-02-11 VITALS — DIASTOLIC BLOOD PRESSURE: 70 MMHG | HEART RATE: 107 BPM | SYSTOLIC BLOOD PRESSURE: 114 MMHG | OXYGEN SATURATION: 92 %

## 2021-02-11 DIAGNOSIS — N32.81 OAB (OVERACTIVE BLADDER): Primary | ICD-10-CM

## 2021-02-11 DIAGNOSIS — R33.9 URINARY RETENTION: ICD-10-CM

## 2021-02-11 PROCEDURE — 99213 OFFICE O/P EST LOW 20 MIN: CPT | Performed by: UROLOGY

## 2021-02-11 NOTE — PROGRESS NOTES
"F/u OAB wet, tobacco, EtOH, hx botox/elevated PVR's, last contact 1/21/21 with moctezuma placement      Pt \"delighted\" with newfound control. No issues. Would like to have Home Health Nurse do cath changes.        Current Outpatient Medications   Medication     acetaminophen (TYLENOL) 500 MG tablet     albuterol (PROAIR HFA/PROVENTIL HFA/VENTOLIN HFA) 108 (90 Base) MCG/ACT inhaler     buPROPion (WELLBUTRIN XL) 300 MG 24 hr tablet     diclofenac (VOLTAREN) 1 % topical gel     DULoxetine (CYMBALTA) 60 MG capsule     gabapentin (NEURONTIN) 300 MG capsule     gabapentin (NEURONTIN) 600 MG tablet     methocarbamol (ROBAXIN) 750 MG tablet     multivitamin, therapeutic (THERA-VIT) TABS tablet     nystatin (MYCOSTATIN) 581067 UNIT/GM external cream     nystatin (MYCOSTATIN) 017684 UNIT/GM external powder     order for DME     order for DME     senna-docusate (SENOKOT-S;PERICOLACE) 8.6-50 MG per tablet     solifenacin (VESICARE) 10 MG tablet     traZODone (DESYREL) 50 MG tablet     Current Facility-Administered Medications   Medication     botulinum toxin type A (BOTOX) 100 units injection 100 Units     lidocaine (PF) (XYLOCAINE) 1 % injection 4 mL     lidocaine (PF) (XYLOCAINE) 1 % injection 4 mL     lidocaine 1 % injection 4 mL     lidocaine 1 % injection 4 mL     lidocaine 1 % injection 4 mL     lidocaine 1 % injection 4 mL     lidocaine 1 % injection 4 mL     lidocaine 1 % injection 4 mL     lidocaine 1 % injection 4 mL     methylPREDNISolone (DEPO-MEDROL) injection 80 mg     methylPREDNISolone (DEPO-MEDROL) injection 80 mg     methylPREDNISolone (DEPO-MEDROL) injection 80 mg     methylPREDNISolone (DEPO-MEDROL) injection 80 mg     methylPREDNISolone (DEPO-MEDROL) injection 80 mg     methylPREDNISolone (DEPO-MEDROL) injection 80 mg     methylPREDNISolone (DEPO-MEDROL) injection 80 mg     methylPREDNISolone (DEPO-MEDROL) injection 80 mg     methylPREDNISolone (DEPO-MEDROL) injection 80 mg       IMP:  1. Mixed incontinence, " doing well with moctezuma      PLAN:  1. Discussed situation with patient in detail.  2. Monthly cath changes with 20 Fr  3. RTC only PRN  4. 15 minutes spent with patient, more than 50% spent in counseling and coordination of care for incont.

## 2021-02-12 ENCOUNTER — VIRTUAL VISIT (OUTPATIENT)
Dept: FAMILY MEDICINE | Facility: CLINIC | Age: 76
End: 2021-02-12
Payer: COMMERCIAL

## 2021-02-12 ENCOUNTER — TELEPHONE (OUTPATIENT)
Dept: ORTHOPEDICS | Facility: CLINIC | Age: 76
End: 2021-02-12

## 2021-02-12 DIAGNOSIS — F51.01 PRIMARY INSOMNIA: ICD-10-CM

## 2021-02-12 DIAGNOSIS — G89.29 OTHER CHRONIC PAIN: Primary | ICD-10-CM

## 2021-02-12 DIAGNOSIS — F32.A DEPRESSION, UNSPECIFIED DEPRESSION TYPE: ICD-10-CM

## 2021-02-12 PROCEDURE — 99214 OFFICE O/P EST MOD 30 MIN: CPT | Mod: 95 | Performed by: NURSE PRACTITIONER

## 2021-02-12 PROCEDURE — 96127 BRIEF EMOTIONAL/BEHAV ASSMT: CPT | Mod: 59 | Performed by: NURSE PRACTITIONER

## 2021-02-12 RX ORDER — OXYCODONE AND ACETAMINOPHEN 5; 325 MG/1; MG/1
TABLET ORAL
Qty: 60 TABLET | Refills: 0 | Status: SHIPPED | OUTPATIENT
Start: 2021-02-12 | End: 2021-02-16

## 2021-02-12 RX ORDER — MIRTAZAPINE 7.5 MG/1
7.5 TABLET, FILM COATED ORAL AT BEDTIME
Qty: 30 TABLET | Refills: 0 | Status: SHIPPED | OUTPATIENT
Start: 2021-02-12 | End: 2021-02-26

## 2021-02-12 ASSESSMENT — ANXIETY QUESTIONNAIRES
3. WORRYING TOO MUCH ABOUT DIFFERENT THINGS: SEVERAL DAYS
1. FEELING NERVOUS, ANXIOUS, OR ON EDGE: SEVERAL DAYS
GAD7 TOTAL SCORE: 3
7. FEELING AFRAID AS IF SOMETHING AWFUL MIGHT HAPPEN: NOT AT ALL
2. NOT BEING ABLE TO STOP OR CONTROL WORRYING: NOT AT ALL
6. BECOMING EASILY ANNOYED OR IRRITABLE: NOT AT ALL
5. BEING SO RESTLESS THAT IT IS HARD TO SIT STILL: NOT AT ALL
IF YOU CHECKED OFF ANY PROBLEMS ON THIS QUESTIONNAIRE, HOW DIFFICULT HAVE THESE PROBLEMS MADE IT FOR YOU TO DO YOUR WORK, TAKE CARE OF THINGS AT HOME, OR GET ALONG WITH OTHER PEOPLE: SOMEWHAT DIFFICULT

## 2021-02-12 ASSESSMENT — PATIENT HEALTH QUESTIONNAIRE - PHQ9
SUM OF ALL RESPONSES TO PHQ QUESTIONS 1-9: 7
5. POOR APPETITE OR OVEREATING: SEVERAL DAYS

## 2021-02-12 NOTE — PROGRESS NOTES
Ledy is a 75 year old who is being evaluated via a billable telephone visit.      What phone number would you like to be contacted at? [phone]  How would you like to obtain your AVS?     Assessment & Plan   Problem List Items Addressed This Visit     Insomnia    Relevant Medications    mirtazapine (REMERON) 7.5 MG tablet      Other Visit Diagnoses     Other chronic pain    -  Primary    Relevant Medications    oxyCODONE-acetaminophen (PERCOCET) 5-325 MG tablet    Depression, unspecified depression type        Relevant Medications    mirtazapine (REMERON) 7.5 MG tablet         Worsened depression d/t many factors, covid 19 isolation, restricted driving license now, new catheter etc  Already on max dose wellbutrin and duloxetine 60 mg anything > than this not shown effective.   Recommend adding mirtazipine 7.5 mg HS this will also help with sleep. Avoid taking trazoodne or take 25 mg HS to avoid serotonin syndrome.   Also agreed to Rx percocet 5/325 mg 1 tab max daily for chronic pain #60 tabs dispensed good for 2 months. Can refill in provider absence.   F/u in 2 weeks     20 minutes spent on the date of the encounter doing chart review, history and exam, documentation and further activities as noted above       FUTURE APPOINTMENTS:       - Follow-up visit in 2 weeks     No follow-ups on file.    ROSCOE Ridley [facility]    Giacomo Villafana is a 75 year old who presents for the following health issues     HPI       Depression and Anxiety Follow-Up    How are you doing with your depression since your last visit? Worsened due to COVID    How are you doing with your anxiety since your last visit?  Worsened, would like to discuss possible med increase     Are you having other symptoms that might be associated with depression or anxiety? Yes:  . lack of energy to do stuff    Have you had a significant life event? OTHER: COVID     Do you have any concerns with your  use of alcohol or other drugs? No  Doesn't have a 's license anymore was taken away  Physical therapist was here today and recommended going up on her depression meds  Says she is in a lot of pain. She hasn't refilled percocet in over a month. She thought she couldn't get anymore.   She was seen by ortho and they recommended an electric wheelchair for her rotator cuff.   Trouble sleeping     She now has a urinary catheter. She is followed by urology.     Social History     Tobacco Use     Smoking status: Current Every Day Smoker     Packs/day: 0.50     Years: 43.00     Pack years: 21.50     Types: Cigarettes     Smokeless tobacco: Never Used   Substance Use Topics     Alcohol use: No     Alcohol/week: 0.0 standard drinks     Drug use: No     PHQ 7/9/2019 5/28/2020 6/9/2020   PHQ-9 Total Score 6 3 2   Q9: Thoughts of better off dead/self-harm past 2 weeks Not at all Not at all Not at all     LESTER-7 SCORE 7/9/2019 5/28/2020 6/9/2020   Total Score - - -   Total Score 3 2 2     Last PHQ-9 2/12/2021   1.  Little interest or pleasure in doing things 1   2.  Feeling down, depressed, or hopeless 1   3.  Trouble falling or staying asleep, or sleeping too much 1   4.  Feeling tired or having little energy 1   5.  Poor appetite or overeating 2   6.  Feeling bad about yourself 0   7.  Trouble concentrating 1   8.  Moving slowly or restless 0   Q9: Thoughts of better off dead/self-harm past 2 weeks 0   PHQ-9 Total Score 7   Difficulty at work, home, or with people Somewhat difficult     LESTER-7  2/12/2021   1. Feeling nervous, anxious, or on edge 1   2. Not being able to stop or control worrying 0   3. Worrying too much about different things 1   4. Trouble relaxing 1   5. Being so restless that it is hard to sit still 0   6. Becoming easily annoyed or irritable 0   7. Feeling afraid, as if something awful might happen 0   LESTER-7 Total Score 3   If you checked any problems, how difficult have they made it for you to do your  work, take care of things at home, or get along with other people? Somewhat difficult       Suicide Assessment Five-step Evaluation and Treatment (SAFE-T)      How many servings of fruits and vegetables do you eat daily?  2-3    On average, how many sweetened beverages do you drink each day (Examples: soda, juice, sweet tea, etc.  Do NOT count diet or artificially sweetened beverages)?   0    How many days per week do you exercise enough to make your heart beat faster?     How many minutes a day do you exercise enough to make your heart beat faster?     How many days per week do you miss taking your medication? 0      Review of Systems   Constitutional, HEENT, cardiovascular, pulmonary, GI, , musculoskeletal, neuro, skin, endocrine and psych systems are negative, except as otherwise noted.      Objective           Vitals:  No vitals were obtained today due to virtual visit.    Physical Exam   healthy, alert and no distress  PSYCH: Alert and oriented times 3; coherent speech, normal   rate and volume, able to articulate logical thoughts, able   to abstract reason, no tangential thoughts, no hallucinations   or delusions  Her affect is normal  RESP: No cough, no audible wheezing, able to talk in full sentences  Remainder of exam unable to be completed due to telephone visits    Orders Only on 04/24/2020   Component Date Value Ref Range Status     Specimen Description 04/24/2020 Midstream Urine   Final     Culture Micro 04/24/2020    Final                    Value:>100,000 colonies/mL  mixed urogenital jairo  Susceptibility testing not routinely done       Color Urine 04/24/2020 Yellow   Final     Appearance Urine 04/24/2020 Cloudy   Final     Glucose Urine 04/24/2020 Negative  NEG^Negative mg/dL Final     Bilirubin Urine 04/24/2020 Negative  NEG^Negative Final     Ketones Urine 04/24/2020 Negative  NEG^Negative mg/dL Final     Specific Gravity Urine 04/24/2020 1.013  1.003 - 1.035 Final     Blood Urine 04/24/2020  Small* NEG^Negative Final     pH Urine 04/24/2020 5.5  5.0 - 7.0 pH Final     Protein Albumin Urine 04/24/2020 30* NEG^Negative mg/dL Final     Urobilinogen mg/dL 04/24/2020 Normal  0.0 - 2.0 mg/dL Final     Nitrite Urine 04/24/2020 Negative  NEG^Negative Final     Leukocyte Esterase Urine 04/24/2020 Large* NEG^Negative Final     Source 04/24/2020 Midstream Urine   Final     RBC Urine 04/24/2020 0  0 - 2 /HPF Final     WBC Urine 04/24/2020 >182* 0 - 5 /HPF Final     WBC Clumps 04/24/2020 Present* NEG^Negative /HPF Final     Bacteria Urine 04/24/2020 Many* NEG^Negative /HPF Final     Squamous Epithelial /HPF Urine 04/24/2020 1  0 - 1 /HPF Final               Phone call duration: 15 minutes

## 2021-02-13 ASSESSMENT — ANXIETY QUESTIONNAIRES: GAD7 TOTAL SCORE: 3

## 2021-02-13 NOTE — TELEPHONE ENCOUNTER
[facility] now requests orders and shares plan of care/discharge summaries for some patients through [facility].  Please REPLY TO THIS MESSAGE OR ROUTE BACK TO THE AUTHOR in order to give authorization for orders when needed.  This is considered a verbal order, you will still receive a faxed copy of orders for signature.  Thank you for your assistance in improving collaboration for our patients.    ORDER: OT1 Q 14D 1 evaluation and treatment for equipment and DME needs, ADLs, cognition assessment, energy conservation techniques.  Akanksha Menendez, PT  [phone]  [email or web address]

## 2021-02-15 ENCOUNTER — TELEPHONE (OUTPATIENT)
Dept: FAMILY MEDICINE | Facility: CLINIC | Age: 76
End: 2021-02-15

## 2021-02-15 DIAGNOSIS — G89.29 OTHER CHRONIC PAIN: ICD-10-CM

## 2021-02-15 NOTE — TELEPHONE ENCOUNTER
Reason for Call:  Other prescription    Detailed comments: patient states during her virtual visit on 2/12 Tatiana Napoles was going to send in two medications for her, but when she received her delivery from [facility] they had only received the RX for the mirtazapine.    Oxycodone appears to have been a local print and did not make it to the pharmacy. Are we able to give a verbal order to the pharmacy, or re-send the prescription electronically?    Phone Number Patient can be reached at: Home number on file [phone] (home)    Best Time: anytime    Can we leave a detailed message on this number? YES    Call taken on 2/15/2021 at 2:26 PM by Cj Bennett

## 2021-02-16 ENCOUNTER — TELEPHONE (OUTPATIENT)
Dept: FAMILY MEDICINE | Facility: CLINIC | Age: 76
End: 2021-02-16

## 2021-02-16 DIAGNOSIS — Z12.11 COLON CANCER SCREENING: Primary | ICD-10-CM

## 2021-02-16 RX ORDER — OXYCODONE AND ACETAMINOPHEN 5; 325 MG/1; MG/1
TABLET ORAL
Qty: 60 TABLET | Refills: 0 | Status: SHIPPED | OUTPATIENT
Start: 2021-02-16 | End: 2021-04-19

## 2021-02-16 NOTE — TELEPHONE ENCOUNTER
Prior Authorization Retail Medication Request    Medication/Dose: oxyCODONE-acetaminophen (PERCOCET) 5-325 MG tablet    ICD code (if different than what is on RX):  mica  Previously Tried and Failed:  mica  Rationale:  na    Insurance Name:  mica  Insurance ID:  na      Pharmacy Information (if different than what is on RX)  Name:  [facility]  Phone:  na

## 2021-02-16 NOTE — LETTER
February 16, 2021      Ledy Odonnell  [address]  [facility] [address]        Dear Ledy,       February 16, 2021    Ledy Odonnell  [address]  [facility] [address]    Dear Parent/Guardian of Ledy,    We care about your health and have reviewed your health plan. We have reviewed your medical conditions, medication list, and lab results and are making recommendations based on this review, to better manage your health.    You are in particular need of attention regarding:  - Depression  - Scheduling a Breast Cancer Screening (Mammography) [phone]. If this has been done elsewhere within the last 2 years, please let us know when, where, and the result so we can add it to your medical history  - Scheduling a Colon Cancer Screening (Colonoscopy only) [phone] for [facility], call clinic for referral elsewhere  - Scheduling an Annual Physical / Wellness Visit with your primary care provider    Here is a list of Health Maintenance topics that are due now or due soon:  Health Maintenance Due   Topic Date Due     DEPRESSION ACTION PLAN  1945     ADVANCE CARE PLANNING  08/26/2016     MEDICARE ANNUAL WELLNESS VISIT  09/13/2017     LUNG CANCER SCREENING ANNUAL  03/07/2018     MAMMO SCREENING  10/18/2018     URINE DRUG SCREEN  01/10/2019     LIPID  06/30/2019     ALT  04/26/2020     COLORECTAL CANCER SCREENING  09/15/2020       Please call us at [phone] (or use [employer]) to address the above recommendations.     Thank you for trusting [facility] with your healthcare needs. We appreciate the opportunity to serve you and look forward to supporting you in the future.    Healthy Regards,      ROSCOE Ridley CNP        Your clinic record indicates that you are due for Colonoscopy or yearly stool blood testing (FIT).      Your Provider, Vika Napoles NP suggested you to complete the Cologuard test.   Cologuard is a noninvasive colon cancer screening  test for people 45 years of age and older who are at average risk for colon cancer.  The test will be mailed to your home address on file. Please complete that at your earliest convenience.    If you have questions about this letter please contact your provider.    Sincerely,    Your [facility] Team

## 2021-02-16 NOTE — TELEPHONE ENCOUNTER
Tatiana, please sign the order if appropriate.  Thank you.  Panel management letter mailed to patient's home address.   Yanet Sadler CMA ([facility])

## 2021-02-16 NOTE — LETTER
April 27, 2021      Ledy Odonnell  [address]  [facility] [address]        Dear ,    We are writing to inform you of your test results.    Your test results fall within the expected range(s) or remain unchanged from previous results.  Please continue with current treatment plan.    Resulted Orders   COLOGUARD(EXACT SCIENCES)   Result Value Ref Range    COLOGUARD-ABSTRACT Negative        If you have any questions or concerns, please call the clinic at the number listed above.       Sincerely,      ROSCOE Ellis CNP

## 2021-02-16 NOTE — TELEPHONE ENCOUNTER
Called pt, no answer, mesg below states can leave a detailed mesg. Left a mesg stating rx was sent to [facility] pharmacy and to call back with any further questions.    Junie Elena RN

## 2021-02-19 NOTE — TELEPHONE ENCOUNTER
Prior Authorization Approval    Authorization Effective Date: 1/20/2021  Authorization Expiration Date: 2/19/2022  Medication: oxyCODONE-acetaminophen (PERCOCET) 5-325 MG tablet  Approved Dose/Quantity:   Reference #: [id number]   Insurance Company: [employer] - Phone [phone] Fax [phone]  Expected CoPay:       CoPay Card Available:      Foundation Assistance Needed:    Which Pharmacy is filling the prescription (Not needed for infusion/clinic administered): [facility] [address] [place] [address]  Pharmacy Notified: Yes  Patient Notified: Yes, **Instructed pharmacy to notify patient when script is ready to /ship.**

## 2021-02-19 NOTE — TELEPHONE ENCOUNTER
PA Initiation    Medication: oxyCODONE-acetaminophen (PERCOCET) 5-325 MG tablet  Insurance Company: [employer] - Phone [phone] Fax [phone]  Pharmacy Filling the Rx: [facility], [place] - [place], [place] - [address] [place] [address]  Filling Pharmacy Phone: [phone]  Filling Pharmacy Fax: [phone]  Start Date: 2/19/2021

## 2021-02-26 ENCOUNTER — VIRTUAL VISIT (OUTPATIENT)
Dept: FAMILY MEDICINE | Facility: CLINIC | Age: 76
End: 2021-02-26
Payer: COMMERCIAL

## 2021-02-26 DIAGNOSIS — G89.4 CHRONIC PAIN DISORDER: ICD-10-CM

## 2021-02-26 DIAGNOSIS — F33.1 MAJOR DEPRESSIVE DISORDER, RECURRENT EPISODE, MODERATE (H): ICD-10-CM

## 2021-02-26 DIAGNOSIS — F51.01 PRIMARY INSOMNIA: ICD-10-CM

## 2021-02-26 PROCEDURE — 96127 BRIEF EMOTIONAL/BEHAV ASSMT: CPT | Mod: 59 | Performed by: NURSE PRACTITIONER

## 2021-02-26 PROCEDURE — 99214 OFFICE O/P EST MOD 30 MIN: CPT | Mod: 95 | Performed by: NURSE PRACTITIONER

## 2021-02-26 RX ORDER — MIRTAZAPINE 7.5 MG/1
7.5 TABLET, FILM COATED ORAL AT BEDTIME
Qty: 90 TABLET | Refills: 3 | Status: SHIPPED | OUTPATIENT
Start: 2021-02-26 | End: 2021-06-01

## 2021-02-26 ASSESSMENT — ANXIETY QUESTIONNAIRES
5. BEING SO RESTLESS THAT IT IS HARD TO SIT STILL: NOT AT ALL
IF YOU CHECKED OFF ANY PROBLEMS ON THIS QUESTIONNAIRE, HOW DIFFICULT HAVE THESE PROBLEMS MADE IT FOR YOU TO DO YOUR WORK, TAKE CARE OF THINGS AT HOME, OR GET ALONG WITH OTHER PEOPLE: NOT DIFFICULT AT ALL
2. NOT BEING ABLE TO STOP OR CONTROL WORRYING: SEVERAL DAYS
7. FEELING AFRAID AS IF SOMETHING AWFUL MIGHT HAPPEN: NOT AT ALL
1. FEELING NERVOUS, ANXIOUS, OR ON EDGE: NOT AT ALL
6. BECOMING EASILY ANNOYED OR IRRITABLE: NOT AT ALL
GAD7 TOTAL SCORE: 1
3. WORRYING TOO MUCH ABOUT DIFFERENT THINGS: NOT AT ALL

## 2021-02-26 ASSESSMENT — PATIENT HEALTH QUESTIONNAIRE - PHQ9
5. POOR APPETITE OR OVEREATING: NOT AT ALL
SUM OF ALL RESPONSES TO PHQ QUESTIONS 1-9: 2

## 2021-02-26 NOTE — PROGRESS NOTES
Ledy is a 75 year old who is being evaluated via a billable telephone visit.      What phone number would you like to be contacted at? [phone]  How would you like to obtain your AVS?     Assessment & Plan   Problem List Items Addressed This Visit     Insomnia    Chronic pain disorder      Other Visit Diagnoses     Major depressive disorder, recurrent episode, moderate (H)             Improved sleep and depression with adding mirtazapine 7.5 HS   discussed pain medication ok to refill in PCP absence taking oxycodone 5-325 mg 1 tablet daily maximum. Next refill due mid April.           minutes spent on the date of the encounter doing chart review, history and exam, documentation and further activities as noted above       FUTURE APPOINTMENTS:       - Follow-up visit in 3 months     No follow-ups on file.    ROSCOE Ridley [facility]   Ledy is a 75 year old who presents for the following health issues     HPI       Depression and Anxiety Follow-Up    How are you doing with your depression since your last visit? Improved a little     How are you doing with your anxiety since your last visit?  Improved a little with weather getting warmer     Are you having other symptoms that might be associated with depression or anxiety? No    Have you had a significant life event? No     Do you have any concerns with your use of alcohol or other drugs? No  Says she started mirtazapine 7.5 mg HS and she notices it a bit.     Chronic pain  she is taking it late afternoon or early evening and it helps. She only takes it when she needs it.   She is getting an electric WC by ortho  Has technician comes out this week to fit for chair   Social History     Tobacco Use     Smoking status: Current Every Day Smoker     Packs/day: 0.50     Years: 43.00     Pack years: 21.50     Types: Cigarettes     Smokeless tobacco: Never Used   Substance Use Topics     Alcohol use: No      Alcohol/week: 0.0 standard drinks     Drug use: No     PHQ 5/28/2020 6/9/2020 2/12/2021   PHQ-9 Total Score 3 2 7   Q9: Thoughts of better off dead/self-harm past 2 weeks Not at all Not at all Not at all     LESTER-7 SCORE 5/28/2020 6/9/2020 2/12/2021   Total Score - - -   Total Score 2 2 3     Last PHQ-9 2/26/2021   1.  Little interest or pleasure in doing things 0   2.  Feeling down, depressed, or hopeless 0   3.  Trouble falling or staying asleep, or sleeping too much 1   4.  Feeling tired or having little energy 0   5.  Poor appetite or overeating 0   6.  Feeling bad about yourself 0   7.  Trouble concentrating 1   8.  Moving slowly or restless 0   Q9: Thoughts of better off dead/self-harm past 2 weeks 0   PHQ-9 Total Score 2   Difficulty at work, home, or with people Not difficult at all     LESTER-7  2/26/2021   1. Feeling nervous, anxious, or on edge 0   2. Not being able to stop or control worrying 1   3. Worrying too much about different things 0   4. Trouble relaxing 0   5. Being so restless that it is hard to sit still 0   6. Becoming easily annoyed or irritable 0   7. Feeling afraid, as if something awful might happen 0   LESTER-7 Total Score 1   If you checked any problems, how difficult have they made it for you to do your work, take care of things at home, or get along with other people? Not difficult at all       Suicide Assessment Five-step Evaluation and Treatment (SAFE-T)      How many servings of fruits and vegetables do you eat daily?  2-3    On average, how many sweetened beverages do you drink each day (Examples: soda, juice, sweet tea, etc.  Do NOT count diet or artificially sweetened beverages)?   0    How many days per week do you exercise enough to make your heart beat faster?     How many minutes a day do you exercise enough to make your heart beat faster?     How many days per week do you miss taking your medication? 0      Review of Systems   Constitutional, HEENT, cardiovascular, pulmonary, GI,  , musculoskeletal, neuro, skin, endocrine and psych systems are negative, except as otherwise noted.      Objective           Vitals:  No vitals were obtained today due to virtual visit.    Physical Exam   healthy, alert and no distress  PSYCH: Alert and oriented times 3; coherent speech, normal   rate and volume, able to articulate logical thoughts, able   to abstract reason, no tangential thoughts, no hallucinations   or delusions  Her affect is normal  RESP: No cough, no audible wheezing, able to talk in full sentences  Remainder of exam unable to be completed due to telephone visits    Orders Only on 04/24/2020   Component Date Value Ref Range Status     Specimen Description 04/24/2020 Midstream Urine   Final     Culture Micro 04/24/2020    Final                    Value:>100,000 colonies/mL  mixed urogenital jairo  Susceptibility testing not routinely done       Color Urine 04/24/2020 Yellow   Final     Appearance Urine 04/24/2020 Cloudy   Final     Glucose Urine 04/24/2020 Negative  NEG^Negative mg/dL Final     Bilirubin Urine 04/24/2020 Negative  NEG^Negative Final     Ketones Urine 04/24/2020 Negative  NEG^Negative mg/dL Final     Specific Gravity Urine 04/24/2020 1.013  1.003 - 1.035 Final     Blood Urine 04/24/2020 Small* NEG^Negative Final     pH Urine 04/24/2020 5.5  5.0 - 7.0 pH Final     Protein Albumin Urine 04/24/2020 30* NEG^Negative mg/dL Final     Urobilinogen mg/dL 04/24/2020 Normal  0.0 - 2.0 mg/dL Final     Nitrite Urine 04/24/2020 Negative  NEG^Negative Final     Leukocyte Esterase Urine 04/24/2020 Large* NEG^Negative Final     Source 04/24/2020 Midstream Urine   Final     RBC Urine 04/24/2020 0  0 - 2 /HPF Final     WBC Urine 04/24/2020 >182* 0 - 5 /HPF Final     WBC Clumps 04/24/2020 Present* NEG^Negative /HPF Final     Bacteria Urine 04/24/2020 Many* NEG^Negative /HPF Final     Squamous Epithelial /HPF Urine 04/24/2020 1  0 - 1 /HPF Final             Phone call duration: 8 minutes

## 2021-02-27 ASSESSMENT — ANXIETY QUESTIONNAIRES: GAD7 TOTAL SCORE: 1

## 2021-03-05 ENCOUNTER — TELEPHONE (OUTPATIENT)
Dept: FAMILY MEDICINE | Facility: CLINIC | Age: 76
End: 2021-03-05

## 2021-03-05 NOTE — TELEPHONE ENCOUNTER
Forms received from [facility]/ Order # [id number]/For Order :SN 3 Mo, 1 mo 1, 2 as needed for Vika Napoles NP.  Forms placed in provider 'sign me' folder.  Please fax forms to [phone] after completion.    OLAF REDD (R)  Radiology Department

## 2021-03-09 ENCOUNTER — IMMUNIZATION (OUTPATIENT)
Dept: NURSING | Facility: CLINIC | Age: 76
End: 2021-03-09
Payer: COMMERCIAL

## 2021-03-09 PROCEDURE — 91300 PR COVID VAC PFIZER DIL RECON 30 MCG/0.3 ML IM: CPT

## 2021-03-09 PROCEDURE — 0001A PR COVID VAC PFIZER DIL RECON 30 MCG/0.3 ML IM: CPT

## 2021-03-30 ENCOUNTER — IMMUNIZATION (OUTPATIENT)
Dept: NURSING | Facility: CLINIC | Age: 76
End: 2021-03-30
Attending: FAMILY MEDICINE
Payer: COMMERCIAL

## 2021-03-30 PROCEDURE — 0002A PR COVID VAC PFIZER DIL RECON 30 MCG/0.3 ML IM: CPT

## 2021-03-30 PROCEDURE — 91300 PR COVID VAC PFIZER DIL RECON 30 MCG/0.3 ML IM: CPT

## 2021-04-05 LAB — COLOGUARD-ABSTRACT: NEGATIVE

## 2021-04-08 ENCOUNTER — TELEPHONE (OUTPATIENT)
Dept: UROLOGY | Facility: CLINIC | Age: 76
End: 2021-04-08

## 2021-04-08 NOTE — TELEPHONE ENCOUNTER
Diane [facility] called needs orders for [employer] skilled nursing for 1x month for 2 months add 2 PRN visits.    Also Diane seen patient today refused her to put the catheter back in and said she wanted to wait a week or so. Diane told her if she does not put back in, in a week she will have to discharge her patient understood.  Theresa Eagle  [employer] [place],

## 2021-04-13 ENCOUNTER — MEDICAL CORRESPONDENCE (OUTPATIENT)
Dept: HEALTH INFORMATION MANAGEMENT | Facility: CLINIC | Age: 76
End: 2021-04-13

## 2021-04-14 DIAGNOSIS — G89.29 OTHER CHRONIC PAIN: ICD-10-CM

## 2021-04-16 NOTE — TELEPHONE ENCOUNTER
Routing refill request to provider for review/approval because:  Drug not on the [facility] refill protocol   Joan Krishnamurthy RN

## 2021-04-19 ENCOUNTER — TELEPHONE (OUTPATIENT)
Dept: UROLOGY | Facility: CLINIC | Age: 76
End: 2021-04-19

## 2021-04-19 RX ORDER — OXYCODONE AND ACETAMINOPHEN 5; 325 MG/1; MG/1
TABLET ORAL
Qty: 60 TABLET | Refills: 0 | Status: SHIPPED | OUTPATIENT
Start: 2021-04-19 | End: 2021-06-01

## 2021-04-19 NOTE — TELEPHONE ENCOUNTER
Reason for Call: Request for an order or referral:    Order or referral being requested: verbal orders for changing moctezuma catheter once every four weeks and prn, using up to 24 Portuguese 20cc balloon.  Okay to irrigate up to 250ml normal saline or sterile water if catheter clogged and no urine output for 4-8 hours.    Date needed: as soon as possible    Can we leave a detailed message on this number?  YES    Call taken on 4/19/2021 at 8:34 AM by Deepika Brooks

## 2021-04-21 ENCOUNTER — MEDICAL CORRESPONDENCE (OUTPATIENT)
Dept: HEALTH INFORMATION MANAGEMENT | Facility: CLINIC | Age: 76
End: 2021-04-21

## 2021-04-28 ENCOUNTER — TELEPHONE (OUTPATIENT)
Dept: CARE COORDINATION | Facility: CLINIC | Age: 76
End: 2021-04-28

## 2021-04-28 DIAGNOSIS — J44.9 CHRONIC OBSTRUCTIVE PULMONARY DISEASE, UNSPECIFIED COPD TYPE (H): ICD-10-CM

## 2021-04-28 NOTE — TELEPHONE ENCOUNTER
[facility] now requests orders and shares plan of care/discharge summaries for some patients through [employer].  Please REPLY TO THIS MESSAGE OR ROUTE BACK TO THE AUTHOR in order to give authorization for orders when needed.  This is considered a verbal order, you will still receive a faxed copy of orders for signature.  Thank you for your assistance in improving collaboration for our patients.      Parviz Sandy, patient Ev Odonnell has an albuterol inhaler that needs refills. She cannot remember who prescribed her the medication.       Please advise,  Sarah Tovar, RN Case Manager  [phone]

## 2021-04-29 RX ORDER — ALBUTEROL SULFATE 90 UG/1
1-2 AEROSOL, METERED RESPIRATORY (INHALATION) EVERY 6 HOURS PRN
Qty: 18 G | Refills: 1 | Status: SHIPPED | OUTPATIENT
Start: 2021-04-29

## 2021-04-29 NOTE — TELEPHONE ENCOUNTER
Spoke to patient.  Understands she needs to schedule visit if she requires any more refills.  She states she takes this PRN and has never really needed it, only when she gets a cold.      At the moment she does not need an appointment but she knows if she wants this refilled again she will need to schedule a visit in clinic.    Mary Flaherty/

## 2021-04-29 NOTE — TELEPHONE ENCOUNTER
Looks like previous PCP prescribed in 2018. I will send in a month's worth. She is due for a related visit with someone.    Thanks.  Max Hammond, MPAS, PA-C

## 2021-05-12 ENCOUNTER — OFFICE VISIT (OUTPATIENT)
Dept: ORTHOPEDICS | Facility: CLINIC | Age: 76
End: 2021-05-12
Payer: COMMERCIAL

## 2021-05-12 VITALS
BODY MASS INDEX: 25.83 KG/M2 | WEIGHT: 155 LBS | HEART RATE: 96 BPM | DIASTOLIC BLOOD PRESSURE: 81 MMHG | SYSTOLIC BLOOD PRESSURE: 144 MMHG | HEIGHT: 65 IN | OXYGEN SATURATION: 99 %

## 2021-05-12 DIAGNOSIS — M25.512 CHRONIC PAIN OF BOTH SHOULDERS: ICD-10-CM

## 2021-05-12 DIAGNOSIS — M12.811 ROTATOR CUFF ARTHROPATHY OF BOTH SHOULDERS: ICD-10-CM

## 2021-05-12 DIAGNOSIS — M17.11 PRIMARY OSTEOARTHRITIS OF RIGHT KNEE: ICD-10-CM

## 2021-05-12 DIAGNOSIS — M12.812 ROTATOR CUFF ARTHROPATHY OF BOTH SHOULDERS: ICD-10-CM

## 2021-05-12 DIAGNOSIS — G89.29 CHRONIC PAIN OF BOTH SHOULDERS: ICD-10-CM

## 2021-05-12 DIAGNOSIS — R20.0 NUMBNESS OF FINGERS OF BOTH HANDS: Primary | ICD-10-CM

## 2021-05-12 DIAGNOSIS — M25.511 CHRONIC PAIN OF BOTH SHOULDERS: ICD-10-CM

## 2021-05-12 PROCEDURE — 99213 OFFICE O/P EST LOW 20 MIN: CPT | Performed by: ORTHOPAEDIC SURGERY

## 2021-05-12 ASSESSMENT — MIFFLIN-ST. JEOR: SCORE: 1203.96

## 2021-05-12 NOTE — PATIENT INSTRUCTIONS
Cortisone Injections  Cortisone is a type of steroid. It can greatly reduce inflammation (swelling, redness, and irritation). Being injected with cortisone is simple and doesn t take long. But your doctor may ask you questions about your health. Certain medical conditions, such as diabetes, can be affected by cortisone.     Your pain may be relieved by a cortisone injection.    Why Have a Cortisone Injection?  Injecting cortisone can relieve pain for anything from a sports injury to arthritis. Your doctor may suggest an injection if rest, splints, or oral medication doesn t relieve your pain. Injecting cortisone is simpler than having surgery. And cortisone may provide the lasting pain relief that can help you get out and enjoy life again.  Getting the Injection  Your injection will  start by cleaning and numbing your skin at the injection site. Next, you ll be injected with local anesthetics (for short-term pain relief) and cortisone. The injection may last a few moments. A small bandage will be applied over the injection site. You ll then be ready to go home.  After Your Injection  After being injected, make sure you don t injure the treated region. But stay active. Enjoy a walk or some other mild activity. Just be careful not to strain the region that gave you trouble.  The Next Day or Two  Some patients feel more pain after being injected. This is normal, and it will go away soon. Applying ice for 20 minutes at a time to your injury may reduce the increased pain. Rest for the first day or two. You don t need to stay in bed. But avoid tasks that may strain the injured region.  If You Have Diabetes  Cortisone injections can cause blood sugar to be increased for several days after the injection. Follow your regular plan for what to do when your blood sugar is elevated.     You may have the area injected, in general, every three to four months.  This is the estimated amount of time that the body takes to metabolize  "the \"cortisone.\"  Getting injections too frequently may result in a softening of the cartilage and cause the joint to actually wear out more quickly.      Comment: Your provider has referred you for the following:    EMG at [facility]: [facility] [phone]   [email or web address]      Please be aware that coverage of these services is subject to the terms and limitations of your health insurance plan.  Call member services at your health plan with any benefit or coverage questions.        Please bring the following with you to your appointment:      (1) Any X-Rays, CTs or MRIs which have been performed.  Contact the facility where they were done to arrange for  prior to your scheduled appointment.     (2) List of current medications   (3) This referral request    (4) Any documents/labs given to you for this referral       "

## 2021-05-12 NOTE — PROGRESS NOTES
SUBJECTIVE:  Ledy Odonnell is a 74 year old female who is seen in follow-up for bilateral shoulder rotator cuff tear arthropathy..  She is wheel chair dependant and cannot function during recovery time for a rotator cuff repair or RTSA. She is being treated non-operatively. She has been having trouble wheeling with her wheel chair due to significant shoulder pains. She has finally been approved for an electric wheelchair.    The knee is also very painful as well..  She has mild osteoarthritis in the knee as of the 2016 xrays, but is probably worse now.  Surgery on any of her joints is not an option in her mind..    Also multiple other joint pain complaints--hands and severely deformed left foot and ankle as well.  She says she woke up 2 months ago with severe burning pain in the right hand and has had some pain in the right hand and to a lesser degree in the left hand since.  She reports numbness and tingling that is fairly dense in the radial 4 digits of the right hand and to a lesser degree, the left hand.    Corticosteroid injections last visit:  1/27/21.  Amount of relief: very good.  Length of time of relief 4- 6 weeks for bilateral shoulders and right knee  .  Doing therapy/exercises: (y/n): yes occasionally.    She wants a Motorized cart.  Wants a muscle relaxer.  Insurance will not pay for Robaxin and wants a different medication.   She does not take any narcotics she reports.    GENERAL: healthy, alert and no distress  GAIT: normal   SKIN: no suspicious lesions or rashes  NEURO: Normal strength and tone, mentation intact and speech normal  VASCULAR:  normal pulses and cappillary refill   PSYCH:  mentation appears normal and affect normal/bright    SHOULDER:  Shoulder Inspection: no swelling, bruising, discoloration, or obvious deformity or asymmetry  marked muscle atrophy of rotator cuff     Range of Motion:   Active:forward flexion 70 degrees  Right knee:  Some valgus  Moderate effusion  Range of  motion 0-120   Diffuse tenderness.    She has positive Tinel's sign on both hands.  Diminished sensation in the radial 4 digits of both hands more so on the right than on the left.  She has significant osteoarthritis of the first CMC joints and has resultant severe thenar eminence atrophy bilaterally.     Xrays :2/21/18 bilateral shoulders: FINDINGS: Complete loss of the acromiohumeral interval bilaterally  compatible with chronic rotator cuff tear. Degenerative change at the  glenohumeral joints. No acute fracture or acute malalignment.     Impression:   Encounter Diagnoses   Name Primary?     Numbness of fingers of both hands Yes     Rotator cuff arthropathy of both shoulders      Chronic pain of both shoulders      Primary osteoarthritis of right knee         PLAN: With the patient's consent, right knee(s) injected intra-articularly with 80mg of Depomedrol and 4cc of local anesthetic after sterile prep.  Procedure Note:   Informed consent obtained. Risks, benefits and complications of the injection were discussed with the patient and the patient elected to proceed. Bilateral shoulder injected into the subacromial space after sterile prep, using 80mg Depomedrol and 4cc local anesthetic.    Flexeril prescription E Rx'd.  Discussed again that other such meds or refills should be discussed with her primary care provider.    Bilateral upper extremity EMGs were ordered.    Follow up via virtual visit for the results of the EMG    SALINAS Woods MD  Dept. Orthopedic Surgery  [facility]

## 2021-05-12 NOTE — LETTER
5/12/2021         RE: Ledy Odonnell  [address]  [place] [address]        Dear Colleague,    Thank you for referring your patient, Ledy Odonnell, to the [facility]. Please see a copy of my visit note below.    SUBJECTIVE:  Ledy Odonnell is a 74 year old female who is seen in follow-up for bilateral shoulder rotator cuff tear arthropathy..  She is wheel chair dependant and cannot function during recovery time for a rotator cuff repair or RTSA. She is being treated non-operatively. She has been having trouble wheeling with her wheel chair due to significant shoulder pains. She has finally been approved for an electric wheelchair.    The knee is also very painful as well..  She has mild osteoarthritis in the knee as of the 2016 xrays, but is probably worse now.  Surgery on any of her joints is not an option in her mind..    Also multiple other joint pain complaints--hands and severely deformed left foot and ankle as well.  She says she woke up 2 months ago with severe burning pain in the right hand and has had some pain in the right hand and to a lesser degree in the left hand since.  She reports numbness and tingling that is fairly dense in the radial 4 digits of the right hand and to a lesser degree, the left hand.    Corticosteroid injections last visit:  1/27/21.  Amount of relief: very good.  Length of time of relief 4- 6 weeks for bilateral shoulders and right knee  .  Doing therapy/exercises: (y/n): yes occasionally.    She wants a Motorized cart.  Wants a muscle relaxer.  Insurance will not pay for Robaxin and wants a different medication.   She does not take any narcotics she reports.    GENERAL: healthy, alert and no distress  GAIT: normal   SKIN: no suspicious lesions or rashes  NEURO: Normal strength and tone, mentation intact and speech normal  VASCULAR:  normal pulses and cappillary refill   PSYCH:  mentation appears normal and affect  normal/bright    SHOULDER:  Shoulder Inspection: no swelling, bruising, discoloration, or obvious deformity or asymmetry  marked muscle atrophy of rotator cuff     Range of Motion:   Active:forward flexion 70 degrees  Right knee:  Some valgus  Moderate effusion  Range of motion 0-120   Diffuse tenderness.    She has positive Tinel's sign on both hands.  Diminished sensation in the radial 4 digits of both hands more so on the right than on the left.  She has significant osteoarthritis of the first CMC joints and has resultant severe thenar eminence atrophy bilaterally.     Xrays :2/21/18 bilateral shoulders: FINDINGS: Complete loss of the acromiohumeral interval bilaterally  compatible with chronic rotator cuff tear. Degenerative change at the  glenohumeral joints. No acute fracture or acute malalignment.     Impression:   Encounter Diagnoses   Name Primary?     Numbness of fingers of both hands Yes     Rotator cuff arthropathy of both shoulders      Chronic pain of both shoulders      Primary osteoarthritis of right knee         PLAN: With the patient's consent, right knee(s) injected intra-articularly with 80mg of Depomedrol and 4cc of local anesthetic after sterile prep.  Procedure Note:   Informed consent obtained. Risks, benefits and complications of the injection were discussed with the patient and the patient elected to proceed. Bilateral shoulder injected into the subacromial space after sterile prep, using 80mg Depomedrol and 4cc local anesthetic.    Flexeril prescription E Rx'd.  Discussed again that other such meds or refills should be discussed with her primary care provider.    Bilateral upper extremity EMGs were ordered.    Follow up via virtual visit for the results of the EMG    SALINAS Woods MD  Dept. Orthopedic Surgery  [facility]                Again, thank you for allowing me to participate in the care of your patient.        Sincerely,        Rex Woods MD

## 2021-05-13 ENCOUNTER — TELEPHONE (OUTPATIENT)
Dept: FAMILY MEDICINE | Facility: CLINIC | Age: 76
End: 2021-05-13

## 2021-05-13 NOTE — TELEPHONE ENCOUNTER
Forms received from [employer]/ Standard Written Order - Catheter supplies (order dated 03/02/2021) for Vika Napoles CNP.  Forms placed in provider 'sign me' folder.  Please fax forms to [phone] after completion.    Ana Mullen

## 2021-05-19 ENCOUNTER — TELEPHONE (OUTPATIENT)
Dept: FAMILY MEDICINE | Facility: CLINIC | Age: 76
End: 2021-05-19

## 2021-05-19 NOTE — TELEPHONE ENCOUNTER
Routing to  [place] provider pool in Vika Napoles NP absence    [facility] looking for verbal ok for power wheelchair evaluation.    Junie Elena RN

## 2021-05-19 NOTE — TELEPHONE ENCOUNTER
"[employer]i faxed forms for provider to sign each page and date.  Writer placed in Dr. Zuluaga \"sign me\" folder      Mary Flaherty/  Stephane Jo   "

## 2021-05-19 NOTE — TELEPHONE ENCOUNTER
Patient is having her power wheelchair delivered on Monday 5/24/21 and [facility] would like OT evaluation for this visit.     Call Sarah to give verbal  ok LM/call anytime.    Mary Flaherty/  [place]

## 2021-05-24 ENCOUNTER — TELEPHONE (OUTPATIENT)
Dept: FAMILY MEDICINE | Facility: CLINIC | Age: 76
End: 2021-05-24

## 2021-05-24 NOTE — TELEPHONE ENCOUNTER
Forms received from [facility]/Client Order Report/ Order ID [id number] for Vika Napoles NP.  Forms placed in provider 'sign me' folder.  Please fax forms to [phone] after completion.    OLAF REDD (R)  Radiology Department

## 2021-05-25 DIAGNOSIS — G60.9 IDIOPATHIC PERIPHERAL NEUROPATHY: ICD-10-CM

## 2021-05-25 RX ORDER — GABAPENTIN 600 MG/1
TABLET ORAL
Qty: 90 TABLET | Refills: 0 | Status: SHIPPED | OUTPATIENT
Start: 2021-05-28 | End: 2021-07-21

## 2021-05-25 NOTE — TELEPHONE ENCOUNTER
Routing refill request to provider for review/approval because:  Drug not on the [facility] refill protocol     Junie Elena RN

## 2021-05-25 NOTE — TELEPHONE ENCOUNTER
website checked. Medication refilled however it looks like pts PCP recommended a 3 month med check follow up around this time

## 2021-05-27 ENCOUNTER — TELEPHONE (OUTPATIENT)
Dept: FAMILY MEDICINE | Facility: CLINIC | Age: 76
End: 2021-05-27

## 2021-05-27 NOTE — TELEPHONE ENCOUNTER
Spoke to patient over the phone. Patient stated that she does not have a 's licence and cannot drive, she is in a wheel chair and no one can drive her to her appointments.   Telephone visit with Mariana Lew scheduled for 6/1/2021, patient declined a video visit due to not having a smart phone and she is not on MyChart.  Yanet Sadler CMA ([facility])

## 2021-05-27 NOTE — TELEPHONE ENCOUNTER
[facility]/Client [facility]/ Order ID [id number] for Vika Napoles NP.  Forms placed in provider 'sign me' folder.  Please fax forms to [phone] after completion.    OLAF REDD (R)  Radiology Department

## 2021-06-01 ENCOUNTER — VIRTUAL VISIT (OUTPATIENT)
Dept: FAMILY MEDICINE | Facility: CLINIC | Age: 76
End: 2021-06-01
Payer: COMMERCIAL

## 2021-06-01 DIAGNOSIS — F33.0 MILD EPISODE OF RECURRENT MAJOR DEPRESSIVE DISORDER (H): ICD-10-CM

## 2021-06-01 DIAGNOSIS — F51.01 PRIMARY INSOMNIA: Primary | ICD-10-CM

## 2021-06-01 DIAGNOSIS — G89.29 OTHER CHRONIC PAIN: ICD-10-CM

## 2021-06-01 PROCEDURE — 99214 OFFICE O/P EST MOD 30 MIN: CPT | Mod: 95 | Performed by: NURSE PRACTITIONER

## 2021-06-01 RX ORDER — OXYCODONE AND ACETAMINOPHEN 5; 325 MG/1; MG/1
TABLET ORAL
Qty: 30 TABLET | Refills: 0 | Status: SHIPPED | OUTPATIENT
Start: 2021-06-16 | End: 2021-07-13

## 2021-06-01 RX ORDER — MIRTAZAPINE 15 MG/1
15 TABLET, FILM COATED ORAL AT BEDTIME
Qty: 90 TABLET | Refills: 1 | Status: SHIPPED | OUTPATIENT
Start: 2021-06-01 | End: 2021-11-02

## 2021-06-01 NOTE — PROGRESS NOTES
Ev is a 75 year old who is being evaluated via a billable telephone visit.      What phone number would you like to be contacted at? [phone]  How would you like to obtain your AVS? Mail a copy    Assessment & Plan     Other chronic pain   website was checked. She last received 60 tablets of percocet on 4/19/21 so she is not quite due for a refill yet, will refill for a fill date later this month.   - oxyCODONE-acetaminophen (PERCOCET) 5-325 MG tablet  Dispense: 30 tablet; Refill: 0    Primary insomnia  remeron increased from 7.5 to 15mg PO daily.   - mirtazapine (REMERON) 15 MG tablet  Dispense: 90 tablet; Refill: 1    Mild episode of recurrent major depressive disorder (H)  Improving per pt report. Will increase remeron from 7.5 to 15mg PO daily in hopes the higher dose will also improve insomnia.   - mirtazapine (REMERON) 15 MG tablet  Dispense: 90 tablet; Refill: 1      Return in about 3 months (around 9/1/2021) for Medication Follow up Visit.    Mariana Lew NP  [facility]    Subjective   Ev is a 75 year old who presents for the following health issues     HPI     Chronic Pain Follow-Up    Where in your body do you have pain? Right knee and bilat shoulders   How has your pain affected your ability to work? 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 NA    How well are you sleeping? Fair  How has your mood been since your last visit? About the same  someday's are better than others.   Have you had a significant life event? dear friend who recently had a stroke.   Taking medication as directed? Yes    PHQ-9 SCORE 6/9/2020 2/12/2021 2/26/2021   PHQ-9 Total Score - - -   PHQ-9 Total Score 2 7 2     LESTER-7 SCORE 6/9/2020 2/12/2021 2/26/2021   Total Score - - -   Total Score 2 3 1     No flowsheet data found.  Encounter-Level CSA - 08/25/2015:    Controlled Substance Agreement - Scan on  9/4/2015 10:45 AM: CONTROLLED SUBSTANCE AGREEMENT, 8/25/15     Patient-Level CSA:    There are no patient-level csa.       She states she takes percocet once daily. She saw orthopedics last month and states she received cortisone injections which she can get every few months. She uses a wheelchair but is able to perform pivot transfers with weight bearing on her right leg. She had imaging in 2018 of her shoulders which showed chronic bilat rotator cuff tears. She rates her pain up to a 7 which improves to a 4 on 0-10 scale with percocet. She adds she also limits additional tylenol as she knows this is also in percocet.     She states remeron helps at times with sleep but she has noticed improvement in her depression since starting this medication. She was told to stop taking trazodone and switch to remeron which she has done. She adds she no longer drives.     She adds she has a home care nurse come to her home once per month as she has a catheter in place.        Review of Systems   Constitutional, HEENT, cardiovascular, pulmonary, gi and gu systems are negative, except as otherwise noted.      Objective           Vitals:  No vitals were obtained today due to virtual visit.    Physical Exam   healthy, alert and no distress  PSYCH: Alert and oriented times 3; coherent speech, normal   rate and volume, able to articulate logical thoughts, able   to abstract reason, no tangential thoughts, no hallucinations   or delusions  Her affect is normal  RESP: No cough, no audible wheezing, able to talk in full sentences  Remainder of exam unable to be completed due to telephone visits      Phone call duration: 13 minutes

## 2021-06-07 ENCOUNTER — TELEPHONE (OUTPATIENT)
Dept: FAMILY MEDICINE | Facility: CLINIC | Age: 76
End: 2021-06-07

## 2021-06-07 ENCOUNTER — TELEPHONE (OUTPATIENT)
Dept: CARE COORDINATION | Facility: CLINIC | Age: 76
End: 2021-06-07

## 2021-06-07 DIAGNOSIS — N39.41 URGENCY INCONTINENCE: ICD-10-CM

## 2021-06-07 DIAGNOSIS — J44.9 CHRONIC OBSTRUCTIVE PULMONARY DISEASE, UNSPECIFIED COPD TYPE (H): Primary | ICD-10-CM

## 2021-06-07 NOTE — TELEPHONE ENCOUNTER
[facility] now requests orders and shares plan of care/discharge summaries for some patients through [employer].  Please REPLY TO THIS MESSAGE OR ROUTE BACK TO THE AUTHOR in order to give authorization for orders when needed.  This is considered a verbal order, you will still receive a faxed copy of orders for signature.  Thank you for your assistance in improving collaboration for our patients.      Parviz Wayne,    Need orders continuation into new cert episode.  SN to change moctezuma catheter once every four weeks and prn using 22f, 10cc balloon. Okay to irrigate up to 250ml normal saline or sterile water if catheter clogged and no urine output for 4-8 hours.  Please allow 2 SN PRN visits to assess reported signs/symptoms of UTI such as dark color urine, low urine output, foul odor, fever, fatigue. Please allow SN to collect UAUC labs for s/s of infection.  Please allow 2 SN PRN visits to assess reports of leaking, clogged, or malfunctioned catheter and urinary bag system.     Patient will have supplies ordered through [employer]. They need a new script to update the 20f catheter to 22f catheter. Patient changed to the 22f catheter because she was experiencing leakage from using the 20f. Fax new script to [employer] at [phone].      Please advise,  Sarah Tovar RN Case Manager  [phone]

## 2021-06-07 NOTE — TELEPHONE ENCOUNTER
RN called and left  for Sarah at [phone].      RN gave verbal orders for home care as requested    Stone Foster RN, BSN, PHN  [facility]

## 2021-06-07 NOTE — TELEPHONE ENCOUNTER
[facility] now requests orders and shares plan of care/discharge summaries for some patients through [employer].  Please REPLY TO THIS MESSAGE OR ROUTE BACK TO THE AUTHOR in order to give authorization for orders when needed.  This is considered a verbal order, you will still receive a faxed copy of orders for signature.  Thank you for your assistance in improving collaboration for our patients.    Parviz Wayne,  Patient Ledy Odonnell was seen today for home care recertification.  Requesting continuation of moctezuma catheter orders into the new cert episode. We are using 22F moctezuma instead of 20F moctezuma because she was having leakage with the 20F. Her supplies will come from [employer] and they need an updated script to reflect the 22F moctezuma catheter. Please fax new script to [employer] at [phone].    Requesting 3 SN PRN visits to assess  status, signs/symptoms of UTI including cloudy, dark tj urine, low urine output, fever, pain, burning with urination, frequency. Okay for SN to obtain lab for UAUC with reported symptoms.  Requesting 3 SN PRN visits for leaking catheter, clogged catheter, catheter malfunction that requires SN assessment and to change catheter if needed.  Requesting order for SN to change moctezuma catheter using

## 2021-06-07 NOTE — TELEPHONE ENCOUNTER
Verbal orders requested for home care:    Detailed comments: Re certification Skilled nursing one time a month for July and August to visit and assess GIGU and related medication changes and 3 PRN.     PT eval and treat     Routing to PCP to review and approve verbal orders as requested.     Oralia Justice, RN, BSN, PHN  [facility]: [place]

## 2021-06-07 NOTE — TELEPHONE ENCOUNTER
Reason for Call:  Other     Detailed comments: Re certification Skilled nursing one time a month for July and August to visit and assess GIGU and related medication changes and 3 PRN. Pt eval and treat       Phone Number   [facility] [phone]         Best Time:     Can we leave a detailed message on this number? YES    Call taken on 6/7/2021 at 12:15 PM by Kristen Parson

## 2021-06-08 NOTE — TELEPHONE ENCOUNTER
Home care nurse states patient needs orders to continue to use moctezuma catheters. The order needs to say that moctezuma cath size changed from 20 F to a 22 F. Supplies are coming from [employer]. Fax is [phone].    David SESAY RN....6/8/2021 3:04 PM

## 2021-06-10 ENCOUNTER — TELEPHONE (OUTPATIENT)
Dept: CARE COORDINATION | Facility: CLINIC | Age: 76
End: 2021-06-10

## 2021-06-10 NOTE — TELEPHONE ENCOUNTER
[facility] now requests orders and shares plan of care/discharge summaries for some patients through [employer].  Please REPLY TO THIS MESSAGE OR ROUTE BACK TO THE AUTHOR in order to give authorization for orders when needed.  This is considered a verbal order, you will still receive a faxed copy of orders for signature.  Thank you for your assistance in improving collaboration for our patients.      Parviz Wayne,     Need orders continuation into new cert episode.  SN to change moctezuma catheter once every four weeks and prn using 22f, 10cc balloon. Okay to irrigate up to 250ml normal saline or sterile water if catheter clogged and no urine output for 4-8 hours.  Please allow 2 SN PRN visits to assess reported signs/symptoms of UTI such as dark color urine, low urine output, foul odor, fever, fatigue. Please allow SN to collect UAUC labs for s/s of infection.  Please allow 2 SN PRN visits to assess reports of leaking, clogged, or malfunctioned catheter and urinary bag system.      Patient will have supplies ordered through [email or web address]. They need a new script to update the 20f catheter to 22f catheter. Patient changed to the 22f catheter because she was experiencing leakage from using the 20f. Fax new script to [email or web address] at [phone].    Just called [email or web address], they stated they have not received the fax from yesterday.  Please refax to [email or web address].          Thanks,  Sarah Tovar, RN Case Manager  [phone]

## 2021-06-11 ENCOUNTER — TELEPHONE (OUTPATIENT)
Dept: UROLOGY | Facility: CLINIC | Age: 76
End: 2021-06-11

## 2021-06-11 NOTE — TELEPHONE ENCOUNTER
[facility] Call Center    Phone Message    May a detailed message be left on voicemail: yes     Reason for Call: Other: Sarah calling from [facility] home care because she needs verbal orders for moctezuma and bladder irrigation.  Previous encounter in chart has details. Please give Sarah a call back to provider verbal orders.    Action Taken: Message routed to:  [facility] ([facility]): uro    Travel Screening: Not Applicable

## 2021-06-15 NOTE — TELEPHONE ENCOUNTER
See separate TE regarding same concern.     Oralia Justice, RN, BSN, PHN  [facility]: [place]     Universal Safety Interventions

## 2021-06-16 ENCOUNTER — TELEPHONE (OUTPATIENT)
Dept: CARE COORDINATION | Facility: CLINIC | Age: 76
End: 2021-06-16

## 2021-06-16 DIAGNOSIS — N39.41 URGENCY INCONTINENCE: Primary | ICD-10-CM

## 2021-06-16 NOTE — TELEPHONE ENCOUNTER
[facility] now requests orders and shares plan of care/discharge summaries for some patients through [employer].  Please REPLY TO THIS MESSAGE OR ROUTE BACK TO THE AUTHOR in order to give authorization for orders when needed.  This is considered a verbal order, you will still receive a faxed copy of orders for signature.  Thank you for your assistance in improving collaboration for our patients.      Parviz Wayne,    This writer reached out to [employer] today, they have received the order for 22F moctezuma catheter. Patient have [employer] insurance and the patient is only allowed 1 catheter and 1 insertion tray per month. SN recommend 2 of each per month in case there are issues that arise before the next change, however, [facility] requires supporting evidence. Is it possible to get an order back over to [employer] to send to [facility] with this information that would authorize up to 2 moctezuma catheters & 2 insertion trays per month?  Patient have had leakage in the past, torn leg bag, and currently at [facility] from what appears to be a UTI.        Please advise,  Sarah Tovar RN Case Manager  [phone]

## 2021-06-17 ENCOUNTER — TELEPHONE (OUTPATIENT)
Dept: FAMILY MEDICINE | Facility: CLINIC | Age: 76
End: 2021-06-17

## 2021-06-17 NOTE — TELEPHONE ENCOUNTER
Forms received from [facility]/ Missed Visit Report/ 6/17/2021 for Mariana Lew.  Forms placed in provider 'sign me' folder.  Please fax forms to [phone] after completion.    OLAF REDD (R)  Radiology Department

## 2021-06-20 ENCOUNTER — NURSE TRIAGE (OUTPATIENT)
Dept: NURSING | Facility: CLINIC | Age: 76
End: 2021-06-20

## 2021-06-20 NOTE — TELEPHONE ENCOUNTER
Caller is nurse from [facility] home care; patient returned home today after admission  Need following orders      1) Ot to evaluate an treat for new power chair   2) skilled nursing visits 2x month for 2 months 1x month for 1 month and  3 prn  Monitor new medication and  moctezuma catheter.     Advised that per protocol okay to begin.   Will route to  PCP and clinic to place orders and  Sign  Lis Pacheco RN  FNA            Reason for Disposition    General information question, no triage required and triager able to answer question    Protocols used: INFORMATION ONLY CALL - NO TRIAGE-A-

## 2021-06-21 DIAGNOSIS — Z53.9 DIAGNOSIS NOT YET DEFINED: Primary | ICD-10-CM

## 2021-06-21 PROCEDURE — G0179 MD RECERTIFICATION HHA PT: HCPCS | Performed by: NURSE PRACTITIONER

## 2021-06-21 NOTE — TELEPHONE ENCOUNTER
Called Nura with Home care and left detailed message on secure voice mailed with verbal okay for SN orders listed below.    Junie Elena RN

## 2021-06-23 ENCOUNTER — TELEPHONE (OUTPATIENT)
Dept: FAMILY MEDICINE | Facility: CLINIC | Age: 76
End: 2021-06-23

## 2021-06-23 NOTE — TELEPHONE ENCOUNTER
Forms received from [facility]/ Resumption of Care #[id number] (order date: 6/20/21) for Vika Napoles MD.  Forms placed in provider 'sign me' folder.  Please fax forms to [phone] after completion.    Max Gaspar RT (R) (ARRT)  Radiology Dept

## 2021-07-01 ENCOUNTER — TELEPHONE (OUTPATIENT)
Dept: FAMILY MEDICINE | Facility: CLINIC | Age: 76
End: 2021-07-01

## 2021-07-01 NOTE — TELEPHONE ENCOUNTER
Forms received from [email or web address] [facility]/ Add of Discipline - Order # [id number] (printed: 7/1/21) for Vika Napoles NP.  Forms placed in provider 'sign me' folder.  Please fax forms to [phone] after completion.    Max Gaspar RT (R) (ARRT)  Radiology Dept

## 2021-07-09 ENCOUNTER — TELEPHONE (OUTPATIENT)
Dept: FAMILY MEDICINE | Facility: CLINIC | Age: 76
End: 2021-07-09

## 2021-07-09 NOTE — TELEPHONE ENCOUNTER
Forms received from [facility]/Physician Order/ Order Date: 7/8/2021/ Order # [id number] for Vika Napoles NP.  Forms placed in provider 'sign me' folder.  Please fax forms to [phone] after completion.    OLAF REDD (R)  Radiology Department

## 2021-07-12 DIAGNOSIS — G89.29 OTHER CHRONIC PAIN: ICD-10-CM

## 2021-07-12 DIAGNOSIS — G89.4 CHRONIC PAIN DISORDER: Primary | ICD-10-CM

## 2021-07-12 NOTE — TELEPHONE ENCOUNTER
Routing refill request to provider for review/approval because:  Drug not on the [facility] refill protocol     Oralia Justice RN, BSN, PHN  [facility]: [place]

## 2021-07-12 NOTE — TELEPHONE ENCOUNTER
Needs her oxyCODONE-acetaminophen (PERCOCET) 5-325 MG tablet sent [place] [facility] [address]  Team Nagi,

## 2021-07-13 RX ORDER — OXYCODONE AND ACETAMINOPHEN 5; 325 MG/1; MG/1
TABLET ORAL
Qty: 30 TABLET | Refills: 0 | Status: SHIPPED | OUTPATIENT
Start: 2021-07-13 | End: 2021-08-13

## 2021-07-21 DIAGNOSIS — G60.9 IDIOPATHIC PERIPHERAL NEUROPATHY: ICD-10-CM

## 2021-07-21 RX ORDER — GABAPENTIN 600 MG/1
TABLET ORAL
Qty: 90 TABLET | Refills: 0 | Status: SHIPPED | OUTPATIENT
Start: 2021-07-21 | End: 2021-10-15

## 2021-07-21 NOTE — TELEPHONE ENCOUNTER
Reason for Call:  Medication or medication refill:    Do you use a [facility]?  Name of the pharmacy and phone number for the current request:  [facility]    Name of the medication requested: gabapentin (NEURONTIN) 600 MG tablet    Other request: no refill on file with pharmacy    Can we leave a detailed message on this number? YES    Phone number patient can be reached at: Home number on file [phone] (home)    Best Time: anytime    Call taken on 7/21/2021 at 11:13 AM by Ruth Forman

## 2021-07-29 ENCOUNTER — TELEPHONE (OUTPATIENT)
Dept: FAMILY MEDICINE | Facility: CLINIC | Age: 76
End: 2021-07-29

## 2021-07-30 ENCOUNTER — TELEPHONE (OUTPATIENT)
Dept: FAMILY MEDICINE | Facility: CLINIC | Age: 76
End: 2021-07-30

## 2021-07-30 NOTE — TELEPHONE ENCOUNTER
Forms received from [facility]/Physician Order/ Order # [id number]/ Order Date: 7/28/2021 for Mariana Lew.  Forms placed in provider 'sign me' folder.  Please fax forms to [phone] after completion.    OLAF REDD (R)  Radiology Department

## 2021-08-10 ENCOUNTER — MEDICAL CORRESPONDENCE (OUTPATIENT)
Dept: HEALTH INFORMATION MANAGEMENT | Facility: CLINIC | Age: 76
End: 2021-08-10

## 2021-08-11 ENCOUNTER — TELEPHONE (OUTPATIENT)
Dept: FAMILY MEDICINE | Facility: CLINIC | Age: 76
End: 2021-08-11

## 2021-08-11 NOTE — TELEPHONE ENCOUNTER
Judith cheung nurse from [facility] is calling for verbal orders.     1x a month for 2 month, 3PRN    Call  ok to alistair Flaherty/TeamCoordinator  Stephane Elizabeth   Mohs Rapid Report Verbiage: The area of clinically evident tumor was marked with skin marking ink and appropriately hatched.  The initial incision was made following the Mohs approach through the skin.  The specimen was taken to the lab, divided into the necessary number of pieces, chromacoded and processed according to the Mohs protocol.  This was repeated in successive stages until a tumor free defect was achieved.

## 2021-08-11 NOTE — TELEPHONE ENCOUNTER
Routing to PCP    [facility] calling for verbal home care orders.    Stone Foster, RN, BSN, PHN  [facility]

## 2021-08-11 NOTE — TELEPHONE ENCOUNTER
Called Judith with [facility] and gave verbal okay for SN order per providers message below    Junie Elena RN

## 2021-08-13 ENCOUNTER — OFFICE VISIT (OUTPATIENT)
Dept: FAMILY MEDICINE | Facility: CLINIC | Age: 76
End: 2021-08-13
Payer: COMMERCIAL

## 2021-08-13 VITALS — SYSTOLIC BLOOD PRESSURE: 120 MMHG | DIASTOLIC BLOOD PRESSURE: 70 MMHG

## 2021-08-13 DIAGNOSIS — E78.5 HYPERLIPIDEMIA LDL GOAL <130: ICD-10-CM

## 2021-08-13 DIAGNOSIS — B37.2 CANDIDAL INTERTRIGO: ICD-10-CM

## 2021-08-13 DIAGNOSIS — J44.9 CHRONIC OBSTRUCTIVE PULMONARY DISEASE, UNSPECIFIED COPD TYPE (H): ICD-10-CM

## 2021-08-13 DIAGNOSIS — F17.200 TOBACCO USE DISORDER: ICD-10-CM

## 2021-08-13 DIAGNOSIS — G89.4 CHRONIC PAIN DISORDER: ICD-10-CM

## 2021-08-13 DIAGNOSIS — Z87.440 RECENT URINARY TRACT INFECTION: ICD-10-CM

## 2021-08-13 DIAGNOSIS — F33.1 MAJOR DEPRESSIVE DISORDER, RECURRENT EPISODE, MODERATE (H): ICD-10-CM

## 2021-08-13 DIAGNOSIS — Z00.00 ENCOUNTER FOR MEDICARE ANNUAL WELLNESS EXAM: Primary | ICD-10-CM

## 2021-08-13 LAB
ALBUMIN UR-MCNC: 30 MG/DL
APPEARANCE UR: ABNORMAL
BACTERIA #/AREA URNS HPF: ABNORMAL /HPF
BILIRUB UR QL STRIP: NEGATIVE
COLOR UR AUTO: YELLOW
GLUCOSE UR STRIP-MCNC: NEGATIVE MG/DL
HGB UR QL STRIP: ABNORMAL
KETONES UR STRIP-MCNC: NEGATIVE MG/DL
LEUKOCYTE ESTERASE UR QL STRIP: ABNORMAL
NITRATE UR QL: NEGATIVE
PH UR STRIP: 7 [PH] (ref 5–7)
RBC #/AREA URNS AUTO: ABNORMAL /HPF
SP GR UR STRIP: 1.01 (ref 1–1.03)
SQUAMOUS #/AREA URNS AUTO: ABNORMAL /LPF
UROBILINOGEN UR STRIP-ACNC: 0.2 E.U./DL
WBC #/AREA URNS AUTO: ABNORMAL /HPF
WBC CLUMPS #/AREA URNS HPF: PRESENT /HPF

## 2021-08-13 PROCEDURE — 36415 COLL VENOUS BLD VENIPUNCTURE: CPT | Performed by: NURSE PRACTITIONER

## 2021-08-13 PROCEDURE — 81001 URINALYSIS AUTO W/SCOPE: CPT | Performed by: NURSE PRACTITIONER

## 2021-08-13 PROCEDURE — 80053 COMPREHEN METABOLIC PANEL: CPT | Performed by: NURSE PRACTITIONER

## 2021-08-13 PROCEDURE — 80061 LIPID PANEL: CPT | Performed by: NURSE PRACTITIONER

## 2021-08-13 PROCEDURE — 87086 URINE CULTURE/COLONY COUNT: CPT | Performed by: NURSE PRACTITIONER

## 2021-08-13 PROCEDURE — 99397 PER PM REEVAL EST PAT 65+ YR: CPT | Performed by: NURSE PRACTITIONER

## 2021-08-13 PROCEDURE — 99214 OFFICE O/P EST MOD 30 MIN: CPT | Mod: 25 | Performed by: NURSE PRACTITIONER

## 2021-08-13 PROCEDURE — 87186 SC STD MICRODIL/AGAR DIL: CPT | Performed by: NURSE PRACTITIONER

## 2021-08-13 RX ORDER — NYSTATIN 100000 U/G
CREAM TOPICAL 2 TIMES DAILY PRN
Qty: 30 G | Refills: 3 | Status: SHIPPED | OUTPATIENT
Start: 2021-08-13 | End: 2021-11-02

## 2021-08-13 RX ORDER — OXYCODONE AND ACETAMINOPHEN 5; 325 MG/1; MG/1
TABLET ORAL
Qty: 30 TABLET | Refills: 0 | Status: SHIPPED | OUTPATIENT
Start: 2021-08-13 | End: 2021-09-15

## 2021-08-13 RX ORDER — NYSTATIN 100000 [USP'U]/G
1 POWDER TOPICAL 2 TIMES DAILY PRN
Qty: 60 G | Refills: 3 | Status: SHIPPED | OUTPATIENT
Start: 2021-08-13 | End: 2021-11-02

## 2021-08-13 RX ORDER — BUPROPION HYDROCHLORIDE 150 MG/1
150 TABLET ORAL EVERY MORNING
COMMUNITY
Start: 2021-08-13 | End: 2023-01-01

## 2021-08-13 ASSESSMENT — PATIENT HEALTH QUESTIONNAIRE - PHQ9
5. POOR APPETITE OR OVEREATING: NOT AT ALL
SUM OF ALL RESPONSES TO PHQ QUESTIONS 1-9: 3

## 2021-08-13 ASSESSMENT — ANXIETY QUESTIONNAIRES
1. FEELING NERVOUS, ANXIOUS, OR ON EDGE: NOT AT ALL
GAD7 TOTAL SCORE: 0
6. BECOMING EASILY ANNOYED OR IRRITABLE: NOT AT ALL
5. BEING SO RESTLESS THAT IT IS HARD TO SIT STILL: NOT AT ALL
7. FEELING AFRAID AS IF SOMETHING AWFUL MIGHT HAPPEN: NOT AT ALL
3. WORRYING TOO MUCH ABOUT DIFFERENT THINGS: NOT AT ALL
2. NOT BEING ABLE TO STOP OR CONTROL WORRYING: NOT AT ALL
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
## 2021-08-13 NOTE — PATIENT INSTRUCTIONS
Patient Education   Personalized Prevention Plan  You are due for the preventive services outlined below.  Your care team is available to assist you in scheduling these services.  If you have already completed any of these items, please share that information with your care team to update in your medical record.  Health Maintenance Due   Topic Date Due     ANNUAL REVIEW OF HM ORDERS  Never done     Depression Action Plan  Never done     Discuss Advance Care Planning  08/26/2016     Annual Wellness Visit  09/13/2017     Lung Cancer Screening (CT Scan)  03/07/2018     Mammogram  10/18/2018     URINE DRUG SCREEN  01/10/2019     Cholesterol Lab  06/30/2019     Liver Monitoring Lab  04/26/2020     FALL RISK ASSESSMENT  07/29/2021     Depression Assessment  08/26/2021

## 2021-08-14 LAB
ALBUMIN SERPL-MCNC: 3.7 G/DL (ref 3.4–5)
ALP SERPL-CCNC: 80 U/L (ref 40–150)
ALT SERPL W P-5'-P-CCNC: 22 U/L (ref 0–50)
ANION GAP SERPL CALCULATED.3IONS-SCNC: 7 MMOL/L (ref 3–14)
AST SERPL W P-5'-P-CCNC: 22 U/L (ref 0–45)
BACTERIA UR CULT: ABNORMAL
BILIRUB SERPL-MCNC: 0.6 MG/DL (ref 0.2–1.3)
BUN SERPL-MCNC: 15 MG/DL (ref 7–30)
CALCIUM SERPL-MCNC: 10 MG/DL (ref 8.5–10.1)
CHLORIDE BLD-SCNC: 104 MMOL/L (ref 94–109)
CHOLEST SERPL-MCNC: 187 MG/DL
CO2 SERPL-SCNC: 25 MMOL/L (ref 20–32)
CREAT SERPL-MCNC: 0.81 MG/DL (ref 0.52–1.04)
FASTING STATUS PATIENT QL REPORTED: YES
GFR SERPL CREATININE-BSD FRML MDRD: 71 ML/MIN/1.73M2
GLUCOSE BLD-MCNC: 107 MG/DL (ref 70–99)
HDLC SERPL-MCNC: 49 MG/DL
LDLC SERPL CALC-MCNC: 113 MG/DL
NONHDLC SERPL-MCNC: 138 MG/DL
POTASSIUM BLD-SCNC: 4.4 MMOL/L (ref 3.4–5.3)
PROT SERPL-MCNC: 7.5 G/DL (ref 6.8–8.8)
SODIUM SERPL-SCNC: 136 MMOL/L (ref 133–144)
TRIGL SERPL-MCNC: 125 MG/DL

## 2021-08-14 ASSESSMENT — ANXIETY QUESTIONNAIRES: GAD7 TOTAL SCORE: 0

## 2021-08-16 ENCOUNTER — TELEPHONE (OUTPATIENT)
Dept: FAMILY MEDICINE | Facility: CLINIC | Age: 76
End: 2021-08-16

## 2021-08-16 DIAGNOSIS — M85.872 OTHER SPECIFIED DISORDERS OF BONE DENSITY AND STRUCTURE, LEFT ANKLE AND FOOT: ICD-10-CM

## 2021-08-16 DIAGNOSIS — E78.5 HYPERLIPIDEMIA LDL GOAL <100: Primary | ICD-10-CM

## 2021-08-16 DIAGNOSIS — N30.00 ACUTE CYSTITIS WITHOUT HEMATURIA: ICD-10-CM

## 2021-08-16 DIAGNOSIS — M85.80 OSTEOPENIA, UNSPECIFIED LOCATION: ICD-10-CM

## 2021-08-16 DIAGNOSIS — E04.2 MULTINODULAR THYROID: ICD-10-CM

## 2021-08-16 RX ORDER — NITROFURANTOIN 25; 75 MG/1; MG/1
100 CAPSULE ORAL 2 TIMES DAILY
Qty: 10 CAPSULE | Refills: 0 | Status: SHIPPED | OUTPATIENT
Start: 2021-08-16 | End: 2021-08-21

## 2021-08-16 RX ORDER — ATORVASTATIN CALCIUM 20 MG/1
20 TABLET, FILM COATED ORAL DAILY
Qty: 90 TABLET | Refills: 1 | Status: SHIPPED | OUTPATIENT
Start: 2021-08-16 | End: 2021-11-02

## 2021-08-16 NOTE — TELEPHONE ENCOUNTER
RN left message for patient at [phone] requesting call back to clinic.    RN called to relay providers message    Stone Foster RN, BSN, PHN  [facility]

## 2021-08-16 NOTE — TELEPHONE ENCOUNTER
Pt called back, reviewed lab results and recommendations. She will  both rx's and plan on f/u with 3 months with repeat fasting labs.     Notified her she should be expecting a call this week to schedule thyroid us and Dexa scan.     Pt verbalized an understanding.    Junie Elena RN

## 2021-08-16 NOTE — TELEPHONE ENCOUNTER
"Please call pt with lab results: glucose slightly elevated at 107. Cholesterol levels slightly elevated with elevated ASCVD risk score. Recommend that she start on a statin at HS and have fasting lipid panel re-checked in 2-3 months. Urine culture came back positive for e.coli. Recommend starting macrobid to treat UTI.      Ways to improve your cholesterol:     1- Eats less saturated fats (including avoiding \"trans\" fats).     2 - Eat more unsaturated fats  - found in vegetables, grains, and tree nuts.  Also by replacing butter with canola oil or olive oil.     3 - Eat more nuts. 1-2 ounces (a small handful) of almonds, walnuts, hazelnuts or pecans once a day in place of other less healthy snacks.     4 - Eat more high fiber foods - vegetables and whole grains including oat bran, oats, beans, peas, and flax seed.     5 - Eat more fish - such as salmon, tuna, mackerel, and sardines.  1 or 2 six ounce servings per week is a healthy replacement for other proteins.     6 - Exercise for at least 120 minutes per week - which is equal to 30 minutes 4 days per week.    The 10-year ASCVD risk score (Bethanierhett PAYTON Jr., et al., 2013) is: 20.1%    Values used to calculate the score:      Age: 76 years      Sex: Female      Is Non- : No      Diabetic: No      Tobacco smoker: Yes      Systolic Blood Pressure: 120 mmHg      Is BP treated: No      HDL Cholesterol: 49 mg/dL      Total Cholesterol: 187 mg/dL  "

## 2021-08-16 NOTE — TELEPHONE ENCOUNTER
Her last DEXA scan was in 2017 and abnormal thyroid ultrasound was also in 2017. Recommend repeating both, orders placed

## 2021-08-25 ENCOUNTER — TELEPHONE (OUTPATIENT)
Dept: FAMILY MEDICINE | Facility: CLINIC | Age: 76
End: 2021-08-25

## 2021-08-25 DIAGNOSIS — N30.00 ACUTE CYSTITIS WITHOUT HEMATURIA: Primary | ICD-10-CM

## 2021-08-25 RX ORDER — NITROFURANTOIN 25; 75 MG/1; MG/1
100 CAPSULE ORAL 2 TIMES DAILY
Qty: 10 CAPSULE | Refills: 0 | Status: SHIPPED | OUTPATIENT
Start: 2021-08-25 | End: 2021-08-30

## 2021-08-25 NOTE — TELEPHONE ENCOUNTER
Routing to PCP- see phone call    Called pt and reviewed note below. She states it took her over a month to get the first urine sample as she does not drive and she does not have any family members or friends that can bring her to the clinic and it costs her $50-60 to get a ride here and she can not afford that.  She states her catheter will be changed in 2 weeks by her home health nurse.   She has noticed an improvement in her symptoms but they are not completely resolved.   She was hoping to just get another dose to clear it up completely.     Notified pt that it is hard to treat something without a test and taking more antibiotics when not needed may cause more problems.     Notified pt will forward this message to PCP, but may not be able to prescribe more pills without sample.    Junie Elena RN

## 2021-08-25 NOTE — TELEPHONE ENCOUNTER
Called and notified pt of providers message below. She was very grateful for the help. She will call Pat to have the rx sent to her and will call her home care nurse to give her a heads up that she will need a urine sample collected at her next visit when catheter is changed.   Junie Elena RN

## 2021-08-25 NOTE — TELEPHONE ENCOUNTER
macrobid refilled at this time as it was susceptible per last urine culture. i'm recommending that home care complete a UC in 2 weeks when they change her catheter. If her symptoms persist/worsen prior to then, she needs to be seen.

## 2021-08-25 NOTE — TELEPHONE ENCOUNTER
If home care will be changing her catheter soon, then okay to have them collect urine culture otherwise please collect in clinic knowing it wont be a clean catch. UC only ordered as UA will likely be abnormal.

## 2021-08-25 NOTE — TELEPHONE ENCOUNTER
"Patient has indwelling urinary catheter, so \"clean catch\" would only be possible when changing catheter altogether or if straight cathed.    RN could wipe spout of catheter bag when collecting sample, but sample would not be considered clean catch.    Per note, patient receives changes monthly via home care.     Routed to PCP to review and advise.     Oralia Justice RN, BSN, PHN  [facility]: [place]      "

## 2021-08-25 NOTE — TELEPHONE ENCOUNTER
Repeat UC ordered. RN will need to collect as she has a catheter, please schedule visit for this. Also recommending that she follow up with urology due to persistent symptoms.

## 2021-08-25 NOTE — TELEPHONE ENCOUNTER
Patient was seen 8/13/21 with PCP for physical. Was noted to have : Urinary catheter/leg bag present, urine appears yellow and slightly cloudy.      Patient was prescribed 5 day course of Macrobid.     Patient states that her symptoms improved with the medication. However, she is still experiencing burning and foul odor.     She wonders if she needs another course of antibiotics to clear?     Routed to PCP to review and advise.     Oralia Justice RN, BSN, PHN  [facility]: [place]

## 2021-09-02 ENCOUNTER — TELEPHONE (OUTPATIENT)
Dept: FAMILY MEDICINE | Facility: CLINIC | Age: 76
End: 2021-09-02

## 2021-09-02 NOTE — TELEPHONE ENCOUNTER
lab order was place on 8/25/21 by PCP.     RN spoke with Judith [facility] RN. Informed her that lab order is in chart. She will collect sample and drop off.     Oralia Justice, RN, BSN, PHN  [facility]: [place]

## 2021-09-02 NOTE — TELEPHONE ENCOUNTER
Judith from [facility] is calling for verbal orders.      Order for a urine sample; per patient Vipin wanted her to have a urine done.      [phone] ok to alistair Flaherty/  [place]

## 2021-09-07 ENCOUNTER — TELEPHONE (OUTPATIENT)
Dept: FAMILY MEDICINE | Facility: CLINIC | Age: 76
End: 2021-09-07

## 2021-09-07 DIAGNOSIS — Z53.9 DIAGNOSIS NOT YET DEFINED: Primary | ICD-10-CM

## 2021-09-07 PROCEDURE — G0179 MD RECERTIFICATION HHA PT: HCPCS | Performed by: NURSE PRACTITIONER

## 2021-09-07 NOTE — TELEPHONE ENCOUNTER
Forms received from [facility] and Plan of Care - Order #[id number] (cert period: 8/11/21 to 10/9/21) for Mariana Lew NP.  Forms placed in provider 'sign me' folder.  Please fax forms to [phone] after completion.    Max Gaspar RT (R) (ARRT)  Radiology Dept

## 2021-09-08 ENCOUNTER — OFFICE VISIT (OUTPATIENT)
Dept: ORTHOPEDICS | Facility: CLINIC | Age: 76
End: 2021-09-08
Payer: COMMERCIAL

## 2021-09-08 VITALS — SYSTOLIC BLOOD PRESSURE: 124 MMHG | OXYGEN SATURATION: 96 % | HEART RATE: 91 BPM | DIASTOLIC BLOOD PRESSURE: 70 MMHG

## 2021-09-08 DIAGNOSIS — M12.811 ROTATOR CUFF ARTHROPATHY OF BOTH SHOULDERS: Primary | ICD-10-CM

## 2021-09-08 DIAGNOSIS — G62.9 PERIPHERAL POLYNEUROPATHY: ICD-10-CM

## 2021-09-08 DIAGNOSIS — M17.11 PRIMARY OSTEOARTHRITIS OF RIGHT KNEE: ICD-10-CM

## 2021-09-08 DIAGNOSIS — Q66.00 CONGENITAL TALIPES EQUINOVARUS: ICD-10-CM

## 2021-09-08 DIAGNOSIS — M12.812 ROTATOR CUFF ARTHROPATHY OF BOTH SHOULDERS: Primary | ICD-10-CM

## 2021-09-08 DIAGNOSIS — G14 POST-POLIO SYNDROME (H): ICD-10-CM

## 2021-09-08 PROCEDURE — 20610 DRAIN/INJ JOINT/BURSA W/O US: CPT | Mod: 50 | Performed by: ORTHOPAEDIC SURGERY

## 2021-09-08 PROCEDURE — 99213 OFFICE O/P EST LOW 20 MIN: CPT | Mod: 25 | Performed by: ORTHOPAEDIC SURGERY

## 2021-09-08 RX ORDER — METHYLPREDNISOLONE ACETATE 80 MG/ML
240 INJECTION, SUSPENSION INTRA-ARTICULAR; INTRALESIONAL; INTRAMUSCULAR; SOFT TISSUE ONCE
Status: COMPLETED | OUTPATIENT
Start: 2021-09-08 | End: 2021-09-08

## 2021-09-08 RX ORDER — METHYLPREDNISOLONE ACETATE 80 MG/ML
80 INJECTION, SUSPENSION INTRA-ARTICULAR; INTRALESIONAL; INTRAMUSCULAR; SOFT TISSUE
Status: DISCONTINUED | OUTPATIENT
Start: 2021-09-08 | End: 2022-07-21

## 2021-09-08 RX ORDER — METHYLPREDNISOLONE ACETATE 80 MG/ML
80 INJECTION, SUSPENSION INTRA-ARTICULAR; INTRALESIONAL; INTRAMUSCULAR; SOFT TISSUE
Status: DISCONTINUED | OUTPATIENT
Start: 2021-09-08 | End: 2023-01-01

## 2021-09-08 RX ORDER — LIDOCAINE HYDROCHLORIDE 10 MG/ML
4 INJECTION, SOLUTION EPIDURAL; INFILTRATION; INTRACAUDAL; PERINEURAL
Status: DISCONTINUED | OUTPATIENT
Start: 2021-09-08 | End: 2022-07-21

## 2021-09-08 RX ADMIN — METHYLPREDNISOLONE ACETATE 240 MG: 80 INJECTION, SUSPENSION INTRA-ARTICULAR; INTRALESIONAL; INTRAMUSCULAR; SOFT TISSUE at 10:52

## 2021-09-08 RX ADMIN — LIDOCAINE HYDROCHLORIDE 4 ML: 10 INJECTION, SOLUTION EPIDURAL; INFILTRATION; INTRACAUDAL; PERINEURAL at 10:51

## 2021-09-08 RX ADMIN — METHYLPREDNISOLONE ACETATE 80 MG: 80 INJECTION, SUSPENSION INTRA-ARTICULAR; INTRALESIONAL; INTRAMUSCULAR; SOFT TISSUE at 10:50

## 2021-09-08 RX ADMIN — METHYLPREDNISOLONE ACETATE 80 MG: 80 INJECTION, SUSPENSION INTRA-ARTICULAR; INTRALESIONAL; INTRAMUSCULAR; SOFT TISSUE at 10:51

## 2021-09-08 RX ADMIN — LIDOCAINE HYDROCHLORIDE 4 ML: 10 INJECTION, SOLUTION EPIDURAL; INFILTRATION; INTRACAUDAL; PERINEURAL at 10:50

## 2021-09-08 ASSESSMENT — PAIN SCALES - GENERAL: PAINLEVEL: MODERATE PAIN (5)

## 2021-09-08 NOTE — LETTER
9/8/2021         RE: Ledy Odonnell  [address]  [facility] [address]        Dear Colleague,    Thank you for referring your patient, Ledy Odonnell, to the [facility]. Please see a copy of my visit note below.    SUBJECTIVE:  Ledy Odonnell is a 74 year old female who is seen in follow-up for bilateral shoulder rotator cuff tear arthropathy..  She is wheel chair dependant and cannot function during recovery time for a rotator cuff repair or RTSA. She is being treated non-operatively. She has been having trouble wheeling with her wheel chair due to significant shoulder pains and hand deformities. She did get an electric wheelchair. But couldn't fit through doors and didn't sit high enough for her to reach things on tables and counters. She needs an adjustable height device, and needs one that is narrower.  The knee is also very painful as well..  She has mild osteoarthritis in the knee as of the 2016 xrays, but is probably worse now.  Surgery on any of her joints is not an option in her mind..    Also multiple other joint pain complaints--hands and severely deformed left foot and ankle as well.    Corticosteroid injections last visit:  5/12/21.  Amount of relief: very good.  Length of time of relief 4- 6 weeks for bilateral shoulders and right knee  .  Doing therapy/exercises: (y/n): yes occasionally.    GENERAL: healthy, alert and no distress  GAIT: normal   SKIN: no suspicious lesions or rashes  NEURO: Normal strength and tone, mentation intact and speech normal  VASCULAR:  normal pulses and cappillary refill   PSYCH:  mentation appears normal and affect normal/bright    SHOULDER:  Shoulder Inspection: no swelling, bruising, discoloration, or obvious deformity or asymmetry  marked muscle atrophy of rotator cuff     Range of Motion:   Active:forward flexion 70 degrees  Right knee:  Some valgus  Moderate effusion  Range of motion 0-120   Diffuse tenderness.     Xrays  ":2/21/18 bilateral shoulders: Complete loss of the acromiohumeral interval bilaterally  compatible with chronic rotator cuff tear. Degenerative change at the  glenohumeral joints. No acute fracture or acute malalignment.     Impression:   Encounter Diagnoses   Name Primary?     Rotator cuff arthropathy of both shoulders Yes     Primary osteoarthritis of right knee      Post-polio syndrome       talipes equinovarus      Peripheral polyneuropathy         PLAN: With the patient's consent, right knee(s) injected intra-articularly with 80mg of Depomedrol and 4cc of local anesthetic after sterile prep.  Procedure Note:   Informed consent obtained. Risks, benefits and complications of the injection were discussed with the patient and the patient elected to proceed. Bilateral shoulder injected into the subacromial space after sterile prep, using 80mg Depomedrol and 4cc local anesthetic.    We filled out more forms to try to get a \"CancerIQzzyAir 2\" (SKU JZAIR2) electric wheelchair/scooter.  She really is almost non-functional without such a device.    Follow up as needed     SALINAS Woods MD  Dept. Orthopedic Surgery  [facility]              Large Joint Injection/Arthocentesis: bilateral subacromial bursa    Date/Time: 9/8/2021 10:50 AM  Performed by: Wolfgang Angeles  Authorized by: Rex Woods MD     Indications:  Osteoarthritis and pain  Needle Size:  22 G  Guidance: landmark guided    Approach:  Lateral  Location:  Shoulder  Laterality:  Bilateral      Site:  Bilateral subacromial bursa  Medications (Right):  80 mg methylPREDNISolone 80 MG/ML; 4 mL lidocaine (PF) 1 %  Medications (Left):  80 mg methylPREDNISolone 80 MG/ML; 4 mL lidocaine (PF) 1 %  Outcome:  Tolerated well, no immediate complications  Procedure discussed: discussed risks, benefits, and alternatives    Consent Given by:  Patient  Timeout: timeout called immediately prior to procedure    Prep: patient was prepped and draped in usual " sterile fashion    Large Joint Injection/Arthocentesis: R knee joint    Date/Time: 9/8/2021 10:51 AM  Performed by: Wolfgang Angeles  Authorized by: Rex Woods MD     Indications:  Pain and osteoarthritis  Needle Size:  22 G  Guidance: landmark guided    Approach:  Anterolateral  Location:  Knee      Medications:  80 mg methylPREDNISolone 80 MG/ML; 4 mL lidocaine (PF) 1 %  Outcome:  Tolerated well, no immediate complications  Procedure discussed: discussed risks, benefits, and alternatives    Consent Given by:  Patient  Timeout: timeout called immediately prior to procedure    Prep: patient was prepped and draped in usual sterile fashion              Again, thank you for allowing me to participate in the care of your patient.        Sincerely,        Rex Woods MD

## 2021-09-08 NOTE — PROGRESS NOTES
Large Joint Injection/Arthocentesis: bilateral subacromial bursa    Date/Time: 9/8/2021 10:50 AM  Performed by: Wolfgang Angeles  Authorized by: Rex Woods MD     Indications:  Osteoarthritis and pain  Needle Size:  22 G  Guidance: landmark guided    Approach:  Lateral  Location:  Shoulder  Laterality:  Bilateral      Site:  Bilateral subacromial bursa  Medications (Right):  80 mg methylPREDNISolone 80 MG/ML; 4 mL lidocaine (PF) 1 %  Medications (Left):  80 mg methylPREDNISolone 80 MG/ML; 4 mL lidocaine (PF) 1 %  Outcome:  Tolerated well, no immediate complications  Procedure discussed: discussed risks, benefits, and alternatives    Consent Given by:  Patient  Timeout: timeout called immediately prior to procedure    Prep: patient was prepped and draped in usual sterile fashion    Large Joint Injection/Arthocentesis: R knee joint    Date/Time: 9/8/2021 10:51 AM  Performed by: Wolfgang Angeles  Authorized by: Rex Woods MD     Indications:  Pain and osteoarthritis  Needle Size:  22 G  Guidance: landmark guided    Approach:  Anterolateral  Location:  Knee      Medications:  80 mg methylPREDNISolone 80 MG/ML; 4 mL lidocaine (PF) 1 %  Outcome:  Tolerated well, no immediate complications  Procedure discussed: discussed risks, benefits, and alternatives    Consent Given by:  Patient  Timeout: timeout called immediately prior to procedure    Prep: patient was prepped and draped in usual sterile fashion

## 2021-09-08 NOTE — PROGRESS NOTES
SUBJECTIVE:  Ledy Odonnell is a 74 year old female who is seen in follow-up for bilateral shoulder rotator cuff tear arthropathy..  She is wheel chair dependant and cannot function during recovery time for a rotator cuff repair or RTSA. She is being treated non-operatively. She has been having trouble wheeling with her wheel chair due to significant shoulder pains and hand deformities. She did get an electric wheelchair. But couldn't fit through doors and didn't sit high enough for her to reach things on tables and counters. She needs an adjustable height device, and needs one that is narrower.  The knee is also very painful as well..  She has mild osteoarthritis in the knee as of the 2016 xrays, but is probably worse now.  Surgery on any of her joints is not an option in her mind..    Also multiple other joint pain complaints--hands and severely deformed left foot and ankle as well.    Corticosteroid injections last visit:  5/12/21.  Amount of relief: very good.  Length of time of relief 4- 6 weeks for bilateral shoulders and right knee  .  Doing therapy/exercises: (y/n): yes occasionally.    GENERAL: healthy, alert and no distress  GAIT: normal   SKIN: no suspicious lesions or rashes  NEURO: Normal strength and tone, mentation intact and speech normal  VASCULAR:  normal pulses and cappillary refill   PSYCH:  mentation appears normal and affect normal/bright    SHOULDER:  Shoulder Inspection: no swelling, bruising, discoloration, or obvious deformity or asymmetry  marked muscle atrophy of rotator cuff     Range of Motion:   Active:forward flexion 70 degrees  Right knee:  Some valgus  Moderate effusion  Range of motion 0-120   Diffuse tenderness.     Xrays :2/21/18 bilateral shoulders: Complete loss of the acromiohumeral interval bilaterally  compatible with chronic rotator cuff tear. Degenerative change at the  glenohumeral joints. No acute fracture or acute malalignment.     Impression:   Encounter Diagnoses  "  Name Primary?     Rotator cuff arthropathy of both shoulders Yes     Primary osteoarthritis of right knee      Post-polio syndrome       talipes equinovarus      Peripheral polyneuropathy         PLAN: With the patient's consent, right knee(s) injected intra-articularly with 80mg of Depomedrol and 4cc of local anesthetic after sterile prep.  Procedure Note:   Informed consent obtained. Risks, benefits and complications of the injection were discussed with the patient and the patient elected to proceed. Bilateral shoulder injected into the subacromial space after sterile prep, using 80mg Depomedrol and 4cc local anesthetic.    We filled out more forms to try to get a \"JazzyAir 2\" (SKU JZAIR2) electric wheelchair/scooter.  She really is almost non-functional without such a device.    Follow up as needed     SALINAS Woods MD  Dept. Orthopedic Surgery  [facility]            "

## 2021-09-13 ENCOUNTER — TELEPHONE (OUTPATIENT)
Dept: ORTHOPEDICS | Facility: CLINIC | Age: 76
End: 2021-09-13

## 2021-09-13 NOTE — TELEPHONE ENCOUNTER
Reason for Call:  Other call back    Detailed comments: Nino gerard Kris is calling regarding orders that were sent for patient for a wheelchair, he is requesting chart notes from visit. Please fax them to [phone].    Phone Number Patient can be reached at: Other phone number:  [phone]    Best Time: any    Can we leave a detailed message on this number? YES    Call taken on 9/13/2021 at 2:57 PM by Velia Hatch

## 2021-09-14 NOTE — TELEPHONE ENCOUNTER
Form completed for: Ledy Odonnell  What was done with form: faxed to Nino attn: Kris @ [phone]

## 2021-09-15 ENCOUNTER — TELEPHONE (OUTPATIENT)
Dept: ORTHOPEDICS | Facility: CLINIC | Age: 76
End: 2021-09-15

## 2021-09-15 NOTE — TELEPHONE ENCOUNTER
Reason for call:  Other   Patient called regarding (reason for call): Chart notes  Additional comments: [employer] calling stating they need new chart notes from 9/8/21. Needs to be signed by provider. Fax to [phone]    Phone number to reach patient:  Nino [phone]    Best Time:  anytime    Can we leave a detailed message on this number?  YES    Travel screening: Not Applicable

## 2021-09-16 ENCOUNTER — TELEPHONE (OUTPATIENT)
Dept: FAMILY MEDICINE | Facility: CLINIC | Age: 76
End: 2021-09-16

## 2021-09-16 DIAGNOSIS — F51.01 PRIMARY INSOMNIA: ICD-10-CM

## 2021-09-16 NOTE — TELEPHONE ENCOUNTER
[id number] faxed a Refill Request     mirtazapine (REMERON) 7.5 MG tablet  DISCONTINUED        Last Written Prescription Date:  2/26/2021  Last Fill Quantity: 90 tablet,   # refills: 3  Last Office Visit: 8/13/2021 ALEXANDR Lew    Future Office visit:       Routing refill request to provider for review/approval because:  Drug not active on patient's medication list

## 2021-09-16 NOTE — TELEPHONE ENCOUNTER
Form completed for: Ledy Odonnell  What was done with form: Faxed to Kris galo Nino:[phone]    Wolfgang ULRICH

## 2021-09-17 RX ORDER — MIRTAZAPINE 7.5 MG/1
7.5 TABLET, FILM COATED ORAL AT BEDTIME
Qty: 90 TABLET | Refills: 3 | OUTPATIENT
Start: 2021-09-17

## 2021-09-17 NOTE — TELEPHONE ENCOUNTER
7.5mg tablet Dc'd. Pt now taking 15mg. Notified pharmacy to discontinue 7.5mg tabs.    Junie Elena RN

## 2021-10-05 ENCOUNTER — MEDICAL CORRESPONDENCE (OUTPATIENT)
Dept: HEALTH INFORMATION MANAGEMENT | Facility: CLINIC | Age: 76
End: 2021-10-05
Payer: COMMERCIAL

## 2021-10-14 DIAGNOSIS — Z53.9 DIAGNOSIS NOT YET DEFINED: Primary | ICD-10-CM

## 2021-10-14 PROCEDURE — G0179 MD RECERTIFICATION HHA PT: HCPCS | Performed by: NURSE PRACTITIONER

## 2021-10-21 ENCOUNTER — TELEPHONE (OUTPATIENT)
Dept: FAMILY MEDICINE | Facility: CLINIC | Age: 76
End: 2021-10-21

## 2021-10-21 NOTE — TELEPHONE ENCOUNTER
Forms received from [facility]/ Home Health/ Hospice/ Home Health Cert. and Plan of Care/ Order # [id number] for Vika Napoles NP.  Forms placed in provider 'sign me' folder.  Please fax forms to [phone] after completion.    OLAF REDD (R)  Radiology Department

## 2021-11-01 ENCOUNTER — TELEPHONE (OUTPATIENT)
Dept: UROLOGY | Facility: CLINIC | Age: 76
End: 2021-11-01

## 2021-11-01 ENCOUNTER — TELEPHONE (OUTPATIENT)
Dept: FAMILY MEDICINE | Facility: CLINIC | Age: 76
End: 2021-11-01

## 2021-11-01 DIAGNOSIS — R30.0 DYSURIA: Primary | ICD-10-CM

## 2021-11-01 DIAGNOSIS — N95.2 ATROPHIC VAGINITIS: ICD-10-CM

## 2021-11-01 DIAGNOSIS — N39.46 MIXED STRESS AND URGE URINARY INCONTINENCE: ICD-10-CM

## 2021-11-01 NOTE — TELEPHONE ENCOUNTER
Judith RN with [facility] home care calling.     She saw patient today for a catheter change. At visit, Judith noticed red rash around perineal area.     Patient also reports recent burning and itching around catheter. However, she states that the burning sensation was improved after catheter was changed.     Judith believes that the patient may have a yeast infection due to rash and symptoms. Denies fever or pain.     Patient is prone to yeast infection of groin folds and has PRN nystatin to treat.    It is difficult for patient to come to clinic (transporation is limited, patient is single, lives alone, no family).     Would PCP be willing to treat without exam? And/or telephone visit OK?     Judith does not need a call back, asks to follow up with patient directly with provider's recommendations.     Oralia Justice RN, BSN, PHN  [facility] [place]

## 2021-11-01 NOTE — TELEPHONE ENCOUNTER
Patient calling office. Home care nurse advised she call to discuss symptoms of intermittent burning with chronic catheter.  Will have RN address,  Vika Colon, CMA

## 2021-11-02 ENCOUNTER — VIRTUAL VISIT (OUTPATIENT)
Dept: FAMILY MEDICINE | Facility: CLINIC | Age: 76
End: 2021-11-02
Payer: COMMERCIAL

## 2021-11-02 DIAGNOSIS — F33.0 MILD EPISODE OF RECURRENT MAJOR DEPRESSIVE DISORDER (H): ICD-10-CM

## 2021-11-02 DIAGNOSIS — E78.5 HYPERLIPIDEMIA LDL GOAL <100: ICD-10-CM

## 2021-11-02 DIAGNOSIS — G60.9 IDIOPATHIC PERIPHERAL NEUROPATHY: ICD-10-CM

## 2021-11-02 DIAGNOSIS — F51.01 PRIMARY INSOMNIA: ICD-10-CM

## 2021-11-02 DIAGNOSIS — B37.2 CANDIDAL INTERTRIGO: ICD-10-CM

## 2021-11-02 PROCEDURE — 99214 OFFICE O/P EST MOD 30 MIN: CPT | Mod: 95 | Performed by: NURSE PRACTITIONER

## 2021-11-02 RX ORDER — GABAPENTIN 600 MG/1
TABLET ORAL
Qty: 90 TABLET | Refills: 6 | Status: SHIPPED | OUTPATIENT
Start: 2021-11-02 | End: 2022-05-30

## 2021-11-02 RX ORDER — ATORVASTATIN CALCIUM 20 MG/1
20 TABLET, FILM COATED ORAL DAILY
Qty: 90 TABLET | Refills: 6 | Status: SHIPPED | OUTPATIENT
Start: 2021-11-02 | End: 2022-07-18

## 2021-11-02 RX ORDER — MIRTAZAPINE 15 MG/1
15 TABLET, FILM COATED ORAL AT BEDTIME
Qty: 90 TABLET | Refills: 6 | Status: SHIPPED | OUTPATIENT
Start: 2021-11-02 | End: 2022-03-09

## 2021-11-02 RX ORDER — ESTRADIOL 0.1 MG/G
2 CREAM VAGINAL
Qty: 42.5 G | Refills: 3 | Status: SHIPPED | OUTPATIENT
Start: 2021-11-03

## 2021-11-02 RX ORDER — NYSTATIN 100000 U/G
CREAM TOPICAL 2 TIMES DAILY PRN
Qty: 30 G | Refills: 3 | Status: SHIPPED | OUTPATIENT
Start: 2021-11-02 | End: 2022-07-18

## 2021-11-02 RX ORDER — NYSTATIN 100000 [USP'U]/G
1 POWDER TOPICAL 2 TIMES DAILY PRN
Qty: 60 G | Refills: 3 | Status: SHIPPED | OUTPATIENT
Start: 2021-11-02 | End: 2022-03-04

## 2021-11-02 NOTE — TELEPHONE ENCOUNTER
Attempted to reach pt to offer 1240 video/telephone appt with Vika today. No answer, left mesg to return call to [facility] at [phone].    Pt tentatively scheduled for 1240 visit today. Will await a return to call to verify.    Junie Elena RN

## 2021-11-02 NOTE — PROGRESS NOTES
Ev is a 76 year old who is being evaluated via a billable telephone visit.      What phone number would you like to be contacted at? [phone]  How would you like to obtain your AVS? Mail a copy    Assessment & Plan     Candidal intertrigo  She likes to alternate between cream and powder  - nystatin (MYCOSTATIN) 364329 UNIT/GM external powder; Apply 1 g topically 2 times daily as needed for other (rash)  - nystatin (MYCOSTATIN) 055949 UNIT/GM external cream; Apply topically 2 times daily as needed for dry skin or other (rash)    Primary insomnia  Stable refilled  - mirtazapine (REMERON) 15 MG tablet; Take 1 tablet (15 mg) by mouth At Bedtime    Mild episode of recurrent major depressive disorder (H)  Stable refilled  - mirtazapine (REMERON) 15 MG tablet; Take 1 tablet (15 mg) by mouth At Bedtime    Idiopathic peripheral neuropathy  Stable refilled  - gabapentin (NEURONTIN) 600 MG tablet; TAKE ONE TABLET BY MOUTH THREE TIMES A DAY    Hyperlipidemia LDL goal <100  Stable reviewed  - atorvastatin (LIPITOR) 20 MG tablet; Take 1 tablet (20 mg) by mouth daily    Prescription drug management             No follow-ups on file.    ROSCOE Ridley [facility]    Subjective   Ev is a 76 year old who presents for the following health issues  HPI     Rash  Onset/Duration: yrs  Description  Location: low abdominal, and discharge from vag/urethra   Character: burning, red, wet-per patient   Itching: around it   Intensity:  severe  Progression of Symptoms:  worsening and constant  Accompanying signs and symptoms:   Fever: no, temp 11/1/2021 95.1, under tongue   Body aches or joint pain: no  Sore throat symptoms: no  Recent cold symptoms: no  History:           Previous episodes of similar rash: yes, this has been going on for yrs  New exposures:  None  Recent travel: no  Exposure to similar rash: no  Precipitating or alleviating factors: using nystatin   Therapies tried and outcome:  nystatin cream    Chronic pain management stable  Duloxetine 60 mg daily and Percocet 1 tablet at bedtime  On gabapentin for neuropathic pain    Needs refills on all home medications  Review of Systems   Constitutional, HEENT, cardiovascular, pulmonary, GI, , musculoskeletal, neuro, skin, endocrine and psych systems are negative, except as otherwise noted.      Objective           Vitals:  No vitals were obtained today due to virtual visit.    Physical Exam   healthy, alert and no distress  PSYCH: Alert and oriented times 3; coherent speech, normal   rate and volume, able to articulate logical thoughts, able   to abstract reason, no tangential thoughts, no hallucinations   or delusions  Her affect is normal  RESP: No cough, no audible wheezing, able to talk in full sentences  Remainder of exam unable to be completed due to telephone visits    Office Visit on 08/13/2021   Component Date Value Ref Range Status     Cholesterol 08/13/2021 187  <200 mg/dL Final    Age 0-19 years  Desirable: <170 mg/dL  Borderline high:  170-199 mg/dl  High:            >199 mg/dl    Age 20 years and older  Desirable: <200 mg/dL     Triglycerides 08/13/2021 125  <150 mg/dL Final    0-9 years:  Normal:    Less than 75 mg/dL  Borderline high:  75-99 mg/dL  High:             Greater than or equal to 100 mg/dL    0-19 years:  Normal:    Less than 90 mg/dL  Borderline high:   mg/dL  High:             Greater than or equal to 130 mg/dL    20 years and older:  Normal:    Less than 150 mg/dL  Borderline high:  150-199 mg/dL  High:             200-499 mg/dL  Very high:   Greater than or equal to 500 mg/dL     Direct Measure HDL 08/13/2021 49* >=50 mg/dL Final    0-19 years:       Greater than or equal to 45 mg/dL   Low: Less than 40 mg/dL   Borderline low: 40-44 mg/dL     20 years and older:   Female: Greater than or equal to 50 mg/dL   Male:   Greater than or equal to 40 mg/dL          LDL Cholesterol Calculated 08/13/2021 113* <=100 mg/dL  Final    Age 0-19 years:  Desirable: 0-110 mg/dL   Borderline high: 110-129 mg/dL   High: >= 130 mg/dL    Age 20 years and older:  Desirable: <100mg/dL  Above desirable: 100-129 mg/dL   Borderline high: 130-159 mg/dL   High: 160-189 mg/dL   Very high: >= 190 mg/dL     Non HDL Cholesterol 08/13/2021 138* <130 mg/dL Final    0-19 years:  Desirable:          Less than 120 mg/dL  Borderline high:   120-144 mg/dL  High:                   Greater than or equal to 145 mg/dL    20 years and older:  Desirable:          130 mg/dL  Above Desirable: 130-159 mg/dL  Borderline high:   160-189 mg/dL  High:               190-219 mg/dL  Very high:     Greater than or equal to 220 mg/dL     Patient Fasting > 8hrs? 08/13/2021 Yes   Final     Sodium 08/13/2021 136  133 - 144 mmol/L Final     Potassium 08/13/2021 4.4  3.4 - 5.3 mmol/L Final     Chloride 08/13/2021 104  94 - 109 mmol/L Final     Carbon Dioxide (CO2) 08/13/2021 25  20 - 32 mmol/L Final     Anion Gap 08/13/2021 7  3 - 14 mmol/L Final     Urea Nitrogen 08/13/2021 15  7 - 30 mg/dL Final     Creatinine 08/13/2021 0.81  0.52 - 1.04 mg/dL Final     Calcium 08/13/2021 10.0  8.5 - 10.1 mg/dL Final     Glucose 08/13/2021 107* 70 - 99 mg/dL Final     Alkaline Phosphatase 08/13/2021 80  40 - 150 U/L Final     AST 08/13/2021 22  0 - 45 U/L Final     ALT 08/13/2021 22  0 - 50 U/L Final     Protein Total 08/13/2021 7.5  6.8 - 8.8 g/dL Final     Albumin 08/13/2021 3.7  3.4 - 5.0 g/dL Final     Bilirubin Total 08/13/2021 0.6  0.2 - 1.3 mg/dL Final     GFR Estimate 08/13/2021 71  >60 mL/min/1.73m2 Final    As of July 11, 2021, eGFR is calculated by the CKD-EPI creatinine equation, without race adjustment. eGFR can be influenced by muscle mass, exercise, and diet. The reported eGFR is an estimation only and is only applicable if the renal function is stable.     Color Urine 08/13/2021 Yellow  Colorless, Straw, Light Yellow, Yellow Final     Appearance Urine 08/13/2021 Slightly Cloudy*  Clear Final     Glucose Urine 08/13/2021 Negative  Negative mg/dL Final     Bilirubin Urine 08/13/2021 Negative  Negative Final     Ketones Urine 08/13/2021 Negative  Negative mg/dL Final     Specific Gravity Urine 08/13/2021 1.015  1.003 - 1.035 Final     Blood Urine 08/13/2021 Trace* Negative Final     pH Urine 08/13/2021 7.0  5.0 - 7.0 Final     Protein Albumin Urine 08/13/2021 30 * Negative mg/dL Final     Urobilinogen Urine 08/13/2021 0.2  0.2, 1.0 E.U./dL Final     Nitrite Urine 08/13/2021 Negative  Negative Final     Leukocyte Esterase Urine 08/13/2021 Large* Negative Final     Bacteria Urine 08/13/2021 Moderate* None Seen /HPF Final     RBC Urine 08/13/2021 0-2  0-2 /HPF /HPF Final     WBC Urine 08/13/2021 10-25* 0-5 /HPF /HPF Final     Squamous Epithelials Urine 08/13/2021 Few* None Seen /LPF Final     WBC Clumps Urine 08/13/2021 Present* None Seen /HPF Final     Culture 08/13/2021 >100,000 CFU/mL Escherichia coli*  Final               Phone call duration:  minutes

## 2021-11-02 NOTE — TELEPHONE ENCOUNTER
Called and spoke to patient.   Pt states that she is having some burning with urination.   Pt states that she removed the catheter herself and symptoms have resolved.   Pt encouraged to use over the AZO, and scheduled Tylenol for relief.   Will route to provider for possible estrace cream.   Pt agrees to plan of care.   Georgia Multani RN

## 2021-12-08 DIAGNOSIS — G89.4 CHRONIC PAIN DISORDER: ICD-10-CM

## 2021-12-08 RX ORDER — OXYCODONE AND ACETAMINOPHEN 5; 325 MG/1; MG/1
1 TABLET ORAL EVERY 6 HOURS PRN
Qty: 90 TABLET | Refills: 0 | Status: SHIPPED | OUTPATIENT
Start: 2021-12-13 | End: 2022-03-08

## 2021-12-08 NOTE — TELEPHONE ENCOUNTER
Routing refill request to provider for review/approval because:  Drug not on the [facility] refill protocol     Oralia Justice RN, BSN, PHN  [facility] [place]

## 2021-12-08 NOTE — TELEPHONE ENCOUNTER
Reason for Call:  Medication or medication refill:    Do you use a [facility]?  Name of the pharmacy and phone number for the current request:       -[facility] [address] [place] [address]    Name of the medication requested: Oxycodone     Other request: Patient would like a call once the prescription has been sent to her pharmacy.     Can we leave a detailed message on this number? YES    Phone number patient can be reached at: Home number on file [phone] (home)    Best Time: any     Call taken on 12/8/2021 at 2:20 PM by Laurie Lagos

## 2022-01-05 ENCOUNTER — TELEPHONE (OUTPATIENT)
Dept: FAMILY MEDICINE | Facility: CLINIC | Age: 77
End: 2022-01-05
Payer: COMMERCIAL

## 2022-01-05 NOTE — TELEPHONE ENCOUNTER
Forms received from [facility]/ [facility] and Plan of Care - order #[id number] (cert period: 12/9/21 to 2/6/22) for Vika Napoles NP.  Forms placed in provider 'sign me' folder.  Please fax forms to [phone] after completion.    Max Gaspar RT (R) (ARRT)  Radiology Dept

## 2022-02-01 ENCOUNTER — HOSPITAL ENCOUNTER (OUTPATIENT)
Dept: OCCUPATIONAL THERAPY | Facility: CLINIC | Age: 77
Setting detail: THERAPIES SERIES
End: 2022-02-01
Attending: ORTHOPAEDIC SURGERY
Payer: COMMERCIAL

## 2022-02-01 PROCEDURE — 97542 WHEELCHAIR MNGMENT TRAINING: CPT | Mod: GO,GT | Performed by: OCCUPATIONAL THERAPIST

## 2022-02-01 NOTE — PROGRESS NOTES
"   SEATING AND WHEELED MOBILITY ASSESSMENT  02/01/22 0800   Quick Adds   Quick Adds Certification;Current Manual Wheelchair   General Information   Rehab Discipline OT   Funding [facility] Medicare/MA/Waiver   Service Outpatient;Occupational Therapy;Seating/Wheeled Mobility Evaluation   Height 5'5\"   Weight 155   Start Of Care Date 02/01/22   Referring Physician Rex Woods   Orders Evaluate And Treat As Indicated;Per Therapist Evaluation   Orders Date 09/08/21   Others Present at Evaluation Ismael JOSÉ from [facility]   Patient/Caregiver Goals Power mobility with seat elevator   Rehabilitation Technology Supplier Yesenia GONZALEZ from [employer]   Current Community Support Personal Care Attendant;Family/Friend Caregiver;Meals On Wheels;Transportation Service;Housekeeping Service;Emergency Call System  (PCA 1x per week)   Patient role/Employment history Disabled   General Information Comments Virtual visit due to challenges with community mobiility    Fall Risk Screen   Fall screen completed by OT   Have you fallen 2 or more times in the past year? Yes   Have you fallen and had an injury in the past year? Yes   Is patient a fall risk? Yes   Fall screen comments all with tranfers for functional activity -cooking ect   Medical History   Onset Of Illness/injury Or Date Of Surgery 9/8/21  (order)   Medical Diagnosis Postpolio   Medical History B rotator cuff tear with R surgery, Hand arthritis, peripheral neuropathy, gastroparesis, club feet   Recent or Planned Surgeries COPD, L VALERIE and ORIF due to fall   Current Manual Wheelchair    [employer] Tracer 5   Manual Wheelchair Comments capped rental from 8 years ago s/p d/c from [facility] that is ill fitting and challenging to proper due to shoulder and hand issues.   Home Accessibility   Living Environment House   Primary Entrance Covered Ramp   All Rooms Wheelchair Accessible Yes   Community ADL   Transportation Car;Transportation Services   Community Mobility Requirements Medical " Appointments;Shopping;Restorationism   Community ADL Comments Has friends that provide transportation and needs to use manual chair to do so.   Cognitive/Visual/Hearing Status   Observations No Problems Observed During Evaluation   Vision Intact   Hearing Intact   ADL Status   Feeding Independent   Grooming/Hygiene Independent   Dressing Independent   Toileting Independent;Uses Equipment  (grab bars)   Bathing Independent;Uses Equipment  (extended shower chair)   Meal Preparation Requires Assist   Home Management Requires Assist   Balance   Unsupported Sitting Balance Within Functional Limits   Sitting Balance in Chair Uses Upper Extremities for Balance   Standing Balance Uses Upper Extremities for Balance   Ambulation   Ambulation Non Ambulatory  (for the past 5 years)   Transfers   Transfer Assist Independent   Transfer Method Stand Pivot   Sleep/Rest   Sleep Surface/Equipment standard bed with bed rail   Neuromuscular   History of Pressure Sores Yes   Current Pressure Sores Yes   Pressure Sore location L lateral dorsm of foot due to pressure and contracture   Pain Yes   Pain Location B Shoulders and L foot   Pain Scale  8   Coordination UE Impaired;LE Impaired  (arthritis and club feet)   Tone Hypotonic   Sensory Deficits Reported hands and feet are numb   Upper Extremities   UE ROM B Shoulders limited to ~40 degrees flexion and very painful, hand ranges limited due to arthritis   UE Strength 2+/5   Dominance Left   Pelvis   Anterior/Posterior Pelvis Position Posterior Tilt   Trunk   Anterior/Posterior Trunk Position Increased Thoracic Kyphosis   Lower Extremities   LE ROM Hips and knees WFL and contractures and limited sensation at feet due to deformitieds   LE Strength hips and knees 4/5 ankles 0-1/5   Foot Positioning Inversion;Plantar flexed  (L is severe and R moderate and worsening)   LE Comments L foot has bones starting to protrude from skin   Patient Measurements   Other per atp notes   Education Assessment    Barriers to Learning Physical   Preferred Learning Style Listening;Demonstration   Assessment/Plan   Criteria for Skilled Interventions Met Yes, Treatment Indicated   Treatment Diagnosis impaired participation in MRADLS   Therapy Frequency once   Planned Therapy Interventions Wheelchair Management/Propulsion Training   Planned Therapy Interventions Comments Determined need for better mobility.  Sunny is challenged with w.c propulsion due to rotator cuff issues.  PAtient lisa's safe use of stretto in home, she is able to drive the tight spaces with smaller chair and make it through doorways.  Using the seat elevator she can now access her freezer and upper cabinets to continue to meet her goal of staying independent in her home.   Risks and benefits of treatment have been explained Yes   Patient/family & other staff in agreement with plan of care Yes   Comments Back up manual chair required due to limited tranportation and small spaces in her home.   Session Time   OT Wheelchair Management Minutes (70610) 45   Certification   Certification date from 02/01/22   Certification date to 02/01/22   Adult OT Eval Goals   OT Eval Goals (Adult) 1    OT Goal 1   Goal Identifier wheelchair   Goal Description Patient to demonstrate a successful home trial with the recommended equipment   Goal Progress met   Target Date 02/01/22   Date Met 02/01/22   Electronically signed by:  Yesenia COLEMAN/JEFFERY, ATP      Occupational Therapist, Assistive   [phone]      fax: [phone]      [email or web address]  Seating [facility]- [facility], [address]  [address]  [place] [address]

## 2022-02-01 NOTE — PROGRESS NOTES
Ledy Odonenll is a 76 year old female who is being seen via a billable video visit.      Patient has given verbal consent for Video visit? Yes    Video Start Time: 815    Telehealth Visit Details    Type of Service:  Telehealth    Video End Time (time video stopped): 900    Originating Location (pt. location): Home    Additional Participants in Telehealth Visit: yesenia garcia from [facility] and domonique aguilar from [facility] Location (provider location):  [facility]     Mode of Communication (Audio Visual or Audio Only):  audio visual    Yesenia Frias, OT  February 1, 2022

## 2022-02-07 ENCOUNTER — TELEPHONE (OUTPATIENT)
Dept: FAMILY MEDICINE | Facility: CLINIC | Age: 77
End: 2022-02-07
Payer: COMMERCIAL

## 2022-02-07 NOTE — TELEPHONE ENCOUNTER
Forms received from [facility] Health/ Discharge From Agency - Order #[id number] (order date: 2/7/22) for Vanessa Napoles NP.  Forms placed in provider 'sign me' folder.  Please fax forms to [phone] after completion.    Max TAM (R) (ARRT)  Radiology Dept

## 2022-02-18 ENCOUNTER — TELEPHONE (OUTPATIENT)
Dept: UROLOGY | Facility: CLINIC | Age: 77
End: 2022-02-18
Payer: COMMERCIAL

## 2022-02-18 DIAGNOSIS — N39.41 URGENCY INCONTINENCE: Primary | ICD-10-CM

## 2022-02-18 NOTE — TELEPHONE ENCOUNTER
Patient calling asking for home care order to replace her moctezuma catheter.  It was removed last month due to symptoms (temporarily). Quality of life is higher with catheter in place. Will route to pool for RN to place order.  Vika Colon, CMA

## 2022-02-24 NOTE — PROGRESS NOTES
REQUISITION AND JUSTIFICATION FOR DURABLE MEDICAL EQUIPMENT    Patient Name:  Ledy Odonnell  MR #:  [id number]  :  1945  Age/Gender:  76 year old female  Visit Date:  Ledy Odonnell seen for seating and wheeled mobility evaluation by Yesenia Frias OTR/L,ATP and ATP from [facility] on 22.    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 Specifically affected are toileting (being able to get there in time to prevent accidents), dressing, and bathing (getting into the bathroom of designated place). Current equipment used is 8 year old rental manual chair that is ill fitting and difficult to independently propel. This patient needs the new equipment requested to be able to increase safe and independent participation in MRADLS. Please see additional documentation in the seating and wheeled mobility report for details.   Ledy had a successful clinical trial here, and also a successful trial at home with the recommended equipment. Ledy is very willing and physically / cognitively able to use the recommended equipment to assist her with mobility-related activities of daily living and general mobility. A group 3 power wheelchair is being requested because it has better suspension for a smooth ride and has the capabilities of expandable electronics to operate the  power seat functions Ledy needs for independence with her activities of daily living. A Group 2 power wheelchair does not have sufficient electronics to support this patient's progressive neurological deficits due to postpolio.  B) Ledy's mobility limitation cannot  be sufficiently and safely resolved by the use of an appropriately fitted cane or walker because she is non ambulatory for the past 5 years. Strength of legs is hips and knees 4/5 and ankles 0-1/5 for one maximal repetition. Fatigue also impacts this patient's ability to ambulate, regardless of the gait aid.    C) Ledy does not have sufficient upper extremity function to self-propel an optimally-configured manual wheelchair in her home to perform MRADLs during a typical day due to limitations in strength, endurance, range of motion, and coordination. Distance and time to propel a light weight manual wheelchair NT due to shoulder limitations and current deficits demo's with chair.  Strength of arms is 2+/5.  D)  Ledy is not able to use a POV/scooter because it will not fit in her home environment. Ledy is unable to safely transfer to and from a POV, unable to operate the tiller steering system, and unable to maintain postural stability and position while operating the POV. Ledy needs more appropriate seating and positioning than any scooter seat provides.  E) The need for this equipment is LIFETIME.   RECOMMENDATIONS FOR MOBILITY BASE, SEATING SYSTEM AND COMPONENTS  Quantum Q6 [employer] 3MP-SS - this mid wheel drive power wheelchair is needed for this patient to continue to have independent mobility and to be able to allow her to complete or assist in all of her mobility related activities of daily living (MRADLs). This wheelchair will also have the seating and positioning system and seat function she needs to be able to use and tolerate the wheelchair full time, and have functional and comfortable positioning for a full day's activities. Ledy has postpolio, B rotator cuff tears with R side surgery, hand arthritis, peripheral neuropathy, gastroparesis, club feet, COPD, L VALERIE, and ORIF due to fall which impairs her ability to move in her home without the use of the requested wheelchair. She lives in  ramped home with level access.    100 amp Q-Logic 3 EX controller -  Needed for operation of the joystick for mobility and seat functions    Harness for expandable controller - needs to be inline for communication between the controller and the joystick    Q-Logic 3 EX Joystick - this is the joystick needed to be used by this patient for moving this wheelchair and also controlling the movement of the seat functions.    Swing away joystick mount - needed to be able to move the joystick out of the way during transfers, or when at the desk using the computer, or at the table eating, so as not to inadvertently hit the joystick, thus moving the wheelchair or turning the power on or off without her knowledge.    ARSENIO Balance Power Tilt and Recline  - This seating function combination is needed for this patient to be able to perform independent weight shifts and also to be able to change her seated position without the request of a care giver. Ledy requires a powered seating system with both tilt and recline to optimize pressure distribution and postural repositioning.   The power tilt seat allows her to change her position by rotating rearward without changing the angle of her hips or knees. Due to atrophy of her buttocks she is at high risk of developing pressure ulcers if not able to change her position. Due to compromised ability to exert herself for weight shifting, the power tilt seat allows her to do this by operating it through the joystick. She can also use a slight degree of tilt for assisting in her seating and positioning for normal functions during the day a to assist keeping her in a midline, erect and upright position by using the effect of gravity.   The power reclining back feature also reduces pressures by allowing her to change the angle of her hips and knees into a more open and supine / recumbent position. This increases her tolerance and be able to remain in the requested wheelchair for a complete  day and not be dependent on care givers to change her position or transfer her to another surface for comfort or resting. The recline feature can be used to access the perineal area necessary for toileting assistance. The power tilt seat and power recline back are necessary features that allow tasks to be done by the care giver without the need for transferring back to bed for these activities.  The medical justification for these seat functions is consistent with the RESNA Position Papers regarding these seat function interventions (Sonya et al., 2009). These seat functions will also reduce upper extremity strain per the [employer] [place] [place] Guidelines for upper limb preservation (Frankinger, et al., 2005).    Power elevating seat - Vertical movement is necessary to allow postpolio, B rotator cuff tears with R side surgery, hand arthritis, peripheral neuropathy, gastroparesis, club feet, COPD, L VALERIE, and ORIF due to fall to function and participate in a three-dimensional world. This seat feature will increase her ability to reach by bringing her closer for better access with weak arms while reaching into cupboards or closets.  During the home trial she was able to use this feature to access her freezer and counters for cooking.  She is transferring independently with significant effort and pain due to foot deformities. This requested seat lift feature promotes safety with and improved independence with lateral transfers by allowing a level transfer or transfer from a higher to lower surface, which is gravity-assisted.  It also facilitates forward transfer by allowing legs, hips to be more extended, thereby lessening the strength required for the user to perform a stand-pivot transfer.  Power seat elevation also allows the user to have eye contact with others and reduces cervical strain and pain (including headaches from poor positioning). Vertical rise also provides psycho-social benefits of being on peer  "level and speaking eye-to-eye. Additionally, seat elevation allows certain medications to be kept out of reach of children but remain accessible to the user.    ARSENIO comfort Solid curved and padded back support - firm and contoured back support is needed to support Nettes thoracolumbar area in an upright and midline position, with appropriate support pads as needed. This will provide support whether she is in the upright or tilted/reclined position. This back support is essential to provide sufficient lateral contour to maximize her postural alignment and minimize her tendency to develop scoliosis and other secondary complications.    Stealth Comfort Plus 10\" headrest and mounting hardware - needed to keep Nettes head in an erect, midline and upright position whether she is in the upright, tilted or reclined position. Her head and neck need to be supported as well as the rest of her body.    Stealth comfort plus best mounting hardware - needed for mounting the requested headrest in the most appropriate position on the back of the wheelchair for optimal head and neck support and to be able to be removed for transfers.     Power Positioning electronics to be operated through the Q logic controller - this is needed to allow her to use the joystick of the wheelchair for control of mobility and operation of the power seat function. This is important for this patient with limited strength and coordination so as not to require a separate switch for operation of the seat function.    Power elevating foot legrest- Allows for elevation and extension of lower extremities while elevating. This can improve circulation and prevent/reduce edema (with recline/tilt combination).  The lower legs of this wheelchair user act as a reservoir for fluid accumulation due to lack of movement. Elevation of this patient's legs above the level of the heart (left atrium) is recommended as part of the management of edema in conjunction " with other measures such as support garments  This patient has lower extremity dependent edema while sitting in her wheelchair, which resolves with elevation of her legs. This feature, when used with the power recline back or tilt seat, can increase Ledy's sitting tolerance while positioning her in a more natural position. This can also be helpful if she fatigues and requires rests throughout the day, without transferring her back to bed.  Due to inability to perform a functional weight shifting, she is at risk for developing pressure ulcers. With the use of this feature it will allow for optimal weight shifting in conjunction with tilt and recline.    Per RESNA white paper on elevating legrests, using elevating legrests and tilting more than 30 degrees in combination with full recline significantly improves lower leg hemodynamics status as measured by near-infrared spectroscopy (Marshall et al., 2010)  Additionally, these legrests can improve ground clearance to navigate thresholds and slopes and still allowing the legs to achieve a tight 90 degree position for typical driving conditions. This position shortens the overall functional wheelbase for improved maneuverability    ARSENIO comfort 2 SPP seat cushion - this pressure distribution and positioning seat cushion will optimally  distribute seating pressures to prevent pressure ulcers, but also provide a stable base of support for her to use during MRADLs.    2 Batteries and  - gel sealed, and two are necessary to power the wheelchair. They are maintenance free and are safe for travel on the road or in the air. They are necessary to provide reliable use of the power wheelchair on a single charge.    This equipment is reasonable and necessary with reference to accepted standards of medical practice and treatment of this patient's condition and is not being recommended as a convenience item. Without this recommended equipment, she is highly likely to sustain  injuries from falls, develop pressure sores or postural compensation, and/or be bed confined, which those costs far exceed the cost of the requested equipment.    Electronically signed by:  CARLOS Fonseca      Occupational Therapist, Assistive   [phone]      fax: [phone]      [email or web address], [place] [address]  February 24, 2022    I have read and concur with the above recommendations.    Physician Printed Name __________________________________________    Physician SIgnature  _____________________________________________    Date of SIgnature ______________________________    Physician Phone  ______________________________

## 2022-03-03 ENCOUNTER — TELEPHONE (OUTPATIENT)
Dept: FAMILY MEDICINE | Facility: CLINIC | Age: 77
End: 2022-03-03
Payer: COMMERCIAL

## 2022-03-03 NOTE — TELEPHONE ENCOUNTER
Routing to PCP to please advise for below orders.    Have never approved cath/balloon changes      Brigid Prajapati RN

## 2022-03-03 NOTE — TELEPHONE ENCOUNTER
Reason for Call:  Home Health Care    Minerva with [facility] called regarding (reason for call): verbal orders--(see below skilled nursing) but also needs verbal ok to go from a 10cc balloon to a 30cc balloon catheter (22 [place])    Orders are needed for this patient.     PT:     OT:     Skilled Nursinx week for 9 weeks, 3 PRN - weekly wound monitoring and every 4 week catheter changes.     Pt Provider: Vika Napoles CNP    Phone Number Homecare Nurse can be reached at: [phone]    Can we leave a detailed message on this number? YES    Phone number patient can be reached at: Home number on file [phone] (home)    Best Time: any    Call taken on 3/3/2022 at 10:44 AM by Ana Mullen

## 2022-03-04 NOTE — TELEPHONE ENCOUNTER
Called Minerva with [facility] and notified her that these orders should be approved by pts Urologist Dr. Philippe.  Minerva verbalized an understanding and will call his office.     Junie Elena RN

## 2022-03-07 ENCOUNTER — TELEPHONE (OUTPATIENT)
Dept: FAMILY MEDICINE | Facility: CLINIC | Age: 77
End: 2022-03-07
Payer: COMMERCIAL

## 2022-03-07 ENCOUNTER — TELEPHONE (OUTPATIENT)
Dept: FAMILY MEDICINE | Facility: CLINIC | Age: 77
End: 2022-03-07

## 2022-03-07 DIAGNOSIS — G89.4 CHRONIC PAIN DISORDER: ICD-10-CM

## 2022-03-07 RX ORDER — OXYCODONE AND ACETAMINOPHEN 5; 325 MG/1; MG/1
1 TABLET ORAL EVERY 6 HOURS PRN
Qty: 90 TABLET | Refills: 0 | Status: CANCELLED | OUTPATIENT
Start: 2022-03-07

## 2022-03-07 NOTE — TELEPHONE ENCOUNTER
Routing refill request to provider for review/approval because:  Drug not on the [facility] refill protocol     Pending Prescriptions:                       Disp   Refills    oxyCODONE-acetaminophen (PERCOCET) 5-325 *90 tab*0            Sig: Take 1 tablet by mouth every 6 hours as needed           for severe pain Maximum 1 tablet daily. 3 month           supply.        Brigid Prajapati RN

## 2022-03-07 NOTE — TELEPHONE ENCOUNTER
Reason for Call:  Medication or medication refill:    Do you use a [facility]?  Name of the pharmacy and phone number for the current request:  [facility]/ [address] / [facility] [id number]    Name of the medication requested: oxyCODONE-acetaminophen (PERCOCET) 5-325 MG tablet    Other request:     Can we leave a detailed message on this number? YES    Phone number patient can be reached at: Home number on file [phone] (home)    Best Time: any    Call taken on 3/7/2022 at 11:16 AM by Ana Mullen

## 2022-03-07 NOTE — TELEPHONE ENCOUNTER
Routing below message to PCP.    Home care nurse is letting PCP know that the upcoming appointment on 3/9 will be a face to face appointment  and to please include the following in the office visit notes:    1. Assess feet for wounds  2. Catheter    Kit SUMAN Simental

## 2022-03-07 NOTE — TELEPHONE ENCOUNTER
Laurel RN from [facility] is calling to christianoi the provider    Laurel werner's pt has an upcoming appointment.  Laurel is asking Dony to assess patient's feet for wounds and also that this visit is also going to be a F2F and Laurel needs the catheter addressed in the office notes.     Mary Flaherty/  Stephane Jo

## 2022-03-08 ENCOUNTER — MEDICAL CORRESPONDENCE (OUTPATIENT)
Dept: HEALTH INFORMATION MANAGEMENT | Facility: CLINIC | Age: 77
End: 2022-03-08
Payer: COMMERCIAL

## 2022-03-08 RX ORDER — OXYCODONE AND ACETAMINOPHEN 5; 325 MG/1; MG/1
1 TABLET ORAL EVERY 6 HOURS PRN
Qty: 90 TABLET | Refills: 0 | Status: SHIPPED | OUTPATIENT
Start: 2022-03-08 | End: 2022-03-09

## 2022-03-08 NOTE — TELEPHONE ENCOUNTER
Routing back to RN. Patient is scheduled for a virtual appointment so a face to face cannot happen.    GAYATRI Galvez  [facility]

## 2022-03-08 NOTE — TELEPHONE ENCOUNTER
[place]  reviewed last refilled on 12/13/2021 for 3-month supply okay to refill.  She will need an office visit with me for the next refill.

## 2022-03-09 ENCOUNTER — VIRTUAL VISIT (OUTPATIENT)
Dept: FAMILY MEDICINE | Facility: CLINIC | Age: 77
End: 2022-03-09
Payer: COMMERCIAL

## 2022-03-09 DIAGNOSIS — L97.529 ULCER OF LEFT FOOT, UNSPECIFIED ULCER STAGE (H): Primary | ICD-10-CM

## 2022-03-09 DIAGNOSIS — G89.4 CHRONIC PAIN DISORDER: ICD-10-CM

## 2022-03-09 PROCEDURE — 99214 OFFICE O/P EST MOD 30 MIN: CPT | Mod: 95 | Performed by: NURSE PRACTITIONER

## 2022-03-09 RX ORDER — OXYCODONE AND ACETAMINOPHEN 5; 325 MG/1; MG/1
1 TABLET ORAL EVERY 6 HOURS PRN
Qty: 90 TABLET | Refills: 0 | Status: SHIPPED | OUTPATIENT
Start: 2022-03-09 | End: 2022-04-08

## 2022-03-09 RX ORDER — OXYCODONE AND ACETAMINOPHEN 5; 325 MG/1; MG/1
1 TABLET ORAL EVERY 6 HOURS PRN
Qty: 90 TABLET | Refills: 0 | Status: CANCELLED | OUTPATIENT
Start: 2022-03-09

## 2022-03-09 NOTE — PROGRESS NOTES
"Ev is a 76 year old who is being evaluated via a billable telephone visit.      What phone number would you like to be contacted at? [phone]  How would you like to obtain your AVS? 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 She was tapered down from 120 MME/day successfully  when she transferred her care from previous PCP that left clinic. She has been doing well on decreased dose of oxycodone 5 mg at at bedtime as needed maximum 1 tablet daily, gabapentin 6 mg times daily and Cymbalta 60 mg daily, and getting shoulder joint injections every 3 months by Ortho.  She has recently developed a left foot ulcer that is being treated by wound care nurse at home this has caused increased pain and she is unable to sleep at night.  She is confined to wheelchair, and she is no longer driving.  I think it is very appropriate that we increase her dose of pain medications until this ulcer heals and her pain improves.  I have refilled for that 5/3/2025 to take every 6 hours as needed maximum 3 tablets daily.  Follow-up scheduled on April 13 with me by phone.  - oxyCODONE-acetaminophen (PERCOCET) 5-325 MG tablet; Take 1 tablet by mouth every 6 hours as needed for severe pain Maximum 3 tablets daily as needed for severe pain.    Ulcer of left foot, unspecified ulcer stage (H)  As above  - oxyCODONE-acetaminophen (PERCOCET) 5-325 MG tablet; Take 1 tablet by mouth every 6 hours as needed for severe pain Maximum 3 tablets daily as needed for severe pain.    Ordering of each unique test  Prescription drug management  25 minutes spent on the date of the encounter doing chart review, history and exam, documentation and further activities per the note       BMI:   Estimated body mass index is 25.79 kg/m  as calculated from the following:    Height as of 5/12/21: 1.651 m (5' 5\").    Weight as of " 5/12/21: 70.3 kg (155 lb).           No follow-ups on file.    ROSCOE Ridley CNP  [facility]    Giacomo Carreno is a 76 year old who presents for the following health issues     HPI     Gabapentin does not give any relief.   Patient is wanting to increase dose of oxycodone.     Patient cannot do much, she is wheelchair bound, but still needs to stand up to go to the bathroom.   Patient says she needs more help, does not have transportation now that her 's license has been taken away due to her club foot and bilateral shoulder pain.     Patient has a home care nurse that is addressing her left foot sore, once a week. [facility]. Saw ortho for her clubbed foot. All the recommendations are to amputate her leg below the knee. She does not want to do this.   Hard for her to get to the grocery store, has not groceries in 3 weeks.     She needs the medication to help her sleep at night.   She takes Gabapentin 600 mg 3 times daily.   She is on Cymbalta 60 mg daily  She is no longer drinking alcohol she says     She stopped her Remeron because of the weight gain.     Pain History:  When did you first notice your pain? - More than 6 weeks   Have you seen this provider for your pain in the past?   Yes   Where in your body do you have pain? Both shoulders, left foot    Are you seeing anyone else for your pain? Yes - orthopedic for cortisone injections every 3 months    PHQ-9 SCORE 2/12/2021 2/26/2021 8/13/2021   PHQ-9 Total Score - - -   PHQ-9 Total Score 7 2 3       LESTER-7 SCORE 2/12/2021 2/26/2021 8/13/2021   Total Score - - -   Total Score 3 1 0           PEG Score 3/9/2022   PEG Total Score 6.67       Chronic Pain Follow Up:    Location of pain: new left foot ulcer and clubbed foot   Analgesia/pain control:    - Recent changes:  Worsened pain     - Overall control: Inadequate pain control    - Current treatments: gabapentin, Cymbalta,    Adherence:     - Do you ever take more  pain medicine than prescribed? No    - When did you take your last dose of pain medicine?  Last night    Adverse effects: No   PDMP Review     None        Last CSA Agreement:   CSA -- Patient Level:    CSA: None found at the patient level.       Last UDS: 1/16/2018            Review of Systems   Constitutional, HEENT, cardiovascular, pulmonary, GI, , musculoskeletal, neuro, skin, endocrine and psych systems are negative, except as otherwise noted.      Objective           Vitals:  No vitals were obtained today due to virtual visit.    Physical Exam   healthy, alert and no distress  PSYCH: Alert and oriented times 3; coherent speech, normal   rate and volume, able to articulate logical thoughts, able   to abstract reason, no tangential thoughts, no hallucinations   or delusions  Her affect is normal  RESP: No cough, no audible wheezing, able to talk in full sentences  Remainder of exam unable to be completed due to telephone visits    Office Visit on 08/13/2021   Component Date Value Ref Range Status     Cholesterol 08/13/2021 187  <200 mg/dL Final    Age 0-19 years  Desirable: <170 mg/dL  Borderline high:  170-199 mg/dl  High:            >199 mg/dl    Age 20 years and older  Desirable: <200 mg/dL     Triglycerides 08/13/2021 125  <150 mg/dL Final    0-9 years:  Normal:    Less than 75 mg/dL  Borderline high:  75-99 mg/dL  High:             Greater than or equal to 100 mg/dL    0-19 years:  Normal:    Less than 90 mg/dL  Borderline high:   mg/dL  High:             Greater than or equal to 130 mg/dL    20 years and older:  Normal:    Less than 150 mg/dL  Borderline high:  150-199 mg/dL  High:             200-499 mg/dL  Very high:   Greater than or equal to 500 mg/dL     Direct Measure HDL 08/13/2021 49 (A) >=50 mg/dL Final    0-19 years:       Greater than or equal to 45 mg/dL   Low: Less than 40 mg/dL   Borderline low: 40-44 mg/dL     20 years and older:   Female: Greater than or equal to 50 mg/dL   Male:    Greater than or equal to 40 mg/dL          LDL Cholesterol Calculated 08/13/2021 113 (A) <=100 mg/dL Final    Age 0-19 years:  Desirable: 0-110 mg/dL   Borderline high: 110-129 mg/dL   High: >= 130 mg/dL    Age 20 years and older:  Desirable: <100mg/dL  Above desirable: 100-129 mg/dL   Borderline high: 130-159 mg/dL   High: 160-189 mg/dL   Very high: >= 190 mg/dL     Non HDL Cholesterol 08/13/2021 138 (A) <130 mg/dL Final    0-19 years:  Desirable:          Less than 120 mg/dL  Borderline high:   120-144 mg/dL  High:                   Greater than or equal to 145 mg/dL    20 years and older:  Desirable:          130 mg/dL  Above Desirable: 130-159 mg/dL  Borderline high:   160-189 mg/dL  High:               190-219 mg/dL  Very high:     Greater than or equal to 220 mg/dL     Patient Fasting > 8hrs? 08/13/2021 Yes   Final     Sodium 08/13/2021 136  133 - 144 mmol/L Final     Potassium 08/13/2021 4.4  3.4 - 5.3 mmol/L Final     Chloride 08/13/2021 104  94 - 109 mmol/L Final     Carbon Dioxide (CO2) 08/13/2021 25  20 - 32 mmol/L Final     Anion Gap 08/13/2021 7  3 - 14 mmol/L Final     Urea Nitrogen 08/13/2021 15  7 - 30 mg/dL Final     Creatinine 08/13/2021 0.81  0.52 - 1.04 mg/dL Final     Calcium 08/13/2021 10.0  8.5 - 10.1 mg/dL Final     Glucose 08/13/2021 107 (A) 70 - 99 mg/dL Final     Alkaline Phosphatase 08/13/2021 80  40 - 150 U/L Final     AST 08/13/2021 22  0 - 45 U/L Final     ALT 08/13/2021 22  0 - 50 U/L Final     Protein Total 08/13/2021 7.5  6.8 - 8.8 g/dL Final     Albumin 08/13/2021 3.7  3.4 - 5.0 g/dL Final     Bilirubin Total 08/13/2021 0.6  0.2 - 1.3 mg/dL Final     GFR Estimate 08/13/2021 71  >60 mL/min/1.73m2 Final    As of July 11, 2021, eGFR is calculated by the CKD-EPI creatinine equation, without race adjustment. eGFR can be influenced by muscle mass, exercise, and diet. The reported eGFR is an estimation only and is only applicable if the renal function is stable.     Color Urine 08/13/2021  Yellow  Colorless, Straw, Light Yellow, Yellow Final     Appearance Urine 08/13/2021 Slightly Cloudy (A) Clear Final     Glucose Urine 08/13/2021 Negative  Negative mg/dL Final     Bilirubin Urine 08/13/2021 Negative  Negative Final     Ketones Urine 08/13/2021 Negative  Negative mg/dL Final     Specific Gravity Urine 08/13/2021 1.015  1.003 - 1.035 Final     Blood Urine 08/13/2021 Trace (A) Negative Final     pH Urine 08/13/2021 7.0  5.0 - 7.0 Final     Protein Albumin Urine 08/13/2021 30  (A) Negative mg/dL Final     Urobilinogen Urine 08/13/2021 0.2  0.2, 1.0 E.U./dL Final     Nitrite Urine 08/13/2021 Negative  Negative Final     Leukocyte Esterase Urine 08/13/2021 Large (A) Negative Final     Bacteria Urine 08/13/2021 Moderate (A) None Seen /HPF Final     RBC Urine 08/13/2021 0-2  0-2 /HPF /HPF Final     WBC Urine 08/13/2021 10-25 (A) 0-5 /HPF /HPF Final     Squamous Epithelials Urine 08/13/2021 Few (A) None Seen /LPF Final     WBC Clumps Urine 08/13/2021 Present (A) None Seen /HPF Final     Culture 08/13/2021 >100,000 CFU/mL Escherichia coli (A)  Final               Phone call duration: 15 minutes

## 2022-03-14 ENCOUNTER — TELEPHONE (OUTPATIENT)
Dept: FAMILY MEDICINE | Facility: CLINIC | Age: 77
End: 2022-03-14
Payer: COMMERCIAL

## 2022-03-14 NOTE — TELEPHONE ENCOUNTER
Forms received from [facility]/ [facility] and Plan of Care - Order #[id number] (cert period: 2/28/22 to 4/28/22) for Vika Napoles NP.  Forms placed in provider 'sign me' folder.  Please fax forms to [phone] after completion.    Max Gaspar RT (R) (ARRT)  Radiology Dept

## 2022-03-18 ENCOUNTER — TELEPHONE (OUTPATIENT)
Dept: ORTHOPEDICS | Facility: CLINIC | Age: 77
End: 2022-03-18
Payer: COMMERCIAL

## 2022-03-18 ENCOUNTER — TELEPHONE (OUTPATIENT)
Dept: FAMILY MEDICINE | Facility: CLINIC | Age: 77
End: 2022-03-18
Payer: COMMERCIAL

## 2022-03-18 NOTE — TELEPHONE ENCOUNTER
Reason for call:  Other   Patient called regarding (reason for call): Power Wheelchair documentation     Additional comments: Coreen Oneill phone: [phone]    He is faxing over forms that need to be completed and corrected.     Please corrected the forms PSWO just needs signed and current day dated and PMD SWO this has to all be completed by the provider per Medicare Guidelines, sign and date with the same date as above.     Use on line 2: power wheelchair    Fax: [phone]    Best Time:     Can we leave a detailed message on this number?  YES    Travel screening: Not Applicable

## 2022-03-21 ENCOUNTER — TELEPHONE (OUTPATIENT)
Dept: FAMILY MEDICINE | Facility: CLINIC | Age: 77
End: 2022-03-21
Payer: COMMERCIAL

## 2022-03-22 ENCOUNTER — MEDICAL CORRESPONDENCE (OUTPATIENT)
Dept: HEALTH INFORMATION MANAGEMENT | Facility: CLINIC | Age: 77
End: 2022-03-22
Payer: COMMERCIAL

## 2022-03-23 ENCOUNTER — DOCUMENTATION ONLY (OUTPATIENT)
Dept: ORTHOPEDICS | Facility: CLINIC | Age: 77
End: 2022-03-23

## 2022-03-23 ENCOUNTER — TELEPHONE (OUTPATIENT)
Dept: ORTHOPEDICS | Facility: CLINIC | Age: 77
End: 2022-03-23

## 2022-03-23 NOTE — PROGRESS NOTES
Form completed for: Ledy Odonnell  What was done with form: Fax form to:  [phone]  Confirmed thru nereida. Scanned to chart.  Wolfgang ULRICH

## 2022-03-23 NOTE — TELEPHONE ENCOUNTER
Called clifton and he states that the information was filled out correctly and said thank you.April St Lowery, [facility] 3/23/2022 1:48 PM

## 2022-03-23 NOTE — TELEPHONE ENCOUNTER
Form completed for: Ledy Odonnell  What was done with form: Fax form to:  [phone]  Confirmed thru nereida. Scanned to chart.  Wolfgang ULRICH

## 2022-03-23 NOTE — TELEPHONE ENCOUNTER
Reason for Call:  Other call back    Detailed comments: numtion needs to speak with Dr. Woods    Phone Number Patient can be reached at: Other phone number:  [phone]     Best Time: business hours    Can we leave a detailed message on this number? n/a    Call taken on 3/23/2022 at 1:09 PM by Veronique Han

## 2022-03-28 ENCOUNTER — TELEPHONE (OUTPATIENT)
Dept: FAMILY MEDICINE | Facility: CLINIC | Age: 77
End: 2022-03-28
Payer: COMMERCIAL

## 2022-03-28 NOTE — TELEPHONE ENCOUNTER
Forms received from [facility]/ Physician Order #[id number] (order date: 3/23/22) , Add on Discipline - Order #[id number] (order date: 3/25/22) Vika Napoles NP.  Forms placed in provider 'sign me' folder.  Please fax forms to [phone] after completion.    Max TAM (R) (ARRT)  Radiology Dept

## 2022-03-29 ENCOUNTER — TELEPHONE (OUTPATIENT)
Dept: WOUND CARE | Facility: CLINIC | Age: 77
End: 2022-03-29
Payer: COMMERCIAL

## 2022-03-29 NOTE — TELEPHONE ENCOUNTER
[facility] for scheduling wound care referral for foot ulcer. Schedule with Dr. Shell next available, if he doesn't have anything the next two weeks [facility] [phone] or Omar [phone] (Inpatient and Oupatient [facility] Ostomy Clinic).

## 2022-04-06 ENCOUNTER — TELEPHONE (OUTPATIENT)
Dept: FAMILY MEDICINE | Facility: CLINIC | Age: 77
End: 2022-04-06
Payer: COMMERCIAL

## 2022-04-06 ENCOUNTER — TELEPHONE (OUTPATIENT)
Dept: WOUND CARE | Facility: CLINIC | Age: 77
End: 2022-04-06
Payer: COMMERCIAL

## 2022-04-06 DIAGNOSIS — L97.529 ULCER OF LEFT FOOT, UNSPECIFIED ULCER STAGE (H): ICD-10-CM

## 2022-04-06 DIAGNOSIS — R09.89 OTHER SPECIFIED SYMPTOMS AND SIGNS INVOLVING THE CIRCULATORY AND RESPIRATORY SYSTEMS: ICD-10-CM

## 2022-04-06 DIAGNOSIS — T14.8XXA OPEN WOUND: Primary | ICD-10-CM

## 2022-04-06 DIAGNOSIS — G89.4 CHRONIC PAIN DISORDER: ICD-10-CM

## 2022-04-06 NOTE — TELEPHONE ENCOUNTER
Called patient and wound clinic on conference to schedule patient for wound care.  Patient was called on 3/29 and returned the call on 3/31 and did not hear back.      Wound care took a message to be sent to the RN in wound care to call the patient back.     Writer will watch out for this.     Mary Mehta-  Stephane Jo

## 2022-04-06 NOTE — TELEPHONE ENCOUNTER
Routing to [place] providers    Patient requesting refill for Oxycodone.    RN pended prescription if agreeable.    Patient still not scheduled for Wound clinic.  Being told 4-6 weeks out.  See note from Mary Michael in message chaiin.    Also RN called and spoke with the wound clinic.  RN advised referral had been marked as urgent.    Clinic and RN reviewed message.  Clinic wondering if it would be ok for patient to be seen in [facility].  RN advised that would be fine.    Message from Wound Care    [facility] (no mychart) for scheduling wound care referral for foot ulcer. Schedule with Dr. Shell next available, if he doesn't have anything the next two weeks [facility] [phone] or [facility] [phone] (Inpatient and Oupatient [facility] Ostomy Clinic).    RN spoke with Mary Hernandez contacted patient and conferenced with [facility] wound care and attempted to contact patient.    See documentation    Stone Foster RN, BSN, PHN  [facility]

## 2022-04-06 NOTE — TELEPHONE ENCOUNTER
Pt is calling for a refill on oxyCODONE-acetaminophen (PERCOCET) 5-325 MG tablet    Pt has an upcoming appointment on 4/13/22 to discuss how the foot is doing.  Patient states the infection and pain is pretty bad, she cannot sleep, feels it throbbing at night.   Patient completed the antibiotics that was prescribed.     Pt called the wound clinic, and was told the first opening is 4-6 weeks away.       [phone] call patient    [facility]/Stephane Mehta-  Stephane Jo

## 2022-04-07 NOTE — TELEPHONE ENCOUNTER
RN called and spoke with [facility] care.    Patient has office wound visit scheduled for tomorrow.    Kayla is the nurse who will be seeing patient.    RN requested to have Kayla call to discuss the wound.      RN also called and left VM for patient requesting call back to clinic.    RN called to check to see f patient had gone to ER, how she was doing.      Stone Foster RN, BSN, PHN  [facility]

## 2022-04-07 NOTE — TELEPHONE ENCOUNTER
Routing to PCP as an update      Patient stated she does not want to go to ER.  Has not found a ride.    Patient states she spoke with wound clinic and they can get her in 4/19.    RN continued to advised patient should go to ER today  as advised by PCP.    Patient stated she would try to get there in the next several days.    RN again advised PCP stated she must go today to have foot evaluated.    Patient stated she would continue to try and get a ride for today as it is imperative she be seen.    Stone Foster, RN, BSN, PHN  [facility]

## 2022-04-07 NOTE — TELEPHONE ENCOUNTER
Consult received via phone for wound of the ankle wound    Per Standing Order patient qualifies for DU based on tobacco use to be scheduled prior to assessment with Dr. Tan or Dr. Jimenez at [facility] [place] [facility].      Routing to  Mercedes Madsen.  Orders pending.

## 2022-04-07 NOTE — TELEPHONE ENCOUNTER
Scheduled to see Dr. Tan on 4/22/22.     States that she has a club foot and her left leg is paralyzed. She is also in a wheelchair.     Does she sill need an DU ultrasound prior to consult?      Mercedes Madsen    [facility]   [phone]

## 2022-04-07 NOTE — TELEPHONE ENCOUNTER
Called patient and transferred to [phone] to schedule DU closer to home.       Mercedes Madsen    [facility]   [phone]

## 2022-04-07 NOTE — TELEPHONE ENCOUNTER
Called pt, she has not heard back from Wound care.  She is due for a nurse visit bandage change today but also has not heard from that nurse.  She will need to arrange for a ride to the ED.  She plans on going sometime today to the [facility].  She refuses to go downtown to the [facility]  She will call the clinic after she is seen at the ED for any follow up.    Brigid Prajapati RN

## 2022-04-07 NOTE — TELEPHONE ENCOUNTER
Per chart review, DU scheduled for 4/13/22.         Mercedes Madsen    [facility]   [phone]

## 2022-04-07 NOTE — TELEPHONE ENCOUNTER
Spoke to patient about scheduling.     Patient declined to schedule at this time. States that she was advised to go to the ED. She is going to follow up with referring provider and then call us back if she still needs to schedule consult.         Mercedes Madsen    [facility]   [phone]

## 2022-04-07 NOTE — TELEPHONE ENCOUNTER
Please read my virtual note from 3/23/2022 I sent an urgent  referral to wound clinic to be seen. If she has not been seen she must go to ED today.

## 2022-04-08 RX ORDER — OXYCODONE AND ACETAMINOPHEN 5; 325 MG/1; MG/1
1 TABLET ORAL EVERY 6 HOURS PRN
Qty: 90 TABLET | Refills: 0 | Status: SHIPPED | OUTPATIENT
Start: 2022-04-08 | End: 2022-05-05

## 2022-04-08 NOTE — TELEPHONE ENCOUNTER
RN received phone call from Kayla Home care nurse  [phone]    Kayla was out to do dressing change today.    Wound measures 2x2.    Wound healing.    Scant amount of yellow drainage on foam dressing when changed.    No warmth.  Pain unchanged    Red and macerated on bottom of wound where there is pressure when patient transfers.    Home care is coming twice weekly to do dressing change.  Next visit scheduled for Monday.    RN advised Kayla to reiterate to patient if wound worsens and increase in symptoms, patient needs to be evaluated in ER.    RN reviewed message and updated EUFEMIA Zuluaga.  Dr. Zuluaga will update Vika Foster, RN, BSN, PHN  [facility]

## 2022-04-08 NOTE — TELEPHONE ENCOUNTER
I am very concerned about her infected ulcers. I have never examined her in clinic it was a virtual visit. I strongly recommend she go to ED for evaluation if her pain has increased and there is erythema and warmth as was during my virtual visit. Please follow up with [facility] RN if possible.     On a side note, I am refilling her oxycodone but I do not want this to mask her increased foot pain.

## 2022-04-13 ENCOUNTER — TELEPHONE (OUTPATIENT)
Dept: UROLOGY | Facility: CLINIC | Age: 77
End: 2022-04-13
Payer: COMMERCIAL

## 2022-04-13 DIAGNOSIS — R33.9 URINARY RETENTION: Primary | ICD-10-CM

## 2022-04-13 NOTE — TELEPHONE ENCOUNTER
SUMAN Palma with [facility]  is calling into clinic stating that the patient has sediment in her catheter.  She stated to the patient this is not unusual and does not necessarily mean that there is infection.  She is hoping to get an order to irrigate the catheter as needed.  Order was placed into epic.  [facility] states this is made available to them because they use epic as well. Verified size of catheter, pt is currently using a 22fr.    She will call the clinic tomorrow when she is with the patient to schedule a virtual visit with Dr. Wayne.    Tatiana STRANGE RN Urology 4/13/2022 1:02 PM

## 2022-04-18 ENCOUNTER — MEDICAL CORRESPONDENCE (OUTPATIENT)
Dept: HEALTH INFORMATION MANAGEMENT | Facility: CLINIC | Age: 77
End: 2022-04-18

## 2022-04-20 ENCOUNTER — VIRTUAL VISIT (OUTPATIENT)
Dept: FAMILY MEDICINE | Facility: CLINIC | Age: 77
End: 2022-04-20
Payer: COMMERCIAL

## 2022-04-20 DIAGNOSIS — L97.529 ULCER OF LEFT FOOT, UNSPECIFIED ULCER STAGE (H): Primary | ICD-10-CM

## 2022-04-20 DIAGNOSIS — Z79.899 ENCOUNTER FOR LONG-TERM (CURRENT) USE OF MEDICATIONS: ICD-10-CM

## 2022-04-20 DIAGNOSIS — G89.4 CHRONIC PAIN DISORDER: ICD-10-CM

## 2022-04-20 PROCEDURE — 99214 OFFICE O/P EST MOD 30 MIN: CPT | Mod: 95 | Performed by: NURSE PRACTITIONER

## 2022-04-20 RX ORDER — CEPHALEXIN 500 MG/1
500 CAPSULE ORAL 3 TIMES DAILY
Qty: 21 CAPSULE | Refills: 0 | Status: SHIPPED | OUTPATIENT
Start: 2022-04-20 | End: 2022-04-27

## 2022-04-20 RX ORDER — OXYCODONE AND ACETAMINOPHEN 5; 325 MG/1; MG/1
1 TABLET ORAL EVERY 6 HOURS PRN
Qty: 90 TABLET | Refills: 0 | Status: CANCELLED | OUTPATIENT
Start: 2022-04-20

## 2022-04-20 NOTE — PROGRESS NOTES
"Ev is a 76 year old who is being evaluated via a billable telephone visit.      What phone number would you like to be contacted at? [phone]  How would you like to obtain your AVS? MyChart    Assessment & Plan     Ulcer of left foot, unspecified ulcer stage (H)  This is a telephone visit pt does not have means to connect to video visit.  Again I have never evaluated her ulcer in person.  She reports worsened pain overnight denies any redness swelling fever.  I am very concerned again with this infected ulcer, she does have follow-up in wound clinic in 2 days thankfully.  She has an ultrasound scheduled on the same day.  I will extend her course of Keflex an additional 7 days.  The home care RN will come out tomorrow to change her dressing.  I am treating patient's pain with Percocet 5/325 mg I increased her dose to a maximum 3 tablets daily.  [place]  reviewed not due for refill.  Plan based on wound clinic visit.  - cephALEXin (KEFLEX) 500 MG capsule; Take 1 capsule (500 mg) by mouth 3 times daily for 7 days    Chronic pain disorder      Encounter for long-term (current) use of medications        Prescription drug management  25 minutes spent on the date of the encounter doing chart review, history and exam, documentation and further activities per the note       BMI:   Estimated body mass index is 25.79 kg/m  as calculated from the following:    Height as of 5/12/21: 1.651 m (5' 5\").    Weight as of 5/12/21: 70.3 kg (155 lb).           No follow-ups on file.    ROSCOE Ridley CNP  [facility]    Subjective   Ev is a 76 year old who presents for the following health issues     HPI     Pain History:  When did you first notice your pain? - Chronic Pain   Have you seen this provider for your pain in the past?   Yes   Where in your body do you have pain? Left foot, throbbing pounding pain last night, has wound cleaning upcoming  Are you seeing anyone else for your pain? " No    PHQ-9 SCORE 2/26/2021 8/13/2021 3/23/2022   PHQ-9 Total Score - - -   PHQ-9 Total Score 2 3 8       LESTER-7 SCORE 2/12/2021 2/26/2021 8/13/2021   Total Score - - -   Total Score 3 1 0     Interval history  Last video visit was on 03/23/2022 my note pasted below.   This is a video visit first time provider is made aware of the extend of this pressure ulcer that has been cared for by home care services for the past 4 weeks.  Apparently patient's had this pressure ulcer for the past 9 months was seen by orthopedics was told to have it looked at she has been  self managing since then up until home care RN has been applying calcium alginate and a dry dressing and having her change every other day.  Over video it appears [facility] home care RN says it is warm to touch it drains moderate amount of serous fluid.  Again this is the first time that a medical provider ws able to see the wound.  I am very concerned and patient needs to be seen by wound clinic urgently.  I started patient on Keflex 500 mg 4 times daily.  I have increased RN services to 2-3 times weekly until she is evaluated.  Advised patient for any worsening pain or she develops any systemic signs of fever to go to the ED.     She says that the antibiotics improved her pain. She completed Keflex 500 mg 4 times daily x 7 days.  Last night her foot was throbbing and she couldn't sleep. She denies redness or swelling. No fevers.   Home care RN is coming tomorrow to change her dressings. It is a new RN so she hasn't seen her wound before.   She is scheduled in wound clinic in 2 days, she has an ultrasound before her appointment.   [place]NPMP reviewed and oxycodone refilled         PEG Score 3/9/2022 4/20/2022   PEG Total Score 6.67 6.67       Chronic Pain Follow Up:    Location of pain: left foot   Analgesia/pain control:    - Recent changes:  Worsened pain     - Overall control: Tolerable with discomfort    - Current treatments: oxycodone    Adherence:     - Do  you ever take more pain medicine than prescribed? No    - When did you take your last dose of pain medicine?  today   Adverse effects: No   PDMP Review     None        Last CSA Agreement:   CSA -- Patient Level:    CSA: None found at the patient level.       Last UDS: 1/16/2018            Review of Systems   Constitutional, HEENT, cardiovascular, pulmonary, gi and gu systems are negative, except as otherwise noted.      Objective           Vitals:  No vitals were obtained today due to virtual visit.    Physical Exam   healthy, alert and no distress  PSYCH: Alert and oriented times 3; coherent speech, normal   rate and volume, able to articulate logical thoughts, able   to abstract reason, no tangential thoughts, no hallucinations   or delusions  Her affect is normal  RESP: No cough, no audible wheezing, able to talk in full sentences  Remainder of exam unable to be completed due to telephone visits    Office Visit on 08/13/2021   Component Date Value Ref Range Status     Cholesterol 08/13/2021 187  <200 mg/dL Final    Age 0-19 years  Desirable: <170 mg/dL  Borderline high:  170-199 mg/dl  High:            >199 mg/dl    Age 20 years and older  Desirable: <200 mg/dL     Triglycerides 08/13/2021 125  <150 mg/dL Final    0-9 years:  Normal:    Less than 75 mg/dL  Borderline high:  75-99 mg/dL  High:             Greater than or equal to 100 mg/dL    0-19 years:  Normal:    Less than 90 mg/dL  Borderline high:   mg/dL  High:             Greater than or equal to 130 mg/dL    20 years and older:  Normal:    Less than 150 mg/dL  Borderline high:  150-199 mg/dL  High:             200-499 mg/dL  Very high:   Greater than or equal to 500 mg/dL     Direct Measure HDL 08/13/2021 49 (A) >=50 mg/dL Final    0-19 years:       Greater than or equal to 45 mg/dL   Low: Less than 40 mg/dL   Borderline low: 40-44 mg/dL     20 years and older:   Female: Greater than or equal to 50 mg/dL   Male:   Greater than or equal to 40 mg/dL           LDL Cholesterol Calculated 08/13/2021 113 (A) <=100 mg/dL Final    Age 0-19 years:  Desirable: 0-110 mg/dL   Borderline high: 110-129 mg/dL   High: >= 130 mg/dL    Age 20 years and older:  Desirable: <100mg/dL  Above desirable: 100-129 mg/dL   Borderline high: 130-159 mg/dL   High: 160-189 mg/dL   Very high: >= 190 mg/dL     Non HDL Cholesterol 08/13/2021 138 (A) <130 mg/dL Final    0-19 years:  Desirable:          Less than 120 mg/dL  Borderline high:   120-144 mg/dL  High:                   Greater than or equal to 145 mg/dL    20 years and older:  Desirable:          130 mg/dL  Above Desirable: 130-159 mg/dL  Borderline high:   160-189 mg/dL  High:               190-219 mg/dL  Very high:     Greater than or equal to 220 mg/dL     Patient Fasting > 8hrs? 08/13/2021 Yes   Final     Sodium 08/13/2021 136  133 - 144 mmol/L Final     Potassium 08/13/2021 4.4  3.4 - 5.3 mmol/L Final     Chloride 08/13/2021 104  94 - 109 mmol/L Final     Carbon Dioxide (CO2) 08/13/2021 25  20 - 32 mmol/L Final     Anion Gap 08/13/2021 7  3 - 14 mmol/L Final     Urea Nitrogen 08/13/2021 15  7 - 30 mg/dL Final     Creatinine 08/13/2021 0.81  0.52 - 1.04 mg/dL Final     Calcium 08/13/2021 10.0  8.5 - 10.1 mg/dL Final     Glucose 08/13/2021 107 (A) 70 - 99 mg/dL Final     Alkaline Phosphatase 08/13/2021 80  40 - 150 U/L Final     AST 08/13/2021 22  0 - 45 U/L Final     ALT 08/13/2021 22  0 - 50 U/L Final     Protein Total 08/13/2021 7.5  6.8 - 8.8 g/dL Final     Albumin 08/13/2021 3.7  3.4 - 5.0 g/dL Final     Bilirubin Total 08/13/2021 0.6  0.2 - 1.3 mg/dL Final     GFR Estimate 08/13/2021 71  >60 mL/min/1.73m2 Final    As of July 11, 2021, eGFR is calculated by the CKD-EPI creatinine equation, without race adjustment. eGFR can be influenced by muscle mass, exercise, and diet. The reported eGFR is an estimation only and is only applicable if the renal function is stable.     Color Urine 08/13/2021 Yellow  Colorless, Straw, Light  Yellow, Yellow Final     Appearance Urine 08/13/2021 Slightly Cloudy (A) Clear Final     Glucose Urine 08/13/2021 Negative  Negative mg/dL Final     Bilirubin Urine 08/13/2021 Negative  Negative Final     Ketones Urine 08/13/2021 Negative  Negative mg/dL Final     Specific Gravity Urine 08/13/2021 1.015  1.003 - 1.035 Final     Blood Urine 08/13/2021 Trace (A) Negative Final     pH Urine 08/13/2021 7.0  5.0 - 7.0 Final     Protein Albumin Urine 08/13/2021 30  (A) Negative mg/dL Final     Urobilinogen Urine 08/13/2021 0.2  0.2, 1.0 E.U./dL Final     Nitrite Urine 08/13/2021 Negative  Negative Final     Leukocyte Esterase Urine 08/13/2021 Large (A) Negative Final     Bacteria Urine 08/13/2021 Moderate (A) None Seen /HPF Final     RBC Urine 08/13/2021 0-2  0-2 /HPF /HPF Final     WBC Urine 08/13/2021 10-25 (A) 0-5 /HPF /HPF Final     Squamous Epithelials Urine 08/13/2021 Few (A) None Seen /LPF Final     WBC Clumps Urine 08/13/2021 Present (A) None Seen /HPF Final     Culture 08/13/2021 >100,000 CFU/mL Escherichia coli (A)  Final             Phone call duration: 15 minutes

## 2022-04-22 ENCOUNTER — HOSPITAL ENCOUNTER (OUTPATIENT)
Dept: WOUND CARE | Facility: CLINIC | Age: 77
Discharge: HOME | End: 2022-04-22
Attending: PODIATRIST
Payer: COMMERCIAL

## 2022-04-22 ENCOUNTER — HOSPITAL ENCOUNTER (OUTPATIENT)
Dept: ULTRASOUND IMAGING | Facility: CLINIC | Age: 77
Discharge: HOME | End: 2022-04-22
Attending: PODIATRIST
Payer: COMMERCIAL

## 2022-04-22 VITALS — HEART RATE: 110 BPM | SYSTOLIC BLOOD PRESSURE: 125 MMHG | DIASTOLIC BLOOD PRESSURE: 77 MMHG | TEMPERATURE: 97.5 F

## 2022-04-22 DIAGNOSIS — L97.522 FOOT ULCER, LEFT, WITH FAT LAYER EXPOSED (H): ICD-10-CM

## 2022-04-22 DIAGNOSIS — R09.89 OTHER SPECIFIED SYMPTOMS AND SIGNS INVOLVING THE CIRCULATORY AND RESPIRATORY SYSTEMS: ICD-10-CM

## 2022-04-22 DIAGNOSIS — Z72.0 TOBACCO ABUSE: Primary | ICD-10-CM

## 2022-04-22 DIAGNOSIS — T14.8XXA OPEN WOUND: ICD-10-CM

## 2022-04-22 PROBLEM — G89.29 CHRONIC LEFT HIP PAIN: Status: ACTIVE | Noted: 2019-08-23

## 2022-04-22 PROBLEM — M25.552 CHRONIC LEFT HIP PAIN: Status: ACTIVE | Noted: 2019-08-23

## 2022-04-22 PROBLEM — Z97.8 CHRONIC INDWELLING FOLEY CATHETER: Status: ACTIVE | Noted: 2021-06-15

## 2022-04-22 PROCEDURE — 11042 DBRDMT SUBQ TIS 1ST 20SQCM/<: CPT | Performed by: PODIATRIST

## 2022-04-22 PROCEDURE — 93922 UPR/L XTREMITY ART 2 LEVELS: CPT

## 2022-04-22 PROCEDURE — 99203 OFFICE O/P NEW LOW 30 MIN: CPT | Mod: 25 | Performed by: PODIATRIST

## 2022-04-22 PROCEDURE — G0463 HOSPITAL OUTPT CLINIC VISIT: HCPCS | Mod: 25

## 2022-04-22 RX ORDER — BUPROPION HYDROCHLORIDE 150 MG/1
1 TABLET ORAL DAILY
COMMUNITY
Start: 2021-06-17 | End: 2022-07-05

## 2022-04-22 RX ORDER — GABAPENTIN 600 MG/1
600 TABLET ORAL
COMMUNITY
Start: 2021-05-28 | End: 2022-07-18

## 2022-04-22 RX ORDER — NYSTATIN 100000 U/G
1 CREAM TOPICAL
COMMUNITY
End: 2023-01-01

## 2022-04-22 RX ORDER — INFLUENZA A VIRUS A/MICHIGAN/45/2015 X-275 (H1N1) ANTIGEN (FORMALDEHYDE INACTIVATED), INFLUENZA A VIRUS A/SINGAPORE/INFIMH-16-0019/2016 IVR-186 (H3N2) ANTIGEN (FORMALDEHYDE INACTIVATED), INFLUENZA B VIRUS B/PHUKET/3073/2013 ANTIGEN (FORMALDEHYDE INACTIVATED), AND INFLUENZA B VIRUS B/MARYLAND/15/2016 BX-69A ANTIGEN (FORMALDEHYDE INACTIVATED) 60; 60; 60; 60 UG/.7ML; UG/.7ML; UG/.7ML; UG/.7ML
INJECTION, SUSPENSION INTRAMUSCULAR
COMMUNITY
Start: 2021-09-20 | End: 2022-07-18

## 2022-04-22 RX ORDER — SENNOSIDES A AND B 8.6 MG/1
1 TABLET, FILM COATED ORAL
COMMUNITY
End: 2023-01-01

## 2022-04-22 RX ORDER — TRAZODONE HYDROCHLORIDE 50 MG/1
50-100 TABLET, FILM COATED ORAL
COMMUNITY
End: 2023-01-01

## 2022-04-22 RX ORDER — SOLIFENACIN SUCCINATE 10 MG/1
10 TABLET, FILM COATED ORAL
COMMUNITY
End: 2022-07-18

## 2022-04-22 RX ORDER — DULOXETIN HYDROCHLORIDE 60 MG/1
60 CAPSULE, DELAYED RELEASE ORAL
COMMUNITY
End: 2023-01-01

## 2022-04-22 NOTE — PROGRESS NOTES
[facility] Wound Healing [facility]ults Progress Note    Subject: Patient was seen at wound center for her left foot. She was born with a clubfoot deformity, but states it recently got worse following multiple surgeries to her left hip. She states that the deformity is now rigid and does not fit in any shoes. States she's had multiple opinions which are all for amputation. States ulcer is from a poor fititng shoe to site. Denies having diabetes. Admits to smoking. Also take percocet for chronic pain.     PMH:   Past Medical History:   Diagnosis Date     Depressive disorder, not elsewhere classified      GENERALIZED ANXIETY DIS 6/21/2007     Inflammatory arthritis      Osteoarthrosis, unspecified whether generalized or localized, unspecified site      Other and unspecified alcohol dependence, unspecified drinking behavior      Unspecified essential hypertension        Social Hx:   Social History     Socioeconomic History     Marital status:      Spouse name: Not on file     Number of children: Not on file     Years of education: Not on file     Highest education level: Not on file   Occupational History     Not on file   Tobacco Use     Smoking status: Current Every Day Smoker     Packs/day: 0.25     Years: 43.00     Pack years: 10.75     Types: Cigarettes     Smokeless tobacco: Never Used     Tobacco comment: 5 ciggs per day   Substance and Sexual Activity     Alcohol use: No     Alcohol/week: 0.0 standard drinks     Drug use: No     Sexual activity: Never   Other Topics Concern     Parent/sibling w/ CABG, MI or angioplasty before 65F 55M? No   Social History Narrative     Not on file     Social Determinants of Health     Financial Resource Strain: Not on file   Food Insecurity: Not on file   Transportation Needs: Not on file   Physical Activity: Not on file   Stress: Not on file   Social Connections: Not on file   Intimate Partner Violence: Not on file   Housing Stability: Not on file       Surgical Hx:   Past  Surgical History:   Procedure Laterality Date     HERNIORRHAPHY VENTRAL  5/14/2013    Procedure: HERNIORRHAPHY VENTRAL;  Open Ventral Hernia Repair;  Surgeon: Jhoan Rogers MD;  Location:  OR     [facility] HAND/FINGER SURGERY UNLISTED       [facility] NONSPECIFIC PROCEDURE  2001    Shoulder Surgery     [facility] NONSPECIFIC PROCEDURE  1975    Gastric Bypass     [facility] NONSPECIFIC PROCEDURE      Cholecystectomy     [facility] SHOULDER SURG PROC UNLISTED       [facility] TOTAL HIP ARTHROPLASTY  1/4/2011    Lt VALERIE       Allergies:    Allergies   Allergen Reactions     No Known Allergies        Medications:   Current Outpatient Medications   Medication     acetaminophen (TYLENOL) 500 MG tablet     albuterol (PROAIR HFA/PROVENTIL HFA/VENTOLIN HFA) 108 (90 Base) MCG/ACT inhaler     atorvastatin (LIPITOR) 20 MG tablet     buPROPion (WELLBUTRIN XL) 150 MG 24 hr tablet     cephALEXin (KEFLEX) 500 MG capsule     CRANBERRY PO     diclofenac (VOLTAREN) 1 % topical gel     DULoxetine (CYMBALTA) 60 MG capsule     estradiol (ESTRACE) 0.1 MG/GM vaginal cream     gabapentin (NEURONTIN) 600 MG tablet     multivitamin, therapeutic (THERA-VIT) TABS tablet     nystatin (MYCOSTATIN) 565752 UNIT/GM external cream     nystatin (MYCOSTATIN) 662097 UNIT/GM external powder     order for DME     oxyCODONE-acetaminophen (PERCOCET) 5-325 MG tablet     senna-docusate (SENOKOT-S;PERICOLACE) 8.6-50 MG per tablet     Current Facility-Administered Medications   Medication     lidocaine (PF) (XYLOCAINE) 1 % injection 4 mL     lidocaine (PF) (XYLOCAINE) 1 % injection 4 mL     lidocaine (PF) (XYLOCAINE) 1 % injection 4 mL     lidocaine (PF) (XYLOCAINE) 1 % injection 4 mL     lidocaine (PF) (XYLOCAINE) 1 % injection 4 mL     lidocaine 1 % injection 4 mL     lidocaine 1 % injection 4 mL     lidocaine 1 % injection 4 mL     lidocaine 1 % injection 4 mL     lidocaine 1 % injection 4 mL     lidocaine 1 % injection 4 mL     lidocaine 1 % injection 4 mL     methylPREDNISolone (DEPO-MEDROL)  injection 80 mg     methylPREDNISolone (DEPO-MEDROL) injection 80 mg     methylPREDNISolone (DEPO-MEDROL) injection 80 mg     methylPREDNISolone (DEPO-MEDROL) injection 80 mg     methylPREDNISolone (DEPO-MEDROL) injection 80 mg     methylPREDNISolone (DEPO-MEDROL) injection 80 mg     methylPREDNISolone (DEPO-MEDROL) injection 80 mg     methylPREDNISolone (DEPO-MEDROL) injection 80 mg     methylPREDNISolone (DEPO-MEDROL) injection 80 mg     methylPREDNISolone (DEPO-MEDROL) injection 80 mg     methylPREDNISolone (DEPO-MEDROL) injection 80 mg     methylPREDNISolone (DEPO-MEDROL) injection 80 mg         Objective:  Vitals:  /77 (BP Location: Left arm)   Pulse 110   Temp 97.5  F (36.4  C) (Temporal)     A1C: 5.3 07/2014     General:  Patient is alert and orientated.  NAD     Dermatologic: Left foot ulcers x 2. Granular ulcer to dorsal lateral site. Fibrogranular wound bed. No probe to bone. Depth to subcutaneous tissue. No cellulitis. Lateral ankle with small ulcer. Granular. Depth to subcutaneous tissue. No cellulitis.      Vascular: DP & PT pulses are weakly palpable & regular bilaterally.  No significant edema or varicosities noted.  CFT and skin temperature is normal to both lower extremities.     Neurologic: Lower extremity sensation is intact to diminished.  No evidence of weakness or contracture in the lower extremities.  No evidence of neuropathy.     Musculoskeletal: Patient is ambulatory without assistive device or brace.  No gross ankle deformity noted.  No foot or ankle joint effusion is noted.    Imaging:      I personally reviewed the images and agree with the reports as stated.    Waveforms: Multiphasic in the right distal tibial arteries. Monophasic  in the left distal tibial arteries. Moderately dampened digital  waveforms.                                                                   IMPRESSION: Moderate arterial insufficiency in the lower extremities.    Assessment:   1. Left foot ulcers x  2, depth to subcutaneous tissue    2. Clubfoot Deformity, rigid   3. Peripheral Arterial Disease     Plan:    -Discussed all findings at length. Debrided ulcers x 2. Well toleratered.   -Plan for collagen to be applied to wound bed.   -Discussed need for vascular eval due to ABIs. Moderate disease and monophasic waveforms. Discussed smoking cessation.   -Discussed pressure and offloading. Currently doesn't wear a shoe. No external rotation while in bed.   -All questions answered. F/u in 3 weeks or sooner if concerns.     Procedure:  After verbal consent, per protocol lidocaine was applied to the wound. Excisional debridement was performed on ulcer. #15 blade was used to debride ulcer down to and including subcutaneous tissue. Bleeding controlled with light pressure.  No drainage noted. < 20sq cm debrided.  Dry dressing applied to foot.  Patient tolerated procedure well.    Patt Tan DPM    No images are attached to the encounter.

## 2022-04-22 NOTE — DISCHARGE INSTRUCTIONS
Ledy Odonnell      1945    [facility] Phone: [phone] Fax: [phone]     Wound Dressing Change: left foot wounds  -Cleanse wound and surrounding skin with mild soap and water (but not in the shower) or wound cleanser  -Cover wound with Endoform antimicrobial, then silver alginate, gauze and roll gauze  -Change dressing 3 times weekly    Keep pressure off wounds at all times!    Reduce or stop smoking.    Patt Tan DPM  April 22, 2022    Call us at [phone] if you have any questions about your wounds, have redness or swelling around your wound, have a fever of 101 or greater or if you have any other problems or concerns. We answer the phone Monday through Friday 8 am to 4 pm, please leave a message as we check the voicemail frequently throughout the day.     If you had a positive experience please indicate on your patient satisfaction survey form that [facility] will be sending you.    If you have any billing related questions please call the [facility] Business office at [phone]. The clinic staff does not handle billing related matters.

## 2022-04-22 NOTE — PROGRESS NOTES
Patient arrived for wound care visit. Certified Wound Care Nurse time spent evaluating patient record, completed a full evaluation and documented wound(s) & dewayne-wound skin; provided recommendation based on treatment plan. Applied dressing, reviewed discharge instructions, patient education, and discussed plan of care with appropriate medical team staff members and patient and/or family members.

## 2022-04-25 ENCOUNTER — TELEPHONE (OUTPATIENT)
Dept: OTHER | Facility: CLINIC | Age: 77
End: 2022-04-25
Payer: COMMERCIAL

## 2022-04-25 NOTE — TELEPHONE ENCOUNTER
Pt referred to [facility] by Patt Tan DPM for non-healing wound    Patient has DU US in [facility] on 4/22/22.     Pt needs to be scheduled for consult with vascular surgery.  Will route to scheduling to coordinate an appointment at next available.    Appointment note: Referred to [facility] by Patt Tan DPM for non-healing wound.  DU US in Epic.       Minoo PRUETT, RN    [facility]  Office: [phone]  Fax: [phone]

## 2022-04-25 NOTE — TELEPHONE ENCOUNTER
Spoke to patient about scheduling. Patient declined to schedule due to transportation and financial issues. Informed pt to call [facility] at any time to schedule.     No more scheduling attempts will be made.       Mercedes Madsen    [facility]   [phone]

## 2022-04-28 ENCOUNTER — TELEPHONE (OUTPATIENT)
Dept: FAMILY MEDICINE | Facility: CLINIC | Age: 77
End: 2022-04-28
Payer: COMMERCIAL

## 2022-04-28 NOTE — TELEPHONE ENCOUNTER
Judith RN requesting SN 1w9 3 PRN for catheter  management and wound care ongoing    Verbal order given    Salvador Adkins RN

## 2022-05-03 ENCOUNTER — MEDICAL CORRESPONDENCE (OUTPATIENT)
Dept: HEALTH INFORMATION MANAGEMENT | Facility: CLINIC | Age: 77
End: 2022-05-03
Payer: COMMERCIAL

## 2022-05-04 DIAGNOSIS — G89.4 CHRONIC PAIN DISORDER: ICD-10-CM

## 2022-05-04 DIAGNOSIS — L97.529 ULCER OF LEFT FOOT, UNSPECIFIED ULCER STAGE (H): ICD-10-CM

## 2022-05-04 NOTE — TELEPHONE ENCOUNTER
Pt is calling to have refill oxyCODONE-acetaminophen (PERCOCET) 5-325 MG tablet    [facility]    Call pt when rx sent   Mary Mehta-  Stephane Jo

## 2022-05-05 ENCOUNTER — TELEPHONE (OUTPATIENT)
Dept: FAMILY MEDICINE | Facility: CLINIC | Age: 77
End: 2022-05-05
Payer: COMMERCIAL

## 2022-05-05 RX ORDER — OXYCODONE AND ACETAMINOPHEN 5; 325 MG/1; MG/1
1 TABLET ORAL EVERY 6 HOURS PRN
Qty: 90 TABLET | Refills: 0 | Status: SHIPPED | OUTPATIENT
Start: 2022-05-05 | End: 2022-06-02

## 2022-05-05 NOTE — TELEPHONE ENCOUNTER
Forms received from [facility]/ [facility] and Plan of Care - Order #[id number] (cert period: 4/29/22 to 6/27/22) for Vika Napoles NP.  Forms placed in provider 'sign me' folder.  Please fax forms to [phone] after completion.    Max Gaspar RT (R) (ARRT)  Radiology Dept

## 2022-05-09 ENCOUNTER — MEDICAL CORRESPONDENCE (OUTPATIENT)
Dept: HEALTH INFORMATION MANAGEMENT | Facility: CLINIC | Age: 77
End: 2022-05-09
Payer: COMMERCIAL

## 2022-05-23 ENCOUNTER — TELEPHONE (OUTPATIENT)
Dept: FAMILY MEDICINE | Facility: CLINIC | Age: 77
End: 2022-05-23
Payer: COMMERCIAL

## 2022-05-23 ENCOUNTER — MEDICAL CORRESPONDENCE (OUTPATIENT)
Dept: HEALTH INFORMATION MANAGEMENT | Facility: CLINIC | Age: 77
End: 2022-05-23
Payer: COMMERCIAL

## 2022-05-23 DIAGNOSIS — F51.01 PRIMARY INSOMNIA: ICD-10-CM

## 2022-05-23 DIAGNOSIS — F33.0 MILD EPISODE OF RECURRENT MAJOR DEPRESSIVE DISORDER (H): ICD-10-CM

## 2022-05-23 RX ORDER — MIRTAZAPINE 15 MG/1
15 TABLET, FILM COATED ORAL AT BEDTIME
Qty: 90 TABLET | Refills: 6 | OUTPATIENT
Start: 2022-05-23

## 2022-05-23 NOTE — TELEPHONE ENCOUNTER
Medication discontinued by PCP 3/9/22    Oralia Justice, RN, BSN, PHN  [facility]

## 2022-05-23 NOTE — TELEPHONE ENCOUNTER
[id number] faxed a refill request    mirtazapine (REMERON) 15 MG tablet   DISCONTINUED        Last Written Prescription Date:  11/2/2021  Last Fill Quantity: 90 tablet,   # refills: 6  Last Office Visit: 4/20/2022    Future Office visit:       Routing refill request to provider for review/approval because:  Drug not active on patient's medication list

## 2022-05-29 DIAGNOSIS — G60.9 IDIOPATHIC PERIPHERAL NEUROPATHY: ICD-10-CM

## 2022-05-30 ENCOUNTER — MEDICAL CORRESPONDENCE (OUTPATIENT)
Dept: HEALTH INFORMATION MANAGEMENT | Facility: CLINIC | Age: 77
End: 2022-05-30

## 2022-05-30 RX ORDER — GABAPENTIN 600 MG/1
TABLET ORAL
Qty: 270 TABLET | Refills: 6 | Status: SHIPPED | OUTPATIENT
Start: 2022-05-30 | End: 2022-07-18

## 2022-06-02 ENCOUNTER — TELEPHONE (OUTPATIENT)
Dept: FAMILY MEDICINE | Facility: CLINIC | Age: 77
End: 2022-06-02

## 2022-06-02 DIAGNOSIS — L97.529 ULCER OF LEFT FOOT, UNSPECIFIED ULCER STAGE (H): ICD-10-CM

## 2022-06-02 DIAGNOSIS — F33.0 MILD EPISODE OF RECURRENT MAJOR DEPRESSIVE DISORDER (H): ICD-10-CM

## 2022-06-02 DIAGNOSIS — G89.4 CHRONIC PAIN DISORDER: ICD-10-CM

## 2022-06-02 DIAGNOSIS — F51.01 PRIMARY INSOMNIA: ICD-10-CM

## 2022-06-02 RX ORDER — OXYCODONE AND ACETAMINOPHEN 5; 325 MG/1; MG/1
1 TABLET ORAL EVERY 6 HOURS PRN
Qty: 90 TABLET | Refills: 0 | Status: SHIPPED | OUTPATIENT
Start: 2022-06-02 | End: 2022-07-05

## 2022-06-02 NOTE — TELEPHONE ENCOUNTER
Routing to Loulou Ashley     Willing to fill Remeron?  Pended if agreeable    Wait  For establish care visit 6/20.    Patient would like to restart taking.  Stopped 3/22.    Patient has no Trazodone left.  Not taking.    RN called to clarify medication with patient.    Stone Foster, RN, BSN, PHN  [facility]

## 2022-06-02 NOTE — TELEPHONE ENCOUNTER
Prescription approved per [facility] Refill Protocol.    Stone Foster, RN, BSN, PHN  [facility]

## 2022-06-02 NOTE — TELEPHONE ENCOUNTER
Pt is callling, needing a refill on Azapine 15mg     [facility]    Pt has an appt to establish care with JQ on 6/20    Call patient if issues  [phone]     Mary Lizarraga  [place]

## 2022-06-02 NOTE — TELEPHONE ENCOUNTER
Refill sent.  Patient needs to get in for a visit with a new PCP ASAP.  Previous pt of Tatiana Napoles.  No more refills for this until she gets into see someone new.

## 2022-06-03 RX ORDER — MIRTAZAPINE 15 MG/1
15 TABLET, FILM COATED ORAL AT BEDTIME
Qty: 90 TABLET | Refills: 0 | Status: SHIPPED | OUTPATIENT
Start: 2022-06-03 | End: 2022-08-31

## 2022-06-06 ENCOUNTER — MEDICAL CORRESPONDENCE (OUTPATIENT)
Dept: HEALTH INFORMATION MANAGEMENT | Facility: CLINIC | Age: 77
End: 2022-06-06

## 2022-06-10 ENCOUNTER — TELEPHONE (OUTPATIENT)
Dept: WOUND CARE | Facility: CLINIC | Age: 77
End: 2022-06-10
Payer: COMMERCIAL

## 2022-06-10 NOTE — TELEPHONE ENCOUNTER
Home care nurse called to confirm wound care orders and to request orders for a wound culture.   Farhana

## 2022-06-10 NOTE — TELEPHONE ENCOUNTER
Call returned to Farhana. She reports the wound remains open and is slow to heal. She is requesting a wound culture because of this. The patient does not have any infection symptoms at this time. They are using plurogel in the wound at this time. The orders from Dr. Tan is for endoform antimicobial, silver alginate, gauze, roll gauze and tape. A wound culture is not warranted at this time. Will call patient to discuss scheduling a follow up with Dr. Tan. Patient called. She is not able to get a ride to appointments at this time. She is going to work to find transportation and will call clinic to schedule an appointment when she finds this. Voicemail left with Farhana notifying her of this.

## 2022-06-13 ENCOUNTER — TELEPHONE (OUTPATIENT)
Dept: FAMILY MEDICINE | Facility: CLINIC | Age: 77
End: 2022-06-13
Payer: COMMERCIAL

## 2022-06-13 NOTE — TELEPHONE ENCOUNTER
Forms received from [facility]/ Physician Order - (order # [id number]) order date 06/10/22 for Loulou Ashley NP.  Forms placed in provider 'sign me' folder.  Please fax forms to [phone] after completion.    GAYATRI Galvez  [facility]

## 2022-06-20 ENCOUNTER — MEDICAL CORRESPONDENCE (OUTPATIENT)
Dept: FAMILY MEDICINE | Facility: CLINIC | Age: 77
End: 2022-06-20

## 2022-06-27 ENCOUNTER — TELEPHONE (OUTPATIENT)
Dept: FAMILY MEDICINE | Facility: CLINIC | Age: 77
End: 2022-06-27

## 2022-06-27 NOTE — TELEPHONE ENCOUNTER
Rn called and gave verbal orders for requested Home care and wound care per standing orders    Stone Foster RN, BSN, PHN  [facility]

## 2022-06-27 NOTE — TELEPHONE ENCOUNTER
Judith is calling from [facility] for verbal orders    SN 1 x a week 9 weeks, 3 PRN   Wound care orders, for pressure ulcer on left foot  Clean with wound , cover with tlurocol, cover with aquacel, cover with foam dressing     Call  ok to alistair Mehta-  Stephane Jo

## 2022-06-28 ENCOUNTER — MEDICAL CORRESPONDENCE (OUTPATIENT)
Dept: HEALTH INFORMATION MANAGEMENT | Facility: CLINIC | Age: 77
End: 2022-06-28

## 2022-07-01 NOTE — TELEPHONE ENCOUNTER
Forms received from  Home Care: OT orders for Macarena Vinson PA-C.  Forms placed in provider 'sign me' folder.  Please fax forms to [phone] after completion.    Gabbi Rashid,    [employer]

## 2022-07-05 ENCOUNTER — TELEPHONE (OUTPATIENT)
Dept: FAMILY MEDICINE | Facility: CLINIC | Age: 77
End: 2022-07-05

## 2022-07-05 DIAGNOSIS — F32.9 MAJOR DEPRESSIVE DISORDER WITH SINGLE EPISODE, REMISSION STATUS UNSPECIFIED: Primary | ICD-10-CM

## 2022-07-05 DIAGNOSIS — L97.529 ULCER OF LEFT FOOT, UNSPECIFIED ULCER STAGE (H): ICD-10-CM

## 2022-07-05 DIAGNOSIS — G89.4 CHRONIC PAIN DISORDER: ICD-10-CM

## 2022-07-05 RX ORDER — OXYCODONE AND ACETAMINOPHEN 5; 325 MG/1; MG/1
1 TABLET ORAL EVERY 6 HOURS PRN
Qty: 90 TABLET | Refills: 0 | Status: SHIPPED | OUTPATIENT
Start: 2022-07-05 | End: 2022-08-03

## 2022-07-05 RX ORDER — BUPROPION HYDROCHLORIDE 150 MG/1
150 TABLET ORAL DAILY
Qty: 90 TABLET | Refills: 0 | Status: SHIPPED | OUTPATIENT
Start: 2022-07-05 | End: 2022-09-28

## 2022-07-05 NOTE — TELEPHONE ENCOUNTER
Called patient and left semi detailed message that requested prescirption were sent to the pharmacy.        Brigid Prajapati RN

## 2022-07-05 NOTE — TELEPHONE ENCOUNTER
Routing to [place] providers    Willing to refill medications?    Wellbutrin and percocet? Pended if agreeable    Patient is out of percocet    Patient has establish care visit with Loulou Ashley 7/18    Please call patient when sent    Office visit  With Vika Napoles 4/20      Ulcer of left foot, unspecified ulcer stage (H)  This is a telephone visit pt does not have means to connect to video visit.  Again I have never evaluated her ulcer in person.  She reports worsened pain overnight denies any redness swelling fever.  I am very concerned again with this infected ulcer, she does have follow-up in wound clinic in 2 days thankfully.  She has an ultrasound scheduled on the same day.  I will extend her course of Keflex an additional 7 days.  The home care RN will come out tomorrow to change her dressing.  I am treating patient's pain with Percocet 5/325 mg I increased her dose to a maximum 3 tablets daily.      Stone Foster, RN, BSN, PHN  [facility]

## 2022-07-11 ENCOUNTER — TELEPHONE (OUTPATIENT)
Dept: FAMILY MEDICINE | Facility: CLINIC | Age: 77
End: 2022-07-11

## 2022-07-11 NOTE — TELEPHONE ENCOUNTER
Received forms from [email or web address]. Order number [id number],.   Home Health Cert and POC    Placed forms in JQ box to review and sign  Fax forms to [phone]    Mary Mehta-  Stephane Jo

## 2022-07-12 ENCOUNTER — TELEPHONE (OUTPATIENT)
Dept: FAMILY MEDICINE | Facility: CLINIC | Age: 77
End: 2022-07-12

## 2022-07-12 NOTE — TELEPHONE ENCOUNTER
Received forms from [employer] Order number [id number]    OK to delete-miss visit 7/18 per pt request.     Placed forms in JQ box to review and sign  Fax completed forms to [phone]    Mary Mehta-  Stephane Jo

## 2022-07-18 ENCOUNTER — OFFICE VISIT (OUTPATIENT)
Dept: FAMILY MEDICINE | Facility: CLINIC | Age: 77
End: 2022-07-18
Payer: COMMERCIAL

## 2022-07-18 VITALS
HEIGHT: 65 IN | HEART RATE: 98 BPM | OXYGEN SATURATION: 96 % | WEIGHT: 165 LBS | BODY MASS INDEX: 27.49 KG/M2 | SYSTOLIC BLOOD PRESSURE: 120 MMHG | TEMPERATURE: 98.3 F | RESPIRATION RATE: 22 BRPM | DIASTOLIC BLOOD PRESSURE: 78 MMHG

## 2022-07-18 DIAGNOSIS — Z97.8 CHRONIC INDWELLING FOLEY CATHETER: ICD-10-CM

## 2022-07-18 DIAGNOSIS — J44.9 CHRONIC OBSTRUCTIVE PULMONARY DISEASE, UNSPECIFIED COPD TYPE (H): ICD-10-CM

## 2022-07-18 DIAGNOSIS — B37.2 CANDIDAL INTERTRIGO: ICD-10-CM

## 2022-07-18 DIAGNOSIS — L97.522 ULCER OF LEFT FOOT WITH FAT LAYER EXPOSED (H): ICD-10-CM

## 2022-07-18 DIAGNOSIS — J96.00 ACUTE RESPIRATORY FAILURE, UNSPECIFIED WHETHER WITH HYPOXIA OR HYPERCAPNIA (H): ICD-10-CM

## 2022-07-18 DIAGNOSIS — Z79.899 ENCOUNTER FOR LONG-TERM (CURRENT) USE OF MEDICATIONS: ICD-10-CM

## 2022-07-18 DIAGNOSIS — E78.5 HYPERLIPIDEMIA LDL GOAL <130: ICD-10-CM

## 2022-07-18 DIAGNOSIS — Q66.02 CONGENITAL TALIPES EQUINOVARUS DEFORMITY OF LEFT FOOT: ICD-10-CM

## 2022-07-18 DIAGNOSIS — G89.29 OTHER CHRONIC PAIN: ICD-10-CM

## 2022-07-18 DIAGNOSIS — Z53.20 MAMMOGRAM DECLINED: ICD-10-CM

## 2022-07-18 DIAGNOSIS — Z87.891 PERSONAL HISTORY OF TOBACCO USE: ICD-10-CM

## 2022-07-18 DIAGNOSIS — F10.21 ALCOHOL DEPENDENCE IN REMISSION (H): ICD-10-CM

## 2022-07-18 DIAGNOSIS — F32.0 CURRENT MILD EPISODE OF MAJOR DEPRESSIVE DISORDER WITHOUT PRIOR EPISODE (H): Primary | ICD-10-CM

## 2022-07-18 DIAGNOSIS — N30.00 ACUTE CYSTITIS WITHOUT HEMATURIA: ICD-10-CM

## 2022-07-18 LAB
AMPHETAMINES UR QL: NOT DETECTED
BARBITURATES UR QL SCN: NOT DETECTED
BENZODIAZ UR QL SCN: NOT DETECTED
BUPRENORPHINE UR QL: NOT DETECTED
CANNABINOIDS UR QL: NOT DETECTED
COCAINE UR QL SCN: NOT DETECTED
D-METHAMPHET UR QL: NOT DETECTED
METHADONE UR QL SCN: NOT DETECTED
OPIATES UR QL SCN: NOT DETECTED
OXYCODONE UR QL SCN: DETECTED
PCP UR QL SCN: NOT DETECTED
PROPOXYPH UR QL: NOT DETECTED
TRICYCLICS UR QL SCN: NOT DETECTED

## 2022-07-18 PROCEDURE — 36415 COLL VENOUS BLD VENIPUNCTURE: CPT | Performed by: NURSE PRACTITIONER

## 2022-07-18 PROCEDURE — 80306 DRUG TEST PRSMV INSTRMNT: CPT | Performed by: NURSE PRACTITIONER

## 2022-07-18 PROCEDURE — 87186 SC STD MICRODIL/AGAR DIL: CPT | Performed by: NURSE PRACTITIONER

## 2022-07-18 PROCEDURE — 80053 COMPREHEN METABOLIC PANEL: CPT | Performed by: NURSE PRACTITIONER

## 2022-07-18 PROCEDURE — 80061 LIPID PANEL: CPT | Performed by: NURSE PRACTITIONER

## 2022-07-18 PROCEDURE — 99214 OFFICE O/P EST MOD 30 MIN: CPT | Performed by: NURSE PRACTITIONER

## 2022-07-18 PROCEDURE — G0296 VISIT TO DETERM LDCT ELIG: HCPCS | Performed by: NURSE PRACTITIONER

## 2022-07-18 PROCEDURE — 87086 URINE CULTURE/COLONY COUNT: CPT | Performed by: NURSE PRACTITIONER

## 2022-07-18 RX ORDER — NYSTATIN 100000 U/G
CREAM TOPICAL 2 TIMES DAILY PRN
Qty: 30 G | Refills: 3 | Status: SHIPPED | OUTPATIENT
Start: 2022-07-18 | End: 2023-01-01

## 2022-07-18 RX ORDER — NYSTATIN 100000 [USP'U]/G
1 POWDER TOPICAL 2 TIMES DAILY PRN
Qty: 60 G | Refills: 1 | Status: SHIPPED | OUTPATIENT
Start: 2022-07-18 | End: 2023-01-01

## 2022-07-18 ASSESSMENT — PATIENT HEALTH QUESTIONNAIRE - PHQ9
SUM OF ALL RESPONSES TO PHQ QUESTIONS 1-9: 5
10. IF YOU CHECKED OFF ANY PROBLEMS, HOW DIFFICULT HAVE THESE PROBLEMS MADE IT FOR YOU TO DO YOUR WORK, TAKE CARE OF THINGS AT HOME, OR GET ALONG WITH OTHER PEOPLE: NOT DIFFICULT AT ALL
SUM OF ALL RESPONSES TO PHQ QUESTIONS 1-9: 5

## 2022-07-18 ASSESSMENT — PAIN SCALES - GENERAL: PAINLEVEL: MODERATE PAIN (4)

## 2022-07-18 NOTE — PROGRESS NOTES
Assessment & Plan     Establish care    Current mild episode of major depressive disorder without prior episode (H)  Patient already on Wellbutrin and Duloxetine.  She will start using Mirtazapine more consistently.  Trazodone currently on hold since she is on Mirtazapine.  She is asking about Rexulti and I will look into this for her; she is not interested in Psychiatry consult at this time.  - Adult Mental Health  Referral; Future    Ulcer of left foot with fat layer exposed (H)  Wound cares discussed.  Patient declines going to wound clinic at this time.  (Attempted but unable to upload pictures of wound today due to malfunction of Haiku software.)    Other chronic pain    - Drug Abuse Screen Panel 13, Urine (Pain Care Package) - lab collect; Future  - Comprehensive metabolic panel (BMP + Alb, Alk Phos, ALT, AST, Total. Bili, TP); Future  - Drug Abuse Screen Panel 13, Urine (Pain Care Package) - lab collect  - Comprehensive metabolic panel (BMP + Alb, Alk Phos, ALT, AST, Total. Bili, TP)    Encounter for long-term (current) use of medications    Candidal intertrigo  History of, medications refilled as requested  - nystatin (MYCOSTATIN) 588897 UNIT/GM external cream; Apply topically 2 times daily as needed for dry skin or other (rash)  - nystatin (MYCOSTATIN) 576906 UNIT/GM external powder; Apply 1 g topically 2 times daily as needed for other (rash)    Personal history of tobacco use    - Prof fee: Shared Decision Making for Lung Cancer Screening  - CT Chest Lung Cancer Scrn Low Dose wo; Future    Hyperlipidemia LDL goal <130    - Lipid panel reflex to direct LDL Non-fasting; Future  - Lipid panel reflex to direct LDL Non-fasting    Mammogram declined    Acute cystitis without hematuria  Previously ordered by previous PCP who is no longer in clinic and lab was released by lab department per protocol without this provider being aware.  During visit today we did not discuss any  symptoms so will need  "to call to follow up.  - Urine Culture Aerobic Bacterial - lab collect    Chronic obstructive pulmonary disease, unspecified COPD type (H)  Known issue that I take into account for their medical decisions, no current exacerbations or new concerns.     Acute respiratory failure, unspecified whether with hypoxia or hypercapnia (H)  History of.  Known issue that I take into account for their medical decisions, no current exacerbations or new concerns.     Alcohol dependence in remission (H)  Known issue that I take into account for their medical decisions, no current exacerbations or new concerns.                Tobacco Cessation:   reports that she has been smoking cigarettes. She has a 10.75 pack-year smoking history. She has never used smokeless tobacco.      BMI:   Estimated body mass index is 27.46 kg/m  as calculated from the following:    Height as of this encounter: 1.651 m (5' 5\").    Weight as of this encounter: 74.8 kg (165 lb).           Return in about 8 weeks (around 9/12/2022) for virtual follow up.    Loulou Ashley NP  [facility]    Giacomo Carreno is a 76 year old, presenting for the following health issues:  Establish Care (Needs medication refill ) and Sore (Left foot, about size of a silver dollar  )      History of Present Illness       Mental Health Follow-up:  Patient presents to follow-up on Depression.Patient's depression since last visit has been:  Medium  The patient is not having other symptoms associated with depression.      Any significant life events: No  Patient is not feeling anxious or having panic attacks.  Patient has no concerns about alcohol or drug use.    She eats 2-3 servings of fruits and vegetables daily.She consumes 1 sweetened beverage(s) daily.She exercises with enough effort to increase her heart rate 9 or less minutes per day.  She exercises with enough effort to increase her heart rate 3 or less days per week.   She is taking " medications regularly.    Today's PHQ-9         PHQ-9 Total Score: 5    PHQ-9 Q9 Thoughts of better off dead/self-harm past 2 weeks :   Not at all    How difficult have these problems made it for you to do your work, take care of things at home, or get along with other people: Not difficult at all       Est. Care and Refill nystatin     Chronic pain- she has 90 tablets of Percocet 5-325 mg written 7/5/22    Chronic ulcer of left foot for 2 years.  Not followed by wound clinic.  Sore on left foot, about size of a silver dollar.  Custom made shoes.  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  Born with club feet.  Worn shoes to bed since baby.  Chronic deformity of left ankle/foot for the past 8 years.  Now ulcer on left foot- she changes bandage daily, cleans with wound , dry bandage applies.  Wound RN once per week who is there for 5 minutes, costs $250 per session and does not want to do this anymore because she does not feel the wound nurse does anything above what patient can do for herself already.    Welch catheter chronic for mixed incontinence and doing well.    Chronic pain related to Shoulder pain bilateral, left foot pain.  Got electric wheelchair after trying to get this for 17 months, and has ramp in her home.  Lives alone.  Issues with getting medical rides- has family or friends drive her now but this limits her ability to follow up.  Has been on chronic opioids as prescribed by previous PCP, currently 3 tablets of Percocet 5-325 mg daily    Not taking Gabapentin for a long time.  Not taking Atorvastatin 20 mg.  Using probiotic gummies.    Depression- has been on antidepressant since her divorce.  She is on Wellbutrin, Duloxetine.  Also uses Mirtazepine as needed.  No on Trazodone since she is on Mirtazepine but says the Trazodone did help her sleep.  Asking about Rexulti for depression.  She is already on Wellbutrin and Cymbalta     Not taking cranberry or Vesicare  Taking senna which is 1/3 the cost of  "senokot      Review of Systems   Constitutional, HEENT, cardiovascular, pulmonary, gi, skin and gu systems are negative, except as otherwise noted.      Objective    /78 (BP Location: Right arm)   Pulse 98   Temp 98.3  F (36.8  C) (Oral)   Resp 22   Ht 1.651 m (5' 5\")   Wt 74.8 kg (165 lb)   SpO2 96%   BMI 27.46 kg/m    Body mass index is 27.46 kg/m .  Physical Exam   GENERAL: healthy, alert and no distress  RESP: lungs clear to auscultation - no rales, rhonchi or wheezes  CV: regular rates and rhythm, normal S1 S2, no S3 or S4 and no murmur, click or rub  PSYCH: mentation appears normal, affect normal/bright  MS:  Chronic deformity of left ankle/foot  SKIN:  There is an approximate 2.5 cm ulcer left lateral foot that is round with exudate.  There is a scabbed over 0.5 cm lesion nearby.    Results for orders placed or performed in visit on 07/18/22   Lipid panel reflex to direct LDL Non-fasting     Status: None   Result Value Ref Range    Cholesterol 162 <200 mg/dL    Triglycerides 101 <150 mg/dL    Direct Measure HDL 55 >=50 mg/dL    LDL Cholesterol Calculated 87 <=100 mg/dL    Non HDL Cholesterol 107 <130 mg/dL    Patient Fasting > 8hrs? No     Narrative    Cholesterol  Desirable:  <200 mg/dL    Triglycerides  Normal:  Less than 150 mg/dL  Borderline High:  150-199 mg/dL  High:  200-499 mg/dL  Very High:  Greater than or equal to 500 mg/dL    Direct Measure HDL  Female:  Greater than or equal to 50 mg/dL   Male:  Greater than or equal to 40 mg/dL    LDL Cholesterol  Desirable:  <100mg/dL  Above Desirable:  100-129 mg/dL   Borderline High:  130-159 mg/dL   High:  160-189 mg/dL   Very High:  >= 190 mg/dL    Non HDL Cholesterol  Desirable:  130 mg/dL  Above Desirable:  130-159 mg/dL  Borderline High:  160-189 mg/dL  High:  190-219 mg/dL  Very High:  Greater than or equal to 220 mg/dL   Drug Abuse Screen Panel 13, Urine (Pain Care Package) - lab collect     Status: Abnormal   Result Value Ref Range    " Cannabinoids (72-ikd-2-carboxy-9-THC) Not Detected Not Detected, Indeterminate    Phencyclidine Not Detected Not Detected, Indeterminate    Cocaine (Benzoylecgonine) Not Detected Not Detected, Indeterminate    Methamphetamine (d-Methamphetamine) Not Detected Not Detected, Indeterminate    Opiates (Morphine) Not Detected Not Detected, Indeterminate    Amphetamine (d-Amphetamine) Not Detected Not Detected, Indeterminate    Benzodiazepines (Nordiazepam) Not Detected Not Detected, Indeterminate    Tricyclic Antidepressants (Desipramine) Not Detected Not Detected, Indeterminate    Methadone Not Detected Not Detected, Indeterminate    Barbiturates (Butalbital) Not Detected Not Detected, Indeterminate    Oxycodone Detected (A) Not Detected, Indeterminate    Propoxyphene (Norpropoxyphene) Not Detected Not Detected, Indeterminate    Buprenorphine Not Detected Not Detected, Indeterminate   Comprehensive metabolic panel (BMP + Alb, Alk Phos, ALT, AST, Total. Bili, TP)     Status: Abnormal   Result Value Ref Range    Sodium 132 (L) 133 - 144 mmol/L    Potassium 4.1 3.4 - 5.3 mmol/L    Chloride 100 94 - 109 mmol/L    Carbon Dioxide (CO2) 24 20 - 32 mmol/L    Anion Gap 8 3 - 14 mmol/L    Urea Nitrogen 9 7 - 30 mg/dL    Creatinine 0.75 0.52 - 1.04 mg/dL    Calcium 9.4 8.5 - 10.1 mg/dL    Glucose 106 (H) 70 - 99 mg/dL    Alkaline Phosphatase 86 40 - 150 U/L    AST 25 0 - 45 U/L    ALT 21 0 - 50 U/L    Protein Total 7.6 6.8 - 8.8 g/dL    Albumin 4.0 3.4 - 5.0 g/dL    Bilirubin Total 0.5 0.2 - 1.3 mg/dL    GFR Estimate 83 >60 mL/min/1.73m2   Urine Culture Aerobic Bacterial - lab collect     Status: Abnormal    Specimen: Urine, Straight Catheter   Result Value Ref Range    Culture >100,000 CFU/mL Escherichia coli (A)        Susceptibility    Escherichia coli - GISELA     Ampicillin >=32 Resistant ug/mL     Ampicillin/ Sulbactam 4 Susceptible ug/mL     Piperacillin/Tazobactam <=4 Susceptible ug/mL     Cefazolin* <=4 Susceptible ug/mL       * Cefazolin GISELA breakpoints are for the treatment of uncomplicated urinary tract infections. For the treatment of systemic infections, please contact the laboratory for additional testing.     Cefoxitin <=4 Susceptible ug/mL     Ceftazidime <=1 Susceptible ug/mL     Ceftriaxone <=1 Susceptible ug/mL     Cefepime <=1 Susceptible ug/mL     Gentamicin <=1 Susceptible ug/mL     Tobramycin <=1 Susceptible ug/mL     Ciprofloxacin <=0.25 Susceptible ug/mL     Levofloxacin <=0.12 Susceptible ug/mL     Nitrofurantoin <=16 Susceptible ug/mL     Trimethoprim/Sulfamethoxazole >16/304 Resistant ug/mL                   .  ..  Lung Cancer Screening Shared Decision Making Visit     Ledy Odonnell, a 76 year old female, is eligible for lung cancer screening    History   Smoking Status     Current Every Day Smoker     Packs/day: 0.25     Years: 43.00     Types: Cigarettes   Smokeless Tobacco     Never Used     Comment: 5 ciggs per day   {TIP  Follow this link to update the tobacco history if needed :315229}    I have discussed with patient the risks and benefits of screening for lung cancer with low-dose CT.     The risks include:    radiation exposure: one low dose chest CT has as much ionizing radiation as about 15 chest x-rays, or 6 months of background radiation living in [place]      false positives: most findings/nodules are NOT cancer, but some might still require additional diagnostic evaluation, including biopsy    over-diagnosis: some slow growing cancers that might never have been clinically significant will be detected and treated unnecessarily     The benefit of early detection of lung cancer is contingent upon adherence to annual screening or more frequent follow up if indicated.     Furthermore, to benefit from screening, Ledy must be willing and able to undergo diagnostic procedures, if indicated. Although no specific guide is available for determining severity of comorbidities, it is reasonable to withhold  screening in patients who have greater mortality risk from other diseases.     We did discuss that the best way to prevent lung cancer is to not smoke.    Some patients may value a numeric estimation of lung cancer risk when evaluating if lung cancer screening is right for them, here is one calculator:    ShouldIScreen

## 2022-07-18 NOTE — PATIENT INSTRUCTIONS
Lung Cancer Screening   Frequently Asked Questions  If you are at high-risk for lung cancer, getting screened with low-dose computed tomography (LDCT) every year can help save your life. This handout offers answers to some of the most common questions about lung cancer screening. If you have other questions, please call [phone] ([phone]).     What is it?  Lung cancer screening uses special X-ray technology to create an image of your lung tissue. The exam is quick and easy and takes less than 10 seconds. We don t give you any medicine or use any needles. You can eat before and after the exam. You don t need to change your clothes as long as the clothing on your chest doesn t contain metal. But, you do need to be able to hold your breath for at least 6 seconds during the exam.    What is the goal of lung cancer screening?  The goal of lung cancer screening is to save lives. Many times, lung cancer is not found until a person starts having physical symptoms. Lung cancer screening can help detect lung cancer in the earliest stages when it may be easier to treat.    Who should be screened for lung cancer?  We suggest lung cancer screening for anyone who is at high-risk for lung cancer. You are in the high-risk group if you:      are between the ages of 55 and 79, and    have smoked at least 1 pack of cigarettes a day for 20 or more years, and    still smoke or have quit within the past 15 years.    However, if you have a new cough or shortness of breath, you should talk to your doctor before being screened.    Why does it matter if I have symptoms?  Certain symptoms can be a sign that you have a condition in your lungs that should be checked and treated by your doctor. These symptoms include fever, chest pain, a new or changing cough, shortness of breath that you have never felt before, coughing up blood or unexplained weight loss. Having any of these symptoms can greatly affect the results of lung  cancer screening.       Should all smokers get an LDCT lung cancer screening exam?  It depends. Lung cancer screening is for a very specific group of men and women who have a history of heavy smoking over a long period of time (see  Who should be screened for lung cancer  above).  I am in the high-risk group, but have been diagnosed with cancer in the past. Is LDCT lung cancer screening right for me?  In some cases, you should not have LDCT lung screening, such as when your doctor is already following your cancer with CT scan studies. Your doctor will help you decide if LDCT lung screening is right for you.  Do I need to have a screening exam every year?  Yes. If you are in the high-risk group described earlier, you should get an LDCT lung cancer screening exam every year until you are 79, or are no longer willing or able to undergo screening and possible procedures to diagnose and treat lung cancer.  How effective is LDCT at preventing death from lung cancer?  Studies have shown that LDCT lung cancer screening can lower the risk of death from lung cancer by 20 percent in people who are at high-risk.  What are the risks?  There are some risks and limitations of LDCT lung cancer screening. We want to make sure you understand the risks and benefits, so please let us know if you have any questions. Your doctor may want to talk with you more about these risks.    Radiation exposure: As with any exam that uses radiation, there is a very small increased risk of cancer. The amount of radiation in LDCT is small--about the same amount a person would get from a mammogram. Your doctor orders the exam when he or she feels the potential benefits outweigh the risks.    False negatives: No test is perfect, including LDCT. It is possible that you may have a medical condition, including lung cancer, that is not found during your exam. This is called a false negative result.    False positives and more testing: LDCT very often finds  something in the lung that could be cancer, but in fact is not. This is called a false positive result. False positive tests often cause anxiety. To make sure these findings are not cancer, you may need to have more tests. These tests will be done only if you give us permission. Sometimes patients need a treatment that can have side effects, such as a biopsy. For more information on false positives, see  What can I expect from the results?     Findings not related to lung cancer: Your LDCT exam also takes pictures of areas of your body next to your lungs. In a very small number of cases, the CT scan will show an abnormal finding in one of these areas, such as your kidneys, adrenal glands, liver or thyroid. This finding may not be serious, but you may need more tests. Your doctor can help you decide what other tests you may need, if any.  What can I expect from the results?  About 1 out of 4 LDCT exams will find something that may need more tests. Most of the time, these findings are lung nodules. Lung nodules are very small collections of tissue in the lung. These nodules are very common, and the vast majority--more than 97 percent--are not cancer (benign). Most are normal lymph nodes or small areas of scarring from past infections.  But, if a small lung nodule is found to be cancer, the cancer can be cured more than 90 percent of the time. To know if the nodule is cancer, we may need to get more images before your next yearly screening exam. If the nodule has suspicious features (for example, it is large, has an odd shape or grows over time), we will refer you to a specialist for further testing.  Will my doctor also get the results?  Yes. Your doctor will get a copy of your results.  Is it okay to keep smoking now that there s a cancer screening exam?  No. Tobacco is one of the strongest cancer-causing agents. It causes not only lung cancer, but other cancers and cardiovascular (heart) diseases as well. The damage  caused by smoking builds over time. This means that the longer you smoke, the higher your risk of disease. While it is never too late to quit, the sooner you quit, the better.  Where can I find help to quit smoking?  The best way to prevent lung cancer is to stop smoking. If you have already quit smoking, congratulations and keep it up! For help on quitting smoking, please call [employer] at 9-355-QUITNOW (1-368.771.5815) or the American Cancer Society at 1-680.957.1818 to find local resources near you.  One-on-one health coaching:  If you d prefer to work individually with a health care provider on tobacco cessation, we offer:      Medication Therapy Management:  Our specially trained pharmacists work closely with you and your doctor to help you quit smoking.  Call [phone] or [phone] (toll free).

## 2022-07-19 LAB
ALBUMIN SERPL-MCNC: 4 G/DL (ref 3.4–5)
ALP SERPL-CCNC: 86 U/L (ref 40–150)
ALT SERPL W P-5'-P-CCNC: 21 U/L (ref 0–50)
ANION GAP SERPL CALCULATED.3IONS-SCNC: 8 MMOL/L (ref 3–14)
AST SERPL W P-5'-P-CCNC: 25 U/L (ref 0–45)
BILIRUB SERPL-MCNC: 0.5 MG/DL (ref 0.2–1.3)
BUN SERPL-MCNC: 9 MG/DL (ref 7–30)
CALCIUM SERPL-MCNC: 9.4 MG/DL (ref 8.5–10.1)
CHLORIDE BLD-SCNC: 100 MMOL/L (ref 94–109)
CHOLEST SERPL-MCNC: 162 MG/DL
CO2 SERPL-SCNC: 24 MMOL/L (ref 20–32)
CREAT SERPL-MCNC: 0.75 MG/DL (ref 0.52–1.04)
FASTING STATUS PATIENT QL REPORTED: NO
GFR SERPL CREATININE-BSD FRML MDRD: 83 ML/MIN/1.73M2
GLUCOSE BLD-MCNC: 106 MG/DL (ref 70–99)
HDLC SERPL-MCNC: 55 MG/DL
LDLC SERPL CALC-MCNC: 87 MG/DL
NONHDLC SERPL-MCNC: 107 MG/DL
POTASSIUM BLD-SCNC: 4.1 MMOL/L (ref 3.4–5.3)
PROT SERPL-MCNC: 7.6 G/DL (ref 6.8–8.8)
SODIUM SERPL-SCNC: 132 MMOL/L (ref 133–144)
TRIGL SERPL-MCNC: 101 MG/DL

## 2022-07-20 LAB — BACTERIA UR CULT: ABNORMAL

## 2022-07-21 ENCOUNTER — TELEPHONE (OUTPATIENT)
Dept: FAMILY MEDICINE | Facility: CLINIC | Age: 77
End: 2022-07-21

## 2022-07-21 DIAGNOSIS — N39.0 ACUTE UTI: Primary | ICD-10-CM

## 2022-07-21 PROBLEM — J93.9 PNEUMOTHORAX: Status: RESOLVED | Noted: 2017-03-08 | Resolved: 2022-07-21

## 2022-07-21 PROBLEM — S22.41XA MULTIPLE FRACTURES OF RIBS OF RIGHT SIDE: Status: RESOLVED | Noted: 2017-03-07 | Resolved: 2022-07-21

## 2022-07-21 RX ORDER — NITROFURANTOIN 25; 75 MG/1; MG/1
100 CAPSULE ORAL 2 TIMES DAILY
Qty: 10 CAPSULE | Refills: 0 | Status: SHIPPED | OUTPATIENT
Start: 2022-07-21 | End: 2022-07-26

## 2022-07-21 NOTE — TELEPHONE ENCOUNTER
Attempt #1 to call patient.     RN left voicemail and requested return call to [facility] at [phone].     Oralia Shannon, RN, BSN, PHN  [facility]: [place]        ----- Message from Loulou Ashley NP sent at 7/21/2022 12:01 PM CDT -----  Please call patient to discuss results.    A urine culture was done (lab department automatically released Mariana Lew's order without me being aware of this) and was positive.  At her recent visit we did not discuss any symptoms of UTI.  She has chronic moctezuma due to mixed incontinence.    Please call to inquire if patient is having any symptoms or signs related to UTI ?  If so then we can treat her with Macrobid/Nitrofurantoin (resistance to Bactrim).    Loulou Ashley, DNP, APRN, CNP

## 2022-07-21 NOTE — TELEPHONE ENCOUNTER
"Patient returns call.     She reports burning and cloudy urine for a few weeks. She is agreeable to treatment discussed.     She is very appreciative of the call and states \"Please sincerely thank Loulou, I very much adored my visit with her.\"     Routing back to PCP to send in treatment. Pharmacy cued.     Oralia Shannon RN, BSN, PHN  [facility] [place] [facility]: [place]        "

## 2022-07-25 ENCOUNTER — TELEPHONE (OUTPATIENT)
Dept: FAMILY MEDICINE | Facility: CLINIC | Age: 77
End: 2022-07-25

## 2022-07-25 NOTE — TELEPHONE ENCOUNTER
Forms received from [facility]/ Physician Order - patient requesting to cancel Monday, 7/25 visit (order #[id number])  for Loulou Ashley NP.  Forms placed in provider 'sign me' folder.  Please fax forms to [phone] after completion.    GAYATRI Galvez  [facility]

## 2022-08-03 ENCOUNTER — TELEPHONE (OUTPATIENT)
Dept: FAMILY MEDICINE | Facility: CLINIC | Age: 77
End: 2022-08-03

## 2022-08-03 DIAGNOSIS — G89.4 CHRONIC PAIN DISORDER: ICD-10-CM

## 2022-08-03 DIAGNOSIS — L97.529 ULCER OF LEFT FOOT, UNSPECIFIED ULCER STAGE (H): ICD-10-CM

## 2022-08-03 RX ORDER — OXYCODONE AND ACETAMINOPHEN 5; 325 MG/1; MG/1
1 TABLET ORAL EVERY 6 HOURS PRN
Qty: 90 TABLET | Refills: 0 | Status: SHIPPED | OUTPATIENT
Start: 2022-08-05 | End: 2022-09-01

## 2022-08-03 NOTE — TELEPHONE ENCOUNTER
Reason for Call:  Medication or medication refill:    Do you use a [facility]?  Name of the pharmacy and phone number for the current request:  [facility]/ [id number] [facility] [address]    Name of the medication requested: oxyCODONE-acetaminophen (PERCOCET) 5-325 MG tablet    Other request:  Patient would like a call when the script has been sent to pharmacy.    Can we leave a detailed message on this number? YES    Phone number patient can be reached at: Home number on file [phone] (home)    Best Time: any    Call taken on 8/3/2022 at 8:51 AM by Ana Mullen

## 2022-08-15 ENCOUNTER — TELEPHONE (OUTPATIENT)
Dept: FAMILY MEDICINE | Facility: CLINIC | Age: 77
End: 2022-08-15

## 2022-08-15 NOTE — TELEPHONE ENCOUNTER
Reason for Call:  Home Health Care    Judith with [facility] called regarding (reason for call): verbal orders    Orders are needed for this patient.     PT:     OT:     Skilled Nursing:   Just doing catheter changes because patient does not want homecare to do wound care. The catheter orders are :  Monthly.     Pt Provider:  Loulou Ashley NP    Phone Number Homecare Nurse can be reached at: [phone]    Can we leave a detailed message on this number? YES    Phone number patient can be reached at: Home number on file [phone] (home)    Best Time: any    Call taken on 8/15/2022 at 12:09 PM by Ana Mullen

## 2022-08-15 NOTE — TELEPHONE ENCOUNTER
Home care requesting skilled nursing orders for monthly catheter changes only.     Patient declines home care to assist with wound care. Per 7/18/22 OV note, patient also declined wound clinic for ulcer of left foot. Was told to follow up with home care and PCP in September.    Routing to PCP to review and approve verbal orders as requested. Since patient is declining assistance with wound care, prefer follow up with PCP sooner?        Oralia Shannon, RN, BSN, PHN  [facility]: [place]

## 2022-08-16 ENCOUNTER — TELEPHONE (OUTPATIENT)
Dept: FAMILY MEDICINE | Facility: CLINIC | Age: 77
End: 2022-08-16

## 2022-08-16 DIAGNOSIS — N39.0 ACUTE UTI: Primary | ICD-10-CM

## 2022-08-16 RX ORDER — NITROFURANTOIN 25; 75 MG/1; MG/1
100 CAPSULE ORAL 2 TIMES DAILY
Qty: 10 CAPSULE | Refills: 0 | Status: SHIPPED | OUTPATIENT
Start: 2022-08-16 | End: 2022-08-21

## 2022-08-16 NOTE — TELEPHONE ENCOUNTER
Reviewed and I sent in Macrobid for patient for her symptoms.  Agree with RN that urine sample would be best practice, but given her limitations ok to treat empirically.    Loulou Ashley, DNP, APRN, CNP

## 2022-08-16 NOTE — TELEPHONE ENCOUNTER
Called Judith and gave verbal okay for SN orders below per providers message    Routing to team to assist in scheduling pt a f/u visit with Loulou In September.     Junie Elena RN

## 2022-08-16 NOTE — TELEPHONE ENCOUNTER
Called patient and let her know abx was sent in.  She is very grateful to PCP.      Brigid Prajapati RN

## 2022-08-16 NOTE — TELEPHONE ENCOUNTER
Okay for verbal orders.    We should get her on the schedule for September 2022.  She is decline the wound care because she had told me she can change the dressing herself just like wound care was doing but without the added cost.  If not improving with time may need to go back to wound clinic.    Loulou Ashley, DNP, APRN, CNP

## 2022-08-16 NOTE — TELEPHONE ENCOUNTER
Forms received from [facility]/ physician Order  (order #[id number])  for Loulou Ashley NP.  Forms placed in provider 'sign me' folder.  Please fax forms to [phone] after completion.    GAYATRI Galvez  [facility]

## 2022-08-16 NOTE — TELEPHONE ENCOUNTER
Routing to PCP-     Pt scheduled to see you next 9/13/22. She last saw you on 7/18/22 and was treated for a UTI.    Pt has a chronic catheter and sits all day in a wheelchair. She is experiencing burning again and thinks she may have a UTI again. She is also noticing a white cloudy residual in her catheter tubing.     Notified pt that we would most likely want to re check her urine to confirmed. She states she does not have a ride to come in. All her friends and family work and would have to take a day off of work. She can not take a cab in as she needs assistance getting in and out of the car.   She also says she only has someone coming out once a month to change her catheter and can not wait that long to test and treat.    Would you like to treat and f/u on this at next visit on 9/13/22?    Pt uses [facility] in [place] if needed    Junie Elena RN

## 2022-08-19 ENCOUNTER — TELEPHONE (OUTPATIENT)
Dept: FAMILY MEDICINE | Facility: CLINIC | Age: 77
End: 2022-08-19

## 2022-08-19 NOTE — TELEPHONE ENCOUNTER
Forms/Letter Request    Type of form/letter: [facility] [id number]    Have you been seen for this request: N/A    Do we have the form/letter: Yes: Placed in JQ box to review and sign    When is form/letter needed by: when able    How would you like the form/letter returned: Fax    Patient Notified form requests are processed in 3-5 business days:Yes    Fax completed form to   Mary Lizarraga  NE

## 2022-08-22 ENCOUNTER — TELEPHONE (OUTPATIENT)
Dept: UROLOGY | Facility: CLINIC | Age: 77
End: 2022-08-22

## 2022-08-22 NOTE — TELEPHONE ENCOUNTER
Patient called the clinic.  She would like with speak with Dr. Wayne's team (Urology).  Transferred to Urology (Dr. Wayne's) team

## 2022-08-22 NOTE — TELEPHONE ENCOUNTER
"Patient had has catheter in for 1 year. Patient reports that she has had three UTIs in last year and patient has removed her indwelling moctezuma catheter and is asking to restart Vesicare medication. She is concerned about the bladder infections so she wants to give her bladder a \"rest\".   She uses "[facility].  Will call patient back after MD reviews message and medication request.  Vika Colon CMA    "

## 2022-08-24 ENCOUNTER — TELEPHONE (OUTPATIENT)
Dept: FAMILY MEDICINE | Facility: CLINIC | Age: 77
End: 2022-08-24

## 2022-08-24 ENCOUNTER — VIRTUAL VISIT (OUTPATIENT)
Dept: UROLOGY | Facility: CLINIC | Age: 77
End: 2022-08-24
Payer: COMMERCIAL

## 2022-08-24 DIAGNOSIS — N32.81 OAB (OVERACTIVE BLADDER): Primary | ICD-10-CM

## 2022-08-24 PROCEDURE — 99213 OFFICE O/P EST LOW 20 MIN: CPT | Mod: 95 | Performed by: UROLOGY

## 2022-08-24 RX ORDER — SOLIFENACIN SUCCINATE 5 MG/1
5 TABLET, FILM COATED ORAL DAILY
Qty: 90 TABLET | Refills: 0 | Status: SHIPPED | OUTPATIENT
Start: 2022-08-24 | End: 2022-10-12

## 2022-08-24 NOTE — TELEPHONE ENCOUNTER
Routing below request for home care orders to PCP since patient has a new pressure ulcer on L foot.  Are home care orders okay?    Jorge Simental RN

## 2022-08-24 NOTE — PROGRESS NOTES
"F/u indwelling moctezuma      Pt reports 3 \"UTI's\" this year. Her sx is \"intermittent burning when I have to pee.\"     She removed the moctezuma recently (after deflating the balloon). Now quite wet. Would like to leave the moctezuma out because \"otherwise I have to wear sweat pants and its still summer and I want to go to a wedding and you can't wear sweatpants to that.\"    Would also like to \"retry\" Vesicare.      IMP:  1. Marked urinary incontinence, request for retry of Vesicare      PLAN:  1. Discussed situation with patient in detail.  2. OK to retry Vesicare for 90 days at pt request  3. I did share concerns about UTIs and skin breakdown with massive incontinence; pt expresses understanding  4. F/u with phone visit 3 mos at pt request  5. Total time spent in reviewing patient's previous records, discussion with patient and documentation: 20 minutes            "

## 2022-08-24 NOTE — PATIENT INSTRUCTIONS
Patient Education   Resources to Help You Quit Smoking  If you have quit smoking or are thinking about quitting, congratulations! It can be hard to quit smoking, but the benefits are well worth it. To help you quit and stay smoke-free, there are many resources that can help.  Your health plan  If you have health insurance, call them for more details about their phone coaching programs.  [facility]:  6-719-487-BLUE (1-804.251.1963)  [facility]:  0-974-572-QUIT (1-875.119.5675)  [facility]:  1-786-319-BLUE (1-117.586.3585)  [facility]:  1-866.488.9893  [facility]:  1-917.390.7673  [facility] members:  1-161.523.3578  [facility]:   1-516.293.6264  [facility]:  1-784.935.6938  [facility]:  1-452.512.3796  Other resources  American Cancer Society: 1-737.462.7706  The American Cancer Society can help you find local resources to quit smoking.  [employer]AN: 5-873-420-PLAN (1-723.668.6584)  Offers a telephone helpline, gum, patches and lozenges. These services are free for the uninsured and those without coverage. The online program is free to everyone at www.quitplan.com.  American Lung Association: 4-102-LUNG-USA (1-707.510.5039)  Provides a lung helpline as well as an online program, self-help book and group clinic support for quitting smoking. www.lung.org/stop-smoking  National [facility] [place]:  4-061-193-QUIT (1-303.313.8428)  Offers a telephone hotline, online text chat and a website with tools, information and support for smokers who want to quit. www.smokefree.gov  Medication Therapy Management (MTM):  033-796-8660-672-7005 483.465.1822 (toll free)  mtm@[place].org  This is a clinic program to help you quit smoking. It offers one-on-one sessions with a pharmacist. Call or email to find out if your insurance covers MTM and to schedule an appointment at all locations.  For informational purposes only. Not to replace the  advice of your health care provider. Copyright   2013 [facility]. All rights reserved. Clinically reviewed by Trinidad Swann MD, [facility] Lung Cancer Screening Program. [employer] 303638 - Rev 06/19.

## 2022-08-24 NOTE — TELEPHONE ENCOUNTER
Order/Referral Request    Who is requesting: [facility]    Orders being requested: SN 1 x week for 9 weeks    Reason service is needed/diagnosis: pressure ulcer on left foot    When are orders needed by: asap    Has this been discussed with Provider: No    Does patient have a preference on a Group/Provider/Facility?     Does patient have an appointment scheduled?: No     ok to alistair MARQUEZ

## 2022-08-25 ENCOUNTER — VIRTUAL VISIT (OUTPATIENT)
Dept: PSYCHOLOGY | Facility: CLINIC | Age: 77
End: 2022-08-25
Attending: NURSE PRACTITIONER
Payer: COMMERCIAL

## 2022-08-25 DIAGNOSIS — F32.0 CURRENT MILD EPISODE OF MAJOR DEPRESSIVE DISORDER WITHOUT PRIOR EPISODE (H): ICD-10-CM

## 2022-08-25 PROCEDURE — 90834 PSYTX W PT 45 MINUTES: CPT | Mod: 95

## 2022-08-25 ASSESSMENT — ANXIETY QUESTIONNAIRES
2. NOT BEING ABLE TO STOP OR CONTROL WORRYING: SEVERAL DAYS
3. WORRYING TOO MUCH ABOUT DIFFERENT THINGS: SEVERAL DAYS
5. BEING SO RESTLESS THAT IT IS HARD TO SIT STILL: SEVERAL DAYS
1. FEELING NERVOUS, ANXIOUS, OR ON EDGE: SEVERAL DAYS
4. TROUBLE RELAXING: MORE THAN HALF THE DAYS
GAD7 TOTAL SCORE: 8
6. BECOMING EASILY ANNOYED OR IRRITABLE: SEVERAL DAYS
7. FEELING AFRAID AS IF SOMETHING AWFUL MIGHT HAPPEN: SEVERAL DAYS
GAD7 TOTAL SCORE: 8

## 2022-08-25 ASSESSMENT — COLUMBIA-SUICIDE SEVERITY RATING SCALE - C-SSRS
ATTEMPT LIFETIME: NO
1. HAVE YOU WISHED YOU WERE DEAD OR WISHED YOU COULD GO TO SLEEP AND NOT WAKE UP?: NO
2. HAVE YOU ACTUALLY HAD ANY THOUGHTS OF KILLING YOURSELF?: NO

## 2022-08-25 ASSESSMENT — PATIENT HEALTH QUESTIONNAIRE - PHQ9: SUM OF ALL RESPONSES TO PHQ QUESTIONS 1-9: 12

## 2022-08-25 NOTE — TELEPHONE ENCOUNTER
RN relayed verbal orders to SUMAN Phipps.     Oralia Shannon RN, BSN, PHN  [facility]: [place]

## 2022-08-25 NOTE — PROGRESS NOTES
[facility]   Mental Health & Addiction Services     Progress Note - Initial Visit    Patient  Name:  Ledy Odonnell Date: 2022         Service Type: Individual     Visit Start Time: 12:35PM  Visit End Time: 1:15PM    Visit #: 1    Attendees: Client attended alone    Service Modality:  Video Visit:      Provider verified identity through the following two step process.  Patient provided:  Patient  and Patient address    Telemedicine Visit: The patient's condition can be safely assessed and treated via synchronous audio and visual telemedicine encounter.      Reason for Telemedicine Visit: Patient has requested telehealth visit    Originating Site (Patient Location): Patient's home    Distant Site (Provider Location): Provider Remote Setting- Home Office    Consent:  The patient/guardian has verbally consented to: the potential risks and benefits of telemedicine (video visit) versus in person care; bill my insurance or make self-payment for services provided; and responsibility for payment of non-covered services.     Patient would like the video invitation sent by:  My Chart    Mode of Communication:  Video Conference via Jabber    As the provider I attest to compliance with applicable laws and regulations related to telemedicine.       DATA:   Interactive Complexity: No   Crisis: No     Presenting Concerns/  Current Stressors:   Increased depression and anxiety   Wheelchair user    Transportation and mobility limitations    Isolation    Communication stressors    Sleep concerns       ASSESSMENT:  Mental Status Assessment:  Appearance:   unable to asess due to phone visit    Eye Contact:   Unable to assess due to phone visit    Psychomotor Behavior: unable to assess due to phone visit    Attitude:   Cooperative   Orientation:   All  Speech   Rate / Production: Normal/ Responsive Talkative   Volume:  Normal   Mood:    Depressed   Affect:    unable to assess due to phone visit     Thought Content:  Rumination   Thought Form:  Circumstantial  Insight:    Good  and Intellectual Insight      Safety Issues and Plan for Safety and Risk Management:     [place] Suicide Severity Rating Scale (Lifetime/Recent)  [place] Suicide Severity Rating (Lifetime/Recent) 8/25/2022   1. Wish to be Dead (Lifetime) 0   2. Non-Specific Active Suicidal Thoughts (Lifetime) 0   Actual Attempt (Lifetime) 0   Has subject engaged in non-suicidal self-injurious behavior? (Lifetime) 0   Calculated C-SSRS Risk Score (Lifetime/Recent) No Risk Indicated     Patient denies current fears or concerns for personal safety.  Patient denies current or recent suicidal ideation or behaviors.  Patient denies current or recent homicidal ideation or behaviors.  Patient denies current or recent self injurious behavior or ideation.  Patient denies other safety concerns.  Recommended that patient call 911 or go to the local ED should there be a change in any of these risk factors.  Patient reports there are no firearms in the house.     Diagnostic Criteria:  Major Depressive Disorder  CRITERIA (A-C) REPRESENT A MAJOR DEPRESSIVE EPISODE - SELECT THESE CRITERIA  A) Recurrent episode(s) - symptoms have been present during the same 2-week period and represent a change from previous functioning 5 or more symptoms (required for diagnosis)   - Depressed mood. Note: In children and adolescents, can be irritable mood.     - Diminished interest or pleasure in all, or almost all, activities.    - Fatigue or loss of energy.   B) The symptoms cause clinically significant distress or impairment in social, occupational, or other important areas of functioning  C) The episode is not attributable to the physiological effects of a substance or to another medical condition      DSM5 Diagnoses: (Sustained by DSM5 Criteria Listed Above)  Diagnoses: 311 (F32.9) Unspecified Depressive Disorder   Psychosocial & Contextual Factors: wheelchair user, positive  support system    WHODAS 2.0 (12 item): No flowsheet data found.  Intervention:   Mindfulness- Patient was educated on relaxation and mindfulness techniques and Educated on treatment planning and started identifying goals and interventions for treatment plan  Collateral Reports Completed:  Not Applicable      PLAN: (Homework, other):  1. Provider will continue Diagnostic Assessment.  Patient was given the following to do until next session:  Return for next follow up session to complete DA.    2. Provider recommended the following referrals: none at this time.      3.  Suicide Risk and Safety Concerns were assessed for Ledy Odonnell.    Patient meets the following risk assessment and triage: Patient denied any current/recent/lifetime history of suicidal ideation and/or behaviors.  No safety plan indicated at this time.       Sylvia MCLAIN, LGALEXIA  August 25, 2022  This note has been reviewed and I agree with the plan of care. This note is co-signed by LAKIA WestSW Supervisor, on: August 25, 2022

## 2022-08-26 ENCOUNTER — TELEPHONE (OUTPATIENT)
Dept: FAMILY MEDICINE | Facility: CLINIC | Age: 77
End: 2022-08-26

## 2022-08-26 NOTE — TELEPHONE ENCOUNTER
Order/Referral Request    Who is requesting: [facility]     Orders being requested: Dressing orders    Reason service is needed/diagnosis: wound    When are orders needed by:     Has this been discussed with Provider: No    Does patient have a preference on a Group/Provider/Facility?     Does patient have an appointment scheduled?: No    Where to send orders: Fax    Placed forms in JQ box to review and sign  Fax completed forms to [phone]    Mary Mehta-  [place]

## 2022-08-27 ENCOUNTER — MEDICAL CORRESPONDENCE (OUTPATIENT)
Dept: HEALTH INFORMATION MANAGEMENT | Facility: CLINIC | Age: 77
End: 2022-08-27

## 2022-08-29 DIAGNOSIS — F51.01 PRIMARY INSOMNIA: ICD-10-CM

## 2022-08-29 DIAGNOSIS — F33.0 MILD EPISODE OF RECURRENT MAJOR DEPRESSIVE DISORDER (H): ICD-10-CM

## 2022-08-31 RX ORDER — MIRTAZAPINE 15 MG/1
15 TABLET, FILM COATED ORAL AT BEDTIME
Qty: 90 TABLET | Refills: 0 | Status: SHIPPED | OUTPATIENT
Start: 2022-08-31 | End: 2022-11-28

## 2022-08-31 NOTE — TELEPHONE ENCOUNTER
Routing refill request to provider for review/approval because:  Failed Protocol    Loli Butler RN BSN  [facility]

## 2022-09-01 ENCOUNTER — TELEPHONE (OUTPATIENT)
Dept: FAMILY MEDICINE | Facility: CLINIC | Age: 77
End: 2022-09-01

## 2022-09-01 ENCOUNTER — TELEPHONE (OUTPATIENT)
Dept: UROLOGY | Facility: CLINIC | Age: 77
End: 2022-09-01

## 2022-09-01 DIAGNOSIS — L97.529 ULCER OF LEFT FOOT, UNSPECIFIED ULCER STAGE (H): ICD-10-CM

## 2022-09-01 DIAGNOSIS — G89.4 CHRONIC PAIN DISORDER: ICD-10-CM

## 2022-09-01 RX ORDER — OXYCODONE AND ACETAMINOPHEN 5; 325 MG/1; MG/1
1 TABLET ORAL EVERY 6 HOURS PRN
Qty: 90 TABLET | Refills: 0 | Status: SHIPPED | OUTPATIENT
Start: 2022-09-02 | End: 2022-09-29

## 2022-09-01 NOTE — TELEPHONE ENCOUNTER
Forms received from [facility]/ Physician Order #[id number] (order date: 9/1/22), Order #[id number] (cert period: 8/27/22 to 10/25/22)  for Loulou Ashley NP.  Forms placed in provider 'sign me' folder.  Please fax forms to [phone] after completion.    Max Gaspar RT (R) (ARRT)  Radiology Dept

## 2022-09-01 NOTE — TELEPHONE ENCOUNTER
Forms/Letter Request    Type of form/letter: [facility]    Have you been seen for this request: N/A    Do we have the form/letter: Yes: Placed form in JQ box to review and sign    When is form/letter needed by: asap    How would you like the form/letter returned: Fax    Patient Notified form requests are processed in 3-5 business days:Yes    Fax completed forms and supporting documents to     Mary Lizarraga  NE

## 2022-09-01 NOTE — TELEPHONE ENCOUNTER
Patient calling. MD prescribed 5 mg vesicare during recent visit. Patient had previously been on 10mg. Patient asking if she should take two. Per MD VERBAL, ok for patient to take 2 tablets and if this is working OK to request refill for 10mg  Vika Colon CMA

## 2022-09-01 NOTE — TELEPHONE ENCOUNTER
Medication Question or Refill    Contacts       Type Contact Phone/Fax    09/01/2022 11:21 AM CDT Phone (Incoming) Ev Odonnell ESTEFANÍA (Self) [phone] (H)          What medication are you calling about (include dose and sig)?: OxyCodone    Controlled Substance Agreement on file:   CSA -- Patient Level:    CSA: None found at the patient level.       Who prescribed the medication?: Loulou Vuhakeem    Do you need a refill? Yes: pt states her last dose is Sunday    When did you use the medication last? today    Patient offered an appointment? No    Do you have any questions or concerns?  No    [facility]     [facility] [place] [facility] [address]  [address] [facility] [address]  Phone: [phone] Fax: [phone]    Mary MARQUEZ

## 2022-09-02 ENCOUNTER — VIRTUAL VISIT (OUTPATIENT)
Dept: PSYCHOLOGY | Facility: CLINIC | Age: 77
End: 2022-09-02
Payer: COMMERCIAL

## 2022-09-02 DIAGNOSIS — F33.0 MILD EPISODE OF RECURRENT MAJOR DEPRESSIVE DISORDER (H): Primary | ICD-10-CM

## 2022-09-02 DIAGNOSIS — F10.21 ALCOHOL DEPENDENCE IN REMISSION (H): ICD-10-CM

## 2022-09-02 PROCEDURE — 90791 PSYCH DIAGNOSTIC EVALUATION: CPT | Mod: 95

## 2022-09-02 NOTE — PROGRESS NOTES
"    [facility]  Provider Name:  Sylvia Hernández     Credentials:  RAYNE, CUHY    PATIENT'S NAME: Ledy Odonnell  PREFERRED NAME: Ev  PRONOUNS:        MRN: [id number]  : 1945  ADDRESS: [address] [place] [address] [address] [place]  [facility] [address]  ACCT. NUMBER:  [id number]  DATE OF SERVICE: 22  START TIME: 12:30 PM  END TIME: 1:30  PREFERRED PHONE: [phone]  May we leave a program related message: Yes  SERVICE MODALITY:  Phone Visit:      Provider verified identity through the following two step process.  Patient provided:  Patient  and Patient address    The patient has been notified of the following:      \"We have found that certain health care needs can be provided without the need for a face to face visit.  This service lets us provide the care you need with a phone conversation.       I will have full access to your [facility] medical record during this entire phone call.   I will be taking notes for your medical record.      Since this is like an office visit, we will bill your insurance company for this service.       There are potential benefits and risks of telephone visits (e.g. limits to patient confidentiality) that differ from in-person visits.?Confidentiality still applies for telephone services, and nobody will record the visit.  It is important to be in a quiet, private space that is free of distractions (including cell phone or other devices) during the visit.??      If during the course of the call I believe a telephone visit is not appropriate, you will not be charged for this service\"     Consent has been obtained for this service by care team member: Yes     UNIVERSAL ADULT Mental Health DIAGNOSTIC ASSESSMENT    Identifying Information:  Patient is a 77 year old,    individual.    Patient was referred for an assessment by primary care provider .  Patient attended the session alone.    Chief Complaint:   The reason for seeking services at this time is: \" since I " "have been confined to a wheelchair and only use of one leg and confined to home and can't drive it's hard to keep a good attitude\".  The problem(s) began 8 years ago  .    Patient has attempted to resolve these concerns in the past through medications.    Social/Family History:  Patient reported they grew up in  [place], [place]  .  They were raised by    .  Parents both parent  .  Patient reported that their childhood was \"I was the 2nd of 6 kids and mom little helper. It was very comfortable, but my father had a busy career and was gone 6/7 days. I was rejected by my father due to being over weight. They worked very hard to have a comfortable lifestyle.  Patient described their current relationships with family of origin as \"good, they try very hard. If they can do it they will, but they have their own lives\".     The patient describes their cultural background as  Malaysian, Montenegrin, Bulgarian, Scandinavian .  Cultural influences and impact on patient's life structure, values, norms, and healthcare: \"growning up we did not discuss mental health  .  Contextual influences on patient's health include: Individual Factors wheelchair user  and Economic Factors fixed income .    These factors will be addressed in the Preliminary Treatment plan. Patient identified their preferred language to be english  . Patient reported they does not need the assistance of an  or other support involved in therapy.     Patient reported had no significant delays in developmental tasks.   Patient's highest education level was   some college  .  Patient identified the following learning problems: none reported.  Modifications will not be used to assist communication in therapy.  Patient reports they are  able to understand written materials.    Patient reported the following relationship history / .  Patient's current relationship status is   for  1999.   Patient identified their sexual orientation as  .  Patient " "reported having zero   child(rafal). Patient identified my sister and 1 brother and my Jehovah's witness   as part of their support system.  Patient identified the quality of these relationships as  , good  .      Patient's current living/housing situation involves \"live alone\"  .  The immediate members of family and household include  ,  ,  and they report that housing is stable.     Patient is currently retired  .  Patient reports their finances are obtained through sliding scale insulin and my finances are more than I get monthly, patient's brother helps  . Patient   does identify finances as a current stressor.      Patient reported that they   not been involved with the legal system.     Patient   does not report being under probation/ parole/ jurisdiction. They are not under any current court jurisdiction. .    Patient's Strengths and Limitations:  Patient identified the following strengths or resources that will help them succeed in treatment: Jehovah's witness / Quaker, gustavo / spirituality, friends / good social support and family support. Things that may interfere with the patient's success in treatment include: none identified.     Assessments:  The following assessments were completed by patient for this visit:  PHQ9:   PHQ-9 SCORE 6/9/2020 2/12/2021 2/26/2021 8/13/2021 3/23/2022 7/18/2022 8/25/2022   PHQ-9 Total Score - - - - - - -   PHQ-9 Total Score MyChart - - - - - 5 (Mild depression) -   PHQ-9 Total Score 2 7 2 3 8 5 12     GAD7:   LESTER-7 SCORE 7/9/2019 5/28/2020 6/9/2020 2/12/2021 2/26/2021 8/13/2021 8/25/2022   Total Score - - - - - - -   Total Score 3 2 2 3 1 0 8     CAGE-AID: No flowsheet data found.  PROMIS 10-Global Health (only subscores and total score): No flowsheet data found. (Will update at next follow up session)  Morton Suicide Severity Rating Scale (Lifetime/Recent)  Morton Suicide Severity Rating (Lifetime/Recent) 8/25/2022   1. Wish to be Dead (Lifetime) 0   2. Non-Specific Active Suicidal Thoughts " "(Lifetime) 0   Actual Attempt (Lifetime) 0   Has subject engaged in non-suicidal self-injurious behavior? (Lifetime) 0   Calculated C-SSRS Risk Score (Lifetime/Recent) No Risk Indicated       Personal and Family Medical History:  Patient does report a family history of mental health concerns.  Patient reports family history includes Arthritis in her paternal grandmother; Cancer in her father; Dementia in her maternal grandmother and mother; Heart Disease in her mother..     Patient does report Mental Health Diagnosis and/or Treatment.  Patient Patient reported the following previous diagnoses which include(s): Depression.  Patient reported symptoms began unknown.   Patient has received mental health services in the past: primary care provider at [facility] ..  Psychiatric Hospitalizations: None.  Patient denies a history of civil commitment.  Patient is receiving other mental health services.  These include primary care provider at [facility] .  For follow-up on TBD.         Patient has had a physical exam to rule out medical causes for current symptoms.  Date of last physical exam was within the past year. Client was encouraged to follow up with PCP if symptoms were to develop. The patient has a [facility] Primary Care Provider, who is named Loulou Ashley.  Patient reports concerns that are being addressed by MD's.  Patient denies any issues with pain.\"being addressed by MD and pain clinic\".   There are significant appetite / nutritional concerns / weight changes. These may include: no concerns. Patient reports the following sleep concerns:  Delayed sleep onset hours until.   Patient does not report a history of head injury / trauma / cognitive impairment.      Patient reports current meds as:   Outpatient Medications Marked as Taking for the 9/2/22 encounter (Virtual Visit) with Sylvia Hernández Sig     acetaminophen (TYLENOL) 500 MG tablet Take 2 tablets (1,000 mg) by mouth every 8 hours     albuterol " (PROAIR HFA/PROVENTIL HFA/VENTOLIN HFA) 108 (90 Base) MCG/ACT inhaler Inhale 1-2 puffs into the lungs every 6 hours as needed for shortness of breath / dyspnea     buPROPion (WELLBUTRIN XL) 150 MG 24 hr tablet Take 1 tablet (150 mg) by mouth daily     buPROPion (WELLBUTRIN XL) 150 MG 24 hr tablet Take 1 tablet (150 mg) by mouth every morning     diclofenac (VOLTAREN) 1 % topical gel Apply 4 g topically     diclofenac (VOLTAREN) 1 % topical gel Apply 4 g topically 4 times daily     DULoxetine (CYMBALTA) 60 MG capsule Take 60 mg by mouth     DULoxetine (CYMBALTA) 60 MG capsule Take 1 capsule (60 mg) by mouth daily     estradiol (ESTRACE) 0.1 MG/GM vaginal cream Place 2 g vaginally three times a week     mirtazapine (REMERON) 15 MG tablet Take 1 tablet (15 mg) by mouth At Bedtime     multivitamin, therapeutic (THERA-VIT) TABS tablet Take 1 tablet by mouth daily     nystatin (MYCOSTATIN) 262733 UNIT/GM external cream Apply topically 2 times daily as needed for dry skin or other (rash)     nystatin (MYCOSTATIN) 838429 UNIT/GM external cream Apply 1 Application topically     nystatin (MYCOSTATIN) 115978 UNIT/GM external powder Apply 1 g topically 2 times daily as needed for other (rash)     order for DME Equipment being ordered: Wheelchair     oxyCODONE-acetaminophen (PERCOCET) 5-325 MG tablet Take 1 tablet by mouth every 6 hours as needed for severe pain Maximum 3 tablets daily as needed for severe pain.     senna (SENOKOT) 8.6 MG tablet Take 1 tablet by mouth     senna-docusate (SENOKOT-S;PERICOLACE) 8.6-50 MG per tablet Take 1 tablet by mouth 2 times daily.     solifenacin (VESICARE) 5 MG tablet Take 1 tablet (5 mg) by mouth daily for 90 doses     traZODone (DESYREL) 50 MG tablet Take  mg by mouth     UNABLE TO FIND MEDICATION NAME: Probiotic Gummies     Current Facility-Administered Medications for the 9/2/22 encounter (Virtual Visit) with Sylvia Hernández   Medication     methylPREDNISolone (DEPO-MEDROL) injection  80 mg       Medication Adherence:  Patient reports taking prescribed medications as prescribed.    Patient Allergies:    Allergies   Allergen Reactions     No Known Allergies      Therapist inquired about allergies. Patient to follow up with medication provider if allergies develop or persist.  Medical History:    Past Medical History:   Diagnosis Date     Acute blood loss anemia 9/5/2014     Closed left subtrochanteric femur fracture (H) 9/5/2014     Depressive disorder, not elsewhere classified      Femur fracture (H) 10/23/2014     GENERALIZED ANXIETY DIS 6/21/2007     Inflammatory arthritis      Multiple fractures of ribs of right side 3/7/2017     Osteoarthrosis, unspecified whether generalized or localized, unspecified site      Other and unspecified alcohol dependence, unspecified drinking behavior      Pneumothorax 3/8/2017     Unspecified essential hypertension      Therapist inquired about allergies. Patient to follow up with medication provider if allergies develop or persist.    Current Mental Status Exam:   Appearance:  unable to assess due to phone visit     Eye Contact:  unable to assess due to phone visit    Psychomotor:  unable to assess due to phone visit        Gait / station:  unable to assess due to phone visit   Attitude / Demeanor: Cooperative   Speech      Rate / Production: Normal/ Responsive      Volume:  Normal  volume      Language:  intact  Mood:   Normal  Affect:   unable to assess due to phone visit     Thought Content: Clear   Thought Process: Coherent       Associations: No loosening of associations  Insight:   Good   Judgment:  Intact   Orientation:  All  Attention/concentration: Good      Substance Use:  Patient did report a family history of substance use concerns; see medical history section for details.  Patient has received chemical dependency treatment in the past at at [facility] 40 years ago.  Patient has not ever been to detox.      Patient is not currently  receiving any chemical dependency treatment. Patient reported the following problems as a result of their substance use:  DUI, family problems, legal issues and occupational / vocational problems.    Patient using alcohol 5 drinks 4 times a week 40 years ago. Sober for 2 years  Patient using tobacco 1/2 pack a day.  Patient using cannabis recreational. x1 every few months  Patient using caffeine x1 cup coffee daily.  Patient reports using/abusing the following substance(s). Patient reported no other substance use.     Substance Use: Indications of past alcohol abuse +40 years ago per patient's reports of use and symptoms of: vomiting, hangovers, daily use, substance related legal problems, substance related decrease in work performance, family relationship problems due to substance use, driving under the influence and cravings/urges to use  Based on the negative CAGE score and clinical interview there  are not indications of drug or alcohol abuse currently.       Significant Losses / Trauma / Abuse / Neglect Issues:   Patient   serve in the .  There are indications or report of significant loss, trauma, abuse or neglect issues related to: are indications or report of significant loss, trauma, abuse or neglect issues related to, major medical problems wheelchair user and divorce / relational changes  .  Concerns for possible neglect are not present.     Safety Assessment:   Patient denies current homicidal ideation and behaviors.  Patient denies current self-injurious ideation and behaviors.    Patient denied risk behaviors associated with substance use.  Patient denies any high risk behaviors associated with mental health symptoms.  Patient reports the following current concerns for their personal safety: None.  Patient reports there   firearms in the house.       There are no firearms in the home..    History of Safety Concerns:  Patient denied a history of homicidal ideation.     Patient denied a  history of personal safety concerns.    Patient denied a history of assaultive behaviors.    Patient denied a history of sexual assault behaviors.     Patient reported a history unsafe motor vehicle operation associated with substance use.  Patient denies any history of high risk behaviors associated with mental health symptoms.  Patient reports the following protective factors:      Risk Plan:  See Recommendations for Safety and Risk Management Plan    Review of Symptoms per patient report:  Depression: Change in sleep, Lack of interest, Excessive or inappropriate guilt, Change in energy level, Difficulties concentrating, Feelings of hopelessness, Feelings of helplessness, Low self-worth, Ruminations, Feeling sad, down, or depressed and Withdrawn  Eli:  No Symptoms  Psychosis: No Symptoms  Anxiety: Nervousness  Panic:  No symptoms  Post Traumatic Stress Disorder:  No Symptoms   Eating Disorder: No Symptoms  ADD / ADHD:  No symptoms  Conduct Disorder: No symptoms  Autism Spectrum Disorder: No symptoms  Obsessive Compulsive Disorder: No Symptoms    Patient reports the following compulsive behaviors and treatment history: none.      Diagnostic Criteria:   Major Depressive Disorder  CRITERIA (A-C) REPRESENT A MAJOR DEPRESSIVE EPISODE - SELECT THESE CRITERIA  A) Recurrent episode(s) - symptoms have been present during the same 2-week period and represent a change from previous functioning 5 or more symptoms (required for diagnosis)   - Depressed mood. Note: In children and adolescents, can be irritable mood.     - Diminished interest or pleasure in all, or almost all, activities.    - Increased sleep.    - Fatigue or loss of energy.    - Feelings of worthlessness or inappropriate guilt.    - Diminished ability to think or concentrate, or indecisiveness.   B) The symptoms cause clinically significant distress or impairment in social, occupational, or other important areas of functioning  C) The episode is not  attributable to the physiological effects of a substance or to another medical condition  D) The occurence of major depressive episode is not better explained by other thought / psychotic disorders  E) There has never been a manic episode or hypomanic episode Substance Use Disorder Substance is often taken in larger amounts or over a longer period than was intended.  Met for:  Alcohol There is persistent desire or unsuccessful efforts to cut down or control use of the substance.  Met for:  Alcohol Craving, or a strong desire or urge to use the substance.  Met for:  Alcohol Recurrent use of the substance resulting in a failure to fulfill major role obligations at work, school, or home.  Met for:  Alcohol Continued use of the substance despite having persistent or recurrent social or interpersonal problems caused or exacerbated by the effects of its use.  Met for:  Alcohol Important social, occupational, or recreational activities are given up or reduced because of the substance.  Met for:  Alcohol Recurrent use of the substance in which it is physically hazardous.  Met for:  Alcohol Use of the substance is continued despite knowledge of having a persistent or recurrent physical or psychological problem that is likely to have been cause or exacerbated by the substance.  Met for:  Alcohol      Functional Status:  Patient reports the following functional impairments:  relationship(s), self-care and social interactions.     Nonprogrammatic care:  Patient is requesting basic services to address current mental health concerns.    Clinical Summary:  1. Reason for assessment: depression   .  2. Psychosocial, Cultural and Contextual Factors: Individual Factors wheelchair user  and Economic Factors fixed income   3. Principal DSM5 Diagnoses  (Sustained by DSM5 Criteria Listed Above):   296.31 (F33.0) Major Depressive Disorder, Recurrent Episode, Mild _.  4. 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  5. Provisional Diagnosis:  deferred  6. Prognosis: Relieve Acute Symptoms.  7. Likely consequences of symptoms if not treated: Patient may experience an increase in symptoms and be in need of higher level of care.  8. Client strengths include:  caring, committed to sobriety, good listener, motivated and open to learning .     Recommendations:     1. Plan for Safety and Risk Management:   Safety and Risk: Recommended that patient call 911 or go to the local ED should there be a change in any of these risk factors..          Report to child / adult protection services was NA.     2. Patient's identified none.     3. Initial Treatment will focus on:    Depressed Mood      4. Resources/Service Plan:    services are not indicated.   Modifications to assist communication are not indicated.   Additional disability accommodations are not indicated.      5. Collaboration:   Collaboration / coordination of treatment will be initiated with the following  support professionals: primary care physician.      6.  Referrals:   The following referral(s) will be initiated: [facility]. Next Scheduled Appointment: 9/12/2022.     A Release of Information has been obtained for the following: verbal as needed.     Emergency Contact Akanksha Isaac (Sister)   [phone] was obtained.     7. JASSI:    JASSI:  Discussed the general effects of drugs and alcohol on health and well-being. Provider gave patient printed information about the effects of chemical use on their health and well being. Recommendations:  sobriety  .     8. Records:   These were reviewed at time of assessment.   Information in this assessment was obtained from the medical record and  provided by patient who is a good historian.    Patient will have open access to their mental health medical record.        Provider Name/ Credentials:  RAYNE Carolina,  [facility]  September 2, 2022      This note has been reviewed and I agree with the plan of care. This note is co-signed by Glory Diaz, [facility] Supervisor, on: September 8, 2022

## 2022-09-09 NOTE — TELEPHONE ENCOUNTER
Form signed for dressings for wound.    But it is also asking for documentation of the wound (type, location, size- length/width, etc) within the past 30 days (see page 1).  I do not have that information but can home care send us documentation of this so we can add it to the orders?  As they have seen the wound within the past 30 days.    For put into team nahid figueredo.    Loulou Ashley, DNP, APRN, CNP

## 2022-09-12 ENCOUNTER — VIRTUAL VISIT (OUTPATIENT)
Dept: PSYCHOLOGY | Facility: CLINIC | Age: 77
End: 2022-09-12
Payer: COMMERCIAL

## 2022-09-12 DIAGNOSIS — F10.21 ALCOHOL DEPENDENCE IN REMISSION (H): ICD-10-CM

## 2022-09-12 DIAGNOSIS — F33.0 MILD EPISODE OF RECURRENT MAJOR DEPRESSIVE DISORDER (H): Primary | ICD-10-CM

## 2022-09-12 PROCEDURE — 90834 PSYTX W PT 45 MINUTES: CPT | Mod: 95

## 2022-09-12 NOTE — TELEPHONE ENCOUNTER
RN received wound documentation.    RN placed in PCP in folder.    Stone Foster RN, BSN, PHN  [facility]

## 2022-09-12 NOTE — TELEPHONE ENCOUNTER
Reviewed wound documentation this to be FAXed to [email or web address].  Placed the wound documentation into team nahid figueredo.    Loulou Ashley, MARLENE, APRN, CNP

## 2022-09-12 NOTE — PROGRESS NOTES
"    [facility]                                     Progress Note    Patient Name: Ledy Odonnell  Date: 2022         Service Type: Individual      Session Start Time: 11:00 AM  Session End Time: 11:45     Session Length: 45 mins    Session #: 3    Attendees: Client attended alone    Service Modality:  Phone Visit:      Provider verified identity through the following two step process.  Patient provided:  Patient  and Patient address    The patient has been notified of the following:      \"We have found that certain health care needs can be provided without the need for a face to face visit.  This service lets us provide the care you need with a phone conversation.       I will have full access to your [facility] medical record during this entire phone call.   I will be taking notes for your medical record.      Since this is like an office visit, we will bill your insurance company for this service.       There are potential benefits and risks of telephone visits (e.g. limits to patient confidentiality) that differ from in-person visits.?Confidentiality still applies for telephone services, and nobody will record the visit.  It is important to be in a quiet, private space that is free of distractions (including cell phone or other devices) during the visit.??      If during the course of the call I believe a telephone visit is not appropriate, you will not be charged for this service\"     Consent has been obtained for this service by care team member: Yes     DATA  Interactive Complexity: No  Crisis: No        Progress Since Last Session (Related to Symptoms / Goals / Homework):   Symptoms: No change per patient report. Poorly controlled depression.    Homework: Achieved / completed to satisfaction      Episode of Care Goals: No improvement - PREPARATION (Decided to change - considering how); Intervened by negotiating a change plan and determining options / strategies for " behavior change, identifying triggers, exploring social supports, and working towards setting a date to begin behavior change     Current / Ongoing Stressors and Concerns:   Financial stressors    Limited motility   Wheelchair user    Isolation    Depression    Effective communication       Treatment Objective(s) Addressed in This Session:   Treatment planning   Rapport building   Psychoeducation on CBT   Obtain informed consent for treatment      Intervention:   CBT: Met with patient to co develop goals and interventions. Provided active listening as patient gave brief update on her week. Therapist educated on CBT. Obtained informed consent for treatment. Continued rapport building.     Assessments completed prior to visit:  The following assessments were completed by patient for this visit:  PHQ2:   PHQ-2 ( 1999 [employer]) 7/18/2022 8/13/2021 8/13/2019 9/13/2016 9/7/2016 11/11/2014 10/15/2013   Q1: Little interest or pleasure in doing things - 1 0 0 0 0 0   Q2: Feeling down, depressed or hopeless - 0 0 1 1 1 0   PHQ-2 Score - 1 0 1 1 1 0   PHQ-2 Total Score (12-17 Years)- Positive if 3 or more points; Administer PHQ-A if positive - 1 0 - - - -   PHQ-2 Score Incomplete - - - - - -     PHQ9:   PHQ-9 SCORE 6/9/2020 2/12/2021 2/26/2021 8/13/2021 3/23/2022 7/18/2022 8/25/2022   PHQ-9 Total Score - - - - - - -   PHQ-9 Total Score MyChart - - - - - 5 (Mild depression) -   PHQ-9 Total Score 2 7 2 3 8 5 12     GAD2: No flowsheet data found.  GAD7:   LESTER-7 SCORE 7/9/2019 5/28/2020 6/9/2020 2/12/2021 2/26/2021 8/13/2021 8/25/2022   Total Score - - - - - - -   Total Score 3 2 2 3 1 0 8     PROMIS 10-Global Health (only subscores and total score): No flowsheet data found.      ASSESSMENT: Current Emotional / Mental Status (status of significant symptoms):   Risk status (Self / Other harm or suicidal ideation)   Patient denies current fears or concerns for personal safety.   Patient denies current or recent suicidal ideation or  behaviors.   Patient denies current or recent homicidal ideation or behaviors.   Patient denies current or recent self injurious behavior or ideation.   Patient denies other safety concerns.   Patient reports there has been no change in risk factors since their last session.     Patient reports there has been no change in protective factors since their last session.     Recommended that patient call 911 or go to the local ED should there be a change in any of these risk factors.     Appearance:   unable to assess due to phone visit    Eye Contact:   unable to assess due to phone visit    Psychomotor Behavior: unable to assess due to phone visit    Attitude:   Cooperative    Orientation:   All   Speech    Rate / Production: Normal/ Responsive Normal     Volume:  Normal    Mood:    Normal   Affect:    unable to assess due to phone visit    Thought Content:  Clear    Thought Form:  Circumstantial   Insight:    Good      Medication Review:   No changes to current psychiatric medication(s)     Medication Compliance:   Yes     Changes in Health Issues:   None reported     Chemical Use Review:   Substance Use: Chemical use reviewed, no active concerns identified      Tobacco Use: No current tobacco use.      Diagnosis:  1. 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 Alcohol dependence in remission (H)        Collateral Reports Completed:   Authorization for Release of Information Completed for Debbie [phone] (contracted with [place])     PLAN: (Patient Tasks / Therapist Tasks / Other)  Therapist will contact patient's .        Sylvia Hernández                                                       This note has been reviewed and I agree with the plan of care. This note is co-signed by Glory Diaz [facility] Super[facility], on: September 19, 2022     ______________________________________________________________________    Individual Treatment Plan    Patient's Name: Ledy Odonnell  Date  Of Birth: 1945    Date of Creation: 9/12/2022  Date Treatment Plan Last Reviewed/Revised: n/a    DSM5 Diagnoses: 296.31 (F33.0) Major Depressive Disorder, Recurrent Episode, Mild _  Psychosocial / Contextual Factors: wheel chair user   PROMIS (reviewed every 90 days): not completed     Referral / Collaboration:  The following referral(s) was/were discussed but patient declines follow up at this time. Patient declines metro mobility.     Anticipated number of session for this episode of care: 9-12 sessions  Anticipation frequency of session: Weekly  Anticipated Duration of each session: 38-52 minutes  Treatment plan will be reviewed in 90 days or when goals have been changed.       MeasurableTreatment Goal(s) related to diagnosis / functional impairment(s)  Goal 1: Patient will learn skills to manage depression.     I will know I've met my goal when I can manage tasks independently without asking for help.      Objective #A (Patient Action)    Patient will Increase interest, engagement, and pleasure in doing things.  Status: New - Date: 9/12/2022     Intervention(s)  Therapist will provide psychoeducation on depression and CBT. Therapist will provided safe supportive environment express concerns and needs. Therapist will provide emotional support and validation.     Objective #B  Patient will Increase interest, engagement, and pleasure in doing things.  Status: New - Date: 9/12/2022     Intervention(s)  Therapist will use cognitive behavioral therapy to teach how to identify and solve problems that arise from life stressors.    Objective #C  Patient will Identify negative self-talk and behaviors: challenge core beliefs, myths, and actions.  Status: New - Date: 9/12/2022     Intervention(s)  Therapist will teach how to identify and overcome negative thought patterns using CBT.       Patient has reviewed and agreed to the above plan.      Sylvia Hernández  September 12, 2022    This note has been reviewed and I agree  with the plan of care. This note is co-signed by Glory Diaz, [facility] Care[facility] Supervisor, on: September 19, 2022

## 2022-09-12 NOTE — TELEPHONE ENCOUNTER
Called Home care    [phone] they are paging Judith (Rn case manager) to return call to clinic regarding wound.        Brigid Prajapati RN

## 2022-09-12 NOTE — TELEPHONE ENCOUNTER
RN receied call back from Judith.    RN reviewed messages.    Judith will attempt to fax notes regarding wound.    RN gave fax number.    Stone Foster RN, BSN, PHN  [facility]

## 2022-09-13 NOTE — TELEPHONE ENCOUNTER
Faxed wound documentation to [employer] at fax [phone].    GAYATRI Galvez  [facility]     

## 2022-09-16 ENCOUNTER — MEDICAL CORRESPONDENCE (OUTPATIENT)
Dept: HEALTH INFORMATION MANAGEMENT | Facility: CLINIC | Age: 77
End: 2022-09-16

## 2022-09-20 ENCOUNTER — TELEPHONE (OUTPATIENT)
Dept: FAMILY MEDICINE | Facility: CLINIC | Age: 77
End: 2022-09-20

## 2022-09-20 NOTE — TELEPHONE ENCOUNTER
Forms received from [facility]/ Physician Order (order # [id number]) for Loulou Ashley NP.  Forms placed in provider 'sign me' folder.  Please fax forms to [phone] after completion.    GAAYTRI Galvez  [facility]

## 2022-09-27 DIAGNOSIS — F32.9 MAJOR DEPRESSIVE DISORDER WITH SINGLE EPISODE, REMISSION STATUS UNSPECIFIED: ICD-10-CM

## 2022-09-28 RX ORDER — BUPROPION HYDROCHLORIDE 150 MG/1
150 TABLET ORAL DAILY
Qty: 90 TABLET | Refills: 0 | Status: SHIPPED | OUTPATIENT
Start: 2022-09-28 | End: 2023-01-24

## 2022-09-29 ENCOUNTER — VIRTUAL VISIT (OUTPATIENT)
Dept: PSYCHOLOGY | Facility: CLINIC | Age: 77
End: 2022-09-29
Payer: COMMERCIAL

## 2022-09-29 DIAGNOSIS — L97.529 ULCER OF LEFT FOOT, UNSPECIFIED ULCER STAGE (H): ICD-10-CM

## 2022-09-29 DIAGNOSIS — G89.4 CHRONIC PAIN DISORDER: ICD-10-CM

## 2022-09-29 DIAGNOSIS — F33.0 MILD EPISODE OF RECURRENT MAJOR DEPRESSIVE DISORDER (H): Primary | ICD-10-CM

## 2022-09-29 DIAGNOSIS — F10.21 ALCOHOL DEPENDENCE IN REMISSION (H): ICD-10-CM

## 2022-09-29 PROCEDURE — 90834 PSYTX W PT 45 MINUTES: CPT | Mod: 95

## 2022-09-29 NOTE — PROGRESS NOTES
"    [facility]                                     Progress Note    Patient Name: Ldey Odonnell  Date: 2022         Service Type: Individual      Session Start Time: 10:00 AM  Session End Time: 10:38     Session Length: 45 mins    Session #: 4    Attendees: Client attended alone    Service Modality:  Phone Visit:      Provider verified identity through the following two step process.  Patient provided:  Patient  and Patient address    The patient has been notified of the following:      \"We have found that certain health care needs can be provided without the need for a face to face visit.  This service lets us provide the care you need with a phone conversation.       I will have full access to your [facility] medical record during this entire phone call.   I will be taking notes for your medical record.      Since this is like an office visit, we will bill your insurance company for this service.       There are potential benefits and risks of telephone visits (e.g. limits to patient confidentiality) that differ from in-person visits.?Confidentiality still applies for telephone services, and nobody will record the visit.  It is important to be in a quiet, private space that is free of distractions (including cell phone or other devices) during the visit.??      If during the course of the call I believe a telephone visit is not appropriate, you will not be charged for this service\"     Consent has been obtained for this service by care team member: Yes     DATA  Interactive Complexity: No  Crisis: No        Progress Since Last Session (Related to Symptoms / Goals / Homework):   Symptoms: No change per patient report. Poorly controlled depression.    Homework: Achieved / completed to satisfaction      Episode of Care Goals: No improvement - PREPARATION (Decided to change - considering how); Intervened by negotiating a change plan and determining options / strategies for " behavior change, identifying triggers, exploring social supports, and working towards setting a date to begin behavior change     Current / Ongoing Stressors and Concerns:   Financial stressors    Limited motility   Wheelchair user    Isolation    Depression    Effective communication       Treatment Objective(s) Addressed in This Session:   Increase interest, engagement, and pleasure in doing things  Rapport building   Psychoeducation on CBT      Intervention:     Situation   Declining [employer] due to past experiences.       Automatic Thoughts  Cognitive Distortions   This is a horrible company, I will regret using this company, this company will fail me.      Feelings   Anxious      Behavior   Refusing to use [employer] and restricting her quality of life.      Questioning Thoughts            Assessments completed prior to visit:  The following assessments were completed by patient for this visit:  PHQ2:   PHQ-2 ( 1999 [employer]) 7/18/2022 8/13/2021 8/13/2019 9/13/2016 9/7/2016 11/11/2014 10/15/2013   Q1: Little interest or pleasure in doing things - 1 0 0 0 0 0   Q2: Feeling down, depressed or hopeless - 0 0 1 1 1 0   PHQ-2 Score - 1 0 1 1 1 0   PHQ-2 Total Score (12-17 Years)- Positive if 3 or more points; Administer PHQ-A if positive - 1 0 - - - -   PHQ-2 Score Incomplete - - - - - -     PHQ9:   PHQ-9 SCORE 6/9/2020 2/12/2021 2/26/2021 8/13/2021 3/23/2022 7/18/2022 8/25/2022   PHQ-9 Total Score - - - - - - -   PHQ-9 Total Score MyChart - - - - - 5 (Mild depression) -   PHQ-9 Total Score 2 7 2 3 8 5 12     GAD2: No flowsheet data found.  GAD7:   LESTER-7 SCORE 7/9/2019 5/28/2020 6/9/2020 2/12/2021 2/26/2021 8/13/2021 8/25/2022   Total Score - - - - - - -   Total Score 3 2 2 3 1 0 8     PROMIS 10-Global Health (only subscores and total score): No flowsheet data found.      ASSESSMENT: Current Emotional / Mental Status (status of significant symptoms):   Risk status (Self / Other harm or suicidal  ideation)   Patient denies current fears or concerns for personal safety.   Patient denies current or recent suicidal ideation or behaviors.   Patient denies current or recent homicidal ideation or behaviors.   Patient denies current or recent self injurious behavior or ideation.   Patient denies other safety concerns.   Patient reports there has been no change in risk factors since their last session.     Patient reports there has been no change in protective factors since their last session.     Recommended that patient call 911 or go to the local ED should there be a change in any of these risk factors.     Appearance:   unable to assess due to phone visit    Eye Contact:   unable to assess due to phone visit    Psychomotor Behavior: unable to assess due to phone visit    Attitude:   Cooperative    Orientation:   All   Speech    Rate / Production: Normal/ Responsive Normal     Volume:  Normal    Mood:    Depressed    Affect:    unable to assess due to phone visit    Thought Content:  Clear    Thought Form:  Circumstantial   Insight:    Good      Medication Review:   No changes to current psychiatric medication(s)     Medication Compliance:   Yes     Changes in Health Issues:   None reported     Chemical Use Review:   Substance Use: Chemical use reviewed, no active concerns identified      Tobacco Use: No current tobacco use.      Diagnosis:  1. 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 Alcohol dependence in remission (H)        Collateral Reports Completed:   Authorization for Release of Information Completed for Debbie [phone] (contracted with [place])     PLAN: (Patient Tasks / Therapist Tasks / Other)  Patient will contact patient's  to inquire about metro mobility and ramp for front of home.        Sylvia Hernández       This note has been reviewed and I agree with the plan of care. This note is co-signed by Glory Diaz, [facility] Supervisor, on: October 3, 2022                                                      ______________________________________________________________________    Individual Treatment Plan    Patient's Name: Ledy Odonnell  YOB: 1945    Date of Creation: 9/12/2022  Date Treatment Plan Last Reviewed/Revised: n/a    DSM5 Diagnoses: 296.31 (F33.0) Major Depressive Disorder, Recurrent Episode, Mild _  Psychosocial / Contextual Factors: wheel chair user   PROMIS (reviewed every 90 days): not completed     Referral / Collaboration:  The following referral(s) was/were discussed but patient declines follow up at this time. Patient declines metro mobility.     Anticipated number of session for this episode of care: 9-12 sessions  Anticipation frequency of session: Weekly  Anticipated Duration of each session: 38-52 minutes  Treatment plan will be reviewed in 90 days or when goals have been changed.       MeasurableTreatment Goal(s) related to diagnosis / functional impairment(s)  Goal 1: Patient will learn skills to manage depression.     I will know I've met my goal when I can manage tasks independently without asking for help.      Objective #A (Patient Action)    Patient will Increase interest, engagement, and pleasure in doing things.  Status: New - Date: 9/12/2022     Intervention(s)  Therapist will provide psychoeducation on depression and CBT. Therapist will provided safe supportive environment express concerns and needs. Therapist will provide emotional support and validation.     Objective #B  Patient will Increase interest, engagement, and pleasure in doing things.  Status: New - Date: 9/12/2022     Intervention(s)  Therapist will use cognitive behavioral therapy to teach how to identify and solve problems that arise from life stressors.    Objective #C  Patient will Identify negative self-talk and behaviors: challenge core beliefs, myths, and actions.  Status: New - Date: 9/12/2022     Intervention(s)  Therapist will teach how to identify  and overcome negative thought patterns using CBT.       Patient has reviewed and agreed to the above plan.      Sylvia Hernández  September 12, 2022    This note has been reviewed and I agree with the plan of care. This note is co-signed by Glory Diaz, [facility] Supervisor, on: September 19, 2022

## 2022-09-29 NOTE — TELEPHONE ENCOUNTER
Patient calling to request medication refill. See pended medication.    GAYATRI Galvez  [facility]

## 2022-09-29 NOTE — TELEPHONE ENCOUNTER
[place]  noted last filled 9/2/22 for 30 days supply.  So will be due to start next refill 10/2/22.  Will postpone and sign refill on Friday.    Loulou Ashley, MARLENE, APRN, CNP

## 2022-09-30 RX ORDER — OXYCODONE AND ACETAMINOPHEN 5; 325 MG/1; MG/1
1 TABLET ORAL EVERY 6 HOURS PRN
Qty: 90 TABLET | Refills: 0 | Status: SHIPPED | OUTPATIENT
Start: 2022-10-02 | End: 2022-11-01

## 2022-10-06 ENCOUNTER — VIRTUAL VISIT (OUTPATIENT)
Dept: PSYCHOLOGY | Facility: CLINIC | Age: 77
End: 2022-10-06
Payer: COMMERCIAL

## 2022-10-06 DIAGNOSIS — F33.0 MILD EPISODE OF RECURRENT MAJOR DEPRESSIVE DISORDER (H): Primary | ICD-10-CM

## 2022-10-06 PROCEDURE — 90832 PSYTX W PT 30 MINUTES: CPT | Mod: 95

## 2022-10-06 NOTE — PROGRESS NOTES
"    [facility]                                     Progress Note    Patient Name: Ledy Odonnell  Date: 10/06/2022         Service Type: Individual      Session Start Time: 10:00 AM  Session End Time: 10:16     Session Length: 16     Session #: 5    Attendees: Client attended alone    Service Modality:  Phone Visit:      Provider verified identity through the following two step process.  Patient provided:  Patient  and Patient address    The patient has been notified of the following:      \"We have found that certain health care needs can be provided without the need for a face to face visit.  This service lets us provide the care you need with a phone conversation.       I will have full access to your [facility] medical record during this entire phone call.   I will be taking notes for your medical record.      Since this is like an office visit, we will bill your insurance company for this service.       There are potential benefits and risks of telephone visits (e.g. limits to patient confidentiality) that differ from in-person visits.?Confidentiality still applies for telephone services, and nobody will record the visit.  It is important to be in a quiet, private space that is free of distractions (including cell phone or other devices) during the visit.??      If during the course of the call I believe a telephone visit is not appropriate, you will not be charged for this service\"     Consent has been obtained for this service by care team member: Yes     DATA  Extended Session (53+ minutes): No  Interactive Complexity: No  Crisis: No          Progress Since Last Session (Related to Symptoms / Goals / Homework):   Symptoms: Improving per patient report, she was able to attend [employer] service in person by using her electric W/C    Homework: Achieved / completed to satisfaction      Episode of Care Goals: Minimal progress - ACTION (Actively working towards change); Intervened by " reinforcing change plan / affirming steps taken     Current / Ongoing Stressors and Concerns:   Financial stressors    Limited motility   Wheelchair user    Isolation    Depression    Effective communication       Treatment Objective(s) Addressed in This Session:   Increase interest, engagement, and pleasure in doing things  Rapport building   Psychoeducation on CBT      Intervention:     Situation   Limited mobility unable to travel independently w/c user.      Automatic Thoughts  Cognitive Distortions      The novelty has work off and no one cares.    Feelings   Depressed, sad, hopeless      Behavior   Refusing to ask for help     Questioning Thoughts          Therapist provided psycoeducation on irrational thoughts. Discussed identifying irrational thought and changing perspective, creating a healthy alternative thought.      Assessments completed prior to visit:  The following assessments were completed by patient for this visit:  PHQ2:   PHQ-2 ( 1999 [employer]) 7/18/2022 8/13/2021 8/13/2019 9/13/2016 9/7/2016 11/11/2014 10/15/2013   Q1: Little interest or pleasure in doing things - 1 0 0 0 0 0   Q2: Feeling down, depressed or hopeless - 0 0 1 1 1 0   PHQ-2 Score - 1 0 1 1 1 0   PHQ-2 Total Score (12-17 Years)- Positive if 3 or more points; Administer PHQ-A if positive - 1 0 - - - -   PHQ-2 Score Incomplete - - - - - -     PHQ9:   PHQ-9 SCORE 6/9/2020 2/12/2021 2/26/2021 8/13/2021 3/23/2022 7/18/2022 8/25/2022   PHQ-9 Total Score - - - - - - -   PHQ-9 Total Score MyChart - - - - - 5 (Mild depression) -   PHQ-9 Total Score 2 7 2 3 8 5 12     GAD2: No flowsheet data found.  GAD7:   LESTER-7 SCORE 7/9/2019 5/28/2020 6/9/2020 2/12/2021 2/26/2021 8/13/2021 8/25/2022   Total Score - - - - - - -   Total Score 3 2 2 3 1 0 8     PROMIS 10-Global Health (only subscores and total score): No flowsheet data found.      ASSESSMENT: Current Emotional / Mental Status (status of significant symptoms):   Risk status (Self / Other harm  or suicidal ideation)   Patient denies current fears or concerns for personal safety.   Patient denies current or recent suicidal ideation or behaviors.   Patient denies current or recent homicidal ideation or behaviors.   Patient denies current or recent self injurious behavior or ideation.   Patient denies other safety concerns.   Patient reports there has been no change in risk factors since their last session.     Patient reports there has been no change in protective factors since their last session.     Recommended that patient call 911 or go to the local ED should there be a change in any of these risk factors.     Appearance:   unable to assess due to phone visit    Eye Contact:   unable to assess due to phone visit    Psychomotor Behavior: unable to assess due to phone visit    Attitude:   Cooperative    Orientation:   All   Speech    Rate / Production: Normal/ Responsive Normal     Volume:  Normal    Mood:    Normal   Affect:    unable to assess due to phone visit    Thought Content:  Clear    Thought Form:  Logical    Insight:    Good      Medication Review:   No changes to current psychiatric medication(s)     Medication Compliance:   Yes     Changes in Health Issues:   None reported     Chemical Use Review:   Substance Use: Chemical use reviewed, no active concerns identified      Tobacco Use: No current tobacco use.      Diagnosis:  1. Mild episode of recurrent major depressive disorder (H)        Collateral Reports Completed:   Authorization for Release of Information Completed for Debbie [phone] (contracted with [place])     PLAN: (Patient Tasks / Therapist Tasks / Other)  Patient will continue to identify and challenge irrational thoughts.         Sylvia Hernández      This note has been reviewed and I agree with the plan of care. This note is co-signed by Glory [facility] Supervisor, on: October 10, 2022                                                    ______________________________________________________________________    Individual Treatment Plan    Patient's Name: Ledy Odonnell  YOB: 1945    Date of Creation: 9/12/2022  Date Treatment Plan Last Reviewed/Revised: n/a    DSM5 Diagnoses: 296.31 (F33.0) Major Depressive Disorder, Recurrent Episode, Mild _  Psychosocial / Contextual Factors: wheel chair user   PROMIS (reviewed every 90 days): not completed     Referral / Collaboration:  The following referral(s) was/were discussed but patient declines follow up at this time. Patient declines metro mobility.     Anticipated number of session for this episode of care: 9-12 sessions  Anticipation frequency of session: Weekly  Anticipated Duration of each session: 38-52 minutes  Treatment plan will be reviewed in 90 days or when goals have been changed.       MeasurableTreatment Goal(s) related to diagnosis / functional impairment(s)  Goal 1: Patient will learn skills to manage depression.     I will know I've met my goal when I can manage tasks independently without asking for help.      Objective #A (Patient Action)    Patient will Increase interest, engagement, and pleasure in doing things.  Status: New - Date: 9/12/2022     Intervention(s)  Therapist will provide psychoeducation on depression and CBT. Therapist will provided safe supportive environment express concerns and needs. Therapist will provide emotional support and validation.     Objective #B  Patient will Increase interest, engagement, and pleasure in doing things.  Status: New - Date: 9/12/2022     Intervention(s)  Therapist will use cognitive behavioral therapy to teach how to identify and solve problems that arise from life stressors.    Objective #C  Patient will Identify negative self-talk and behaviors: challenge core beliefs, myths, and actions.  Status: New - Date: 9/12/2022     Intervention(s)  Therapist will teach how to identify and overcome negative thought patterns  using CBT.       Patient has reviewed and agreed to the above plan.      Sylvia Hernández  September 12, 2022    This note has been reviewed and I agree with the plan of care. This note is co-signed by Glory Diaz, [facility], on: September 19, 2022

## 2022-10-11 ENCOUNTER — OFFICE VISIT (OUTPATIENT)
Dept: ORTHOPEDICS | Facility: CLINIC | Age: 77
End: 2022-10-11
Payer: COMMERCIAL

## 2022-10-11 VITALS
WEIGHT: 165 LBS | HEART RATE: 82 BPM | HEIGHT: 65 IN | DIASTOLIC BLOOD PRESSURE: 71 MMHG | OXYGEN SATURATION: 95 % | BODY MASS INDEX: 27.49 KG/M2 | SYSTOLIC BLOOD PRESSURE: 125 MMHG

## 2022-10-11 DIAGNOSIS — M79.10 MUSCLE SORENESS: ICD-10-CM

## 2022-10-11 DIAGNOSIS — M12.811 ROTATOR CUFF ARTHROPATHY OF BOTH SHOULDERS: Primary | ICD-10-CM

## 2022-10-11 DIAGNOSIS — G14 POST-POLIO SYNDROME (H): ICD-10-CM

## 2022-10-11 DIAGNOSIS — M12.812 ROTATOR CUFF ARTHROPATHY OF BOTH SHOULDERS: Primary | ICD-10-CM

## 2022-10-11 DIAGNOSIS — M17.11 PRIMARY OSTEOARTHRITIS OF RIGHT KNEE: ICD-10-CM

## 2022-10-11 DIAGNOSIS — G62.9 PERIPHERAL POLYNEUROPATHY: ICD-10-CM

## 2022-10-11 PROCEDURE — 99213 OFFICE O/P EST LOW 20 MIN: CPT | Mod: 25 | Performed by: ORTHOPAEDIC SURGERY

## 2022-10-11 PROCEDURE — 20610 DRAIN/INJ JOINT/BURSA W/O US: CPT | Mod: 50 | Performed by: ORTHOPAEDIC SURGERY

## 2022-10-11 RX ORDER — LIDOCAINE HYDROCHLORIDE 10 MG/ML
4 INJECTION, SOLUTION INFILTRATION; PERINEURAL
Status: DISCONTINUED | OUTPATIENT
Start: 2022-10-11 | End: 2023-01-01

## 2022-10-11 RX ORDER — LIDOCAINE HYDROCHLORIDE 10 MG/ML
4 INJECTION, SOLUTION EPIDURAL; INFILTRATION; INTRACAUDAL; PERINEURAL
Status: DISCONTINUED | OUTPATIENT
Start: 2022-10-11 | End: 2023-01-01

## 2022-10-11 RX ORDER — METHYLPREDNISOLONE ACETATE 80 MG/ML
80 INJECTION, SUSPENSION INTRA-ARTICULAR; INTRALESIONAL; INTRAMUSCULAR; SOFT TISSUE
Status: DISCONTINUED | OUTPATIENT
Start: 2022-10-11 | End: 2023-01-01

## 2022-10-11 RX ADMIN — LIDOCAINE HYDROCHLORIDE 4 ML: 10 INJECTION, SOLUTION EPIDURAL; INFILTRATION; INTRACAUDAL; PERINEURAL at 11:37

## 2022-10-11 RX ADMIN — METHYLPREDNISOLONE ACETATE 80 MG: 80 INJECTION, SUSPENSION INTRA-ARTICULAR; INTRALESIONAL; INTRAMUSCULAR; SOFT TISSUE at 11:36

## 2022-10-11 RX ADMIN — METHYLPREDNISOLONE ACETATE 80 MG: 80 INJECTION, SUSPENSION INTRA-ARTICULAR; INTRALESIONAL; INTRAMUSCULAR; SOFT TISSUE at 11:37

## 2022-10-11 RX ADMIN — LIDOCAINE HYDROCHLORIDE 4 ML: 10 INJECTION, SOLUTION INFILTRATION; PERINEURAL at 11:36

## 2022-10-11 ASSESSMENT — PAIN SCALES - GENERAL: PAINLEVEL: SEVERE PAIN (6)

## 2022-10-11 NOTE — LETTER
10/11/2022         RE: Ledy Odonnell  [address]  [facility] [address]        Dear Colleague,    Thank you for referring your patient, Ledy Odonnell, to the [facility]. Please see a copy of my visit note below.    SUBJECTIVE:  Ledy Odonnell is a 74 year old female who is seen in follow-up for bilateral shoulder rotator cuff tear arthropathy..  Right knee severe osteoarthritis.    She is wheel chair dependant and cannot function during recovery time for a rotator cuff repair or RTSA. She is being treated non-operatively. She has  trouble wheeling with her wheel chair due to significant shoulder pains and hand deformities. She did finally get her Sheree scooter, which has made a huge difference in her life.  The knee is also very painful as well..  She has mild osteoarthritis in the knee as of the 2016 xrays, but is probably worse now.  Surgery on any of her joints is not an option in her mind..    Also multiple other joint pain complaints--hands and severely deformed left foot and ankle as well. Neurologic issues in right foot as well.    Corticosteroid injections last visit:  9/8/21.  Amount of relief: very good.  Length of time of relief 4- 6 weeks for bilateral shoulders and right knee  .  Doing therapy/exercises: (y/n): yes occasionally.    Bilateral shoulders with severe pain with movements.    GENERAL: healthy, alert and no distress  GAIT: normal   SKIN: no suspicious lesions or rashes  NEURO: Normal strength and tone, mentation intact and speech normal  VASCULAR:  normal pulses and cappillary refill   PSYCH:  mentation appears normal and affect normal/bright    SHOULDER:  Shoulder Inspection: no swelling, bruising, discoloration, or obvious deformity or asymmetry  marked muscle atrophy of rotator cuff     Range of Motion:   Active:forward flexion 50 degrees bilateral    Right knee:  Some valgus  Moderate effusion  Range of motion 0-120   Diffuse  "tenderness.     Xrays :2/21/18 bilateral shoulders: Complete loss of the acromiohumeral interval bilaterally  compatible with chronic rotator cuff tear. Degenerative change at the  glenohumeral joints. No acute fracture or acute malalignment.     Impression:   Encounter Diagnosis   Name Primary?     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     With the patient's consent, right knee(s) injected intra-articularly with 80mg of Depomedrol and 4cc of local anesthetic after sterile prep.  Procedure Note:   Informed consent obtained. Risks, benefits and complications of the injection were discussed with the patient and the patient elected to proceed. Bilateral shoulder injected into the subacromial space after sterile prep, using 80mg Depomedrol and 4cc local anesthetic.    We filled out more forms to try to get a \"[employer] 2\" (SKU JZAIR2) electric wheelchair/scooter.  She really is almost non-functional without such a device.    Follow up as needed     SALINAS Woods MD  Dept. Orthopedic Surgery  [facility]            Large Joint Injection/Arthocentesis: bilateral subacromial bursa    Date/Time: 10/11/2022 11:36 AM  Performed by: Wolfgang Angeles  Authorized by: Rex Woods MD     Indications:  Osteoarthritis, joint swelling and pain  Needle Size:  22 G  Guidance: landmark guided    Approach:  Lateral  Location:  Shoulder  Laterality:  Bilateral      Site:  Bilateral subacromial bursa  Medications (Right):  80 mg methylPREDNISolone 80 MG/ML; 4 mL lidocaine 1 %  Medications (Left):  80 mg methylPREDNISolone 80 MG/ML; 4 mL lidocaine 1 %  Outcome:  Tolerated well, no immediate complications  Procedure discussed: discussed risks, benefits, and alternatives    Consent Given by:  Patient  Timeout: timeout called immediately prior to procedure    Prep: patient was " prepped and draped in usual sterile fashion    Large Joint Injection/Arthocentesis: R knee joint    Date/Time: 10/11/2022 11:37 AM  Performed by: Wolfgang Angeles  Authorized by: Rex Woods MD     Indications:  Pain and osteoarthritis  Needle Size:  22 G  Guidance: landmark guided    Approach:  Anterolateral  Location:  Knee      Medications:  80 mg methylPREDNISolone 80 MG/ML; 4 mL lidocaine (PF) 1 %  Outcome:  Tolerated well, no immediate complications  Procedure discussed: discussed risks, benefits, and alternatives    Consent Given by:  Patient  Timeout: timeout called immediately prior to procedure    Prep: patient was prepped and draped in usual sterile fashion              Again, thank you for allowing me to participate in the care of your patient.        Sincerely,        Rex Woods MD

## 2022-10-11 NOTE — PATIENT INSTRUCTIONS
AFTER VISIT SUMMARY    [facility] CORTISONE Injection Discharge Instructions    You may shower, however avoid swimming, tub baths or hot tubs for 24 hours following your procedure    You may have a mild to moderate increase in pain for several days following the injection.    It may take up to 14 days for the steroid medication to start working although you may feel the effect as early as a few days after the procedure.    You may use ice packs for 10-15 minutes, 3 to 4 times a day at the injection site for comfort    You may use anti-inflammatory medications (such as Ibuprofen or Aleve or Advil) or Tylenol for pain control if necessary    If you were fasting, you may resume your normal diet and medications after the procedure    If you have diabetes, check your blood sugar more frequently than usual as your blood sugar may be higher than normal for 10-14 days following a steroid injection. Contact your doctor who manages your diabetes if your blood sugar is higher than usual      If you experience any of the following, call [facility] [phone]  -Fever over 100 degree F  -Swelling, bleeding, redness, drainage, warmth at the injection site  - New or worsening pain

## 2022-10-11 NOTE — PROGRESS NOTES
Large Joint Injection/Arthocentesis: bilateral subacromial bursa    Date/Time: 10/11/2022 11:36 AM  Performed by: Wolfgang Angeles  Authorized by: Rex Woods MD     Indications:  Osteoarthritis, joint swelling and pain  Needle Size:  22 G  Guidance: landmark guided    Approach:  Lateral  Location:  Shoulder  Laterality:  Bilateral      Site:  Bilateral subacromial bursa  Medications (Right):  80 mg methylPREDNISolone 80 MG/ML; 4 mL lidocaine 1 %  Medications (Left):  80 mg methylPREDNISolone 80 MG/ML; 4 mL lidocaine 1 %  Outcome:  Tolerated well, no immediate complications  Procedure discussed: discussed risks, benefits, and alternatives    Consent Given by:  Patient  Timeout: timeout called immediately prior to procedure    Prep: patient was prepped and draped in usual sterile fashion    Large Joint Injection/Arthocentesis: R knee joint    Date/Time: 10/11/2022 11:37 AM  Performed by: Wolfgang Angeles  Authorized by: Rex Woods MD     Indications:  Pain and osteoarthritis  Needle Size:  22 G  Guidance: landmark guided    Approach:  Anterolateral  Location:  Knee      Medications:  80 mg methylPREDNISolone 80 MG/ML; 4 mL lidocaine (PF) 1 %  Outcome:  Tolerated well, no immediate complications  Procedure discussed: discussed risks, benefits, and alternatives    Consent Given by:  Patient  Timeout: timeout called immediately prior to procedure    Prep: patient was prepped and draped in usual sterile fashion

## 2022-10-11 NOTE — PROGRESS NOTES
SUBJECTIVE:  Ledy Odonnell is a 74 year old female who is seen in follow-up for bilateral shoulder rotator cuff tear arthropathy..  Right knee severe osteoarthritis.    She is wheel chair dependant and cannot function during recovery time for a rotator cuff repair or RTSA. She is being treated non-operatively. She has  trouble wheeling with her wheel chair due to significant shoulder pains and hand deformities. She did finally get her Sheree scooter, which has made a huge difference in her life.  The knee is also very painful as well..  She has mild osteoarthritis in the knee as of the 2016 xrays, but is probably worse now.  Surgery on any of her joints is not an option in her mind..    Also multiple other joint pain complaints--hands and severely deformed left foot and ankle as well. Neurologic issues in right foot as well.    Corticosteroid injections last visit:  9/8/21.  Amount of relief: very good.  Length of time of relief 4- 6 weeks for bilateral shoulders and right knee  .  Doing therapy/exercises: (y/n): yes occasionally.    Bilateral shoulders with severe pain with movements.    GENERAL: healthy, alert and no distress  GAIT: normal   SKIN: no suspicious lesions or rashes  NEURO: Normal strength and tone, mentation intact and speech normal  VASCULAR:  normal pulses and cappillary refill   PSYCH:  mentation appears normal and affect normal/bright    SHOULDER:  Shoulder Inspection: no swelling, bruising, discoloration, or obvious deformity or asymmetry  marked muscle atrophy of rotator cuff     Range of Motion:   Active:forward flexion 50 degrees bilateral    Right knee:  Some valgus  Moderate effusion  Range of motion 0-120   Diffuse tenderness.     Xrays :2/21/18 bilateral shoulders: Complete loss of the acromiohumeral interval bilaterally  compatible with chronic rotator cuff tear. Degenerative change at the  glenohumeral joints. No acute fracture or acute malalignment.     Impression:   Encounter  "Diagnosis   Name Primary?     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     With the patient's consent, right knee(s) injected intra-articularly with 80mg of Depomedrol and 4cc of local anesthetic after sterile prep.  Procedure Note:   Informed consent obtained. Risks, benefits and complications of the injection were discussed with the patient and the patient elected to proceed. Bilateral shoulder injected into the subacromial space after sterile prep, using 80mg Depomedrol and 4cc local anesthetic.    We filled out more forms to try to get a \"[employer] 2\" (SKU JZAIR2) electric wheelchair/scooter.  She really is almost non-functional without such a device.    Follow up as needed     SALINAS Woods MD  Dept. Orthopedic Surgery  [facility]          "

## 2022-10-12 DIAGNOSIS — N32.81 OAB (OVERACTIVE BLADDER): ICD-10-CM

## 2022-10-12 RX ORDER — SOLIFENACIN SUCCINATE 5 MG/1
5 TABLET, FILM COATED ORAL DAILY
Qty: 90 TABLET | Refills: 1 | Status: SHIPPED | OUTPATIENT
Start: 2022-10-12 | End: 2023-01-01

## 2022-10-12 NOTE — TELEPHONE ENCOUNTER
MD sent Rx to pharmacy.   Patient advised that Dr. Wayne does not recommend s/p tube as leaking and spasms are still likely and s/p requires collection bag so this option will not solve patient's issues. Patient expresses understanding.  She will contact us in future if she decides to go back to indwelling moctezuma catheter.  Vika Colon, CMA

## 2022-10-12 NOTE — TELEPHONE ENCOUNTER
Patient calling. Asking if Dr. Wayne does suprapubic catheter placement? She is wondering if she is a candidate and would like one since she is not comfortable with the moctezuma. Will route to Dr. Wayne to address.  Patient also needing refill on vesicare sent to [facility].   Medication helping some but still having to use pads/depends for leaking.  Vika Colon, CMA

## 2022-10-13 ENCOUNTER — VIRTUAL VISIT (OUTPATIENT)
Dept: PSYCHOLOGY | Facility: CLINIC | Age: 77
End: 2022-10-13
Payer: COMMERCIAL

## 2022-10-13 DIAGNOSIS — F33.0 MILD EPISODE OF RECURRENT MAJOR DEPRESSIVE DISORDER (H): Primary | ICD-10-CM

## 2022-10-13 PROCEDURE — 90834 PSYTX W PT 45 MINUTES: CPT | Mod: 95

## 2022-10-13 NOTE — PROGRESS NOTES
"    [facility] Counseling                                     Progress Note    Patient Name: Ledy Odonnell  Date: 10/13/2022         Service Type: Individual      Session Start Time: 10:05 AM  Session End Time: 10:56     Session Length: 51    Session #: 6    Attendees: Client attended alone    Service Modality:  Phone Visit:      Provider verified identity through the following two step process.  Patient provided:  Patient  and Patient address    The patient has been notified of the following:      \"We have found that certain health care needs can be provided without the need for a face to face visit.  This service lets us provide the care you need with a phone conversation.       I will have full access to your [facility] medical record during this entire phone call.   I will be taking notes for your medical record.      Since this is like an office visit, we will bill your insurance company for this service.       There are potential benefits and risks of telephone visits (e.g. limits to patient confidentiality) that differ from in-person visits.?Confidentiality still applies for telephone services, and nobody will record the visit.  It is important to be in a quiet, private space that is free of distractions (including cell phone or other devices) during the visit.??      If during the course of the call I believe a telephone visit is not appropriate, you will not be charged for this service\"     Consent has been obtained for this service by care team member: Yes     DATA  Extended Session (53+ minutes): No  Interactive Complexity: No  Crisis: No          Progress Since Last Session (Related to Symptoms / Goals / Homework):   Symptoms: Improving per patient report, she was able to attend [employer] service in person by using her electric W/C    Homework: Achieved / completed to satisfaction      Episode of Care Goals: Minimal progress - ACTION (Actively working towards change); Intervened by " reinforcing change plan / affirming steps taken     Current / Ongoing Stressors and Concerns:   Financial stressors    Limited motility   Wheelchair user    Isolation    Depression    Effective communication       Treatment Objective(s) Addressed in This Session:   Increase interest, engagement, and pleasure in doing things  Rapport building   Psychoeducation on CBT      Intervention:   CBT - Met with patient to review goals and interventions. Provided active listening as patient gave a brief update the week. Patient processed decrease in mood after new financial stressors. Completed PROMIS 10 Global Health together in session.  Discussed difficulty with loosing independent and finding was to cope and deal with life challenges. Praised patient for continuing to do things that's in her control. Patient having difficulty sleeping and has a doctor appointment 11/1/2022. Therapist taught activities and habits during the day that impact sleep.    Assessments completed prior to visit:  The following assessments were completed by patient for this visit:  PHQ2:   PHQ-2 ( 1999 [employer]) 7/18/2022 8/13/2021 8/13/2019 9/13/2016 9/7/2016 11/11/2014 10/15/2013   Q1: Little interest or pleasure in doing things - 1 0 0 0 0 0   Q2: Feeling down, depressed or hopeless - 0 0 1 1 1 0   PHQ-2 Score - 1 0 1 1 1 0   PHQ-2 Total Score (12-17 Years)- Positive if 3 or more points; Administer PHQ-A if positive - 1 0 - - - -   PHQ-2 Score Incomplete - - - - - -     PHQ9:   PHQ-9 SCORE 6/9/2020 2/12/2021 2/26/2021 8/13/2021 3/23/2022 7/18/2022 8/25/2022   PHQ-9 Total Score - - - - - - -   PHQ-9 Total Score MyChart - - - - - 5 (Mild depression) -   PHQ-9 Total Score 2 7 2 3 8 5 12     GAD2: No flowsheet data found.  GAD7:   LESTER-7 SCORE 7/9/2019 5/28/2020 6/9/2020 2/12/2021 2/26/2021 8/13/2021 8/25/2022   Total Score - - - - - - -   Total Score 3 2 2 3 1 0 8     PROMIS 10-Global Health (only subscores and total score):   PROMIS-10 Scores Only  10/13/2022   Global Mental Health Score 11   Global Physical Health Score 13   PROMIS TOTAL - SUBSCORES 24         ASSESSMENT: Current Emotional / Mental Status (status of significant symptoms):   Risk status (Self / Other harm or suicidal ideation)   Patient denies current fears or concerns for personal safety.   Patient denies current or recent suicidal ideation or behaviors.   Patient denies current or recent homicidal ideation or behaviors.   Patient denies current or recent self injurious behavior or ideation.   Patient denies other safety concerns.   Patient reports there has been no change in risk factors since their last session.     Patient reports there has been no change in protective factors since their last session.     Recommended that patient call 911 or go to the local ED should there be a change in any of these risk factors.     Appearance:   unable to assess due to phone visit    Eye Contact:   unable to assess due to phone visit    Psychomotor Behavior: unable to assess due to phone visit    Attitude:   Cooperative    Orientation:   All   Speech    Rate / Production: Normal/ Responsive Normal     Volume:  Normal    Mood:    Normal   Affect:    unable to assess due to phone visit    Thought Content:  Clear    Thought Form:  Logical    Insight:    Good      Medication Review:   No changes to current psychiatric medication(s)     Medication Compliance:   Yes     Changes in Health Issues:   None reported     Chemical Use Review:   Substance Use: Chemical use reviewed, no active concerns identified      Tobacco Use: No current tobacco use.      Diagnosis:  1. Mild episode of recurrent major depressive disorder (H)        Collateral Reports Completed:   Authorization for Release of Information Completed for Debbie [phone] (contracted with [employer])     PLAN: (Patient Tasks / Therapist Tasks / Other)  Patient will incorporate healthy sleep hygiene practices into her bedtime routine.          Sylvia  Edgar    This note has been reviewed and I agree with the plan of care. This note is co-signed by Glory Diaz [facility], on: October 17, 2022                                                    ______________________________________________________________________    Individual Treatment Plan    Patient's Name: Ledy Odonnell  YOB: 1945    Date of Creation: 9/12/2022  Date Treatment Plan Last Reviewed/Revised: n/a    DSM5 Diagnoses: 296.31 (F33.0) Major Depressive Disorder, Recurrent Episode, Mild _  Psychosocial / Contextual Factors: wheel chair user   PROMIS (reviewed every 90 days): not completed     Referral / Collaboration:  The following referral(s) was/were discussed but patient declines follow up at this time. Patient declines metro mobility.     Anticipated number of session for this episode of care: 9-12 sessions  Anticipation frequency of session: Weekly  Anticipated Duration of each session: 38-52 minutes  Treatment plan will be reviewed in 90 days or when goals have been changed.       MeasurableTreatment Goal(s) related to diagnosis / functional impairment(s)  Goal 1: Patient will learn skills to manage depression.     I will know I've met my goal when I can manage tasks independently without asking for help.      Objective #A (Patient Action)    Patient will Increase interest, engagement, and pleasure in doing things.  Status: New - Date: 9/12/2022     Intervention(s)  Therapist will provide psychoeducation on depression and CBT. Therapist will provided safe supportive environment express concerns and needs. Therapist will provide emotional support and validation.     Objective #B  Patient will Increase interest, engagement, and pleasure in doing things.  Status: New - Date: 9/12/2022     Intervention(s)  Therapist will use cognitive behavioral therapy to teach how to identify and solve problems that arise from life stressors.    Objective #C  Patient will Identify  negative self-talk and behaviors: challenge core beliefs, myths, and actions.  Status: New - Date: 9/12/2022     Intervention(s)  Therapist will teach how to identify and overcome negative thought patterns using CBT.       Patient has reviewed and agreed to the above plan.      Sylvia Hernández  September 12, 2022    This note has been reviewed and I agree with the plan of care. This note is co-signed by Glory Diaz [facility] Supervisor, on: September 19, 2022

## 2022-10-27 ENCOUNTER — VIRTUAL VISIT (OUTPATIENT)
Dept: PSYCHOLOGY | Facility: CLINIC | Age: 77
End: 2022-10-27
Payer: COMMERCIAL

## 2022-10-27 DIAGNOSIS — F32.0 CURRENT MILD EPISODE OF MAJOR DEPRESSIVE DISORDER WITHOUT PRIOR EPISODE (H): Primary | ICD-10-CM

## 2022-10-27 PROCEDURE — 90834 PSYTX W PT 45 MINUTES: CPT | Mod: 95

## 2022-10-27 ASSESSMENT — ANXIETY QUESTIONNAIRES
4. TROUBLE RELAXING: SEVERAL DAYS
3. WORRYING TOO MUCH ABOUT DIFFERENT THINGS: SEVERAL DAYS
2. NOT BEING ABLE TO STOP OR CONTROL WORRYING: SEVERAL DAYS
5. BEING SO RESTLESS THAT IT IS HARD TO SIT STILL: NOT AT ALL
GAD7 TOTAL SCORE: 4
1. FEELING NERVOUS, ANXIOUS, OR ON EDGE: SEVERAL DAYS
GAD7 TOTAL SCORE: 4
6. BECOMING EASILY ANNOYED OR IRRITABLE: NOT AT ALL
7. FEELING AFRAID AS IF SOMETHING AWFUL MIGHT HAPPEN: NOT AT ALL

## 2022-10-27 ASSESSMENT — PATIENT HEALTH QUESTIONNAIRE - PHQ9: SUM OF ALL RESPONSES TO PHQ QUESTIONS 1-9: 10

## 2022-10-27 NOTE — PROGRESS NOTES
"    [facility] Counseling                                     Progress Note    Patient Name: Ledy Odonnell  Date: 10/27/2022         Service Type: Individual      Session Start Time: 10:15 AM  Session End Time: 10: 54     Session Length: 39    Session #: 7    Attendees: Client attended alone    Service Modality:  Phone Visit:      Provider verified identity through the following two step process.  Patient provided:  Patient  and Patient address    The patient has been notified of the following:      \"We have found that certain health care needs can be provided without the need for a face to face visit.  This service lets us provide the care you need with a phone conversation.       I will have full access to your [facility] medical record during this entire phone call.   I will be taking notes for your medical record.      Since this is like an office visit, we will bill your insurance company for this service.       There are potential benefits and risks of telephone visits (e.g. limits to patient confidentiality) that differ from in-person visits.?Confidentiality still applies for telephone services, and nobody will record the visit.  It is important to be in a quiet, private space that is free of distractions (including cell phone or other devices) during the visit.??      If during the course of the call I believe a telephone visit is not appropriate, you will not be charged for this service\"     Consent has been obtained for this service by care team member: Yes     DATA  Extended Session (53+ minutes): No  Interactive Complexity: No  Crisis: No          Progress Since Last Session (Related to Symptoms / Goals / Homework):   Symptoms: No change stable    Homework: Achieved / completed to satisfaction      Episode of Care Goals: Satisfactory progress - ACTION (Actively working towards change); Intervened by reinforcing change plan / affirming steps taken     Current / Ongoing Stressors and " Concerns:   Financial stressors    Limited motility   Wheelchair user    Isolation    Depression    Effective communication       Treatment Objective(s) Addressed in This Session:   Increase interest, engagement, and pleasure in doing things     Intervention:   CBT - Met with patient to review goals and interventions. Provided active listening as patient gave a brief update the week. Patient processed upcoming PCP appointment. Patient has made a list of topics to discuss with provider including sleep concerns and light box therapy. Patient is hopeful about potential of new accessible ramp at her home. Complete daily gratitude technique and made follow up appointment in 1 month. Updated PHQ- and LESTER-7.       Assessments completed prior to visit:  The following assessments were completed by patient for this visit:  PHQ2:   PHQ-2 ( 1999 Pfizer) 7/18/2022 8/13/2021 8/13/2019 9/13/2016 9/7/2016 11/11/2014 10/15/2013   Q1: Little interest or pleasure in doing things - 1 0 0 0 0 0   Q2: Feeling down, depressed or hopeless - 0 0 1 1 1 0   PHQ-2 Score - 1 0 1 1 1 0   PHQ-2 Total Score (12-17 Years)- Positive if 3 or more points; Administer PHQ-A if positive - 1 0 - - - -   PHQ-2 Score Incomplete - - - - - -     PHQ9:   PHQ-9 SCORE 2/12/2021 2/26/2021 8/13/2021 3/23/2022 7/18/2022 8/25/2022 10/27/2022   PHQ-9 Total Score - - - - - - -   PHQ-9 Total Score MyChart - - - - 5 (Mild depression) - -   PHQ-9 Total Score 7 2 3 8 5 12 10     GAD2: No flowsheet data found.  GAD7:   LESTER-7 SCORE 5/28/2020 6/9/2020 2/12/2021 2/26/2021 8/13/2021 8/25/2022 10/27/2022   Total Score - - - - - - -   Total Score 2 2 3 1 0 8 4     PROMIS 10-Global Health (only subscores and total score):   PROMIS-10 Scores Only 10/13/2022   Global Mental Health Score 11   Global Physical Health Score 13   PROMIS TOTAL - SUBSCORES 24         ASSESSMENT: Current Emotional / Mental Status (status of significant symptoms):   Risk status (Self / Other harm or  suicidal ideation)   Patient denies current fears or concerns for personal safety.    Patient denies current or recent suicidal ideation or behaviors.   Patient denies current or recent homicidal ideation or behaviors.   Patient denies current or recent self injurious behavior or ideation.   Patient denies other safety concerns.   Patient reports there has been no change in risk factors since their last session.     Patient reports there has been no change in protective factors since their last session.     Recommended that patient call 911 or go to the local ED should there be a change in any of these risk factors.     Appearance:   unable to assess due to phone visit    Eye Contact:   unable to assess due to phone visit    Psychomotor Behavior: unable to assess due to phone visit    Attitude:   Cooperative  Interested Pleasant   Orientation:   All   Speech    Rate / Production: Normal/ Responsive Normal     Volume:  Normal    Mood:    Normal   Affect:    unable to assess due to phone visit    Thought Content:  Clear    Thought Form:  Logical    Insight:    Good      Medication Review:   No changes to current psychiatric medication(s)     Medication Compliance:   Yes     Changes in Health Issues:   None reported     Chemical Use Review:   Substance Use: Chemical use reviewed, no active concerns identified      Tobacco Use: No current tobacco use.      Diagnosis:  1. Current mild episode of major depressive disorder without prior episode (H)        Collateral Reports Completed:   Authorization for Release of Information Completed for Debbie [phone] (contracted with [place])     PLAN: (Patient Tasks / Therapist Tasks / Other)  Patient will discuss sleep medications with PCP at appointment on 11/01/2022. Patient will practice daily gratitude.          Sylvia Hernández    This note has been reviewed and I agree with the plan of care. This note is co-signed by Glory Diaz, [facility] [place] Supervisor, on: October 31,  2022                                                      ______________________________________________________________________    Individual Treatment Plan    Patient's Name: Ledy Odonnell  YOB: 1945    Date of Creation: 9/12/2022  Date Treatment Plan Last Reviewed/Revised: n/a    DSM5 Diagnoses: 296.31 (F33.0) Major Depressive Disorder, Recurrent Episode, Mild _  Psychosocial / Contextual Factors: wheel chair user   PROMIS (reviewed every 90 days): not completed     Referral / Collaboration:  The following referral(s) was/were discussed but patient declines follow up at this time. Patient declines metro mobility.     Anticipated number of session for this episode of care: 9-12 sessions  Anticipation frequency of session: Weekly  Anticipated Duration of each session: 38-52 minutes  Treatment plan will be reviewed in 90 days or when goals have been changed.       MeasurableTreatment Goal(s) related to diagnosis / functional impairment(s)  Goal 1: Patient will learn skills to manage depression.     I will know I've met my goal when I can manage tasks independently without asking for help.      Objective #A (Patient Action)    Patient will Increase interest, engagement, and pleasure in doing things.  Status: New - Date: 9/12/2022     Intervention(s)  Therapist will provide psychoeducation on depression and CBT. Therapist will provided safe supportive environment express concerns and needs. Therapist will provide emotional support and validation.     Objective #B  Patient will Increase interest, engagement, and pleasure in doing things.  Status: New - Date: 9/12/2022     Intervention(s)  Therapist will use cognitive behavioral therapy to teach how to identify and solve problems that arise from life stressors.    Objective #C  Patient will Identify negative self-talk and behaviors: challenge core beliefs, myths, and actions.  Status: New - Date: 9/12/2022     Intervention(s)  Therapist will teach how  to identify and overcome negative thought patterns using CBT.       Patient has reviewed and agreed to the above plan.      Sylvia Edgar  September 12, 2022    This note has been reviewed and I agree with the plan of care. This note is co-signed by Glory Diaz, [employer] Supervisor, on: September 19, 2022

## 2022-11-01 ENCOUNTER — OFFICE VISIT (OUTPATIENT)
Dept: FAMILY MEDICINE | Facility: CLINIC | Age: 77
End: 2022-11-01
Payer: COMMERCIAL

## 2022-11-01 VITALS
SYSTOLIC BLOOD PRESSURE: 126 MMHG | RESPIRATION RATE: 20 BRPM | HEART RATE: 90 BPM | TEMPERATURE: 98.3 F | WEIGHT: 160 LBS | HEIGHT: 65 IN | OXYGEN SATURATION: 100 % | DIASTOLIC BLOOD PRESSURE: 66 MMHG | BODY MASS INDEX: 26.66 KG/M2

## 2022-11-01 DIAGNOSIS — J44.9 CHRONIC OBSTRUCTIVE PULMONARY DISEASE, UNSPECIFIED COPD TYPE (H): ICD-10-CM

## 2022-11-01 DIAGNOSIS — F17.200 TOBACCO USE DISORDER: ICD-10-CM

## 2022-11-01 DIAGNOSIS — G89.4 CHRONIC PAIN DISORDER: ICD-10-CM

## 2022-11-01 DIAGNOSIS — L97.522 FOOT ULCER, LEFT, WITH FAT LAYER EXPOSED (H): ICD-10-CM

## 2022-11-01 DIAGNOSIS — F32.9 CURRENT EPISODE OF MAJOR DEPRESSIVE DISORDER WITHOUT PRIOR EPISODE, UNSPECIFIED DEPRESSION EPISODE SEVERITY: Primary | ICD-10-CM

## 2022-11-01 DIAGNOSIS — G47.00 INSOMNIA, UNSPECIFIED TYPE: ICD-10-CM

## 2022-11-01 PROCEDURE — 99214 OFFICE O/P EST MOD 30 MIN: CPT | Performed by: NURSE PRACTITIONER

## 2022-11-01 RX ORDER — OXYCODONE AND ACETAMINOPHEN 5; 325 MG/1; MG/1
1 TABLET ORAL EVERY 6 HOURS PRN
Qty: 90 TABLET | Refills: 0 | Status: SHIPPED | OUTPATIENT
Start: 2022-11-01 | End: 2022-11-30

## 2022-11-01 RX ORDER — TRAZODONE HYDROCHLORIDE 100 MG/1
100-200 TABLET ORAL AT BEDTIME
Qty: 60 TABLET | Refills: 3 | Status: SHIPPED | OUTPATIENT
Start: 2022-11-01 | End: 2023-01-01

## 2022-11-01 ASSESSMENT — PATIENT HEALTH QUESTIONNAIRE - PHQ9
SUM OF ALL RESPONSES TO PHQ QUESTIONS 1-9: 6
10. IF YOU CHECKED OFF ANY PROBLEMS, HOW DIFFICULT HAVE THESE PROBLEMS MADE IT FOR YOU TO DO YOUR WORK, TAKE CARE OF THINGS AT HOME, OR GET ALONG WITH OTHER PEOPLE: SOMEWHAT DIFFICULT
SUM OF ALL RESPONSES TO PHQ QUESTIONS 1-9: 6

## 2022-11-01 ASSESSMENT — PAIN SCALES - GENERAL: PAINLEVEL: MODERATE PAIN (5)

## 2022-11-01 NOTE — PROGRESS NOTES
"  Assessment & Plan     Current episode of major depressive disorder without prior episode, unspecified depression episode severity    - Miscellaneous Order for light therapy DME - ONLY FOR DME    Chronic pain disorder  Chronic, stable, continue current treatment.   - oxyCODONE-acetaminophen (PERCOCET) 5-325 MG tablet; Take 1 tablet by mouth every 6 hours as needed for severe pain Maximum 3 tablets daily as needed for severe pain.  To start 10/2/22    Chronic obstructive pulmonary disease, unspecified COPD type (H)  Chronic, stable, continue current treatment.     Tobacco use disorder  Not interested in quitting    Insomnia, unspecified type  Not controlled  - Start traZODone (DESYREL) 100 MG tablet; Take 1-2 tablets (100-200 mg) by mouth At Bedtime    Foot ulcer, left, with fat layer exposed (H)  Recommend follow up with wound clinic patient declines  - Miscellaneous Order for wound supplies DME - ONLY FOR DME        Nicotine/Tobacco Cessation:  She reports that she has been smoking cigarettes. She has a 10.75 pack-year smoking history. She has never used smokeless tobacco.  Nicotine/Tobacco Cessation Plan:         BMI:   Estimated body mass index is 26.63 kg/m  as calculated from the following:    Height as of this encounter: 1.651 m (5' 5\").    Weight as of this encounter: 72.6 kg (160 lb).       Return in about 3 months (around 2/1/2023) for Routine Visit.    Loulou Ashley NP  [facility]    Giacomo Carreno is a 77 year old, presenting for the following health issues:  Follow Up (Depression and anxiety )      History of Present Illness       Mental Health Follow-up:  Patient presents to follow-up on Depression.Patient's depression since last visit has been:  Medium  The patient is not having other symptoms associated with depression.      Any significant life events: No  Patient is not feeling anxious or having panic attacks.  Patient has no concerns about alcohol or drug " use.    She eats 2-3 servings of fruits and vegetables daily.She consumes 1 sweetened beverage(s) daily.She exercises with enough effort to increase her heart rate 9 or less minutes per day.  She exercises with enough effort to increase her heart rate 3 or less days per week.   She is taking medications regularly.    Today's PHQ-9         PHQ-9 Total Score: 6    PHQ-9 Q9 Thoughts of better off dead/self-harm past 2 weeks :   Not at all    How difficult have these problems made it for you to do your work, take care of things at home, or get along with other people: Somewhat difficult       Depression and Anxiety Follow-Up    How are you doing with your depression since your last visit? No change    How are you doing with your anxiety since your last visit?  No change    Are you having other symptoms that might be associated with depression or anxiety? No    Have you had a significant life event? No     Do you have any concerns with your use of alcohol or other drugs? No    Social History     Tobacco Use     Smoking status: Every Day     Packs/day: 0.25     Years: 43.00     Pack years: 10.75     Types: Cigarettes     Smokeless tobacco: Never     Tobacco comments:     5 ciggs per day   Vaping Use     Vaping Use: Never used   Substance Use Topics     Alcohol use: No     Alcohol/week: 0.0 standard drinks     Drug use: No     PHQ 8/25/2022 10/27/2022 11/1/2022   PHQ-9 Total Score 12 10 6   Q9: Thoughts of better off dead/self-harm past 2 weeks Not at all Not at all Not at all     LESTER-7 SCORE 8/13/2021 8/25/2022 10/27/2022   Total Score - - -   Total Score 0 8 4     Last PHQ-9 11/1/2022   1.  Little interest or pleasure in doing things 1   2.  Feeling down, depressed, or hopeless 1   3.  Trouble falling or staying asleep, or sleeping too much 1   4.  Feeling tired or having little energy 1   5.  Poor appetite or overeating 1   6.  Feeling bad about yourself 0   7.  Trouble concentrating 1   8.  Moving slowly or restless 0  "  Q9: Thoughts of better off dead/self-harm past 2 weeks 0   PHQ-9 Total Score 6   Difficulty at work, home, or with people -     LESTER-7  10/27/2022   1. Feeling nervous, anxious, or on edge 1   2. Not being able to stop or control worrying 1   3. Worrying too much about different things 1   4. Trouble relaxing 1   5. Being so restless that it is hard to sit still 0   6. Becoming easily annoyed or irritable 0   7. Feeling afraid, as if something awful might happen 0   LESTER-7 Total Score 4   If you checked any problems, how difficult have they made it for you to do your work, take care of things at home, or get along with other people? -     Gabapentin not taking- did not help, made her squirrely   Not doing Trazodone anymore    Last Oxycodone prescribed 10/3/22    Orthopedic wants her to get surgery.  But doesn't want surgery.  Sleep is an issue- hard to fall asleep.    Went to wound clinic only once, they scrapped the wound.  Wound nuse was coming once per week but patient was able to do the same cares so she stopped this.    Urinary incontiinence.  Vesicare did not help.  Permanent moctezuma for awhile but she pulled it out.  Incontinence products.    Doing therapy now.      Suicide Assessment Five-step Evaluation and Treatment (SAFE-T)      Review of Systems   Constitutional, HEENT, cardiovascular, pulmonary, gi and skin systems are negative, except as otherwise noted.      Objective    /66 (BP Location: Right arm, Patient Position: Sitting, Cuff Size: Adult Regular)   Pulse 90   Temp 98.3  F (36.8  C) (Oral)   Resp 20   Ht 1.651 m (5' 5\")   Wt 72.6 kg (160 lb)   SpO2 100%   BMI 26.63 kg/m    Body mass index is 26.63 kg/m .  Physical Exam   GENERAL: healthy, alert and no distress  RESP: lungs clear to auscultation - no rales, rhonchi or wheezes  CV: regular rates and rhythm  MS: deformities BLE  PSYCH: mentation appears normal, affect normal/bright      SKIN:  Left foot stage 3 ulcer, 3 cm length X 3 cm " width X 4 mm depth

## 2022-11-01 NOTE — PATIENT INSTRUCTIONS
Please call to schedule your lung cancer screening.  [facility] (xray, mammogram, bone density, and ultrasound) [phone]

## 2022-11-23 ENCOUNTER — TELEPHONE (OUTPATIENT)
Dept: FAMILY MEDICINE | Facility: CLINIC | Age: 77
End: 2022-11-23

## 2022-11-23 DIAGNOSIS — F32.9 CURRENT EPISODE OF MAJOR DEPRESSIVE DISORDER WITHOUT PRIOR EPISODE, UNSPECIFIED DEPRESSION EPISODE SEVERITY: ICD-10-CM

## 2022-11-23 DIAGNOSIS — L97.522 FOOT ULCER, LEFT, WITH FAT LAYER EXPOSED (H): Primary | ICD-10-CM

## 2022-11-23 NOTE — TELEPHONE ENCOUNTER
Tried calling [facility] and was on hold for about 10 minutes. Had to hang up.    GAYATRI Galvez  [facility]

## 2022-11-23 NOTE — TELEPHONE ENCOUNTER
Reason for Call:  Other     Detailed comments:  Patient is calling because [facility] says that the two DME scripts that Loulou VELA did at the last appointment with patient -- they need more information.  Patient is not sure what exactly they need but is wanting us to call them and find out.     Phone Number Patient can be reached at: Home number on file [phone] (home)    Best Time: any    Can we leave a detailed message on this number? YES    Call taken on 11/23/2022 at 12:03 PM by Ana Mullen

## 2022-11-28 DIAGNOSIS — F51.01 PRIMARY INSOMNIA: ICD-10-CM

## 2022-11-28 DIAGNOSIS — F33.0 MILD EPISODE OF RECURRENT MAJOR DEPRESSIVE DISORDER (H): ICD-10-CM

## 2022-11-28 RX ORDER — MIRTAZAPINE 15 MG/1
15 TABLET, FILM COATED ORAL AT BEDTIME
Qty: 90 TABLET | Refills: 0 | Status: SHIPPED | OUTPATIENT
Start: 2022-11-28 | End: 2023-01-01

## 2022-11-30 DIAGNOSIS — G89.4 CHRONIC PAIN DISORDER: ICD-10-CM

## 2022-11-30 RX ORDER — OXYCODONE AND ACETAMINOPHEN 5; 325 MG/1; MG/1
1 TABLET ORAL EVERY 6 HOURS PRN
Qty: 90 TABLET | Refills: 0 | Status: SHIPPED | OUTPATIENT
Start: 2022-12-01 | End: 2022-12-29

## 2022-11-30 NOTE — TELEPHONE ENCOUNTER
[facility][id number] faxed a CONTROLLED SUBSTANCE REFILL REMINDER re: oxyCODONE-acetaminophen (PERCOCET) 5-325 MG tablet    oxyCODONE-acetaminophen (PERCOCET) 5-325 MG tablet      Last Written Prescription Date:  11/1/2022  Last Fill Quantity: 90 tablet,   # refills: 0  Last Office Visit: 11/1/2022  regan Ashley NP    Future Office visit:       Routing refill request to provider for review/approval because:  Drug not on the [facility], [facility] or [facility] refill protocol or controlled substance

## 2022-12-01 ENCOUNTER — VIRTUAL VISIT (OUTPATIENT)
Dept: PSYCHOLOGY | Facility: CLINIC | Age: 77
End: 2022-12-01
Payer: COMMERCIAL

## 2022-12-01 DIAGNOSIS — F10.21 ALCOHOL DEPENDENCE IN REMISSION (H): ICD-10-CM

## 2022-12-01 DIAGNOSIS — F32.0 CURRENT MILD EPISODE OF MAJOR DEPRESSIVE DISORDER WITHOUT PRIOR EPISODE (H): Primary | ICD-10-CM

## 2022-12-01 PROCEDURE — 90837 PSYTX W PT 60 MINUTES: CPT | Mod: 95

## 2022-12-01 ASSESSMENT — PATIENT HEALTH QUESTIONNAIRE - PHQ9: SUM OF ALL RESPONSES TO PHQ QUESTIONS 1-9: 4

## 2022-12-01 NOTE — PROGRESS NOTES
"    [facility]                                     Progress Note    Patient Name: Ledy Odonnell  Date: 2022         Service Type: Individual      Session Start Time: 11:00 AM  Session End Time: 11: 54     Session Length: 54    Session #: 8    Attendees: Client attended alone    Service Modality:  Phone Visit:      Provider verified identity through the following two step process.  Patient provided:  Patient  and Patient address    The patient has been notified of the following:      \"We have found that certain health care needs can be provided without the need for a face to face visit.  This service lets us provide the care you need with a phone conversation.       I will have full access to your [facility] medical record during this entire phone call.   I will be taking notes for your medical record.      Since this is like an office visit, we will bill your insurance company for this service.       There are potential benefits and risks of telephone visits (e.g. limits to patient confidentiality) that differ from in-person visits.?Confidentiality still applies for telephone services, and nobody will record the visit.  It is important to be in a quiet, private space that is free of distractions (including cell phone or other devices) during the visit.??      If during the course of the call I believe a telephone visit is not appropriate, you will not be charged for this service\"     Consent has been obtained for this service by care team member: Yes     DATA  Extended Session (53+ minutes): PROLONGED SERVICE IN THE OUTPATIENT SETTING REQUIRING DIRECT (FACE-TO-FACE) PATIENT CONTACT BEYOND THE USUAL SERVICE:    - Patient's presenting concerns require more intensive intervention than could be completed within the usual service  Interactive Complexity: No  Crisis: No          Progress Since Last Session (Related to Symptoms / Goals / Homework):   Symptoms: Worsening due to pain in " shoulders, making it more difficult to move around    Homework: Achieved / completed to satisfaction      Episode of Care Goals: Satisfactory progress - ACTION (Actively working towards change); Intervened by reinforcing change plan / affirming steps taken     Current / Ongoing Stressors and Concerns:   Financial stressors    Limited motility   Wheelchair user    Isolation    Depression    Effective communication       Treatment Objective(s) Addressed in This Session:   Increase interest, engagement, and pleasure in doing things     Intervention:   CBT - Met with patient to review goals and interventions. Provided active listening as patient gave a brief update the week. Patient processed gratitude over the holiday for her supportive family. She reports increased pain in her shoulder. Discussed following up with PCP about increased pain and hand tremors. Patient agreed to contact PCP about concerns. Discussed holiday gift giving and financial restraints. Patient will bake and make gifts for loved ones that does not put her in a financial-bind.     Assessments completed prior to visit:  The following assessments were completed by patient for this visit:  PHQ2:   PHQ-2 ( 1999 [employer]) 11/1/2022 7/18/2022 8/13/2021 8/13/2019 9/13/2016 9/7/2016 11/11/2014   Q1: Little interest or pleasure in doing things - - 1 0 0 0 0   Q2: Feeling down, depressed or hopeless - - 0 0 1 1 1   PHQ-2 Score - - 1 0 1 1 1   PHQ-2 Total Score (12-17 Years)- Positive if 3 or more points; Administer PHQ-A if positive - - 1 0 - - -   PHQ-2 Score Incomplete Incomplete - - - - -     PHQ9:   PHQ-9 SCORE 8/13/2021 3/23/2022 7/18/2022 8/25/2022 10/27/2022 11/1/2022 12/1/2022   PHQ-9 Total Score - - - - - - -   PHQ-9 Total Score MyChart - - 5 (Mild depression) - - 6 (Mild depression) -   PHQ-9 Total Score 3 8 5 12 10 6 4     GAD2: No flowsheet data found.  GAD7:   LESTER-7 SCORE 5/28/2020 6/9/2020 2/12/2021 2/26/2021 8/13/2021 8/25/2022 10/27/2022   Total  Score - - - - - - -   Total Score 2 2 3 1 0 8 4     PROMIS 10-Global Health (only subscores and total score):   PROMIS-10 Scores Only 10/13/2022   Global Mental Health Score 11   Global Physical Health Score 13   PROMIS TOTAL - SUBSCORES 24         ASSESSMENT: Current Emotional / Mental Status (status of significant symptoms):   Risk status (Self / Other harm or suicidal ideation)   Patient denies current fears or concerns for personal safety.    Patient denies current or recent suicidal ideation or behaviors.   Patient denies current or recent homicidal ideation or behaviors.   Patient denies current or recent self injurious behavior or ideation.   Patient denies other safety concerns.   Patient reports there has been no change in risk factors since their last session.     Patient reports there has been no change in protective factors since their last session.     Recommended that patient call 911 or go to the local ED should there be a change in any of these risk factors.     Appearance:   unable to assess due to phone visit    Eye Contact:   unable to assess due to phone visit    Psychomotor Behavior: unable to assess due to phone visit    Attitude:   Cooperative  Interested Pleasant   Orientation:   All   Speech    Rate / Production: Normal/ Responsive Normal     Volume:  Normal    Mood:    Normal   Affect:    unable to assess due to phone visit    Thought Content:  Clear    Thought Form:  Logical    Insight:    Good      Medication Review:   No changes to current psychiatric medication(s)     Medication Compliance:   Yes     Changes in Health Issues:   None reported     Chemical Use Review:   Substance Use: Chemical use reviewed, no active concerns identified      Tobacco Use: No current tobacco use.      Diagnosis:  1. Current mild episode of major depressive disorder without prior episode (H)    2. Alcohol dependence in remission (H)        Collateral Reports Completed:   Authorization for Release of  Information Completed for Debbie [phone] (contracted with [employer])     PLAN: (Patient Tasks / Therapist Tasks / Other)  Update treatment plan next session. Patient will follow up with PCP about pain and tremors.         Sylvia Hernández        This note has been reviewed and I agree with the plan of care. This note is co-signed by Glory Diaz [facility] Supervisor, on: December 5, 2022                                                  ______________________________________________________________________    Individual Treatment Plan    Patient's Name: Ledy Odonnell  YOB: 1945    Date of Creation: 9/12/2022  Date Treatment Plan Last Reviewed/Revised: n/a    DSM5 Diagnoses: 296.31 (F33.0) Major Depressive Disorder, Recurrent Episode, Mild _  Psychosocial / Contextual Factors: wheel chair user   PROMIS (reviewed every 90 days): not completed     Referral / Collaboration:  The following referral(s) was/were discussed but patient declines follow up at this time. Patient declines metro mobility.   Patient will follow up with PCP about shoulder pain and hand tremors.     Anticipated number of session for this episode of care: 9-12 sessions  Anticipation frequency of session: Weekly  Anticipated Duration of each session: 38-52 minutes  Treatment plan will be reviewed in 90 days or when goals have been changed.       MeasurableTreatment Goal(s) related to diagnosis / functional impairment(s)  Goal 1: Patient will learn skills to manage depression.     I will know I've met my goal when I can manage tasks independently without asking for help.      Objective #A (Patient Action)    Patient will Increase interest, engagement, and pleasure in doing things.  Status: New - Date: 9/12/2022     Intervention(s)  Therapist will provide psychoeducation on depression and CBT. Therapist will provided safe supportive environment express concerns and needs. Therapist will provide emotional support and validation.      Objective #B  Patient will Increase interest, engagement, and pleasure in doing things.  Status: New - Date: 9/12/2022     Intervention(s)  Therapist will use cognitive behavioral therapy to teach how to identify and solve problems that arise from life stressors.    Objective #C  Patient will Identify negative self-talk and behaviors: challenge core beliefs, myths, and actions.  Status: New - Date: 9/12/2022     Intervention(s)  Therapist will teach how to identify and overcome negative thought patterns using CBT.       Patient has reviewed and agreed to the above plan.      Sylvia Hernández  September 12, 2022    This note has been reviewed and I agree with the plan of care. This note is co-signed by Glory Diaz [facility] Supervisor, on: September 19, 2022

## 2022-12-01 NOTE — TELEPHONE ENCOUNTER
Tried calling [facility] and was on hold for about 15 minutes. Had to hang up.    GAYATRI Galvez  [facility]

## 2022-12-15 NOTE — TELEPHONE ENCOUNTER
Patient is calling checking on the status of what I heard from [facility]. Let patient know that I was not able to get ahold of any one there.  Told patient I would fax a request to [facility] to get a status as to why these orders have not been done.    Faxed a request asking for status of DME orders.    GAYATRI Galvez  [facility]

## 2022-12-20 NOTE — TELEPHONE ENCOUNTER
I reordered the two DMEs rom the 11/1/22 visit.    Another option is if patient wanted to use a different supply company instead of [place].    Loulou Ashley, MARLENE, APRN, CNP

## 2022-12-20 NOTE — TELEPHONE ENCOUNTER
Routing to Loulou Jamil not sure on what to do with these DME orders.  They have been faxed to [facility] but they are not getting in tough with the patient on what needs to be done to get this equipment.  Should you maybe place the orders again??  I have tried to call [facility] but have been on hold for 30 minutes and still was not able to speak to a representative.      GAYATRI Galvez  [facility]

## 2022-12-23 NOTE — TELEPHONE ENCOUNTER
Called patient and left a voicemail message that Loulou VELA had sent over two new orders for the DME orders.    GAYATRI Galvez  [facility]

## 2022-12-29 DIAGNOSIS — G89.4 CHRONIC PAIN DISORDER: ICD-10-CM

## 2022-12-29 NOTE — TELEPHONE ENCOUNTER
Pt calling to request medication refill, pended medication below.      Bertha Camacho    [facility]

## 2022-12-30 DIAGNOSIS — M79.10 MUSCLE SORENESS: ICD-10-CM

## 2022-12-30 NOTE — TELEPHONE ENCOUNTER
Pending Prescriptions:                       Disp   Refills    tiZANidine (ZANAFLEX) 4 MG tablet         60 tab*1            Sig: Take 1 tablet (4 mg) by mouth 3 times daily as           needed for muscle spasms    Routing refill request to provider for review/approval because:  Drug not on the [facility] refill protocol     Lexis ROLLE RN, [facility] 12/30/22 4:22 PM

## 2023-01-01 ENCOUNTER — TELEPHONE (OUTPATIENT)
Dept: FAMILY MEDICINE | Facility: CLINIC | Age: 78
End: 2023-01-01
Payer: COMMERCIAL

## 2023-01-01 ENCOUNTER — TELEPHONE (OUTPATIENT)
Dept: FAMILY MEDICINE | Facility: CLINIC | Age: 78
End: 2023-01-01

## 2023-01-01 ENCOUNTER — VIRTUAL VISIT (OUTPATIENT)
Dept: FAMILY MEDICINE | Facility: CLINIC | Age: 78
End: 2023-01-01
Payer: COMMERCIAL

## 2023-01-01 ENCOUNTER — PATIENT OUTREACH (OUTPATIENT)
Dept: CARE COORDINATION | Facility: CLINIC | Age: 78
End: 2023-01-01
Payer: COMMERCIAL

## 2023-01-01 ENCOUNTER — VIRTUAL VISIT (OUTPATIENT)
Dept: PSYCHOLOGY | Facility: CLINIC | Age: 78
End: 2023-01-01
Payer: COMMERCIAL

## 2023-01-01 ENCOUNTER — NURSE TRIAGE (OUTPATIENT)
Dept: NURSING | Facility: CLINIC | Age: 78
End: 2023-01-01
Payer: COMMERCIAL

## 2023-01-01 ENCOUNTER — OFFICE VISIT (OUTPATIENT)
Dept: ORTHOPEDICS | Facility: CLINIC | Age: 78
End: 2023-01-01
Payer: COMMERCIAL

## 2023-01-01 ENCOUNTER — OFFICE VISIT (OUTPATIENT)
Dept: PODIATRY | Facility: CLINIC | Age: 78
End: 2023-01-01
Payer: COMMERCIAL

## 2023-01-01 ENCOUNTER — TELEPHONE (OUTPATIENT)
Dept: HEMATOLOGY | Facility: CLINIC | Age: 78
End: 2023-01-01
Payer: COMMERCIAL

## 2023-01-01 ENCOUNTER — TELEPHONE (OUTPATIENT)
Dept: UROLOGY | Facility: CLINIC | Age: 78
End: 2023-01-01
Payer: COMMERCIAL

## 2023-01-01 ENCOUNTER — TELEPHONE (OUTPATIENT)
Dept: ORTHOPEDICS | Facility: CLINIC | Age: 78
End: 2023-01-01

## 2023-01-01 ENCOUNTER — OFFICE VISIT (OUTPATIENT)
Dept: FAMILY MEDICINE | Facility: CLINIC | Age: 78
End: 2023-01-01
Payer: COMMERCIAL

## 2023-01-01 ENCOUNTER — TELEPHONE (OUTPATIENT)
Dept: UROLOGY | Facility: CLINIC | Age: 78
End: 2023-01-01

## 2023-01-01 ENCOUNTER — OFFICE VISIT (OUTPATIENT)
Dept: UROLOGY | Facility: CLINIC | Age: 78
End: 2023-01-01
Attending: NURSE PRACTITIONER
Payer: COMMERCIAL

## 2023-01-01 ENCOUNTER — OFFICE VISIT (OUTPATIENT)
Dept: PODIATRY | Facility: CLINIC | Age: 78
End: 2023-01-01
Attending: ORTHOPAEDIC SURGERY
Payer: COMMERCIAL

## 2023-01-01 VITALS
OXYGEN SATURATION: 95 % | HEIGHT: 65 IN | BODY MASS INDEX: 26.66 KG/M2 | DIASTOLIC BLOOD PRESSURE: 70 MMHG | TEMPERATURE: 99.1 F | WEIGHT: 160 LBS | RESPIRATION RATE: 25 BRPM | SYSTOLIC BLOOD PRESSURE: 116 MMHG | HEART RATE: 103 BPM

## 2023-01-01 VITALS
HEIGHT: 65 IN | OXYGEN SATURATION: 98 % | DIASTOLIC BLOOD PRESSURE: 69 MMHG | WEIGHT: 160 LBS | SYSTOLIC BLOOD PRESSURE: 101 MMHG | HEART RATE: 94 BPM | BODY MASS INDEX: 26.66 KG/M2

## 2023-01-01 VITALS
SYSTOLIC BLOOD PRESSURE: 114 MMHG | TEMPERATURE: 98.6 F | OXYGEN SATURATION: 100 % | RESPIRATION RATE: 16 BRPM | BODY MASS INDEX: 26.66 KG/M2 | HEART RATE: 64 BPM | WEIGHT: 160 LBS | DIASTOLIC BLOOD PRESSURE: 60 MMHG | HEIGHT: 65 IN

## 2023-01-01 VITALS
SYSTOLIC BLOOD PRESSURE: 116 MMHG | HEIGHT: 65 IN | WEIGHT: 160 LBS | DIASTOLIC BLOOD PRESSURE: 75 MMHG | BODY MASS INDEX: 26.66 KG/M2 | HEART RATE: 96 BPM | OXYGEN SATURATION: 96 %

## 2023-01-01 DIAGNOSIS — Z13.1 SCREENING FOR DIABETES MELLITUS: ICD-10-CM

## 2023-01-01 DIAGNOSIS — G89.4 CHRONIC PAIN DISORDER: ICD-10-CM

## 2023-01-01 DIAGNOSIS — Z87.898 HISTORY OF URINARY RETENTION: ICD-10-CM

## 2023-01-01 DIAGNOSIS — F32.9 MAJOR DEPRESSIVE DISORDER WITH SINGLE EPISODE, REMISSION STATUS UNSPECIFIED: ICD-10-CM

## 2023-01-01 DIAGNOSIS — Z97.8 CHRONIC INDWELLING FOLEY CATHETER: ICD-10-CM

## 2023-01-01 DIAGNOSIS — E55.9 VITAMIN D DEFICIENCY: ICD-10-CM

## 2023-01-01 DIAGNOSIS — Q66.00 CONGENITAL TALIPES EQUINOVARUS, UNSPECIFIED LATERALITY: ICD-10-CM

## 2023-01-01 DIAGNOSIS — L97.529 ULCER OF FOOT, CHRONIC, LEFT, WITH UNSPECIFIED SEVERITY (H): ICD-10-CM

## 2023-01-01 DIAGNOSIS — F51.01 PRIMARY INSOMNIA: ICD-10-CM

## 2023-01-01 DIAGNOSIS — N39.46 MIXED STRESS AND URGE URINARY INCONTINENCE: Primary | ICD-10-CM

## 2023-01-01 DIAGNOSIS — N39.0 URINARY TRACT INFECTION WITHOUT HEMATURIA, SITE UNSPECIFIED: Primary | ICD-10-CM

## 2023-01-01 DIAGNOSIS — F41.1 GENERALIZED ANXIETY DISORDER: ICD-10-CM

## 2023-01-01 DIAGNOSIS — N39.498 OTHER URINARY INCONTINENCE: ICD-10-CM

## 2023-01-01 DIAGNOSIS — N32.81 OAB (OVERACTIVE BLADDER): Primary | ICD-10-CM

## 2023-01-01 DIAGNOSIS — R79.89 ELEVATED VITAMIN B12 LEVEL: ICD-10-CM

## 2023-01-01 DIAGNOSIS — R73.01 ELEVATED FASTING GLUCOSE: ICD-10-CM

## 2023-01-01 DIAGNOSIS — Q66.02 CONGENITAL TALIPES EQUINOVARUS DEFORMITY OF LEFT FOOT: ICD-10-CM

## 2023-01-01 DIAGNOSIS — L97.522 ULCER OF LEFT FOOT WITH FAT LAYER EXPOSED (H): Primary | ICD-10-CM

## 2023-01-01 DIAGNOSIS — F33.1 MAJOR DEPRESSIVE DISORDER, RECURRENT EPISODE, MODERATE (H): ICD-10-CM

## 2023-01-01 DIAGNOSIS — F33.0 MILD EPISODE OF RECURRENT MAJOR DEPRESSIVE DISORDER (H): ICD-10-CM

## 2023-01-01 DIAGNOSIS — I26.99 PULMONARY EMBOLISM, UNSPECIFIED CHRONICITY, UNSPECIFIED PULMONARY EMBOLISM TYPE, UNSPECIFIED WHETHER ACUTE COR PULMONALE PRESENT (H): ICD-10-CM

## 2023-01-01 DIAGNOSIS — Q66.02 CONGENITAL TALIPES EQUINOVARUS DEFORMITY OF LEFT FOOT: Primary | ICD-10-CM

## 2023-01-01 DIAGNOSIS — B37.2 CANDIDAL INTERTRIGO: ICD-10-CM

## 2023-01-01 DIAGNOSIS — M12.811 ROTATOR CUFF ARTHROPATHY OF BOTH SHOULDERS: Primary | ICD-10-CM

## 2023-01-01 DIAGNOSIS — F17.200 TOBACCO USE DISORDER: ICD-10-CM

## 2023-01-01 DIAGNOSIS — F33.0 MILD EPISODE OF RECURRENT MAJOR DEPRESSIVE DISORDER (H): Primary | ICD-10-CM

## 2023-01-01 DIAGNOSIS — E78.5 HYPERLIPIDEMIA LDL GOAL <130: ICD-10-CM

## 2023-01-01 DIAGNOSIS — M12.812 ROTATOR CUFF ARTHROPATHY OF BOTH SHOULDERS: Primary | ICD-10-CM

## 2023-01-01 DIAGNOSIS — G47.00 INSOMNIA, UNSPECIFIED TYPE: ICD-10-CM

## 2023-01-01 DIAGNOSIS — F10.21 ALCOHOL DEPENDENCE IN REMISSION (H): ICD-10-CM

## 2023-01-01 DIAGNOSIS — N39.46 URINARY INCONTINENCE, MIXED: ICD-10-CM

## 2023-01-01 DIAGNOSIS — G89.4 CHRONIC PAIN SYNDROME: ICD-10-CM

## 2023-01-01 DIAGNOSIS — M51.369 DDD (DEGENERATIVE DISC DISEASE), LUMBAR: ICD-10-CM

## 2023-01-01 DIAGNOSIS — E07.9 THYROID MASS: ICD-10-CM

## 2023-01-01 DIAGNOSIS — G60.9 IDIOPATHIC PERIPHERAL NEUROPATHY: ICD-10-CM

## 2023-01-01 DIAGNOSIS — Z23 NEED FOR INFLUENZA VACCINATION: ICD-10-CM

## 2023-01-01 DIAGNOSIS — M17.11 PRIMARY OSTEOARTHRITIS OF RIGHT KNEE: ICD-10-CM

## 2023-01-01 DIAGNOSIS — I73.9 PERIPHERAL VASCULAR DISEASE (H): ICD-10-CM

## 2023-01-01 DIAGNOSIS — F32.0 CURRENT MILD EPISODE OF MAJOR DEPRESSIVE DISORDER WITHOUT PRIOR EPISODE (H): Primary | ICD-10-CM

## 2023-01-01 DIAGNOSIS — M25.552 CHRONIC LEFT HIP PAIN: ICD-10-CM

## 2023-01-01 DIAGNOSIS — J44.9 CHRONIC OBSTRUCTIVE PULMONARY DISEASE, UNSPECIFIED COPD TYPE (H): ICD-10-CM

## 2023-01-01 DIAGNOSIS — G62.9 PERIPHERAL POLYNEUROPATHY: ICD-10-CM

## 2023-01-01 DIAGNOSIS — N95.2 ATROPHIC VAGINITIS: ICD-10-CM

## 2023-01-01 DIAGNOSIS — L97.522 ULCER OF LEFT FOOT WITH FAT LAYER EXPOSED (H): ICD-10-CM

## 2023-01-01 DIAGNOSIS — N32.81 OAB (OVERACTIVE BLADDER): ICD-10-CM

## 2023-01-01 DIAGNOSIS — F33.1 MODERATE EPISODE OF RECURRENT MAJOR DEPRESSIVE DISORDER (H): Primary | ICD-10-CM

## 2023-01-01 DIAGNOSIS — U07.1 INFECTION DUE TO 2019 NOVEL CORONAVIRUS: Primary | ICD-10-CM

## 2023-01-01 DIAGNOSIS — G89.29 CHRONIC LEFT HIP PAIN: ICD-10-CM

## 2023-01-01 DIAGNOSIS — R30.0 BURNING WITH URINATION: ICD-10-CM

## 2023-01-01 DIAGNOSIS — L89.893 PRESSURE INJURY OF LEFT FOOT, STAGE 3 (H): ICD-10-CM

## 2023-01-01 LAB
ALBUMIN SERPL BCG-MCNC: 3.8 G/DL (ref 3.5–5.2)
ALBUMIN SERPL BCG-MCNC: 3.9 G/DL (ref 3.5–5.2)
ALBUMIN UR-MCNC: ABNORMAL MG/DL
ALP SERPL-CCNC: 81 U/L (ref 35–104)
ALP SERPL-CCNC: 96 U/L (ref 35–104)
ALT SERPL W P-5'-P-CCNC: 11 U/L (ref 10–35)
ALT SERPL W P-5'-P-CCNC: 13 U/L (ref 0–50)
ANION GAP SERPL CALCULATED.3IONS-SCNC: 12 MMOL/L (ref 7–15)
ANION GAP SERPL CALCULATED.3IONS-SCNC: 12 MMOL/L (ref 7–15)
APPEARANCE UR: CLEAR
AST SERPL W P-5'-P-CCNC: 29 U/L (ref 10–35)
AST SERPL W P-5'-P-CCNC: 32 U/L (ref 0–45)
BACTERIA UR CULT: ABNORMAL
BILIRUB SERPL-MCNC: 0.4 MG/DL
BILIRUB SERPL-MCNC: 0.5 MG/DL
BILIRUB UR QL STRIP: NEGATIVE
BUN SERPL-MCNC: 10 MG/DL (ref 8–23)
BUN SERPL-MCNC: 10 MG/DL (ref 8–23)
CALCIUM SERPL-MCNC: 9.3 MG/DL (ref 8.8–10.2)
CALCIUM SERPL-MCNC: 9.8 MG/DL (ref 8.8–10.2)
CHLORIDE SERPL-SCNC: 100 MMOL/L (ref 98–107)
CHLORIDE SERPL-SCNC: 96 MMOL/L (ref 98–107)
CHOLEST SERPL-MCNC: 165 MG/DL
COLOR UR AUTO: YELLOW
CREAT SERPL-MCNC: 0.76 MG/DL (ref 0.51–0.95)
CREAT SERPL-MCNC: 1.03 MG/DL (ref 0.51–0.95)
DEPRECATED CALCIDIOL+CALCIFEROL SERPL-MC: 41 UG/L (ref 20–75)
DEPRECATED HCO3 PLAS-SCNC: 26 MMOL/L (ref 22–29)
DEPRECATED HCO3 PLAS-SCNC: 27 MMOL/L (ref 22–29)
EGFRCR SERPLBLD CKD-EPI 2021: 80 ML/MIN/1.73M2
ERYTHROCYTE [DISTWIDTH] IN BLOOD BY AUTOMATED COUNT: 13.2 % (ref 10–15)
GFR SERPL CREATININE-BSD FRML MDRD: 56 ML/MIN/1.73M2
GLUCOSE SERPL-MCNC: 72 MG/DL (ref 70–99)
GLUCOSE SERPL-MCNC: 92 MG/DL (ref 70–99)
GLUCOSE UR STRIP-MCNC: NEGATIVE MG/DL
HBA1C MFR BLD: 5.6 % (ref 0–5.6)
HCT VFR BLD AUTO: 44.9 % (ref 35–47)
HDLC SERPL-MCNC: 42 MG/DL
HGB BLD-MCNC: 14.7 G/DL (ref 11.7–15.7)
HGB UR QL STRIP: ABNORMAL
KETONES UR STRIP-MCNC: NEGATIVE MG/DL
LDLC SERPL CALC-MCNC: 97 MG/DL
LEUKOCYTE ESTERASE UR QL STRIP: ABNORMAL
MCH RBC QN AUTO: 29.6 PG (ref 26.5–33)
MCHC RBC AUTO-ENTMCNC: 32.7 G/DL (ref 31.5–36.5)
MCV RBC AUTO: 91 FL (ref 78–100)
NITRATE UR QL: POSITIVE
NONHDLC SERPL-MCNC: 123 MG/DL
PH UR STRIP: 6.5 [PH] (ref 5–7)
PLATELET # BLD AUTO: 267 10E3/UL (ref 150–450)
POTASSIUM SERPL-SCNC: 4.5 MMOL/L (ref 3.4–5.3)
POTASSIUM SERPL-SCNC: 5.3 MMOL/L (ref 3.4–5.3)
PROT SERPL-MCNC: 6.8 G/DL (ref 6.4–8.3)
PROT SERPL-MCNC: 7 G/DL (ref 6.4–8.3)
RBC # BLD AUTO: 4.96 10E6/UL (ref 3.8–5.2)
SODIUM SERPL-SCNC: 134 MMOL/L (ref 135–145)
SODIUM SERPL-SCNC: 139 MMOL/L (ref 136–145)
SP GR UR STRIP: 1.01 (ref 1–1.03)
TRIGL SERPL-MCNC: 128 MG/DL
UROBILINOGEN UR STRIP-ACNC: 0.2 E.U./DL
VIT B12 SERPL-MCNC: 1104 PG/ML (ref 232–1245)
VIT B12 SERPL-MCNC: 1644 PG/ML (ref 232–1245)
WBC # BLD AUTO: 8.4 10E3/UL (ref 4–11)

## 2023-01-01 PROCEDURE — 87088 URINE BACTERIA CULTURE: CPT | Performed by: PHYSICIAN ASSISTANT

## 2023-01-01 PROCEDURE — 87186 SC STD MICRODIL/AGAR DIL: CPT | Performed by: PHYSICIAN ASSISTANT

## 2023-01-01 PROCEDURE — 99204 OFFICE O/P NEW MOD 45 MIN: CPT | Mod: 25 | Performed by: PHYSICIAN ASSISTANT

## 2023-01-01 PROCEDURE — 80061 LIPID PANEL: CPT | Performed by: NURSE PRACTITIONER

## 2023-01-01 PROCEDURE — 81003 URINALYSIS AUTO W/O SCOPE: CPT | Performed by: PHYSICIAN ASSISTANT

## 2023-01-01 PROCEDURE — 20610 DRAIN/INJ JOINT/BURSA W/O US: CPT | Mod: 50 | Performed by: ORTHOPAEDIC SURGERY

## 2023-01-01 PROCEDURE — 80053 COMPREHEN METABOLIC PANEL: CPT | Performed by: NURSE PRACTITIONER

## 2023-01-01 PROCEDURE — 99443 PR PHYSICIAN TELEPHONE EVALUATION 21-30 MIN: CPT | Mod: CS

## 2023-01-01 PROCEDURE — 82306 VITAMIN D 25 HYDROXY: CPT | Performed by: NURSE PRACTITIONER

## 2023-01-01 PROCEDURE — 82607 VITAMIN B-12: CPT | Performed by: NURSE PRACTITIONER

## 2023-01-01 PROCEDURE — 96127 BRIEF EMOTIONAL/BEHAV ASSMT: CPT | Performed by: NURSE PRACTITIONER

## 2023-01-01 PROCEDURE — 85027 COMPLETE CBC AUTOMATED: CPT | Performed by: NURSE PRACTITIONER

## 2023-01-01 PROCEDURE — 36415 COLL VENOUS BLD VENIPUNCTURE: CPT | Performed by: NURSE PRACTITIONER

## 2023-01-01 PROCEDURE — G0008 ADMIN INFLUENZA VIRUS VAC: HCPCS | Performed by: NURSE PRACTITIONER

## 2023-01-01 PROCEDURE — 99214 OFFICE O/P EST MOD 30 MIN: CPT | Performed by: PODIATRIST

## 2023-01-01 PROCEDURE — 90834 PSYTX W PT 45 MINUTES: CPT | Mod: 95

## 2023-01-01 PROCEDURE — 90832 PSYTX W PT 30 MINUTES: CPT | Mod: 95

## 2023-01-01 PROCEDURE — 99204 OFFICE O/P NEW MOD 45 MIN: CPT | Performed by: PODIATRIST

## 2023-01-01 PROCEDURE — 99214 OFFICE O/P EST MOD 30 MIN: CPT | Performed by: NURSE PRACTITIONER

## 2023-01-01 PROCEDURE — 99214 OFFICE O/P EST MOD 30 MIN: CPT | Mod: 25 | Performed by: NURSE PRACTITIONER

## 2023-01-01 PROCEDURE — 99213 OFFICE O/P EST LOW 20 MIN: CPT | Mod: 25 | Performed by: ORTHOPAEDIC SURGERY

## 2023-01-01 PROCEDURE — 90662 IIV NO PRSV INCREASED AG IM: CPT | Performed by: NURSE PRACTITIONER

## 2023-01-01 PROCEDURE — 83036 HEMOGLOBIN GLYCOSYLATED A1C: CPT | Performed by: NURSE PRACTITIONER

## 2023-01-01 PROCEDURE — 51798 US URINE CAPACITY MEASURE: CPT | Performed by: PHYSICIAN ASSISTANT

## 2023-01-01 PROCEDURE — 87086 URINE CULTURE/COLONY COUNT: CPT | Performed by: PHYSICIAN ASSISTANT

## 2023-01-01 RX ORDER — LIDOCAINE HYDROCHLORIDE 10 MG/ML
4 INJECTION, SOLUTION INFILTRATION; PERINEURAL
Status: DISCONTINUED | OUTPATIENT
Start: 2023-01-01 | End: 2023-01-01

## 2023-01-01 RX ORDER — METHYLPREDNISOLONE ACETATE 80 MG/ML
80 INJECTION, SUSPENSION INTRA-ARTICULAR; INTRALESIONAL; INTRAMUSCULAR; SOFT TISSUE
Status: SHIPPED | OUTPATIENT
Start: 2023-01-01

## 2023-01-01 RX ORDER — MIRTAZAPINE 15 MG/1
15 TABLET, FILM COATED ORAL AT BEDTIME
Qty: 90 TABLET | Refills: 3 | Status: SHIPPED | OUTPATIENT
Start: 2023-01-01

## 2023-01-01 RX ORDER — OXYCODONE AND ACETAMINOPHEN 5; 325 MG/1; MG/1
1 TABLET ORAL EVERY 6 HOURS PRN
Qty: 90 TABLET | Refills: 0 | Status: SHIPPED | OUTPATIENT
Start: 2023-01-01

## 2023-01-01 RX ORDER — TRAZODONE HYDROCHLORIDE 100 MG/1
100-200 TABLET ORAL AT BEDTIME
Qty: 180 TABLET | Refills: 0 | Status: SHIPPED | OUTPATIENT
Start: 2023-01-01 | End: 2023-01-01

## 2023-01-01 RX ORDER — OXYCODONE AND ACETAMINOPHEN 5; 325 MG/1; MG/1
1 TABLET ORAL EVERY 6 HOURS PRN
Qty: 90 TABLET | Refills: 0 | Status: SHIPPED | OUTPATIENT
Start: 2023-01-01 | End: 2023-01-01

## 2023-01-01 RX ORDER — MIRTAZAPINE 15 MG/1
15 TABLET, FILM COATED ORAL AT BEDTIME
Qty: 90 TABLET | Refills: 0 | Status: SHIPPED | OUTPATIENT
Start: 2023-01-01 | End: 2023-01-01

## 2023-01-01 RX ORDER — DULOXETIN HYDROCHLORIDE 60 MG/1
60 CAPSULE, DELAYED RELEASE ORAL DAILY
Qty: 90 CAPSULE | Refills: 0 | Status: SHIPPED | OUTPATIENT
Start: 2023-01-01 | End: 2023-01-01

## 2023-01-01 RX ORDER — LIDOCAINE HYDROCHLORIDE 10 MG/ML
4 INJECTION, SOLUTION EPIDURAL; INFILTRATION; INTRACAUDAL; PERINEURAL
Status: DISCONTINUED | OUTPATIENT
Start: 2023-01-01 | End: 2023-01-01

## 2023-01-01 RX ORDER — TROSPIUM CHLORIDE 20 MG/1
20 TABLET, FILM COATED ORAL 2 TIMES DAILY
Qty: 180 TABLET | Refills: 1 | Status: SHIPPED | OUTPATIENT
Start: 2023-01-01 | End: 2023-01-01

## 2023-01-01 RX ORDER — GUAIFENESIN 1200 MG/1
1200 TABLET, EXTENDED RELEASE ORAL 2 TIMES DAILY
Qty: 60 TABLET | Refills: 0 | Status: SHIPPED | OUTPATIENT
Start: 2023-01-01 | End: 2023-01-01

## 2023-01-01 RX ORDER — MIRTAZAPINE 15 MG/1
15 TABLET, FILM COATED ORAL
Qty: 90 TABLET | Refills: 0 | OUTPATIENT
Start: 2023-01-01

## 2023-01-01 RX ORDER — NYSTATIN 100000 U/G
CREAM TOPICAL
Status: CANCELLED | OUTPATIENT
Start: 2023-01-01

## 2023-01-01 RX ORDER — LIDOCAINE HYDROCHLORIDE 10 MG/ML
4 INJECTION, SOLUTION EPIDURAL; INFILTRATION; INTRACAUDAL; PERINEURAL
Status: SHIPPED | OUTPATIENT
Start: 2023-01-01

## 2023-01-01 RX ORDER — RESPIRATORY SYNCYTIAL VIRUS VACCINE 120MCG/0.5
0.5 KIT INTRAMUSCULAR ONCE
Qty: 1 EACH | Refills: 0 | Status: CANCELLED | OUTPATIENT
Start: 2023-01-01 | End: 2023-01-01

## 2023-01-01 RX ORDER — RIVAROXABAN 15 MG-20MG
KIT ORAL
Refills: 0 | OUTPATIENT
Start: 2023-01-01

## 2023-01-01 RX ORDER — METHYLPREDNISOLONE ACETATE 80 MG/ML
80 INJECTION, SUSPENSION INTRA-ARTICULAR; INTRALESIONAL; INTRAMUSCULAR; SOFT TISSUE
Status: DISCONTINUED | OUTPATIENT
Start: 2023-01-01 | End: 2023-01-01

## 2023-01-01 RX ORDER — OXYCODONE AND ACETAMINOPHEN 5; 325 MG/1; MG/1
1 TABLET ORAL EVERY 6 HOURS PRN
Qty: 90 TABLET | Refills: 0 | OUTPATIENT
Start: 2023-01-01

## 2023-01-01 RX ORDER — TROSPIUM CHLORIDE ER 60 MG/1
60 CAPSULE ORAL EVERY MORNING
Qty: 30 CAPSULE | Refills: 3 | Status: SHIPPED | OUTPATIENT
Start: 2023-01-01 | End: 2023-01-01

## 2023-01-01 RX ORDER — DULOXETIN HYDROCHLORIDE 20 MG/1
20 CAPSULE, DELAYED RELEASE ORAL DAILY
Qty: 90 CAPSULE | Refills: 1 | Status: SHIPPED | OUTPATIENT
Start: 2023-01-01

## 2023-01-01 RX ORDER — MIRTAZAPINE 15 MG/1
15 TABLET, FILM COATED ORAL
Qty: 90 TABLET | Refills: 0 | Status: SHIPPED | OUTPATIENT
Start: 2023-01-01 | End: 2023-01-01

## 2023-01-01 RX ORDER — DULOXETIN HYDROCHLORIDE 60 MG/1
60 CAPSULE, DELAYED RELEASE ORAL DAILY
Qty: 90 CAPSULE | Refills: 1 | Status: SHIPPED | OUTPATIENT
Start: 2023-01-01

## 2023-01-01 RX ORDER — BUPROPION HYDROCHLORIDE 150 MG/1
150 TABLET ORAL DAILY
Qty: 90 TABLET | Refills: 0 | Status: SHIPPED | OUTPATIENT
Start: 2023-01-01 | End: 2024-01-01

## 2023-01-01 RX ORDER — NYSTATIN 100000 [USP'U]/G
1 POWDER TOPICAL 2 TIMES DAILY PRN
Qty: 60 G | Refills: 1 | Status: SHIPPED | OUTPATIENT
Start: 2023-01-01

## 2023-01-01 RX ORDER — CEPHALEXIN 500 MG/1
500 CAPSULE ORAL 2 TIMES DAILY
Qty: 28 CAPSULE | Refills: 0 | Status: SHIPPED | OUTPATIENT
Start: 2023-01-01 | End: 2023-01-01

## 2023-01-01 RX ORDER — NYSTATIN 100000 [USP'U]/G
1 POWDER TOPICAL 2 TIMES DAILY PRN
Qty: 60 G | Refills: 1 | Status: SHIPPED | OUTPATIENT
Start: 2023-01-01 | End: 2023-01-01

## 2023-01-01 RX ORDER — CEPHALEXIN 500 MG/1
500 CAPSULE ORAL 2 TIMES DAILY
Qty: 14 CAPSULE | Refills: 0 | Status: SHIPPED | OUTPATIENT
Start: 2023-01-01 | End: 2023-01-01

## 2023-01-01 RX ORDER — NYSTATIN 100000 U/G
CREAM TOPICAL 2 TIMES DAILY PRN
Qty: 30 G | Refills: 3 | Status: SHIPPED | OUTPATIENT
Start: 2023-01-01

## 2023-01-01 RX ORDER — TROSPIUM CHLORIDE ER 60 MG/1
60 CAPSULE ORAL EVERY MORNING
Qty: 30 CAPSULE | Refills: 3 | Status: SHIPPED | OUTPATIENT
Start: 2023-01-01

## 2023-01-01 RX ORDER — DEXTROMETHORPHAN POLISTIREX 30 MG/5ML
10 SUSPENSION ORAL 2 TIMES DAILY PRN
Qty: 89 ML | Refills: 0 | Status: SHIPPED | OUTPATIENT
Start: 2023-01-01

## 2023-01-01 RX ORDER — TRAZODONE HYDROCHLORIDE 100 MG/1
100-200 TABLET ORAL AT BEDTIME
Qty: 180 TABLET | Refills: 0 | Status: SHIPPED | OUTPATIENT
Start: 2023-01-01 | End: 2024-01-01

## 2023-01-01 RX ADMIN — LIDOCAINE HYDROCHLORIDE 4 ML: 10 INJECTION, SOLUTION INFILTRATION; PERINEURAL at 13:24

## 2023-01-01 RX ADMIN — METHYLPREDNISOLONE ACETATE 80 MG: 80 INJECTION, SUSPENSION INTRA-ARTICULAR; INTRALESIONAL; INTRAMUSCULAR; SOFT TISSUE at 13:47

## 2023-01-01 RX ADMIN — METHYLPREDNISOLONE ACETATE 80 MG: 80 INJECTION, SUSPENSION INTRA-ARTICULAR; INTRALESIONAL; INTRAMUSCULAR; SOFT TISSUE at 13:24

## 2023-01-01 RX ADMIN — LIDOCAINE HYDROCHLORIDE 4 ML: 10 INJECTION, SOLUTION EPIDURAL; INFILTRATION; INTRACAUDAL; PERINEURAL at 13:47

## 2023-01-01 RX ADMIN — LIDOCAINE HYDROCHLORIDE 4 ML: 10 INJECTION, SOLUTION EPIDURAL; INFILTRATION; INTRACAUDAL; PERINEURAL at 13:24

## 2023-01-01 ASSESSMENT — PAIN SCALES - GENERAL
PAINLEVEL: SEVERE PAIN (7)
PAINLEVEL: SEVERE PAIN (6)
PAINLEVEL: SEVERE PAIN (7)

## 2023-01-01 ASSESSMENT — PATIENT HEALTH QUESTIONNAIRE - PHQ9
5. POOR APPETITE OR OVEREATING: NOT AT ALL
SUM OF ALL RESPONSES TO PHQ QUESTIONS 1-9: 8
SUM OF ALL RESPONSES TO PHQ QUESTIONS 1-9: 2

## 2023-01-01 ASSESSMENT — ANXIETY QUESTIONNAIRES
GAD7 TOTAL SCORE: 5
1. FEELING NERVOUS, ANXIOUS, OR ON EDGE: MORE THAN HALF THE DAYS
GAD7 TOTAL SCORE: 5
5. BEING SO RESTLESS THAT IT IS HARD TO SIT STILL: NOT AT ALL
2. NOT BEING ABLE TO STOP OR CONTROL WORRYING: SEVERAL DAYS
6. BECOMING EASILY ANNOYED OR IRRITABLE: SEVERAL DAYS
3. WORRYING TOO MUCH ABOUT DIFFERENT THINGS: SEVERAL DAYS
7. FEELING AFRAID AS IF SOMETHING AWFUL MIGHT HAPPEN: NOT AT ALL
IF YOU CHECKED OFF ANY PROBLEMS ON THIS QUESTIONNAIRE, HOW DIFFICULT HAVE THESE PROBLEMS MADE IT FOR YOU TO DO YOUR WORK, TAKE CARE OF THINGS AT HOME, OR GET ALONG WITH OTHER PEOPLE: SOMEWHAT DIFFICULT

## 2023-01-02 NOTE — TELEPHONE ENCOUNTER
Routing back to PCP     RN called [facility] and clarified what order are needed.     1. Further documentation is needed. Currently no notes in epic regarding wound. Looks like pt has been referred to wound care several times- pt is refusing to go at this time.     If pt wants to continue with [facility] she will need to see wound care to bill insurance correctly.     11/01/22: Foot ulcer, left, with fat layer exposed (H)  Recommend follow up with wound clinic patient declines  - Miscellaneous Order for wound supplies DME - ONLY FOR DME    2. For light therapy diagnosis needs to be seasonal affective disorder.     RN called pt to relay message- no answer. VM left.    [facility] sis relay that pt has been contacted many times regarding everything above.

## 2023-01-02 NOTE — TELEPHONE ENCOUNTER
The 2 prescriptions are for:    1) wound dressing  2) light therapy for depression    I recommend our nursing team to work on getting these orders sent I to a medical supply for patient.    Both of these orders are to be sent to the medical supply company of patient's choice.  If patient does not know what supply company- some options she could try are [facility], [facility], [facility].    Loulou Ashley, MARLENE, APRN, CNP

## 2023-01-06 ENCOUNTER — VIRTUAL VISIT (OUTPATIENT)
Dept: PSYCHOLOGY | Facility: CLINIC | Age: 78
End: 2023-01-06
Payer: COMMERCIAL

## 2023-01-06 DIAGNOSIS — F10.21 ALCOHOL DEPENDENCE IN REMISSION (H): ICD-10-CM

## 2023-01-06 DIAGNOSIS — F33.0 MILD EPISODE OF RECURRENT MAJOR DEPRESSIVE DISORDER (H): ICD-10-CM

## 2023-01-06 DIAGNOSIS — F32.0 CURRENT MILD EPISODE OF MAJOR DEPRESSIVE DISORDER WITHOUT PRIOR EPISODE (H): Primary | ICD-10-CM

## 2023-01-06 PROCEDURE — 90837 PSYTX W PT 60 MINUTES: CPT | Mod: 95

## 2023-01-06 NOTE — PROGRESS NOTES
"    [facility]                                     Progress Note    Patient Name: Ledy Odonnell  Date: 2023         Service Type: Individual      Session Start Time: 11:02 AM`   Session End Time: 11: 58     Session Length: 56    Session #: 9    Attendees: Client attended alone    Service Modality:  Phone Visit:      Provider verified identity through the following two step process.  Patient provided:  Patient  and Patient address    The patient has been notified of the following:      \"We have found that certain health care needs can be provided without the need for a face to face visit.  This service lets us provide the care you need with a phone conversation.       I will have full access to your [facility] medical record during this entire phone call.   I will be taking notes for your medical record.      Since this is like an office visit, we will bill your insurance company for this service.       There are potential benefits and risks of telephone visits (e.g. limits to patient confidentiality) that differ from in-person visits.?Confidentiality still applies for telephone services, and nobody will record the visit.  It is important to be in a quiet, private space that is free of distractions (including cell phone or other devices) during the visit.??      If during the course of the call I believe a telephone visit is not appropriate, you will not be charged for this service\"     Consent has been obtained for this service by care team member: Yes     DATA  Extended Session (53+ minutes): PROLONGED SERVICE IN THE OUTPATIENT SETTING REQUIRING DIRECT (FACE-TO-FACE) PATIENT CONTACT BEYOND THE USUAL SERVICE:    - Patient's presenting concerns require more intensive intervention than could be completed within the usual service  Interactive Complexity: No  Crisis: No        Progress Since Last Session (Related to Symptoms / Goals / Homework):   Symptoms: Improving per patient " report. Increased socialization     Homework: Achieved / completed to satisfaction      Episode of Care Goals: Satisfactory progress - ACTION (Actively working towards change); Intervened by reinforcing change plan / affirming steps taken     Current / Ongoing Stressors and Concerns:   Financial stressors    Limited motility   Wheelchair user    Isolation    Depression    Effective communication       Treatment Objective(s) Addressed in This Session:   Increase interest, engagement, and pleasure in doing things      Intervention:   CBT - Met with patient to review goals and interventions. Provided active listening as patient gave a brief update the week. Updated treatment plan. Pt reports being grateful for her neighbors and [employer] for the extra support and help with task she is unable to do as a wheelchair user. Discussed giving back to the community and mental health benefits.        Assessments completed prior to visit:  The following assessments were completed by patient for this visit:  PHQ2:   PHQ-2 ( 1999 [employer]) 11/1/2022 7/18/2022 8/13/2021 8/13/2019 9/13/2016 9/7/2016 11/11/2014   Q1: Little interest or pleasure in doing things - - 1 0 0 0 0   Q2: Feeling down, depressed or hopeless - - 0 0 1 1 1   PHQ-2 Score - - 1 0 1 1 1   PHQ-2 Total Score (12-17 Years)- Positive if 3 or more points; Administer PHQ-A if positive - - 1 0 - - -   PHQ-2 Score Incomplete Incomplete - - - - -     PHQ9:   PHQ-9 SCORE 8/13/2021 3/23/2022 7/18/2022 8/25/2022 10/27/2022 11/1/2022 12/1/2022   PHQ-9 Total Score - - - - - - -   PHQ-9 Total Score MyChart - - 5 (Mild depression) - - 6 (Mild depression) -   PHQ-9 Total Score 3 8 5 12 10 6 4     GAD2: No flowsheet data found.  GAD7:   LESTER-7 SCORE 5/28/2020 6/9/2020 2/12/2021 2/26/2021 8/13/2021 8/25/2022 10/27/2022   Total Score - - - - - - -   Total Score 2 2 3 1 0 8 4     PROMIS 10-Global Health (only subscores and total score):   PROMIS-10 Scores Only 10/13/2022   Global  Mental Health Score 11   Global Physical Health Score 13   PROMIS TOTAL - SUBSCORES 24         ASSESSMENT: Current Emotional / Mental Status (status of significant symptoms):   Risk status (Self / Other harm or suicidal ideation)   Patient denies current fears or concerns for personal safety.    Patient denies current or recent suicidal ideation or behaviors.   Patient denies current or recent homicidal ideation or behaviors.   Patient denies current or recent self injurious behavior or ideation.   Patient denies other safety concerns.   Patient reports there has been no change in risk factors since their last session.     Patient reports there has been no change in protective factors since their last session.     Recommended that patient call 911 or go to the local ED should there be a change in any of these risk factors.     Appearance:   unable to assess due to phone visit    Eye Contact:   unable to assess due to phone visit    Psychomotor Behavior: unable to assess due to phone visit    Attitude:   Cooperative  Pleasant   Orientation:   All   Speech    Rate / Production: Normal/ Responsive Normal     Volume:  Normal    Mood:    Normal   Affect:    unable to assess due to phone visit    Thought Content:  Clear    Thought Form:  Logical    Insight:    Good      Medication Review:   No changes to current psychiatric medication(s)     Medication Compliance:   Yes     Changes in Health Issues:   None reported     Chemical Use Review:   Substance Use: Chemical use reviewed, no active concerns identified      Tobacco Use: No current tobacco use.      Diagnosis:  1. Current mild episode of major depressive disorder without prior episode (H)    2. Alcohol dependence in remission (H)    3. Mild episode of recurrent major depressive disorder (H)        Collateral Reports Completed:   Authorization for Release of Information Completed for Debbie [phone] (contracted with [employer])     PLAN: (Patient Tasks / Therapist  Tasks / Other)  Patient will identify volunteer opportunity of interest.          Sylvia Hernández [facility]                                            This note has been reviewed and I agree with the plan of care. This note is co-signed by Glory Diaz [facility] Supervisor, on: January 9, 2023     ______________________________________________________________________    Individual Treatment Plan    Patient's Name: Ledy Odonnell  YOB: 1945    Date of Creation: 9/12/2022  Date Treatment Plan Last Reviewed/Revised: 1/06/2023    DSM5 Diagnoses: 296.31 (F33.0) Major Depressive Disorder, Recurrent Episode, Mild _  Psychosocial / Contextual Factors: wheel chair user   PROMIS (reviewed every 90 days): not completed     Referral / Collaboration:  The following referral(s) was/were discussed but patient declines follow up at this time. Patient declines metro mobility.   Patient will follow up with PCP about shoulder pain and hand tremors.     Anticipated number of session for this episode of care: 9-12 sessions  Anticipation frequency of session: Weekly  Anticipated Duration of each session: 38-52 minutes  Treatment plan will be reviewed in 90 days or when goals have been changed.       MeasurableTreatment Goal(s) related to diagnosis / functional impairment(s)  Goal 1: Patient will learn skills to manage depression.     I will know I've met my goal when I start a volunteer service project.      Objective #A (Patient Action)    Patient will Increase interest, engagement, and pleasure in doing things.  Status: Completed - Date: 1/6/2023     Intervention(s)  Therapist will provide psychoeducation on depression and CBT. Therapist will provided safe supportive environment express concerns and needs. Therapist will provide emotional support and validation.      Objective #B  Patient will Increase interest, engagement, and pleasure in doing things.  Status: Continued - Date(s): 1/06/2023      Intervention(s)  Therapist will use cognitive behavioral therapy to teach how to identify and solve problems that arise from life stressors.    Objective #C  Patient will Identify negative self-talk and behaviors: challenge core beliefs, myths, and actions.  Status: Continued - Date(s): 1/06/2023     Intervention(s)  Therapist will teach how to identify and overcome negative thought patterns using CBT.       Patient has reviewed and agreed to the above plan.      Sylvia Hernández [facility]  January 6, 2023    This note has been reviewed and I agree with the plan of care. This note is co-signed by Glory Diaz [facility] Supervisor, on: January 9, 2023

## 2023-01-23 DIAGNOSIS — F32.9 MAJOR DEPRESSIVE DISORDER WITH SINGLE EPISODE, REMISSION STATUS UNSPECIFIED: ICD-10-CM

## 2023-01-24 RX ORDER — BUPROPION HYDROCHLORIDE 150 MG/1
150 TABLET ORAL DAILY
Qty: 90 TABLET | Refills: 0 | Status: SHIPPED | OUTPATIENT
Start: 2023-01-24 | End: 2023-01-01

## 2023-01-25 ENCOUNTER — TELEPHONE (OUTPATIENT)
Dept: FAMILY MEDICINE | Facility: CLINIC | Age: 78
End: 2023-01-25
Payer: COMMERCIAL

## 2023-01-25 NOTE — TELEPHONE ENCOUNTER
[id number] faxed a Refill Request     atorvastatin (LIPITOR) 20 MG tablet (Discontinued)      Last Written Prescription Date:  11/2/21  Last Fill Quantity: 90 tablet,   # refills: 6  Last Office Visit: 11/1/2022 hayley/ ESTEFANÍA Ashley    Future Office visit:       Routing refill request to provider for review/approval because:  Drug not active on patient's medication list

## 2023-01-25 NOTE — TELEPHONE ENCOUNTER
RN called pharmacy and told them that the medication was discontinued by PCP on 7/18/2022 because patient stated that she was not taking it.    RN called patient and verified that she was still not taking that medication. Patient said that it was an automatic request and she did not request it.     Pharmacy also said that it was an automatic refill request that was sent.    Pharmacy discontinued that refill request.    Denise Finney RN

## 2023-02-03 NOTE — TELEPHONE ENCOUNTER
Pt called requesting refill     Pended medication     Please call if any question       Bertha Camacho    [facility]

## 2023-02-06 NOTE — PROGRESS NOTES
"    [facility]                                     Progress Note    Patient Name: Ledy Odonnell  Date: 2023         Service Type: Individual      Session Start Time: 3:34   Session End Time: 3:50     Session Length: 15    Session #: 10    Attendees: Client attended alone    Service Modality:  Phone Visit:      Provider verified identity through the following two step process.  Patient provided:  Patient  and Patient address    The patient has been notified of the following:      \"We have found that certain health care needs can be provided without the need for a face to face visit.  This service lets us provide the care you need with a phone conversation.       I will have full access to your [facility] medical record during this entire phone call.   I will be taking notes for your medical record.      Since this is like an office visit, we will bill your insurance company for this service.       There are potential benefits and risks of telephone visits (e.g. limits to patient confidentiality) that differ from in-person visits.?Confidentiality still applies for telephone services, and nobody will record the visit.  It is important to be in a quiet, private space that is free of distractions (including cell phone or other devices) during the visit.??      If during the course of the call I believe a telephone visit is not appropriate, you will not be charged for this service\"     Consent has been obtained for this service by care team member: Yes     DATA  Extended Session (53+ minutes): No  Interactive Complexity: No  Crisis: No        Progress Since Last Session (Related to Symptoms / Goals / Homework):   Symptoms: stable    Homework: Achieved / completed to satisfaction      Episode of Care Goals: Satisfactory progress - ACTION (Actively working towards change); Intervened by reinforcing change plan / affirming steps taken     Current / Ongoing Stressors and " Concerns:   Financial stressors    Limited motility   Wheelchair user    Isolation    Depression    Effective communication       Treatment Objective(s) Addressed in This Session:   Increase interest, engagement, and pleasure in doing things      Intervention:   CBT - Met with patient to review goals and interventions. Provided active listening as patient gave a brief update the week. Patient processed assignment from last week and finding it helpful with her mood to engage in volunteer activities. Appointment was abruptly terminated after patient received multiple unexpected visitors.         Assessments completed prior to visit:  The following assessments were completed by patient for this visit:  PHQ2:   PHQ-2 ( 1999 Pfizer) 11/1/2022 7/18/2022 8/13/2021 8/13/2019 9/13/2016 9/7/2016 11/11/2014   Q1: Little interest or pleasure in doing things - - 1 0 0 0 0   Q2: Feeling down, depressed or hopeless - - 0 0 1 1 1   PHQ-2 Score - - 1 0 1 1 1   PHQ-2 Total Score (12-17 Years)- Positive if 3 or more points; Administer PHQ-A if positive - - 1 0 - - -   PHQ-2 Score Incomplete Incomplete - - - - -     PHQ9:   PHQ-9 SCORE 8/13/2021 3/23/2022 7/18/2022 8/25/2022 10/27/2022 11/1/2022 12/1/2022   PHQ-9 Total Score - - - - - - -   PHQ-9 Total Score MyChart - - 5 (Mild depression) - - 6 (Mild depression) -   PHQ-9 Total Score 3 8 5 12 10 6 4     GAD2: No flowsheet data found.  GAD7:   LESTER-7 SCORE 5/28/2020 6/9/2020 2/12/2021 2/26/2021 8/13/2021 8/25/2022 10/27/2022   Total Score - - - - - - -   Total Score 2 2 3 1 0 8 4     PROMIS 10-Global Health (only subscores and total score):   PROMIS-10 Scores Only 10/13/2022   Global Mental Health Score 11   Global Physical Health Score 13   PROMIS TOTAL - SUBSCORES 24         ASSESSMENT: Current Emotional / Mental Status (status of significant symptoms):   Risk status (Self / Other harm or suicidal ideation)   Patient denies current fears or concerns for personal safety.    Patient  denies current or recent suicidal ideation or behaviors.   Patient denies current or recent homicidal ideation or behaviors.   Patient denies current or recent self injurious behavior or ideation.   Patient denies other safety concerns.   Patient reports there has been no change in risk factors since their last session.     Patient reports there has been no change in protective factors since their last session.     Recommended that patient call 911 or go to the local ED should there be a change in any of these risk factors.     Appearance:   unable to assess due to phone visit    Eye Contact:   unable to assess due to phone visit    Psychomotor Behavior: unable to assess due to phone visit    Attitude:   Cooperative  Friendly Pleasant   Orientation:   All   Speech    Rate / Production: Normal/ Responsive    Volume:  Normal    Mood:    Normal   Affect:    unable to assess due to phone visit    Thought Content:  Clear    Thought Form:  Logical    Insight:    Good      Medication Review:   No changes to current psychiatric medication(s)     Medication Compliance:   Yes     Changes in Health Issues:   None reported     Chemical Use Review:   Substance Use: Chemical use reviewed, no active concerns identified      Tobacco Use: No current tobacco use.      Diagnosis:  1. Current mild episode of major depressive disorder without prior episode (H)    2. Alcohol dependence in remission (H)    3. Mild episode of recurrent major depressive disorder (H)        Collateral Reports Completed:   Authorization for Release of Information Completed for Debbie [phone] (contracted with [place])     PLAN: (Patient Tasks / Therapist Tasks / Other)  Patient will attend next scheduled appointment on 3/06/2023.          Sylvia Hernández [facility]                                              This note has been reviewed and I agree with the plan of care. This note is co-signed by Glory Diaz [facility] Supervisor, on: February 13, 2023      ______________________________________________________________________    Individual Treatment Plan    Patient's Name: Ledy Odonnell  YOB: 1945    Date of Creation: 9/12/2022  Date Treatment Plan Last Reviewed/Revised: 1/06/2023    DSM5 Diagnoses: 296.31 (F33.0) Major Depressive Disorder, Recurrent Episode, Mild _  Psychosocial / Contextual Factors: wheel chair user   PROMIS (reviewed every 90 days): not completed     Referral / Collaboration:  The following referral(s) was/were discussed but patient declines follow up at this time. Patient declines metro mobility.   Patient will follow up with PCP about shoulder pain and hand tremors.     Anticipated number of session for this episode of care: 9-12 sessions  Anticipation frequency of session: Weekly  Anticipated Duration of each session: 38-52 minutes  Treatment plan will be reviewed in 90 days or when goals have been changed.       MeasurableTreatment Goal(s) related to diagnosis / functional impairment(s)  Goal 1: Patient will learn skills to manage depression.     I will know I've met my goal when I start a volunteer service project.      Objective #A (Patient Action)    Patient will Increase interest, engagement, and pleasure in doing things.  Status: Completed - Date: 1/6/2023     Intervention(s)  Therapist will provide psychoeducation on depression and CBT. Therapist will provided safe supportive environment express concerns and needs. Therapist will provide emotional support and validation.      Objective #B  Patient will Increase interest, engagement, and pleasure in doing things.  Status: Continued - Date(s): 1/06/2023     Intervention(s)  Therapist will use cognitive behavioral therapy to teach how to identify and solve problems that arise from life stressors.    Objective #C  Patient will Identify negative self-talk and behaviors: challenge core beliefs, myths, and actions.  Status: Continued - Date(s): 1/06/2023      Intervention(s)  Therapist will teach how to identify and overcome negative thought patterns using CBT.       Patient has reviewed and agreed to the above plan.      Sylvia Hernández [facility]  January 6, 2023    This note has been reviewed and I agree with the plan of care. This note is co-signed by Glory Diaz [facility] Supervisor, on: January 9, 2023

## 2023-02-22 NOTE — PROGRESS NOTES
"Ev is a 77 year old who is being evaluated via a billable telephone visit.      What phone number would you like to be contacted at? [phone]  How would you like to obtain your AVS? Mail a copy  Distant Location (provider location):  Off-site    Assessment & Plan     Infection due to 2019 novel coronavirus  - nirmatrelvir and ritonavir (PAXLOVID) therapy pack  Dispense: 30 tablet; Refill: 0  - guaiFENesin 1200 MG TB12  Dispense: 60 tablet; Refill: 0  - dextromethorphan (DELSYM) 30 MG/5ML liquid  Dispense: 89 mL; Refill: 0    (M25.552,  G89.29) Chronic left hip pain  Comment: Takes oxycodone -effects of oxycodone can be increased while taking Paxlovid  Plan: Patient will hold Oxy or take half dose if needed.    (F32.9) Major depressive disorder with single episode, remission status unspecified  Comment: Takes trazodone at bedtime and on Wellbutrin.  Concentration of these medications can change while taking Paxlovid  Plan: Patient will avoid taking trazodone or take half dose.  Patient will monitor for symptoms of worsening depression, does not need to stop or alter her Wellbutrin dose.       Prescription drug [id number]}     BMI:   Estimated body mass index is 26.63 kg/m  as calculated from the following:    Height as of 11/1/22: 1.651 m (5' 5\").    Weight as of 11/1/22: 72.6 kg (160 lb).     See Patient Instructions    No follow-ups on file.    ROSCOE Burrell CNP  [facility]    Giacomo Carreno is a 77 year old, presenting for the following health issues:  COVID Treatment      HPI       COVID-19 Symptom Review  How many days ago did these symptoms start? Sunday    Are any of the following symptoms significant for you?    New or worsening difficulty breathing? No    Worsening cough? Yes, I am coughing up mucus.    Fever or chills? No    Headache: No    Sore throat: No    Chest pain: No    Diarrhea: No    Body aches? No  What treatments has patient tried? Tylenol   Does " patient live in a nursing home, group home, or shelter? No  Does patient have a way to get food/medications during quarantined? Yes     No prior covid infections.  + vaccinated and boosted x 2.   Severity of symptoms 7/10.  In wheelchair, lives alone.     Oxycodone prn, takes 4 x per week. 1 or 2 tabs.  Trazodone - takes 3 or 4 nights/week.  Mirtazapine - doesn't take anymore  wellbutrin - daily        Review of Systems   Constitutional, HEENT, cardiovascular, pulmonary, gi and gu systems are negative, except as otherwise noted.      Objective           Vitals:  No vitals were obtained today due to virtual visit.    Physical Exam   healthy, alert and no distress  PSYCH: Alert and oriented times 3; coherent speech, normal   rate and volume, able to articulate logical thoughts, able   to abstract reason, no tangential thoughts, no hallucinations   or delusions  Her affect is normal  RESP: No cough, no audible wheezing, able to talk in full sentences  Remainder of exam unable to be completed due to telephone visits    Office Visit on 07/18/2022   Component Date Value Ref Range Status     Culture 07/18/2022 >100,000 CFU/mL Escherichia coli (A)   Final     Cholesterol 07/18/2022 162  <200 mg/dL Final     Triglycerides 07/18/2022 101  <150 mg/dL Final     Direct Measure HDL 07/18/2022 55  >=50 mg/dL Final     LDL Cholesterol Calculated 07/18/2022 87  <=100 mg/dL Final     Non HDL Cholesterol 07/18/2022 107  <130 mg/dL Final     Patient Fasting > 8hrs? 07/18/2022 No   Final     Cannabinoids (67-yeh-8-carboxy-9-T* 07/18/2022 Not Detected  Not Detected, Indeterminate Final    Cutoff for a negative cannabinoid is 50 ng/mL or less.     Phencyclidine 07/18/2022 Not Detected  Not Detected, Indeterminate Final    Cutoff for a negative PCP is 25 ng/mL or less.     Cocaine (Benzoylecgonine) 07/18/2022 Not Detected  Not Detected, Indeterminate Final    Cutoff for a negative cocaine is 150 ng/ml or less.     Methamphetamine  (d-Methamphetamine) 07/18/2022 Not Detected  Not Detected, Indeterminate Final    Cutoff for a negative methamphetamine is 500 ng/ml or less.     Opiates (Morphine) 07/18/2022 Not Detected  Not Detected, Indeterminate Final    Cutoff for a negative opiate is 100 ng/ml or less.     Amphetamine (d-Amphetamine) 07/18/2022 Not Detected  Not Detected, Indeterminate Final    Cutoff for a negative amphetamine is 500 ng/mL or less.     Benzodiazepines (Nordiazepam) 07/18/2022 Not Detected  Not Detected, Indeterminate Final    Cutoff for a negative benzodiazepine is 150 ng/ml or less.     Tricyclic Antidepressants (Desipra* 07/18/2022 Not Detected  Not Detected, Indeterminate Final    Cutoff for a negative tricyclic antidepressant is 300 ng/ml or less.     Methadone 07/18/2022 Not Detected  Not Detected, Indeterminate Final    Cutoff for a negative methadone is 200 ng/ml or less.     Barbiturates (Butalbital) 07/18/2022 Not Detected  Not Detected, Indeterminate Final    Cutoff for a negative barbituate is 200 ng/ml or less.     Oxycodone 07/18/2022 Detected (A)  Not Detected, Indeterminate Final    Cutoff for a positive oxycodone is greater than 100 ng/ml.  This is an unconfirmed screening result to be used for medical purposes only.      Propoxyphene (Norpropoxyphene) 07/18/2022 Not Detected  Not Detected, Indeterminate Final    Cutoff for a negative propoxyphene is 300 ng/ml or less.     Buprenorphine 07/18/2022 Not Detected  Not Detected, Indeterminate Final    Cutoff for a negative buprenorphine is 10 ng/ml or less.     Sodium 07/18/2022 132 (L)  133 - 144 mmol/L Final     Potassium 07/18/2022 4.1  3.4 - 5.3 mmol/L Final     Chloride 07/18/2022 100  94 - 109 mmol/L Final     Carbon Dioxide (CO2) 07/18/2022 24  20 - 32 mmol/L Final     Anion Gap 07/18/2022 8  3 - 14 mmol/L Final     Urea Nitrogen 07/18/2022 9  7 - 30 mg/dL Final     Creatinine 07/18/2022 0.75  0.52 - 1.04 mg/dL Final     Calcium 07/18/2022 9.4  8.5 - 10.1  mg/dL Final     Glucose 07/18/2022 106 (H)  70 - 99 mg/dL Final     Alkaline Phosphatase 07/18/2022 86  40 - 150 U/L Final     AST 07/18/2022 25  0 - 45 U/L Final     ALT 07/18/2022 21  0 - 50 U/L Final     Protein Total 07/18/2022 7.6  6.8 - 8.8 g/dL Final     Albumin 07/18/2022 4.0  3.4 - 5.0 g/dL Final     Bilirubin Total 07/18/2022 0.5  0.2 - 1.3 mg/dL Final     GFR Estimate 07/18/2022 83  >60 mL/min/1.73m2 Final    Effective December 21, 2021 eGFRcr in adults is calculated using the 2021 CKD-EPI creatinine equation which includes age and gender (Avani smith al., NEJM, DOI: 10.1056/HFDBfb3279415)       Phone call duration: 30 minutes

## 2023-02-22 NOTE — TELEPHONE ENCOUNTER
RN COVID TREATMENT VISIT  02/22/23      The patient has been triaged and does not require a higher level of care.    Ledy Odonnell  77 year old  Current weight? 165    Has the patient been seen by a primary care provider at an [facility] or [facility] within the past two years? Yes.   Have you been in close proximity to/do you have a known exposure to a person with a confirmed case of influenza? No.     General treatment eligibility:  Date of positive COVID test (PCR or at home)?  2/21/2023    Are you or have you been hospitalized for this COVID-19 infection? No.   Have you received monoclonal antibodies or antiviral treatment for COVID-19 since this positive test? No.   Do you have any of the following conditions that place you at risk of being very sick from COVID-19?   - Age 50 years or older  - Mental health disorders including mood disorders, depression, schizophrenia spectrum disorders   Yes, patient has at least one high risk condition as noted above.     Current COVID symptoms:   - cough  - fatigue  - congestion or runny nose  Yes. Patient has at least one symptom as selected.     How many days since symptoms started? 5 days or less. Established patient, 12 years or older weighing at least 88.2 lbs, who has symptoms that started in the past 5 days, has not been hospitalized nor received treatment already, and is at risk for being very sick from COVID-19.     Treatment eligibility by RN:    Are you currently pregnant or nursing? No    Do you have a clinically significant hypersensitivity to nirmatrelvir or ritonavir, or toxic epidermal necrolysis (TEN) or Stallings-Blas Syndrome? No    Do you have a history of hepatitis, any hepatic impairment on the Problem List (such as Child-Fiore Class C, cirrhosis, fatty liver disease, alcoholic liver disease), or was the last liver lab (hepatic panel, ALT, AST, ALK Phos, bilirubin) elevated in the past 6 months? No    Do you have any  history of severe renal impairment (eGFR < 30mL/min)? No    Is patient eligible to continue?   Yes, patient meets all eligibility requirements for the RN COVID treatment (as denoted by all no responses above).     Current Outpatient Medications   Medication Sig Dispense Refill     acetaminophen (TYLENOL) 500 MG tablet Take 2 tablets (1,000 mg) by mouth every 8 hours       albuterol (PROAIR HFA/PROVENTIL HFA/VENTOLIN HFA) 108 (90 Base) MCG/ACT inhaler Inhale 1-2 puffs into the lungs every 6 hours as needed for shortness of breath / dyspnea 18 g 1     buPROPion (WELLBUTRIN XL) 150 MG 24 hr tablet Take 1 tablet (150 mg) by mouth daily 90 tablet 0     buPROPion (WELLBUTRIN XL) 150 MG 24 hr tablet Take 1 tablet (150 mg) by mouth every morning       diclofenac (VOLTAREN) 1 % topical gel Apply 4 g topically       diclofenac (VOLTAREN) 1 % topical gel Apply 4 g topically 4 times daily 100 g 1     DULoxetine (CYMBALTA) 60 MG capsule Take 1 capsule (60 mg) by mouth daily 90 capsule 0     DULoxetine (CYMBALTA) 60 MG capsule Take 60 mg by mouth       estradiol (ESTRACE) 0.1 MG/GM vaginal cream Place 2 g vaginally three times a week 42.5 g 3     mirtazapine (REMERON) 15 MG tablet Take 1 tablet (15 mg) by mouth At Bedtime 90 tablet 0     multivitamin, therapeutic (THERA-VIT) TABS tablet Take 1 tablet by mouth daily       nystatin (MYCOSTATIN) 230983 UNIT/GM external cream Apply topically 2 times daily as needed for dry skin or other (rash) 30 g 3     nystatin (MYCOSTATIN) 497054 UNIT/GM external cream Apply 1 Application topically       nystatin (MYCOSTATIN) 782314 UNIT/GM external powder Apply 1 g topically 2 times daily as needed for other (rash) 60 g 1     order for DME Equipment being ordered: Wheelchair 1 each 0     oxyCODONE-acetaminophen (PERCOCET) 5-325 MG tablet Take 1 tablet by mouth every 6 hours as needed for severe pain (7-10) Maximum 3 tablets daily as needed for severe pain.  Patient is due for office visit 2/1/23  further refills. 90 tablet 0     senna (SENOKOT) 8.6 MG tablet Take 1 tablet by mouth       senna-docusate (SENOKOT-S;PERICOLACE) 8.6-50 MG per tablet Take 1 tablet by mouth 2 times daily. 40 tablet 11     solifenacin (VESICARE) 5 MG tablet Take 1 tablet (5 mg) by mouth daily 90 tablet 1     tiZANidine (ZANAFLEX) 4 MG tablet Take 1 tablet (4 mg) by mouth 3 times daily as needed for muscle spasms 60 tablet 1     traZODone (DESYREL) 100 MG tablet Take 1-2 tablets (100-200 mg) by mouth At Bedtime 60 tablet 0     traZODone (DESYREL) 50 MG tablet Take  mg by mouth       UNABLE TO FIND MEDICATION NAME: Probiotic Gummies         Medications from List 1 of the standing order (on medications that exclude the use of Paxlovid) that patient is taking: NONE. Is patient taking [place]'s Wort? No  Is patient taking Shaylee's Wort or any meds from List 1? No.   Medications from List 2 of the standing order (on meds that provider needs to adjust) that patient is taking: oxycodone (Oxycontin, Roxicodone, Percocet, Endocet, Nalocet), explained a provider visit is necessary to discuss medication adjustments while taking Paxlovid. Is patient on any of the meds from List 2? Yes. Patient will be scheduled or transferred to a  at the end of this call.   Dana Foster RN

## 2023-02-22 NOTE — TELEPHONE ENCOUNTER
COVID Positive/Requesting COVID treatment    Patient is positive for COVID and requesting treatment options.    Date of positive COVID test (PCR or at home)? home  Current COVID symptoms: cough and fatigue   Date COVID symptoms began: 2/20/2023    Message should be routed to clinic RN pool. Best phone number to use for call back: [phone]    Louisa Begum RN on 2/21/2023 at 7:57 PM    Reason for Disposition    [1] HIGH RISK for severe COVID complications (e.g., weak immune system, age > 64 years, obesity with BMI of 30 or higher, pregnant, chronic lung disease or other chronic medical condition) AND [2] COVID symptoms (e.g., cough, fever)  (Exceptions: Already seen by PCP and no new or worsening symptoms.)    Additional Information    Negative: SEVERE difficulty breathing (e.g., struggling for each breath, speaks in single words)    Negative: Difficult to awaken or acting confused (e.g., disoriented, slurred speech)    Negative: Bluish (or gray) lips or face now    Negative: Shock suspected (e.g., cold/pale/clammy skin, too weak to stand, low BP, rapid pulse)    Negative: Sounds like a life-threatening emergency to the triager    Negative: SEVERE or constant chest pain or pressure  (Exception: Mild central chest pain, present only when coughing.)    Negative: MODERATE difficulty breathing (e.g., speaks in phrases, SOB even at rest, pulse 100-120)    Negative: Headache and stiff neck (can't touch chin to chest)    Negative: Oxygen level (e.g., pulse oximetry) 90 percent or lower    Negative: Chest pain or pressure  (Exception: MILD central chest pain, present only when coughing)    Negative: Patient sounds very sick or weak to the triager    Negative: MILD difficulty breathing (e.g., minimal/no SOB at rest, SOB with walking, pulse <100)    Negative: Fever > 103 F (39.4 C)    Negative: [1] Fever > 101 F (38.3 C) AND [2] over 60 years of age    Negative: [1] Fever > 100.0 F (37.8 C) AND [2] bedridden (e.g.,  nursing home patient, CVA, chronic illness, recovering from surgery)    Protocols used: CORONAVIRUS (COVID-19) DIAGNOSED OR [id number]

## 2023-02-22 NOTE — PATIENT INSTRUCTIONS
Instructions for Patients      What are the symptoms of COVID-19?  Symptoms can include fever, cough, shortness of breath, chills, headache, muscle pain sore throat, fatigue, runny or stuffy nose, and loss of taste and smell. Other less common symptoms include nausea, vomiting, or diarrhea (watery stools).    Know when to call 911. Emergency warning signs include:    Trouble breathing or shortness of breath    Pain or pressure in the chest that doesn't go away    Feeling confused like you haven't felt before, or not being able to wake up    Bluish-colored lips or face    How can I take care of myself?  1. Get lots of rest. Drink extra fluids (unless a doctor has told you not to).  2. Take Tylenol (acetaminophen) for fever or pain. If you have liver or kidney problems, ask your family doctor if it's okay to take Tylenol   Adults can take either:   650 mg (two 325 mg pills or tablets) every 4 to 6 hours, or...   1,000 mg (two 500 mg pills) every 8 hours as needed.  Note: Don't take more than 3,000 mg in one day. Acetaminophen is found in many medicines (both prescribed and over the counter). Read all labels to be sure you don't take too much.  For children, check the Tylenol bottle for the right dose. The dose is based on the child's age or weight.  3. Take over the counter medicines for your symptoms as needed. Talk to your pharmacist.  4. If you have other health problems (like cancer, heart failure, an organ transplant, or severe kidney disease): Call your specialty clinic if you don't feel better in the next 2 days.    Where can I get more information?     [facility] COVID-19 Resource Hub: www.[email or web address].org/covid19/     CDC Quarantine & Isolation: https://www.cdc.gov/coronavirus/2019-ncov/your-health/quarantine-isolation.html     CDC - What to Do If You're Sick: https://www.cdc.gov/coronavirus/2019-ncov/if-you-are-sick/index.html    Learn about the ACTIV-6 Clinical Trial: activ6.[facility] or call  ([phone])-[phone]  Coping with Life After COVID-19  Being in the hospital because of COVID-19 is scary. Going home can be scary, too. You may face changes to your life, the way you work or what you can eat. It s hard to adjust to change, and it s normal to feel afraid, frustrated or even angry. These feelings usually go away over time. If your feelings don t start to get better, it s called  adjustment disorder.      What signs should I look out for?  Adjustment disorder can happen to anyone in a time of stress. It makes it hard to cope with daily life. You may feel lonely or fight with loved ones, even if you re glad to be home. Watch for these signs:  Fear or worry  Hard time focusing  Sadness or anger  Trouble talking to family or friends  Feeling like you don t fit in or isolating yourself  Problems with sleep   Drinking alcohol or taking drugs to cope    What can I do?  You can help yourself get better. Feeling you have control helps you move forward. You may wonder if you ll be able to do things you did before. Be patient. Do your best to make the most of every day. Try to build relationships, be as active as you can, eat right and keep a sense of humor. Avoid smoking and drinking too much alcohol. Call your family doctor or clinic if you re not sure what to do. They can guide you to care or other services.    When should I get help?  Think about getting counseling if your sadness or frustration gets worse. Together with a trained counselor, you can talk about your problems adjusting to life after your hospital stay. You can come up with new ways to handle changes that give you more control. Your family doctor or care team can help you find a counselor.     Get help if you re thinking about hurting yourself. If you need help right away, call 911 or go to the nearest emergency room. You can also try the Crisis Text Line.    Crisis Text Line: 674-574 (http://www.crisistextline.org)  The Crisis Text Line serves  anyone, in any crisis. It gives free, 24/7 support. Here's how it works:  Text HOME to 103720 from anywhere in the [place], anytime, about any type of crisis.  A live, trained Crisis Counselor will text you back quickly.  The volunteer Crisis Counselor can help you move from a  hot moment  to a  cool moment.  They can also help you work out a safety plan.

## 2023-02-28 PROBLEM — L89.893 PRESSURE INJURY OF LEFT FOOT, STAGE 3 (H): Status: ACTIVE | Noted: 2023-01-01

## 2023-02-28 NOTE — LETTER
March 6, 2023      Ev Odonnell  [address]  [facility] [address]          Dear Ev,     Your vitamin B12 is elevated- if you are taking a B12 supplement I recommend decreasing the dose in half and then we can recheck your level 2-3 months after that to monitor.   Your kidney function is down just a bit (creatine is up and GFR down) and I recommend that we monitor this in 3-6 months.   Liver function is good, electrolytes are good, blood sugar is good.   Lipid panel is stable.   Vitamin D level in good range.   Diabetic screening, hemoglobin A1C, is negative/normal.   Blood counts good.     For the urology recommendation, you can consider Dr. Ramirez who is a female provider.     If you have any questions or concerns please feel free to contact me at the office at [phone] or via [employer].     Loulou Ashley, DNP, APRN, CNP       Resulted Orders   CBC with platelets   Result Value Ref Range    WBC Count 8.4 4.0 - 11.0 10e3/uL    RBC Count 4.96 3.80 - 5.20 10e6/uL    Hemoglobin 14.7 11.7 - 15.7 g/dL    Hematocrit 44.9 35.0 - 47.0 %    MCV 91 78 - 100 fL    MCH 29.6 26.5 - 33.0 pg    MCHC 32.7 31.5 - 36.5 g/dL    RDW 13.2 10.0 - 15.0 %    Platelet Count 267 150 - 450 10e3/uL   Vitamin B12   Result Value Ref Range    Vitamin B12 1,644 (H) 232 - 1,245 pg/mL   Vitamin D Deficiency   Result Value Ref Range    Vitamin D, Total (25-Hydroxy) 41 20 - 75 ug/L    Narrative    Season, race, dietary intake, and treatment affect the concentration of 25-hydroxy-Vitamin D. Values may decrease during winter months and increase during summer months. Values 20-29 ug/L may indicate Vitamin D insufficiency and values <20 ug/L may indicate Vitamin D deficiency.    Vitamin D determination is routinely performed by an immunoassay specific for 25 hydroxyvitamin D3.  If an individual is on vitamin D2(ergocalciferol) supplementation, please specify 25 OH vitamin D2 and D3 level determination by LCMSMS test [id number].      Comprehensive metabolic panel (BMP + Alb, Alk Phos, ALT, AST, Total. Bili, TP)   Result Value Ref Range    Sodium 139 136 - 145 mmol/L    Potassium 5.3 3.4 - 5.3 mmol/L    Chloride 100 98 - 107 mmol/L    Carbon Dioxide (CO2) 27 22 - 29 mmol/L    Anion Gap 12 7 - 15 mmol/L    Urea Nitrogen 10.0 8.0 - 23.0 mg/dL    Creatinine 1.03 (H) 0.51 - 0.95 mg/dL    Calcium 9.8 8.8 - 10.2 mg/dL    Glucose 92 70 - 99 mg/dL    Alkaline Phosphatase 81 35 - 104 U/L    AST 29 10 - 35 U/L    ALT 11 10 - 35 U/L    Protein Total 7.0 6.4 - 8.3 g/dL    Albumin 3.9 3.5 - 5.2 g/dL    Bilirubin Total 0.4 <=1.2 mg/dL    GFR Estimate 56 (L) >60 mL/min/1.73m2      Comment:      eGFR calculated using 2021 CKD-EPI equation.   Hemoglobin A1c   Result Value Ref Range    Hemoglobin A1C 5.6 0.0 - 5.6 %      Comment:      Normal <5.7%   Prediabetes 5.7-6.4%    Diabetes 6.5% or higher     Note: Adopted from ADA consensus guidelines.   Lipid panel reflex to direct LDL Fasting   Result Value Ref Range    Cholesterol 165 <200 mg/dL    Triglycerides 128 <150 mg/dL    Direct Measure HDL 42 (L) >=50 mg/dL    LDL Cholesterol Calculated 97 <=100 mg/dL    Non HDL Cholesterol 123 <130 mg/dL    Narrative    Cholesterol  Desirable:  <200 mg/dL    Triglycerides  Normal:  Less than 150 mg/dL  Borderline High:  150-199 mg/dL  High:  200-499 mg/dL  Very High:  Greater than or equal to 500 mg/dL    Direct Measure HDL  Female:  Greater than or equal to 50 mg/dL   Male:  Greater than or equal to 40 mg/dL    LDL Cholesterol  Desirable:  <100mg/dL  Above Desirable:  100-129 mg/dL   Borderline High:  130-159 mg/dL   High:  160-189 mg/dL   Very High:  >= 190 mg/dL    Non HDL Cholesterol  Desirable:  130 mg/dL  Above Desirable:  130-159 mg/dL  Borderline High:  160-189 mg/dL  High:  190-219 mg/dL  Very High:  Greater than or equal to 220 mg/dL

## 2023-02-28 NOTE — PROGRESS NOTES
"  Assessment & Plan     OAB (overactive bladder)  She was following with Dr. Wayne urology but prefers to follow up with a female provider, referral placed  - Adult Urology  Referral; Future    Urinary incontinence, mixed    - UA with Microscopic reflex to Culture - lab collect; Future  - UA with Microscopic reflex to Culture; Future  - Adult Urology  Referral; Future    Chronic indwelling Welch catheter    - Adult Urology  Referral; Future    Chronic pain syndrome    - oxyCODONE-acetaminophen (PERCOCET) 5-325 MG tablet; Take 1 tablet by mouth every 6 hours as needed for severe pain (7-10) Maximum 3 tablets daily as needed for severe pain.   - Vitamin B12  - Vitamin D Deficiency    Pressure injury of left foot, stage 3 (H)  Chronic, non-healing, patient again today declines follow up with wound clinic as previously did not feel like they offered anything above what she could do to care for on her own  - CBC with platelets    Chronic obstructive pulmonary disease, unspecified COPD type (H)  Known issue that I take into account for their medical decisions, no current exacerbations or new concerns.     Hyperlipidemia LDL goal <130  - Lipid panel reflex to direct LDL Fasting    Screening for diabetes mellitus  - Comprehensive metabolic panel (BMP + Alb, Alk Phos, ALT, AST, Total. Bili, TP)  - Hemoglobin A1c    Elevated fasting glucose  - Comprehensive metabolic panel (BMP + Alb, Alk Phos, ALT, AST, Total. Bili, TP)  - Hemoglobin A1c    Tobacco use disorder    Primary insomnia  Chronic, stable, continue current treatment.     Generalized anxiety disorder  Chronic, stable, continue current treatment.     Vitamin D deficiency    - Vitamin D Deficiency; Future  - Vitamin D Deficiency             BMI:   Estimated body mass index is 26.63 kg/m  as calculated from the following:    Height as of this encounter: 1.651 m (5' 5\").    Weight as of this encounter: 72.6 kg (160 lb).           Return in " about 3 months (around 5/28/2023) for Routine Visit.    Loulou Ashley NP  [facility] VAUGHN Carreno is a 77 year old, presenting for the following health issues:  Pain (Refill medication /Shoulders and knee ) and Bladder Problems (Night time incontinence )      HPI       Tried vesicare 10 mg prescribed by urology in the past- did not help.  Urinary incontinence worsening.  Wears panty with 2 overnight pads, analia cloth wash clothes, and adult diaper  Changes a couple times during the day, and a new one at night.  Was seeing Dr. Wayne in urology but wants to change to a female provider.    Concern - Bladder cooncern  Onset:  years  Description: incontinence   Intensity: severe  Progression of Symptoms:  worsening and constant  Accompanying Signs & Symptoms: wearing pads and breief and bed is still wet in am   Previous history of similar problem: yes 2  Precipitating factors:        Worsened by: does not matter when stops drinking fluids    Alleviating factors:        Improved by: none   Therapies tried and outcome:  none     Pain History:  When did you first notice your pain? - Chronic Pain   Have you seen this provider for your pain in the past?   Yes   Are you seeing anyone else for your pain? No    PHQ-9 SCORE 10/27/2022 11/1/2022 12/1/2022   PHQ-9 Total Score - - -   PHQ-9 Total Score MyChart - 6 (Mild depression) -   PHQ-9 Total Score 10 6 4       LESTER-7 SCORE 8/13/2021 8/25/2022 10/27/2022   Total Score - - -   Total Score 0 8 4       Chronic Pain Follow Up:    Location of pain: sholulders and knee   Analgesia/pain control:    - Recent changes:  None     - Overall control: Inadequate pain control    - Current treatments: pain medication    Adherence:     - Do you ever take more pain medicine than prescribed? No    - When did you take your last dose of pain medicine?  2/27/2023   Adverse effects: No   PDMP Review       Value Time User    State PDMP site checked  Yes  "3/5/2023  8:28 PM Loulou Ashley NP        Last CSA Agreement:   CSA -- Patient Level:    CSA: None found at the patient level.       Last UDS: 7/18/2022    Taking Mirtazapine 15 mg and Trazodone     Has a comfort care /helper lady comes once per week for 4 hours    Review of Systems   Constitutional, HEENT, cardiovascular, pulmonary, gi and gu systems are negative, except as otherwise noted.      Objective    /60 (BP Location: Left arm, Patient Position: Sitting, Cuff Size: Adult Regular)   Pulse 64   Temp 98.6  F (37  C) (Tympanic)   Resp 16   Ht 1.651 m (5' 5\")   Wt 72.6 kg (160 lb)   SpO2 100%   BMI 26.63 kg/m    Body mass index is 26.63 kg/m .  Physical Exam   GENERAL: healthy, alert and no distress  RESP: lungs clear to auscultation - no rales, rhonchi or wheezes  CV: regular rates and rhythm, normal S1 S2, no S3 or S4 and no murmur, click or rub  PSYCH: mentation appears normal, affect normal/bright  SKIN:  Left ankle wound 3 X 2 X 0.5 cm    Results for orders placed or performed in visit on 02/28/23   CBC with platelets     Status: Normal   Result Value Ref Range    WBC Count 8.4 4.0 - 11.0 10e3/uL    RBC Count 4.96 3.80 - 5.20 10e6/uL    Hemoglobin 14.7 11.7 - 15.7 g/dL    Hematocrit 44.9 35.0 - 47.0 %    MCV 91 78 - 100 fL    MCH 29.6 26.5 - 33.0 pg    MCHC 32.7 31.5 - 36.5 g/dL    RDW 13.2 10.0 - 15.0 %    Platelet Count 267 150 - 450 10e3/uL   Vitamin B12     Status: Abnormal   Result Value Ref Range    Vitamin B12 1,644 (H) 232 - 1,245 pg/mL   Vitamin D Deficiency     Status: Normal   Result Value Ref Range    Vitamin D, Total (25-Hydroxy) 41 20 - 75 ug/L    Narrative    Season, race, dietary intake, and treatment affect the concentration of 25-hydroxy-Vitamin D. Values may decrease during winter months and increase during summer months. Values 20-29 ug/L may indicate Vitamin D insufficiency and values <20 ug/L may indicate Vitamin D deficiency.    Vitamin D determination is " routinely performed by an immunoassay specific for 25 hydroxyvitamin D3.  If an individual is on vitamin D2(ergocalciferol) supplementation, please specify 25 OH vitamin D2 and D3 level determination by LCMSMS test [id number].     Comprehensive metabolic panel (BMP + Alb, Alk Phos, ALT, AST, Total. Bili, TP)     Status: Abnormal   Result Value Ref Range    Sodium 139 136 - 145 mmol/L    Potassium 5.3 3.4 - 5.3 mmol/L    Chloride 100 98 - 107 mmol/L    Carbon Dioxide (CO2) 27 22 - 29 mmol/L    Anion Gap 12 7 - 15 mmol/L    Urea Nitrogen 10.0 8.0 - 23.0 mg/dL    Creatinine 1.03 (H) 0.51 - 0.95 mg/dL    Calcium 9.8 8.8 - 10.2 mg/dL    Glucose 92 70 - 99 mg/dL    Alkaline Phosphatase 81 35 - 104 U/L    AST 29 10 - 35 U/L    ALT 11 10 - 35 U/L    Protein Total 7.0 6.4 - 8.3 g/dL    Albumin 3.9 3.5 - 5.2 g/dL    Bilirubin Total 0.4 <=1.2 mg/dL    GFR Estimate 56 (L) >60 mL/min/1.73m2   Hemoglobin A1c     Status: Normal   Result Value Ref Range    Hemoglobin A1C 5.6 0.0 - 5.6 %   Lipid panel reflex to direct LDL Fasting     Status: Abnormal   Result Value Ref Range    Cholesterol 165 <200 mg/dL    Triglycerides 128 <150 mg/dL    Direct Measure HDL 42 (L) >=50 mg/dL    LDL Cholesterol Calculated 97 <=100 mg/dL    Non HDL Cholesterol 123 <130 mg/dL    Narrative    Cholesterol  Desirable:  <200 mg/dL    Triglycerides  Normal:  Less than 150 mg/dL  Borderline High:  150-199 mg/dL  High:  200-499 mg/dL  Very High:  Greater than or equal to 500 mg/dL    Direct Measure HDL  Female:  Greater than or equal to 50 mg/dL   Male:  Greater than or equal to 40 mg/dL    LDL Cholesterol  Desirable:  <100mg/dL  Above Desirable:  100-129 mg/dL   Borderline High:  130-159 mg/dL   High:  160-189 mg/dL   Very High:  >= 190 mg/dL    Non HDL Cholesterol  Desirable:  130 mg/dL  Above Desirable:  130-159 mg/dL  Borderline High:  160-189 mg/dL  High:  190-219 mg/dL  Very High:  Greater than or equal to 220 mg/dL

## 2023-03-01 NOTE — TELEPHONE ENCOUNTER
[facility] Call Center    Phone Message    May a detailed message be left on voicemail: yes     Reason for Call: Other: Patient is being referred for OAB, but doesn't want to see Dr. Wayne again - wanting to change provider.  Sending message per guideline instructions for clinic review and follow-up with patient for scheduling. Thank you!    Action Taken: Other: [facility]    Travel Screening: Not Applicable

## 2023-03-02 NOTE — TELEPHONE ENCOUNTER
Last VV with Dr. Wayne: 08/2022.    OK TO CHANGE PROVIDERS.    Writer called and left VM for patient to call central scheduling for changing providers.    Tatiana STRANGE RN Urology 3/2/2023 3:04 PM

## 2023-03-06 NOTE — PROGRESS NOTES
"    [facility]                                     Progress Note    Patient Name: Ledy Odonnell  Date: 3/6/2023         Service Type: Individual      Session Start Time: 2:32   Session End Time: 3:20     Session Length: 48    Session #: 11    Attendees: Client attended alone    Service Modality:  Phone Visit:      Provider verified identity through the following two step process.  Patient provided:  Patient  and Patient address    The patient has been notified of the following:      \"We have found that certain health care needs can be provided without the need for a face to face visit.  This service lets us provide the care you need with a phone conversation.       I will have full access to your [facility] medical record during this entire phone call.   I will be taking notes for your medical record.      Since this is like an office visit, we will bill your insurance company for this service.       There are potential benefits and risks of telephone visits (e.g. limits to patient confidentiality) that differ from in-person visits.?Confidentiality still applies for telephone services, and nobody will record the visit.  It is important to be in a quiet, private space that is free of distractions (including cell phone or other devices) during the visit.??      If during the course of the call I believe a telephone visit is not appropriate, you will not be charged for this service\"     Consent has been obtained for this service by care team member: Yes     DATA  Extended Session (53+ minutes): No  Interactive Complexity: No  Crisis: No        Progress Since Last Session (Related to Symptoms / Goals / Homework):   Symptoms: stable    Homework: Achieved / completed to satisfaction      Episode of Care Goals: Satisfactory progress - ACTION (Actively working towards change); Intervened by reinforcing change plan / affirming steps taken     Current / Ongoing Stressors and " Concerns:   Financial stressors    Limited motility   Wheelchair user    Isolation    Depression    Effective communication       Treatment Objective(s) Addressed in This Session:   Increase interest, engagement, and pleasure in doing things      Intervention:   CBT - Met with patient to review goals and interventions. Provided active listening as patient gave a brief update the week. Patient reports Covid-19 POS test and isolation affecting her mental health including difficulty sleeping feeling sad. Discussed mental health and coping during isolation.  Therapist encouraged staying connected to friends and family via telephone calls to combat loneliness and social isolation.          Assessments completed prior to visit:  The following assessments were completed by patient for this visit:  PHQ2:   PHQ-2 ( 1999 [employer]) 11/1/2022 7/18/2022 8/13/2021 8/13/2019 9/13/2016 9/7/2016 11/11/2014   Q1: Little interest or pleasure in doing things - - 1 0 0 0 0   Q2: Feeling down, depressed or hopeless - - 0 0 1 1 1   PHQ-2 Score - - 1 0 1 1 1   PHQ-2 Total Score (12-17 Years)- Positive if 3 or more points; Administer PHQ-A if positive - - 1 0 - - -   PHQ-2 Score Incomplete Incomplete - - - - -     PHQ9:   PHQ-9 SCORE 3/23/2022 7/18/2022 8/25/2022 10/27/2022 11/1/2022 12/1/2022 3/7/2023   PHQ-9 Total Score - - - - - - -   PHQ-9 Total Score MyChart - 5 (Mild depression) - - 6 (Mild depression) - -   PHQ-9 Total Score 8 5 12 10 6 4 2     GAD2: No flowsheet data found.  GAD7:   LESTER-7 SCORE 5/28/2020 6/9/2020 2/12/2021 2/26/2021 8/13/2021 8/25/2022 10/27/2022   Total Score - - - - - - -   Total Score 2 2 3 1 0 8 4     PROMIS 10-Global Health (only subscores and total score):   PROMIS-10 Scores Only 10/13/2022   Global Mental Health Score 11   Global Physical Health Score 13   PROMIS TOTAL - SUBSCORES 24         ASSESSMENT: Current Emotional / Mental Status (status of significant symptoms):   Risk status (Self / Other harm or  suicidal ideation)   Patient denies current fears or concerns for personal safety.    Patient denies current or recent suicidal ideation or behaviors.   Patient denies current or recent homicidal ideation or behaviors.   Patient denies current or recent self injurious behavior or ideation.   Patient denies other safety concerns.   Patient reports there has been no change in risk factors since their last session.     Patient reports there has been no change in protective factors since their last session.     Recommended that patient call 911 or go to the local ED should there be a change in any of these risk factors.     Appearance:   unable to assess due to phone visit    Eye Contact:   unable to assess due to phone visit    Psychomotor Behavior: unable to assess due to phone visit    Attitude:   Cooperative  Interested Pleasant   Orientation:   All   Speech    Rate / Production: Normal/ Responsive    Volume:  Normal    Mood:    Normal   Affect:    unable to assess due to phone visit    Thought Content:  Clear    Thought Form:  Logical    Insight:    Good      Medication Review:   No changes to current psychiatric medication(s)   Takes trazodone at bedtime and on Wellbutrin.      Medication Compliance:   Yes     Changes in Health Issues:   None reported     Chemical Use Review:   Substance Use: Chemical use reviewed, no active concerns identified      Tobacco Use: No current tobacco use.      Diagnosis:  1. 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 Alcohol dependence in remission (H)        Collateral Reports Completed:   Authorization for Release of Information Completed for Debbie [phone] (contracted with [place])     PLAN: (Patient Tasks / Therapist Tasks / Other)  Take medications as prescribed. Continue working towards goals. Patient will continue to communicate with family and friends daily.         LAKIA DobbsSW                                                 ______________________________________________________________________    Individual Treatment Plan    Patient's Name: Ledy Odonnell  YOB: 1945    Date of Creation: 9/12/2022  Date Treatment Plan Last Reviewed/Revised: 1/06/2023    DSM5 Diagnoses: 296.31 (F33.0) Major Depressive Disorder, Recurrent Episode, Mild _  Psychosocial / Contextual Factors: wheel chair user   PROMIS (reviewed every 90 days): not completed     Referral / Collaboration:  The following referral(s) was/were discussed but patient declines follow up at this time. Patient declines metro mobility.   Patient will follow up with PCP about shoulder pain and hand tremors.     Anticipated number of session for this episode of care: 9-12 sessions  Anticipation frequency of session: Weekly  Anticipated Duration of each session: 38-52 minutes  Treatment plan will be reviewed in 90 days or when goals have been changed.       MeasurableTreatment Goal(s) related to diagnosis / functional impairment(s)  Goal 1: Patient will learn skills to manage depression.     I will know I've met my goal when I start a volunteer service project.      Objective #A (Patient Action)    Patient will Increase interest, engagement, and pleasure in doing things.  Status: Completed - Date: 1/6/2023     Intervention(s)  Therapist will provide psychoeducation on depression and CBT. Therapist will provided safe supportive environment express concerns and needs. Therapist will provide emotional support and validation.      Objective #B  Patient will Increase interest, engagement, and pleasure in doing things.  Status: Continued - Date(s): 1/06/2023     Intervention(s)  Therapist will use cognitive behavioral therapy to teach how to identify and solve problems that arise from life stressors.    Objective #C  Patient will Identify negative self-talk and behaviors: challenge core beliefs, myths, and actions.  Status: Continued - Date(s): 1/06/2023      Intervention(s)  Therapist will teach how to identify and overcome negative thought patterns using CBT.       Patient has reviewed and agreed to the above plan.      [facility]  January 6, 2023    This note has been reviewed and I agree with the plan of care. This note is co-signed by Glory [facility] Supervisor, on: January 9, 2023

## 2023-03-14 NOTE — TELEPHONE ENCOUNTER
Forms received from [facility] annual re certification form   for Loulou DANE.  Forms placed in provider 'sign me' folder.  Please FAx forms to [phone] after completion.      Bertha Camacho    [facility]

## 2023-03-14 NOTE — TELEPHONE ENCOUNTER
Form mailed back to pt as requested copies made to place in pts chart.    Placed in outgoing mail-box    Bertha Camacho    [facility]

## 2023-03-21 NOTE — TELEPHONE ENCOUNTER
Forms received from [facility] certification  for Loulou Ashley.  Forms placed in provider 'sign me' folder.  Please FAx forms to [phone] after completion.      Bertha Camacho    [facility]

## 2023-03-21 NOTE — TELEPHONE ENCOUNTER
From already completed, pulled from stat scan and faxed       Bertha Camacho    [facility]

## 2023-04-04 NOTE — TELEPHONE ENCOUNTER
Patient calling and requesting the below pended medication.      GAYATRI Galvez  [facility]

## 2023-04-04 NOTE — TELEPHONE ENCOUNTER
Patient was looked up in [facility] and there are not indications for concern regarding use of controlled substances on record at this time.     She should be due for refill on Friday and I sent in refill for her.    Loulou Ashley, DNP, APRN, CNP

## 2023-05-02 NOTE — TELEPHONE ENCOUNTER
Patient calling and requesting a refill of the below pended medication.    GAYATRI Galvez  [facility]

## 2023-05-02 NOTE — TELEPHONE ENCOUNTER
Patient was looked up in [facility] and there are not indications for concern regarding use of controlled substances on record at this time.    Refill sent.    Loulou Ashley, DNP, APRN, CNP

## 2023-05-04 NOTE — PATIENT INSTRUCTIONS
Follow-up for urodynamics and possible cystoscopy.     Below is a list of things that can irritate the bladder and should be eliminated:  Caffeinated soft drinks.  Coffee.  Tea.  Chocolate.  Tomato-based foods.  Acidic juices and fruits. (includes cranberry juice)  Alcohol.  Nicotine  Carbonated drinks.  Aspartame/Nutrasweet.    You have been prescribed Trospium twice dialy 20 mg  Potential side effects include but not limited to dry eyes, dry mouth, constipation and possible memory loss issues. Advise you to contact our clinic if develop any side effects to the medication.     URODYNAMIC TESTING    Where should I go for this test?  The procedure is performed at:     [facility]   [address] [place] [address]   Floor 4    If you have questions about your test, please call our nurse triage line at [phone], option #2. If you need to cancel or reschedule your test for any reason, please notify us as soon as possible.    Please check in approximately 15 minutes prior to your procedure time.      What is urodynamic testing?   Urodynamic testing refers to a group of tests used to assess bladder function by measuring various aspects of urine storage and emptying. The test takes about 75 minutes. For most patients, the test is not painful.     How should I get ready for this test?  Please try to arrive with a comfortably full bladder if possible because you will be asked to try and urinate prior to your study.  If you received a bladder diary, please complete this prior to your urodynamic test and bring it with you to your appointment. A bladder diary measures how much fluid you are drinking and how often and how much you are urinating. You can also record any urinary leakage that may have occurred and what you were doing when you leaked.    If you have chronic constipation, please take stool softeners for two days before your test.    What happens during  the test?  You will first be asked to empty your bladder in a private restroom into a special toilet called a uroflow machine which measures the rate of urine flow.  A nurse will then place a very small tube (called a catheter) into your bladder. This drains any urine left over after urinating and also measures the pressures inside of your bladder during your test. Another small catheter will then be placed into your rectum to measure abdominal pressures. Two small sticky patches will be placed on the skin near your anus to measure pelvic floor function.   We will then instill contrast dye into your bladder through the bladder catheter. The contrast is very dense and will allow us to take x-ray pictures of your bladder intermittently during your test.  You will be asked to tell us when you first start to feel like your bladder is filling up, when you have moderate urgency to urinate, when you have very strong urgency to urinate and finally when you feel that your bladder is full. Once you feel full you will be asked to try and urinate.    You may be asked to cough or bear down several times during your procedure. The provider running your study will be looking for urine leakage during this time.      What happens after the test?  The provider running your urodynamic study will share the results of your test on the day of your procedure or very soon after.  After your test, you may go about your day as normal. You may notice some blood in your urine for a couple of days which should clear up on its own. You may also feel a more urgent need to use the toilet or you may need to go more often - this is due to having a catheter placed and should resolve on its own in a few days.   ______________________________  CYSTOSCOPY    What is a Cystoscopy?  This is a procedure done to check for problems inside the bladder.  Problems may include polyps (growths), tumors, inflammation (swelling and redness) and other  concerns.    The doctor inserts a thin tube (called a cystoscope) into the bladder.  The tube is about the size of a pencil.  We will give you numbing medicine to reduce the pain or discomfort you may feel.    The tube allows the doctor to:  The doctor will be able to see inside the bladder by filling the bladder with water.  The water makes it easier to see any problems that may be present.    If needed, the doctor may use the tube to:  The doctor is able to take tissue samples (biopsies).  Samples are sent to the lab for testing.  The doctor can also burn off any small growths or tumors that are found.  This is call fulguration.    What happens after the exam?  You may go back to your normal diet and activity as you feel ready, unless your doctor tells you not to.    For the next two days, you may notice:  Some blood in your urine.  Some burning when you urinate (use the toilet).  An urge to urinate more often.  Bladder spasms.    These are normal after the procedure. They should go away on their own after a day or two.      You can help to relieve the above listed symptoms by:  Drinking 6 to 8 large glasses of water each day (includes drinks at meals).  This will help clear the urine.  Take warm baths to relieve pain and bladder spasms.  Do not add anything to the bath water.  Your doctor may prescribe pain medicine.  You may also take Tylenol (acetaminophen) for pain.    When should I call my doctor?  A fever over 100.0 F (38 C) for more than a day.  (Before you call the doctor, check your temperature under your tongue.)  Chills.  Failure to urinate: No urine comes out when you try to use the toilet.  (Try soaking in a bathtub full of warm water.  If still no urine, call your doctor.)  A lot of blood in the urine or blood clots larger than a nickel.  Pain in the back or abdomen (belly / stomach area).  Pain or spasms that are not relieved by warm tub baths and pain medicine.  Severe pain, burning or other  problems while passing urine.  Pain that gets worse after two days.

## 2023-05-04 NOTE — LETTER
5/4/2023       RE: Ledy Odonnell  [address]  [facility] [address]     Dear Colleague,    Thank you for referring your patient, Ledy Odonnell, to the [facility] at [facility]. Please see a copy of my visit note below.    Urology Clinic      Name: Ledy Odonnell    MRN: [id number]   YOB: 1945  Accompanied at today's visit by:self                 Chief Complaint:   OAB          History of Present Illness:   May 4, 2023    HISTORY:   We have been following 77 year old Ledy Odonnell for OAB, urinary retention with chronic moctezuma placement, hx of UTIs. Of note, Dr. Wayne has followed patient in the past. Has hx of botox injections into bladder. Last botox injection was 100 units in 3/2021 with chronic moctezuma placement following botox due to noted elevated PVRs. Was having monthly moctezuma changes with home health. Last seen by Dr. Wayne on 8/24/22 for hx of UTIs and recent moctezuma removal. Typical UTI symptoms include intermittent burning with voiding. At that time had moctezuma cathter removed per patient's request and wanted to retry vesicare, so medication was ordered for 3 months with recommendation to f/u in 3 months. At that time Dr. Wayne did have a discussion in regards to large leakage could lead to tissue breakdown and recommended moctezuma placement. Patient wanted to continue with 3 month trial of vesicare 10mg. Here today to discuss urinary incontinence. Of note has failed oxybutynin in the past and Dr. Wayne has documented that myrbetriq is contraindicated for her. Patient describes LILIBETH throughout the day. Going through 2 depends in 24 hours. States she no longer attempts to get to the bathroom to void, due to having such a strong urge to void and her functional mobility in and out of her wheel chair has limited how quickly she can get to the bathroom and transfer to the toilet to void.  States she also has leakage when going from sitting to standing. Does not recall having botox injection into bladder. States none of the treatments in the past have worked for her urinary incontinence that she recalls. Per Dr. Wayne's notes, appears patient had good improvement of urinary incontinence, however it was her urinary retention that was the issue after last botox procedure. Patient not interested in further botox at this time. States when had the moctezuma in place had multiple UTIs, however since the moctezuma was removed she has not had issues with UTIs. Per chart review, no UTIs since 7/2022. Does report some burning with urination recently and unsure if this could be the start of a UTI. Denies any other UTI symptoms. Of note, her creatinine has increased. States she has her PCP monitoring her kidney function and plans to have repeat lab work in 3 months. Did have CT scan in 2019 showing left non-obstructing kidney stone and second kidney stone from previous imaging no longer visualized. Has hx of ETOH (states she no longer drinks alcohol), stroke, chornic pain syndrome, MDD with hx of hospitalization for acute encephalopathy and delusion, and current smoker with COPD. Patient voices no other concerns at this time.             Allergies:     Allergies   Allergen Reactions    No Known Allergies             Medications:     Current Outpatient Medications   Medication Sig    acetaminophen (TYLENOL) 500 MG tablet Take 2 tablets (1,000 mg) by mouth every 8 hours    albuterol (PROAIR HFA/PROVENTIL HFA/VENTOLIN HFA) 108 (90 Base) MCG/ACT inhaler Inhale 1-2 puffs into the lungs every 6 hours as needed for shortness of breath / dyspnea    buPROPion (WELLBUTRIN XL) 150 MG 24 hr tablet Take 1 tablet (150 mg) by mouth daily    dextromethorphan (DELSYM) 30 MG/5ML liquid Take 10 mLs (60 mg) by mouth 2 times daily as needed for cough (Patient not taking: Reported on 2/28/2023)    diclofenac (VOLTAREN) 1 % topical gel  Apply 4 g topically 4 times daily    DULoxetine (CYMBALTA) 60 MG capsule Take 1 capsule (60 mg) by mouth daily    estradiol (ESTRACE) 0.1 MG/GM vaginal cream Place 2 g vaginally three times a week (Patient not taking: Reported on 2/28/2023)    mirtazapine (REMERON) 15 MG tablet Take 1 tablet (15 mg) by mouth At Bedtime    multivitamin, therapeutic (THERA-VIT) TABS tablet Take 1 tablet by mouth daily    nystatin (MYCOSTATIN) 692351 UNIT/GM external cream Apply topically 2 times daily as needed for dry skin or other (rash)    nystatin (MYCOSTATIN) 403357 UNIT/GM external powder Apply 1 g topically 2 times daily as needed for other (rash)    order for DME Equipment being ordered: Wheelchair    [START ON 5/6/2023] oxyCODONE-acetaminophen (PERCOCET) 5-325 MG tablet Take 1 tablet by mouth every 6 hours as needed for severe pain Maximum 3 tablets daily as needed for severe pain.    senna-docusate (SENOKOT-S;PERICOLACE) 8.6-50 MG per tablet Take 1 tablet by mouth 2 times daily. (Patient taking differently: Take 1 tablet by mouth 2 times daily as needed)    tiZANidine (ZANAFLEX) 4 MG tablet Take 1 tablet (4 mg) by mouth 3 times daily as needed for muscle spasms    traZODone (DESYREL) 100 MG tablet Take 1-2 tablets (100-200 mg) by mouth At Bedtime    UNABLE TO FIND MEDICATION NAME: Probiotic Gummies     No current facility-administered medications for this visit.               Past  Surgical History:     Past Surgical History:   Procedure Laterality Date    HERNIORRHAPHY VENTRAL  5/14/2013    Procedure: HERNIORRHAPHY VENTRAL;  Open Ventral Hernia Repair;  Surgeon: Jhoan Rogers MD;  Location:  OR    [facility] HAND/FINGER SURGERY UNLISTED      [facility] NONSPECIFIC PROCEDURE  2001    Shoulder Surgery    [facility] NONSPECIFIC PROCEDURE  1975    Gastric Bypass    [facility] NONSPECIFIC PROCEDURE      Cholecystectomy    [facility] SHOULDER SURG PROC UNLISTED      [facility] TOTAL HIP ARTHROPLASTY  1/4/2011    Lt VALERIE             Physical Exam:   There were no  vitals filed for this visit.  PSYCH: NAD  EYES: EOMI  NEURO: AAO x3  : Vulva is normal in appearance. Urethra normal.. Negative for MAXIMILIAN with reduction of speculum when instructed to cough. Vaginal mucosa atrophic. No pain to palpation on internal or external exam. Cystocele grade 1.. Strength 1/5. No obvious masses, lesions, ulcers, bleeding noted on internal or external exam.      LABS:   PVR 64mL    UA RESULTS:  Recent Labs   Lab Test 05/04/23  1519 08/13/21  1157   COLOR Yellow Yellow   APPEARANCE Clear Slightly Cloudy*   URINEGLC Negative Negative   URINEBILI Negative Negative   URINEKETONE Negative Negative   SG 1.010 1.015   UBLD Small* Trace*   URINEPH 6.5 7.0   PROTEIN Trace* 30*   UROBILINOGEN 0.2 0.2   NITRITE Positive* Negative   LEUKEST Large* Large*   RBCU  --  0-2   WBCU  --  10-25*       Creatinine   Date Value Ref Range Status   02/28/2023 1.03 (H) 0.51 - 0.95 mg/dL Final   04/26/2019 0.59 0.52 - 1.04 mg/dL Final     CT abdomen/pelvis 5/20/19  FINDINGS:  Increased posterior lingula atelectasis. Mild dependent atelectasis in  lung bases. Mild esophageal wall thickening distally, nonspecific.     Cholecystectomy. Numerous calcifications in the liver and spleen  compatible with granulomatous disease. The common bile duct measures  up to 1 only 2 cm. This is similar to prior. Mild central intrahepatic  ductal dilation which also appears similar. Mild pancreatic atrophy  without focal abnormality. Thickening of the left adrenal gland, mild  and unchanged. Small renal hypodensities are too small to fully  characterize but likely benign. Nonobstructing lower pole  calcification in the left kidney measures up to 4 mm. The second  calcification present previously visualized is no longer seen.     Bladder uterus, and adnexa have unremarkable appearance. Postoperative  changes involving the stomach which appears similar to previous. Left  lower quadrant small bowel anastomosis again seen. No dilated  bowel  loops. No suspicious adenopathy in the abdomen or pelvis.  Atherosclerotic vascular calcifications involving the aortoiliac  system without aneurysm.     Left hip arthroplasty. Degenerative changes throughout the spine.                                                            IMPRESSION:   1. No acute intra-abdominal pathology.  2. Continued central biliary ductal dilation which is likely related  to prior cholecystectomy.  3. Nonobstructing left renal stone.          Assessment and Plan:   77 year old is a pleasant female who has hx of acute urinary retention following botox, LILIBETH, functional incontinence, pelvic floor dysfunction, vaginal atrophy, hx of UTIs, hx of kidney stones.     Plan:  - PVR WNL.   - UA concerning for infection; UC ordered/pending. Advise to go to ER if develops fevers/chills or blood in the urine.   - Patient requesting trailing a different medication. Discussed risks/benifits and potential side effects of Trospium 20mg IR BID.   - Offered referral to PFPT; patient declined.   - Discussed correlation between sleep apnea and nocturia/nocturnal enuresis. Patient declined referral for sleep study.  - Discussed concern for voiding in her depends and large incontinence in her depends could lead to tissue breakdown. Patient not interested in moctezuma placement.   - Patient has LILIBETH but unclear what is most bothersome. Also question if has ISD based on her history. Will have patient follow-up for VUDS and possible cysto with Dr. Ramirez same day to discuss possible surgical options. Based on patient's description of symptoms, question if may benefit from bulking agent vs third line options, though recommend VUDS first for further evaluation.  - Question if possible pessary placement with knob could help with MAXIMILIAN if declines surgical options.  - Discussed potential third line options and anti-incontinence procedures with patient today so she is aware of possible options pending VUDS results.   -  Discussed increased creatinine. Offered renal US, patient declined stating her PCP has been following her for this and plans to have repeat lab work in 3 months with PCP.   - continue estrogen cream.   - After discussing the assessment and plan with patient, patient verbalizes understanding and agrees to the above plan. All questions answered.       Other orders as below:  Orders Placed This Encounter   Procedures    Urinalysis Macroscopic     37 minutes spent on the date of the encounter doing chart review, review Dr. Wayne's notes, review botox procedure, review of test results, interpretation of tests, patient visit and documentation, exam, review of CT scan from 2019.      Layne Garzon PA-C  Urology  May 4, 2023      Patient Care Team:  Loulou Nguyen NP as PCP - General (Nurse Practitioner - Family)  Eagle Patel MD as MD (Internal Medicine)  Eagle Gomez MD as Referring Physician (Orthopedics)  Arcadio Daley MD as MD (Orthopedics)  Rex Woods MD as Assigned Musculoskeletal Provider  [facility] [[email or web address]] ([facility] ([facility] ([place]  Loluou Nguyen NP as Assigned PCP  Max Wayne MD as Assigned Surgical Provider  Loulou Nguyen NP as Assigned Pain Medication Provider  Layne Garzon PA-C as Physician Assistant  LOULOU NGUYEN

## 2023-05-04 NOTE — NURSING NOTE
Chief Complaint   Patient presents with     Overactivce Bladder     Patient state that she have leakage        Patient state that she have overactive bladder  Patient would like medication.  pvr done by Bladder scan  PVR 65ml    UA RESULTS:  Recent Labs   Lab Test 05/04/23  1519 08/13/21  1157   COLOR Yellow Yellow   APPEARANCE Clear Slightly Cloudy*   URINEGLC Negative Negative   URINEBILI Negative Negative   URINEKETONE Negative Negative   SG 1.010 1.015   UBLD Small* Trace*   URINEPH 6.5 7.0   PROTEIN Trace* 30*   UROBILINOGEN 0.2 0.2   NITRITE Positive* Negative   LEUKEST Large* Large*   RBCU  --  0-2   WBCU  --  10-25*     UA SENT FOR     LILI Aburto

## 2023-05-04 NOTE — PROGRESS NOTES
Urology Clinic      Name: Ledy Odonnell    MRN: [id number]   YOB: 1945  Accompanied at today's visit by:self                 Chief Complaint:   OAB          History of Present Illness:   May 4, 2023    HISTORY:   We have been following 77 year old Ledy Odonnell for OAB, urinary retention with chronic moctezuma placement, hx of UTIs. Of note, Dr. Wayne has followed patient in the past. Has hx of botox injections into bladder. Last botox injection was 100 units in 3/2021 with chronic moctezuma placement following botox due to noted elevated PVRs. Was having monthly moctezuma changes with home health. Last seen by Dr. Wayne on 8/24/22 for hx of UTIs and recent moctezuma removal. Typical UTI symptoms include intermittent burning with voiding. At that time had moctezuma cathter removed per patient's request and wanted to retry vesicare, so medication was ordered for 3 months with recommendation to f/u in 3 months. At that time Dr. Wayne did have a discussion in regards to large leakage could lead to tissue breakdown and recommended moctezuma placement. Patient wanted to continue with 3 month trial of vesicare 10mg. Here today to discuss urinary incontinence. Of note has failed oxybutynin in the past and Dr. Wayne has documented that myrbetriq is contraindicated for her. Patient describes LILIBETH throughout the day. Going through 2 depends in 24 hours. States she no longer attempts to get to the bathroom to void, due to having such a strong urge to void and her functional mobility in and out of her wheel chair has limited how quickly she can get to the bathroom and transfer to the toilet to void. States she also has leakage when going from sitting to standing. Does not recall having botox injection into bladder. States none of the treatments in the past have worked for her urinary incontinence that she recalls. Per Dr. Wayne's notes, appears patient had good improvement of urinary incontinence, however it  was her urinary retention that was the issue after last botox procedure. Patient not interested in further botox at this time. States when had the moctezuma in place had multiple UTIs, however since the moctezuma was removed she has not had issues with UTIs. Per chart review, no UTIs since 7/2022. Does report some burning with urination recently and unsure if this could be the start of a UTI. Denies any other UTI symptoms. Of note, her creatinine has increased. States she has her PCP monitoring her kidney function and plans to have repeat lab work in 3 months. Did have CT scan in 2019 showing left non-obstructing kidney stone and second kidney stone from previous imaging no longer visualized. Has hx of ETOH (states she no longer drinks alcohol), stroke, chornic pain syndrome, MDD with hx of hospitalization for acute encephalopathy and delusion, and current smoker with COPD. Patient voices no other concerns at this time.             Allergies:     Allergies   Allergen Reactions     No Known Allergies             Medications:     Current Outpatient Medications   Medication Sig     acetaminophen (TYLENOL) 500 MG tablet Take 2 tablets (1,000 mg) by mouth every 8 hours     albuterol (PROAIR HFA/PROVENTIL HFA/VENTOLIN HFA) 108 (90 Base) MCG/ACT inhaler Inhale 1-2 puffs into the lungs every 6 hours as needed for shortness of breath / dyspnea     buPROPion (WELLBUTRIN XL) 150 MG 24 hr tablet Take 1 tablet (150 mg) by mouth daily     dextromethorphan (DELSYM) 30 MG/5ML liquid Take 10 mLs (60 mg) by mouth 2 times daily as needed for cough (Patient not taking: Reported on 2/28/2023)     diclofenac (VOLTAREN) 1 % topical gel Apply 4 g topically 4 times daily     DULoxetine (CYMBALTA) 60 MG capsule Take 1 capsule (60 mg) by mouth daily     estradiol (ESTRACE) 0.1 MG/GM vaginal cream Place 2 g vaginally three times a week (Patient not taking: Reported on 2/28/2023)     mirtazapine (REMERON) 15 MG tablet Take 1 tablet (15 mg) by mouth At  Bedtime     multivitamin, therapeutic (THERA-VIT) TABS tablet Take 1 tablet by mouth daily     nystatin (MYCOSTATIN) 838328 UNIT/GM external cream Apply topically 2 times daily as needed for dry skin or other (rash)     nystatin (MYCOSTATIN) 536280 UNIT/GM external powder Apply 1 g topically 2 times daily as needed for other (rash)     order for DME Equipment being ordered: Wheelchair     [START ON 5/6/2023] oxyCODONE-acetaminophen (PERCOCET) 5-325 MG tablet Take 1 tablet by mouth every 6 hours as needed for severe pain Maximum 3 tablets daily as needed for severe pain.     senna-docusate (SENOKOT-S;PERICOLACE) 8.6-50 MG per tablet Take 1 tablet by mouth 2 times daily. (Patient taking differently: Take 1 tablet by mouth 2 times daily as needed)     tiZANidine (ZANAFLEX) 4 MG tablet Take 1 tablet (4 mg) by mouth 3 times daily as needed for muscle spasms     traZODone (DESYREL) 100 MG tablet Take 1-2 tablets (100-200 mg) by mouth At Bedtime     UNABLE TO FIND MEDICATION NAME: Probiotic Gummies     No current facility-administered medications for this visit.               Past  Surgical History:     Past Surgical History:   Procedure Laterality Date     HERNIORRHAPHY VENTRAL  5/14/2013    Procedure: HERNIORRHAPHY VENTRAL;  Open Ventral Hernia Repair;  Surgeon: Jhoan Rogers MD;  Location:  OR     [facility] HAND/FINGER SURGERY UNLISTED       [facility] NONSPECIFIC PROCEDURE  2001    Shoulder Surgery     [facility] NONSPECIFIC PROCEDURE  1975    Gastric Bypass     [facility] NONSPECIFIC PROCEDURE      Cholecystectomy     [facility] SHOULDER SURG PROC UNLISTED       [facility] TOTAL HIP ARTHROPLASTY  1/4/2011    Lt VALERIE             Physical Exam:   There were no vitals filed for this visit.  PSYCH: NAD  EYES: EOMI  NEURO: AAO x3  : Vulva is normal in appearance. Urethra normal.. Negative for MAXIMILIAN with reduction of speculum when instructed to cough. Vaginal mucosa atrophic. No pain to palpation on internal or external exam. Cystocele grade 1.. Strength  1/5. No obvious masses, lesions, ulcers, bleeding noted on internal or external exam.      LABS:   PVR 64mL    UA RESULTS:  Recent Labs   Lab Test 05/04/23  1519 08/13/21  1157   COLOR Yellow Yellow   APPEARANCE Clear Slightly Cloudy*   URINEGLC Negative Negative   URINEBILI Negative Negative   URINEKETONE Negative Negative   SG 1.010 1.015   UBLD Small* Trace*   URINEPH 6.5 7.0   PROTEIN Trace* 30*   UROBILINOGEN 0.2 0.2   NITRITE Positive* Negative   LEUKEST Large* Large*   RBCU  --  0-2   WBCU  --  10-25*       Creatinine   Date Value Ref Range Status   02/28/2023 1.03 (H) 0.51 - 0.95 mg/dL Final   04/26/2019 0.59 0.52 - 1.04 mg/dL Final     CT abdomen/pelvis 5/20/19  FINDINGS:  Increased posterior lingula atelectasis. Mild dependent atelectasis in  lung bases. Mild esophageal wall thickening distally, nonspecific.     Cholecystectomy. Numerous calcifications in the liver and spleen  compatible with granulomatous disease. The common bile duct measures  up to 1 only 2 cm. This is similar to prior. Mild central intrahepatic  ductal dilation which also appears similar. Mild pancreatic atrophy  without focal abnormality. Thickening of the left adrenal gland, mild  and unchanged. Small renal hypodensities are too small to fully  characterize but likely benign. Nonobstructing lower pole  calcification in the left kidney measures up to 4 mm. The second  calcification present previously visualized is no longer seen.     Bladder uterus, and adnexa have unremarkable appearance. Postoperative  changes involving the stomach which appears similar to previous. Left  lower quadrant small bowel anastomosis again seen. No dilated bowel  loops. No suspicious adenopathy in the abdomen or pelvis.  Atherosclerotic vascular calcifications involving the aortoiliac  system without aneurysm.     Left hip arthroplasty. Degenerative changes throughout the spine.                                                            IMPRESSION:   1. No  acute intra-abdominal pathology.  2. Continued central biliary ductal dilation which is likely related  to prior cholecystectomy.  3. Nonobstructing left renal stone.          Assessment and Plan:   77 year old is a pleasant female who has hx of acute urinary retention following botox, LILIBETH, functional incontinence, pelvic floor dysfunction, vaginal atrophy, hx of UTIs, hx of kidney stones.     Plan:  - PVR WNL.   - UA concerning for infection; UC ordered/pending. Advise to go to ER if develops fevers/chills or blood in the urine.   - Patient requesting trailing a different medication. Discussed risks/benifits and potential side effects of Trospium 20mg IR BID.   - Offered referral to PFPT; patient declined.   - Discussed correlation between sleep apnea and nocturia/nocturnal enuresis. Patient declined referral for sleep study.  - Discussed concern for voiding in her depends and large incontinence in her depends could lead to tissue breakdown. Patient not interested in moctezuma placement.   - Patient has LILIBETH but unclear what is most bothersome. Also question if has ISD based on her history. Will have patient follow-up for VUDS and possible cysto with Dr. Ramirez same day to discuss possible surgical options. Based on patient's description of symptoms, question if may benefit from bulking agent vs third line options, though recommend VUDS first for further evaluation.  - Question if possible pessary placement with knob could help with MAXIMILIAN if declines surgical options.  - Discussed potential third line options and anti-incontinence procedures with patient today so she is aware of possible options pending VUDS results.   - Discussed increased creatinine. Offered renal US, patient declined stating her PCP has been following her for this and plans to have repeat lab work in 3 months with PCP.   - continue estrogen cream.   - After discussing the assessment and plan with patient, patient verbalizes understanding and agrees to the  above plan. All questions answered.       Other orders as below:  Orders Placed This Encounter   Procedures     Urinalysis Macroscopic     37 minutes spent on the date of the encounter doing chart review, review Dr. Wayne's notes, review botox procedure, review of test results, interpretation of tests, patient visit and documentation, exam, review of CT scan from 2019.      Layne Garzon PA-C  Urology  May 4, 2023      Patient Care Team:  Loulou Ngueyn NP as PCP - General (Nurse Practitioner - Family)  Eagle Patel MD as MD (Internal Medicine)  Patricia, Eagle Crane MD as Referring Physician (Orthopedics)  Arcadio Daley MD as MD (Orthopedics)  Rex Woods MD as Assigned Musculoskeletal Provider  [facility] ([facility] ([facility]  Loulou Nguyen NP as Assigned PCP  Max Wayne MD as Assigned Surgical Provider  Loulou Nguyen NP as Assigned Pain Medication Provider  Layne Garzon PA-C as Physician Assistant  LOULOU NGUYEN

## 2023-05-05 NOTE — TELEPHONE ENCOUNTER
[facility] Call Center    Phone Message    May a detailed message be left on voicemail: yes     Reason for Call: Other: Patient would like to speak with you, she has a few questions she would like to talk to you about.  Please call back.  Thank you.     Action Taken: Other: [id number]    Travel Screening: Not Applicable

## 2023-05-08 NOTE — TELEPHONE ENCOUNTER
Spoke with patient.  She is scheduled for multiple cortisone injections tomorrow with Dr Woods and has a question for him.    Patient's left leg is basically immobile and has a large ulcer on the foot from using it to balance when she transfers, stands temporarily etc.    She is wondering if Dr Woods has an idea for what she can do for this foot to offload some of the pressure so the ulcer can heal.  She will plan on discussing with him in clinic tomorrow, but wanted to make him aware of the challenge she is experiencing.    Wanda Calhoun RN

## 2023-05-08 NOTE — TELEPHONE ENCOUNTER
RN left message for patient to return call to clinic.  Provided call back number of [phone] to return call today until 3:30.  Directed to call main line at [phone] if returning call after that time.    Wanda Calhoun RN

## 2023-05-09 NOTE — PROGRESS NOTES
Large Joint Injection/Arthocentesis: bilateral subacromial bursa    Date/Time: 5/9/2023 1:24 PM    Performed by: Wolfgang Angeles  Authorized by: Rex Woods MD    Indications:  Pain and osteoarthritis  Needle Size:  22 G  Guidance: landmark guided    Approach:  Lateral  Location:  Shoulder  Laterality:  Bilateral      Site:  Bilateral subacromial bursa  Medications (Right):  80 mg methylPREDNISolone 80 MG/ML; 4 mL lidocaine 1 %  Medications (Left):  80 mg methylPREDNISolone 80 MG/ML; 4 mL lidocaine 1 %  Outcome:  Tolerated well, no immediate complications  Procedure discussed: discussed risks, benefits, and alternatives    Consent Given by:  Patient  Timeout: timeout called immediately prior to procedure    Prep: patient was prepped and draped in usual sterile fashion    Large Joint Injection/Arthocentesis: R knee joint    Date/Time: 5/9/2023 1:24 PM    Performed by: Wolfgang Angeles  Authorized by: Rex Woods MD    Indications:  Pain and osteoarthritis  Needle Size:  22 G  Guidance: landmark guided    Approach:  Anterolateral  Location:  Knee      Medications:  80 mg methylPREDNISolone 80 MG/ML; 4 mL lidocaine (PF) 1 %  Outcome:  Tolerated well, no immediate complications  Procedure discussed: discussed risks, benefits, and alternatives    Consent Given by:  Patient  Timeout: timeout called immediately prior to procedure    Prep: patient was prepped and draped in usual sterile fashion

## 2023-05-09 NOTE — PATIENT INSTRUCTIONS
AFTER VISIT SUMMARY    [facility] CORTISONE Injection Discharge Instructions    You may shower, however avoid swimming, tub baths or hot tubs for 24 hours following your procedure    You may have a mild to moderate increase in pain for several days following the injection.    It may take up to 14 days for the steroid medication to start working although you may feel the effect as early as a few days after the procedure.    You may use ice packs for 10-15 minutes, 3 to 4 times a day at the injection site for comfort    You may use anti-inflammatory medications (such as Ibuprofen or Aleve or Advil) or Tylenol for pain control if necessary    If you were fasting, you may resume your normal diet and medications after the procedure    If you have diabetes, check your blood sugar more frequently than usual as your blood sugar may be higher than normal for 10-14 days following a steroid injection. Contact your doctor who manages your diabetes if your blood sugar is higher than usual      If you experience any of the following, call [facility] [phone]  -Fever over 100 degree F  -Swelling, bleeding, redness, drainage, warmth at the injection site  - New or worsening pain

## 2023-05-09 NOTE — PROGRESS NOTES
SUBJECTIVE:  Ledy Odonnell is a 74 year old female who is seen in follow-up for bilateral shoulder rotator cuff tear arthropathy..  Right knee severe osteoarthritis.    She is wheel chair dependant and cannot function during recovery time for a rotator cuff repair or RTSA. She is being treated non-operatively. She has  trouble wheeling with her wheel chair due to significant shoulder pains and hand deformities. She uses her Sheree scooter, which has made a huge difference in her life.  She has a ramp at her house that works as well.   The right knee is also very painful as well..  She has mild osteoarthritis in the knee as of the 2016 xrays, but is probably worse now.  Surgery on any of her joints is not an option in her mind..    Also multiple other joint pain complaints--hands and severely deformed left foot and ankle as well. Neurologic issues in right foot as well.  She wants an idea how to offload the left foot to allow the ulcer to heal. She weight bears on her talar dome almost  She goes to the wound clinic for the ulcer.     Corticosteroid injections last visit:  10/11/22, 9/8/21.  Amount of relief: very good.  Length of time of relief  4- 6 weeks for bilateral shoulders and right knee  .  Doing therapy/exercises: (y/n): yes occasionally.    Bilateral shoulders with severe pain with movements.    GENERAL: healthy, alert and no distress  GAIT: normal   SKIN: no suspicious lesions or rashes  NEURO: Normal strength and tone, mentation intact and speech normal  VASCULAR:  normal pulses and cappillary refill   PSYCH:  mentation appears normal and affect normal/bright    SHOULDER:  Shoulder Inspection: no swelling, bruising, discoloration, or obvious deformity or asymmetry  marked muscle atrophy of rotator cuff     Range of Motion:   Active:forward flexion 50 degrees bilateral    Right knee:  Some valgus  Moderate effusion  Range of motion 0-120   Diffuse tenderness.    Foot:4-5 cm ulcer on the dorso-lateral  ankle/hindfoot. Some purulence noted.     Xrays :2/21/18 bilateral shoulders: Complete loss of the acromiohumeral interval bilaterally  compatible with chronic rotator cuff tear. Degenerative change at the  glenohumeral joints. No acute fracture or acute malalignment.     Impression:   Encounter Diagnoses   Name Primary?     Rotator cuff arthropathy of both shoulders Yes     Primary osteoarthritis of right knee      Peripheral polyneuropathy      Congenital talipes equinovarus, unspecified laterality      Ulcer of foot, chronic, left, with unspecified severity (H)         PLAN: surgery is not an option in her mind, not even for her deformed left ankle/foot which has a chronic large ulcer on it, where a BKA has been discussed.   I suggested a knee crutch to offload the left foot.  Continued follow up with wound care clinic.    With the patient's consent, right knee(s) injected intra-articularly with 80mg of Depomedrol and 4cc of local anesthetic after sterile prep.  Procedure Note:   Informed consent obtained. Risks, benefits and complications of the injection were discussed with the patient and the patient elected to proceed. Bilateral shoulder injected into the subacromial space after sterile prep, using 80mg Depomedrol and 4cc local anesthetic.    Follow up as needed     SALINAS Woods MD  Dept. Orthopedic Surgery  [facility]

## 2023-05-09 NOTE — LETTER
5/9/2023         RE: Ledy Odonnell  [address]  [place] [address]        Dear Colleague,    Thank you for referring your patient, Ledy Odonnell, to the [facility]. Please see a copy of my visit note below.    SUBJECTIVE:  Ledy Odonnell is a 74 year old female who is seen in follow-up for bilateral shoulder rotator cuff tear arthropathy..  Right knee severe osteoarthritis.    She is wheel chair dependant and cannot function during recovery time for a rotator cuff repair or RTSA. She is being treated non-operatively. She has  trouble wheeling with her wheel chair due to significant shoulder pains and hand deformities. She uses her Sheree scooter, which has made a huge difference in her life.  She has a ramp at her house that works as well.   The right knee is also very painful as well..  She has mild osteoarthritis in the knee as of the 2016 xrays, but is probably worse now.  Surgery on any of her joints is not an option in her mind..    Also multiple other joint pain complaints--hands and severely deformed left foot and ankle as well. Neurologic issues in right foot as well.  She wants an idea how to offload the left foot to allow the ulcer to heal. She weight bears on her talar dome almost  She goes to the wound clinic for the ulcer.     Corticosteroid injections last visit:  10/11/22, 9/8/21.  Amount of relief: very good.  Length of time of relief  4- 6 weeks for bilateral shoulders and right knee  .  Doing therapy/exercises: (y/n): yes occasionally.    Bilateral shoulders with severe pain with movements.    GENERAL: healthy, alert and no distress  GAIT: normal   SKIN: no suspicious lesions or rashes  NEURO: Normal strength and tone, mentation intact and speech normal  VASCULAR:  normal pulses and cappillary refill   PSYCH:  mentation appears normal and affect normal/bright    SHOULDER:  Shoulder Inspection: no swelling, bruising, discoloration, or obvious  deformity or asymmetry  marked muscle atrophy of rotator cuff     Range of Motion:   Active:forward flexion 50 degrees bilateral    Right knee:  Some valgus  Moderate effusion  Range of motion 0-120   Diffuse tenderness.    Foot:4-5 cm ulcer on the dorso-lateral ankle/hindfoot. Some purulence noted.     Xrays :2/21/18 bilateral shoulders: Complete loss of the acromiohumeral interval bilaterally  compatible with chronic rotator cuff tear. Degenerative change at the  glenohumeral joints. No acute fracture or acute malalignment.     Impression:   Encounter Diagnoses   Name Primary?     Rotator cuff arthropathy of both shoulders Yes     Primary osteoarthritis of right knee      Peripheral polyneuropathy      Congenital talipes equinovarus, unspecified laterality      Ulcer of foot, chronic, left, with unspecified severity (H)         PLAN: surgery is not an option in her mind, not even for her deformed left ankle/foot which has a chronic large ulcer on it, where a BKA has been discussed.   I suggested a knee crutch to offload the left foot.  Continued follow up with wound care clinic.    With the patient's consent, right knee(s) injected intra-articularly with 80mg of Depomedrol and 4cc of local anesthetic after sterile prep.  Procedure Note:   Informed consent obtained. Risks, benefits and complications of the injection were discussed with the patient and the patient elected to proceed. Bilateral shoulder injected into the subacromial space after sterile prep, using 80mg Depomedrol and 4cc local anesthetic.    Follow up as needed     SALINAS Woods MD  Dept. Orthopedic Surgery  [facility]          Large Joint Injection/Arthocentesis: bilateral subacromial bursa    Date/Time: 5/9/2023 1:24 PM    Performed by: Wolfgang Angeles  Authorized by: Rex Woods MD    Indications:  Pain and osteoarthritis  Needle Size:  22 G  Guidance: landmark guided    Approach:  Lateral  Location:   Shoulder  Laterality:  Bilateral      Site:  Bilateral subacromial bursa  Medications (Right):  80 mg methylPREDNISolone 80 MG/ML; 4 mL lidocaine 1 %  Medications (Left):  80 mg methylPREDNISolone 80 MG/ML; 4 mL lidocaine 1 %  Outcome:  Tolerated well, no immediate complications  Procedure discussed: discussed risks, benefits, and alternatives    Consent Given by:  Patient  Timeout: timeout called immediately prior to procedure    Prep: patient was prepped and draped in usual sterile fashion    Large Joint Injection/Arthocentesis: R knee joint    Date/Time: 5/9/2023 1:24 PM    Performed by: Wolfgang Angeles  Authorized by: Rex Woods MD    Indications:  Pain and osteoarthritis  Needle Size:  22 G  Guidance: landmark guided    Approach:  Anterolateral  Location:  Knee      Medications:  80 mg methylPREDNISolone 80 MG/ML; 4 mL lidocaine (PF) 1 %  Outcome:  Tolerated well, no immediate complications  Procedure discussed: discussed risks, benefits, and alternatives    Consent Given by:  Patient  Timeout: timeout called immediately prior to procedure    Prep: patient was prepped and draped in usual sterile fashion              Again, thank you for allowing me to participate in the care of your patient.        Sincerely,        Rex Woods MD

## 2023-05-18 NOTE — TELEPHONE ENCOUNTER
General Call      Reason for Call:  Fax    What are your questions or concerns:  Jaylen from [facility] is calling to get a form faxed over to them. She said that it would have been filled out over a month ago and returned to the clinic but they will need it faxed to them at [phone].     Date of last appointment with provider: 02/28    Okay to leave a detailed message?: Yes at Other phone number:  [phone]

## 2023-05-18 NOTE — TELEPHONE ENCOUNTER
TC called patient back   No answer VM message left to call clinic      Bertha Camacho    [facility]

## 2023-05-30 NOTE — TELEPHONE ENCOUNTER
Routing to provider    Pt calling for refill of percocet     RN pended if agreeable.    Heidi Caldwell RN

## 2023-05-31 NOTE — TELEPHONE ENCOUNTER
Forms received from [facility]  for Loulou VELA.  Forms placed in provider 'sign me' folder.  Please fax forms to [phone] after completion.      Bertha Camacho    [facility]

## 2023-06-01 NOTE — TELEPHONE ENCOUNTER
The form needs to be filled out to continue to receive services through [facility].    They are unable to accept form from back in march     Would you like for me to  Print that form and change the date or would you like to refill out the form ?

## 2023-06-01 NOTE — TELEPHONE ENCOUNTER
Already did this same form please see 3/21/23 and it is scanned in media under 3/20/23.    Loulou Ashley, MARLENE, APRN, CNP

## 2023-06-07 NOTE — LETTER
6/7/2023         RE: Ledy Odonnell  [address]  [place] [address]        Dear Colleague,    Thank you for referring your patient, Ledy Odonnell, to the [facility]. Please see a copy of my visit note below.    Past Medical History:   Diagnosis Date     Acute blood loss anemia 9/5/2014     Closed left subtrochanteric femur fracture (H) 9/5/2014     Depressive disorder, not elsewhere classified      Femur fracture (H) 10/23/2014     GENERALIZED ANXIETY DIS 6/21/2007     Inflammatory arthritis      Multiple fractures of ribs of right side 3/7/2017     Osteoarthrosis, unspecified whether generalized or localized, unspecified site      Other and unspecified alcohol dependence, unspecified drinking behavior      Pneumothorax 3/8/2017     Unspecified essential hypertension      Patient Active Problem List   Diagnosis     Esophageal reflux     Primary insomnia     FAMILY HX DIABETES MELLITUS brother     Tobacco use disorder     Obesity     Generalized anxiety disorder     Major depressive disorder, single episode     COPD (chronic obstructive pulmonary disease) (H)     Elevated fasting glucose     Chronic pain     Neuropathy, peripheral     Hyperlipidemia LDL goal <130     Degenerative arthritis of hip     Hip arthrosis - left, severe     Trochanteric bursitis     Status post hip replacement     Advanced directives, counseling/discussion     Gastroparesis     DDD (degenerative disc disease), lumbar     Delayed gastric emptying     Health Care Home     Candidal intertrigo     Ventral hernia     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     Urinary incontinence, mixed     Pulmonary nodules     Chronic left hip pain     Chronic indwelling Welch catheter     Erythrocytosis     Nicotine dependence     Abdominal pain     Foot ulcer, left, with fat layer exposed (H)     Alcohol dependence in remission (H)     Pressure injury of left foot, stage 3 (H)     Past Surgical  History:   Procedure Laterality Date     HERNIORRHAPHY VENTRAL  5/14/2013    Procedure: HERNIORRHAPHY VENTRAL;  Open Ventral Hernia Repair;  Surgeon: Jhona Rogers MD;  Location:  OR     [facility] HAND/FINGER SURGERY UNLISTED       [facility] NONSPECIFIC PROCEDURE  2001    Shoulder Surgery     [facility] NONSPECIFIC PROCEDURE  1975    Gastric Bypass     [facility] NONSPECIFIC PROCEDURE      Cholecystectomy     [facility] SHOULDER SURG PROC UNLISTED       [facility] TOTAL HIP ARTHROPLASTY  1/4/2011    Lt VALERIE     Social History     Socioeconomic History     Marital status:      Spouse name: Not on file     Number of children: Not on file     Years of education: Not on file     Highest education level: Not on file   Occupational History     Not on file   Tobacco Use     Smoking status: Every Day     Packs/day: 0.25     Years: 43.00     Pack years: 10.75     Types: Cigarettes     Smokeless tobacco: Never     Tobacco comments:     5 ciggs per day   Vaping Use     Vaping status: Never Used     Passive vaping exposure: Yes   Substance and Sexual Activity     Alcohol use: No     Alcohol/week: 0.0 standard drinks of alcohol     Drug use: No     Sexual activity: Never   Other Topics Concern     Parent/sibling w/ CABG, MI or angioplasty before 65F 55M? No   Social History Narrative     Not on file     Social Determinants of Health     Financial Resource Strain: Not on file   Food Insecurity: Not on file   Transportation Needs: Not on file   Physical Activity: Not on file   Stress: Not on file   Social Connections: Not on file   Intimate Partner Violence: Not on file   Housing Stability: Not on file     Family History   Problem Relation Age of Onset     Cancer Father         pancreatic     Heart Disease Mother         Unknown specifics     Dementia Mother      Arthritis Paternal Grandmother         RA     Dementia Maternal Grandmother      Lab Results   Component Value Date    A1C 5.6 02/28/2023    A1C 5.3 07/30/2014    A1C 5.8 08/12/2010    A1C 6.3  03/01/2010    A1C 6.4 09/15/2008     Last Comprehensive Metabolic Panel:  Lab Results   Component Value Date     02/28/2023    POTASSIUM 5.3 02/28/2023    CHLORIDE 100 02/28/2023    CO2 27 02/28/2023    ANIONGAP 12 02/28/2023    GLC 92 02/28/2023    BUN 10.0 02/28/2023    CR 1.03 (H) 02/28/2023    GFRESTIMATED 56 (L) 02/28/2023    SCARLET 9.8 02/28/2023         Subjective findings- 77-year-old presents for left dorsal lateral foot ulcer.  She relates has been present for 2 years duration.  Relates that she has seen 9 physicians for this and the option she has was to have an amputation, which she does not want.  Relates she was seen in wound clinic, she has done several different dressings and they have debrided the ulcer in the past.  Relates home therapy nurse was coming out as well, they are not coming out currently.  Relates that the ulcer hurts, relates she has clubfoot, she uses a wheelchair, she is not walking on it currently, denies any nausea, vomiting, fever, or chills, relates it does drain quite a bit, she is also used calcium alginate type dressing in the past and relates that dressing worked pretty well, does not think she has been on antibiotics for this, she was on antibiotics for bladder infection about 1 month ago.    Objective findings- DP and PT are 2 out of 4 left, she has a cavovarus clubfoot left foot.  Dorsal lateral left foot ulcer that measures 3.5 x 3 cm, there is surrounding erythema and edema, serosanguineous drainage, no odor, no calor, she relates there is pain at the ulcer site, reviewed 4/2/2022 ABIs as noted in the EMR, also reviewed 8/21/2018 x-rays of the left foot.    Assessment and plan- Ulcer dorsal lateral  Left foot, Equinovarus deformity, she also has peripheral neuropathy and vascular disease present.  Diagnosis and treatment options discussed with the patient.  Local local wound care done upon consent today, we cleaned the left dorsal lateral foot ulcer with wound Vashe  applied Hydrofera Blue and offloading dressing.  Clean this twice weekly and as needed for drainage with wound Vashe, apply Hydrofera Blue and a sterile offloading dressing to the left foot to take pressure off this.  She is using a wheelchair, so she is nonweightbearing on this.  Prescription for Keflex given for the signs of infection and use discussed with her.  I discussed with her offloading with a hard or soft cast and she was willing to do this but she relates she has to figure out her transportation because it is difficult for her to get a ride to clinic.  We sent a referral to  to see if they can help her with transportation.  Will see if we can coordinate with Orthotics and prosthetics to see her while she sees me in clinic to make a custom offloading device.  Follow-up in 2 to 3 weeks as scheduled.  Previous notes reviewed.                  Moderate to high level of medical decision making.    Again, thank you for allowing me to participate in the care of your patient.        Sincerely,        Vlad Watson DPM

## 2023-06-07 NOTE — PROGRESS NOTES
Past Medical History:   Diagnosis Date     Acute blood loss anemia 9/5/2014     Closed left subtrochanteric femur fracture (H) 9/5/2014     Depressive disorder, not elsewhere classified      Femur fracture (H) 10/23/2014     GENERALIZED ANXIETY DIS 6/21/2007     Inflammatory arthritis      Multiple fractures of ribs of right side 3/7/2017     Osteoarthrosis, unspecified whether generalized or localized, unspecified site      Other and unspecified alcohol dependence, unspecified drinking behavior      Pneumothorax 3/8/2017     Unspecified essential hypertension      Patient Active Problem List   Diagnosis     Esophageal reflux     Primary insomnia     FAMILY HX DIABETES MELLITUS brother     Tobacco use disorder     Obesity     Generalized anxiety disorder     Major depressive disorder, single episode     COPD (chronic obstructive pulmonary disease) (H)     Elevated fasting glucose     Chronic pain     Neuropathy, peripheral     Hyperlipidemia LDL goal <130     Degenerative arthritis of hip     Hip arthrosis - left, severe     Trochanteric bursitis     Status post hip replacement     Advanced directives, counseling/discussion     Gastroparesis     DDD (degenerative disc disease), lumbar     Delayed gastric emptying     Health Care Home     Candidal intertrigo     Ventral hernia     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     Urinary incontinence, mixed     Pulmonary nodules     Chronic left hip pain     Chronic indwelling Welch catheter     Erythrocytosis     Nicotine dependence     Abdominal pain     Foot ulcer, left, with fat layer exposed (H)     Alcohol dependence in remission (H)     Pressure injury of left foot, stage 3 (H)     Past Surgical History:   Procedure Laterality Date     HERNIORRHAPHY VENTRAL  5/14/2013    Procedure: HERNIORRHAPHY VENTRAL;  Open Ventral Hernia Repair;  Surgeon: Jhoan Rogers MD;  Location:  OR     [facility] HAND/FINGER SURGERY UNLISTED       [facility] NONSPECIFIC PROCEDURE   2001    Shoulder Surgery     [facility] NONSPECIFIC PROCEDURE  1975    Gastric Bypass     [facility] NONSPECIFIC PROCEDURE      Cholecystectomy     [facility] SHOULDER SURG PROC UNLISTED       [facility] TOTAL HIP ARTHROPLASTY  1/4/2011    Lt VALERIE     Social History     Socioeconomic History     Marital status:      Spouse name: Not on file     Number of children: Not on file     Years of education: Not on file     Highest education level: Not on file   Occupational History     Not on file   Tobacco Use     Smoking status: Every Day     Packs/day: 0.25     Years: 43.00     Pack years: 10.75     Types: Cigarettes     Smokeless tobacco: Never     Tobacco comments:     5 ciggs per day   Vaping Use     Vaping status: Never Used     Passive vaping exposure: Yes   Substance and Sexual Activity     Alcohol use: No     Alcohol/week: 0.0 standard drinks of alcohol     Drug use: No     Sexual activity: Never   Other Topics Concern     Parent/sibling w/ CABG, MI or angioplasty before 65F 55M? No   Social History Narrative     Not on file     Social Determinants of Health     Financial Resource Strain: Not on file   Food Insecurity: Not on file   Transportation Needs: Not on file   Physical Activity: Not on file   Stress: Not on file   Social Connections: Not on file   Intimate Partner Violence: Not on file   Housing Stability: Not on file     Family History   Problem Relation Age of Onset     Cancer Father         pancreatic     Heart Disease Mother         Unknown specifics     Dementia Mother      Arthritis Paternal Grandmother         RA     Dementia Maternal Grandmother      Lab Results   Component Value Date    A1C 5.6 02/28/2023    A1C 5.3 07/30/2014    A1C 5.8 08/12/2010    A1C 6.3 03/01/2010    A1C 6.4 09/15/2008     Last Comprehensive Metabolic Panel:  Lab Results   Component Value Date     02/28/2023    POTASSIUM 5.3 02/28/2023    CHLORIDE 100 02/28/2023    CO2 27 02/28/2023    ANIONGAP 12 02/28/2023    GLC 92 02/28/2023    BUN  10.0 02/28/2023    CR 1.03 (H) 02/28/2023    GFRESTIMATED 56 (L) 02/28/2023    SCARLET 9.8 02/28/2023         Subjective findings- 77-year-old presents for left dorsal lateral foot ulcer.  She relates has been present for 2 years duration.  Relates that she has seen 9 physicians for this and the option she has was to have an amputation, which she does not want.  Relates she was seen in wound clinic, she has done several different dressings and they have debrided the ulcer in the past.  Relates home therapy nurse was coming out as well, they are not coming out currently.  Relates that the ulcer hurts, relates she has clubfoot, she uses a wheelchair, she is not walking on it currently, denies any nausea, vomiting, fever, or chills, relates it does drain quite a bit, she is also used calcium alginate type dressing in the past and relates that dressing worked pretty well, does not think she has been on antibiotics for this, she was on antibiotics for bladder infection about 1 month ago.    Objective findings- DP and PT are 2 out of 4 left, she has a cavovarus clubfoot left foot.  Dorsal lateral left foot ulcer that measures 3.5 x 3 cm, there is surrounding erythema and edema, serosanguineous drainage, no odor, no calor, she relates there is pain at the ulcer site, reviewed 4/2/2022 ABIs as noted in the EMR, also reviewed 8/21/2018 x-rays of the left foot.    Assessment and plan- Ulcer dorsal lateral  Left foot, Equinovarus deformity, she also has peripheral neuropathy and vascular disease present.  Diagnosis and treatment options discussed with the patient.  Local local wound care done upon consent today, we cleaned the left dorsal lateral foot ulcer with wound Vashe applied Hydrofera Blue and offloading dressing.  Clean this twice weekly and as needed for drainage with wound Vashe, apply Hydrofera Blue and a sterile offloading dressing to the left foot to take pressure off this.  She is using a wheelchair, so she is  nonweightbearing on this.  Prescription for Keflex given for the signs of infection and use discussed with her.  I discussed with her offloading with a hard or soft cast and she was willing to do this but she relates she has to figure out her transportation because it is difficult for her to get a ride to clinic.  We sent a referral to  to see if they can help her with transportation.  Will see if we can coordinate with Orth[facility]hetics to see her while she sees me in clinic to make a custom offloading device.  Follow-up in 2 to 3 weeks as scheduled.  Previous notes reviewed.                  Moderate to high level of medical decision making.

## 2023-06-07 NOTE — NURSING NOTE
Ledy Odonnell's chief complaint for this visit includes:  Chief Complaint   Patient presents with     Consult For     Left club foot; wound over anterior? aspect.      PCP: Loulou Ashley    Referring Provider:  Rex Woods MD  [id number] [facility]  [place] [id number]    There were no vitals taken for this visit.  Data Unavailable     Do you need any medication refills at today's visit? NO    Allergies   Allergen Reactions     No Known Allergies        Riya Rosario, ATC

## 2023-06-07 NOTE — TELEPHONE ENCOUNTER
Patient called nurse line and reports that her overactive bladder symptoms are still present. She would like to increase the dose if possible. Will send message to CAYETANO Odonnell LPN

## 2023-06-07 NOTE — PATIENT INSTRUCTIONS
Rewrap 1-2 times weekly and as needed for drainage. Clean with wound vashe (blue bottle), dry with gauze. Wet the hydrofera blue (the blue pad) with the wound vashe and squeeze until damp. Put hydrofera blue on wound with a gauze over the top. Put gauze padding on bottom of foot in front of and behind the ulcer - offloading the ulcer site- to help pad and wrap with kerlix, coban, and ace wrap.       Thanks for coming today.  Ortho[facility] Medicine Clinic  [address] [place] [address]    To schedule future appointments in Ortho Clinic, you may call [phone].    To schedule ordered imaging or an injection ordered by your provider:  Call Central Imaging Injection scheduling line: [phone]    MyChart available online at:  [email or web address]    Please call if any further questions or concerns ([phone]).  Clinic hours 8 am to 5 pm.    Return to clinic (call) if symptoms worsen or fail to improve.

## 2023-06-08 NOTE — TELEPHONE ENCOUNTER
"Per PA- \"Rx for trospium 60mg daily [facility]. Please notify patient that the trospium is now just once daily instead of the 20 BID that she was taking. Advise not to take the 60mg ER BID but only daily. \"    Called patient and informed that this is now a one daily pill of a 60mg extended release.     Ellie Odonnell LPN         "

## 2023-06-08 NOTE — PROGRESS NOTES
Social Work Telephone Note  [facility]     Patient Name:  Ledy Odonnell  /Age:  1945 (77 year old)    Referral Source: [facility] - podiatry  Reason for Referral:  transportation     attempted to contact Patient via telephone on 23. Attempted to connect with patient following a referral pertaining to transportation following a podiatry appointment. Left a message providing writer contact information encouraging a return call.  will await Patient's return phone call and will provide assistance at that time.          RAYNE Sánchez, [facility]    [facility]  [phone]  [email or web address]

## 2023-06-08 NOTE — PROGRESS NOTES
[facility]/Voicemail       Clinical Data: Care Coordinator Outreach  Outreach attempted x 1.  Left message on patient's voicemail with call back information and requested return call.  Plan:  Care Coordinator will try to reach patient again in 1-2 business days.      DELL Peace [place], [place] [facility]  [phone]

## 2023-06-09 NOTE — PROGRESS NOTES
Social Work Note  [facility]     Patient Name:  Ledy Odonnell  /Age:  1945 (77 year old)    Referral Source: [facility] - podiatry  Reason for Referral:  transportation     contacted Patient via telephone on 23. Sw had been consulted to connect with patient regarding transportation issues.    Spoke with Ev today. She shard her PCA was there during time of call. Ev is well connected with the [facility] and open to waiver. She has a county worker, which she reported is hard to connect with.  Writer informed her of [place] transportation benefit to and from all medical appointments, which she stated she wasn't aware of. Provided her with the contact information for future scheduling. Discussed options of [employer], however she isn't interested in this option due to the long leg times and how interruptive it can be to your day.      Ev needed to end call early because PCA was present. Ev was appreciative of call and information. Provided her with writer contact information and encouraged her to call with any additional calls or questions. Sw will continue to assist as needed.          RAYNE Sánchez, [facility]    [facility]  [phone]  [email or web address]

## 2023-06-09 NOTE — PROGRESS NOTES
Community Health Worker Initial Outreach    CHW Initial Information Gathering:  Referral Source: PCP  Preferred Hospital: [facility]  [phone]  Preferred Urgent Care: [facility] [place], [phone]  Current living arrangement:: I live alone, I live in a private home  Type of residence:: Private home - no stairs  Community Resources: Financial/Insurance  Supplies Currently Used at Home: Diabetic Supplies, Incontinence Supplies  Equipment Currently Used at Home: raised toilet seat, wheelchair, power  Informal Support system:: Family, Friends, Neighbors  No PCP office visit in Past Year: No  Transportation means:: Family, Friend  CHW Additional Questions  If ED/Hospital discharge, follow-up appointment scheduled as recommended?: N/A  Medication changes made following ED/Hospital discharge?: N/A  MyChart active?: No  Patient agreeable to assistance with activating MyChart?: No    Patient accepts CC: Yes. Patient scheduled for assessment with CC SW on 6/13 at 11. Patient noted desire to discuss transportation, patient is in a wheelchair and lives alone. .     DELL Peace  [facility]  [phone]

## 2023-06-12 NOTE — TELEPHONE ENCOUNTER
I believe you cancelled the wrong trospium proscription, she does want the 60 mg daily dose sent to [facility].  Yadira Arshad, JESSEN

## 2023-06-12 NOTE — PROGRESS NOTES
Social Work Note - Incoming Call  [facility]    Data/Intervention:  23    Patient Name: Ledy Odonnell   /Age: 1945 (77 year old)      Call From: patient  Reason for Call: unknown    Assessment: Patient left writer a message requesting a return call. Left an additional message today in attempts to connect with patient.  Provided writer contact information.     Plan: Sw will await return call and provide assistance at that time.        RAYNE Sánchez, [facility]    [facility]  [phone]  [email or web address]

## 2023-06-20 NOTE — PROGRESS NOTES
Past Medical History:   Diagnosis Date     Acute blood loss anemia 9/5/2014     Closed left subtrochanteric femur fracture (H) 9/5/2014     Depressive disorder, not elsewhere classified      Femur fracture (H) 10/23/2014     GENERALIZED ANXIETY DIS 6/21/2007     Inflammatory arthritis      Multiple fractures of ribs of right side 3/7/2017     Osteoarthrosis, unspecified whether generalized or localized, unspecified site      Other and unspecified alcohol dependence, unspecified drinking behavior      Pneumothorax 3/8/2017     Unspecified essential hypertension      Patient Active Problem List   Diagnosis     Esophageal reflux     Primary insomnia     FAMILY HX DIABETES MELLITUS brother     Tobacco use disorder     Obesity     Generalized anxiety disorder     Major depressive disorder, single episode     COPD (chronic obstructive pulmonary disease) (H)     Elevated fasting glucose     Chronic pain     Neuropathy, peripheral     Hyperlipidemia LDL goal <130     Degenerative arthritis of hip     Hip arthrosis - left, severe     Trochanteric bursitis     Status post hip replacement     Advanced directives, counseling/discussion     Gastroparesis     DDD (degenerative disc disease), lumbar     Delayed gastric emptying     Health Care Home     Candidal intertrigo     Ventral hernia     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     Urinary incontinence, mixed     Pulmonary nodules     Chronic left hip pain     Chronic indwelling Welch catheter     Erythrocytosis     Nicotine dependence     Abdominal pain     Foot ulcer, left, with fat layer exposed (H)     Alcohol dependence in remission (H)     Pressure injury of left foot, stage 3 (H)     Past Surgical History:   Procedure Laterality Date     HERNIORRHAPHY VENTRAL  5/14/2013    Procedure: HERNIORRHAPHY VENTRAL;  Open Ventral Hernia Repair;  Surgeon: Jhoan Rogers MD;  Location:  OR     [facility] HAND/FINGER SURGERY UNLISTED       [facility] NONSPECIFIC PROCEDURE   2001    Shoulder Surgery     [facility] NONSPECIFIC PROCEDURE  1975    Gastric Bypass     [facility] NONSPECIFIC PROCEDURE      Cholecystectomy     [facility] SHOULDER SURG PROC UNLISTED       [facility] TOTAL HIP ARTHROPLASTY  1/4/2011    Lt VALERIE     Social History     Socioeconomic History     Marital status:      Spouse name: Not on file     Number of children: Not on file     Years of education: Not on file     Highest education level: Not on file   Occupational History     Not on file   Tobacco Use     Smoking status: Every Day     Packs/day: 0.25     Years: 43.00     Pack years: 10.75     Types: Cigarettes     Smokeless tobacco: Never     Tobacco comments:     5 ciggs per day   Vaping Use     Vaping status: Never Used     Passive vaping exposure: Yes   Substance and Sexual Activity     Alcohol use: No     Alcohol/week: 0.0 standard drinks of alcohol     Drug use: No     Sexual activity: Never   Other Topics Concern     Parent/sibling w/ CABG, MI or angioplasty before 65F 55M? No   Social History Narrative     Not on file     Social Determinants of Health     Financial Resource Strain: Not on file   Food Insecurity: Not on file   Transportation Needs: Not on file   Physical Activity: Not on file   Stress: Not on file   Social Connections: Not on file   Intimate Partner Violence: Not on file   Housing Stability: Not on file     Family History   Problem Relation Age of Onset     Cancer Father         pancreatic     Heart Disease Mother         Unknown specifics     Dementia Mother      Arthritis Paternal Grandmother         RA     Dementia Maternal Grandmother              Subjective findings- 77-year-old returns clinic for ulcer dorsal lateral left foot with equinovarus deformity.  She relates she could not do the offloading dressing because she does a dressing by herself.  She relates she did use the Hydrofera Blue and she felt that is helped.    Objective findings- Vascular status intact left, she is cavovarus left foot, dorsal  lateral left foot ulcer that is through the dermis into the subcutaneous tissues, some venous congestion, no erythema, no odor, positive serosanguineous drainage, no calor.    Assessment and plan- Ulcer dorsal lateral left foot, equinovarus deformity.  She has peripheral Neuropathy and Vascular disease.  Diagnosis and treatment options discussed with the patient.  Local wound care done upon consent today we cleaned this with wound Vashe, applied Hydrofera Blue, and an offloading dressing.  I called orthotics and prosthetics they could not see her today.  Plan will be to try to coordinate with orthotics and prosthetics to get a custom offloading boot.  Continue cleaning this with wound Vashe and applying a sterile dressing twice weekly and is as needed for drainage.  Finish the Keflex.  Previous notes reviewed.  Return to clinic and see me in 2 to 3 weeks.            Moderate to high level of medical decision making.

## 2023-06-20 NOTE — NURSING NOTE
Ledy Odonnell's chief complaint for this visit includes:  Chief Complaint   Patient presents with     Left Foot - Pain, WOUND CARE     PCP: Loulou Ashley    Referring Provider:  Rex Woods MD  [address]  [place] [address]    There were no vitals taken for this visit.  Severe Pain (7)     Do you need any medication refills at today's visit? NO    Allergies   Allergen Reactions     No Known Allergies        Saji Hyde, EMT

## 2023-06-20 NOTE — LETTER
6/20/2023         RE: Ledy Odonnell  [address]  [facility] [address]        Dear Colleague,    Thank you for referring your patient, Ledy Odonnell, to the [facility]. Please see a copy of my visit note below.    Past Medical History:   Diagnosis Date     Acute blood loss anemia 9/5/2014     Closed left subtrochanteric femur fracture (H) 9/5/2014     Depressive disorder, not elsewhere classified      Femur fracture (H) 10/23/2014     GENERALIZED ANXIETY DIS 6/21/2007     Inflammatory arthritis      Multiple fractures of ribs of right side 3/7/2017     Osteoarthrosis, unspecified whether generalized or localized, unspecified site      Other and unspecified alcohol dependence, unspecified drinking behavior      Pneumothorax 3/8/2017     Unspecified essential hypertension      Patient Active Problem List   Diagnosis     Esophageal reflux     Primary insomnia     FAMILY HX DIABETES MELLITUS brother     Tobacco use disorder     Obesity     Generalized anxiety disorder     Major depressive disorder, single episode     COPD (chronic obstructive pulmonary disease) (H)     Elevated fasting glucose     Chronic pain     Neuropathy, peripheral     Hyperlipidemia LDL goal <130     Degenerative arthritis of hip     Hip arthrosis - left, severe     Trochanteric bursitis     Status post hip replacement     Advanced directives, counseling/discussion     Gastroparesis     DDD (degenerative disc disease), lumbar     Delayed gastric emptying     Health Care Home     Candidal intertrigo     Ventral hernia     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     Urinary incontinence, mixed     Pulmonary nodules     Chronic left hip pain     Chronic indwelling Welch catheter     Erythrocytosis     Nicotine dependence     Abdominal pain     Foot ulcer, left, with fat layer exposed (H)     Alcohol dependence in remission (H)     Pressure injury of left foot, stage 3 (H)     Past  Surgical History:   Procedure Laterality Date     HERNIORRHAPHY VENTRAL  5/14/2013    Procedure: HERNIORRHAPHY VENTRAL;  Open Ventral Hernia Repair;  Surgeon: Jhoan Rogers MD;  Location:  OR     [facility] HAND/FINGER SURGERY UNLISTED       [facility] NONSPECIFIC PROCEDURE  2001    Shoulder Surgery     [facility] NONSPECIFIC PROCEDURE  1975    Gastric Bypass     [facility] NONSPECIFIC PROCEDURE      Cholecystectomy     [facility] SHOULDER SURG PROC UNLISTED       [facility] TOTAL HIP ARTHROPLASTY  1/4/2011    Lt VALERIE     Social History     Socioeconomic History     Marital status:      Spouse name: Not on file     Number of children: Not on file     Years of education: Not on file     Highest education level: Not on file   Occupational History     Not on file   Tobacco Use     Smoking status: Every Day     Packs/day: 0.25     Years: 43.00     Pack years: 10.75     Types: Cigarettes     Smokeless tobacco: Never     Tobacco comments:     5 ciggs per day   Vaping Use     Vaping status: Never Used     Passive vaping exposure: Yes   Substance and Sexual Activity     Alcohol use: No     Alcohol/week: 0.0 standard drinks of alcohol     Drug use: No     Sexual activity: Never   Other Topics Concern     Parent/sibling w/ CABG, MI or angioplasty before 65F 55M? No   Social History Narrative     Not on file     Social Determinants of Health     Financial Resource Strain: Not on file   Food Insecurity: Not on file   Transportation Needs: Not on file   Physical Activity: Not on file   Stress: Not on file   Social Connections: Not on file   Intimate Partner Violence: Not on file   Housing Stability: Not on file     Family History   Problem Relation Age of Onset     Cancer Father         pancreatic     Heart Disease Mother         Unknown specifics     Dementia Mother      Arthritis Paternal Grandmother         RA     Dementia Maternal Grandmother              Subjective findings- 77-year-old returns clinic for ulcer dorsal lateral left foot with equinovarus  deformity.  She relates she could not do the offloading dressing because she does a dressing by herself.  She relates she did use the Hydrofera Blue and she felt that is helped.    Objective findings- Vascular status intact left, she is cavovarus left foot, dorsal lateral left foot ulcer that is through the dermis into the subcutaneous tissues, some venous congestion, no erythema, no odor, positive serosanguineous drainage, no calor.    Assessment and plan- Ulcer dorsal lateral left foot, equinovarus deformity.  She has peripheral Neuropathy and Vascular disease.  Diagnosis and treatment options discussed with the patient.  Local wound care done upon consent today we cleaned this with wound Vashe, applied Hydrofera Blue, and an offloading dressing.  I called orthotics and prosthetics they could not see her today.  Plan will be to try to coordinate with orthotics and prosthetics to get a custom offloading boot.  Continue cleaning this with wound Vashe and applying a sterile dressing twice weekly and is as needed for drainage.  Finish the Keflex.  Previous notes reviewed.  Return to clinic and see me in 2 to 3 weeks.            Moderate to high level of medical decision making.      Again, thank you for allowing me to participate in the care of your patient.        Sincerely,        Vlad Watson DPM
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
## 2023-06-23 NOTE — TELEPHONE ENCOUNTER
Patient was looked up in [facility] and there are not indications for concern regarding use of controlled substances on record at this time.    But Percocet last filled 6/1/23, over 1 week early on requesting refill.  Noted below message, but would need to be seen if any injury from her fall.    Will postpone refill request to 6/29/23 and plan to sign then to start 7/1/23.    Loulou Ashley, DNP, APRN, CNP

## 2023-06-26 NOTE — PROGRESS NOTES
Social Work Note - Incoming Call  [facility]    Data/Intervention:  23    Patient Name: Ledy Odonnell   /Age: 1945 (77 year old)      Call From: Ev  Reason for Call: assistance with transportation benefit    Assessment:   Ev called writer this morning stating that she was having difficulties scheduling a medical ride via her Medicaid benefit.      Plan:   Writer and Ev discussed the she doesn't have a Medicaid transportation benefit because she has an Alternative Care Program. She is going to call into her county worker to discuss is this can be changed.    Writer and Ev also discussed option of [employer], which she doesn't want to do unless she has to.     Confirmed that Ev has writer contact information and encouraged her to call with additional concerns or questions. Sw will continue to assist as needed.           RAYNE Sánchez, [facility]    [facility]  [phone]  [email or web address]

## 2023-08-02 NOTE — TELEPHONE ENCOUNTER
Patient calling and requesting the below pended medication.    GAYATRI Galvez  [facility]

## 2023-08-10 NOTE — TELEPHONE ENCOUNTER
RN called patient and let her know RX was filled    Denise Finney RN, BSN  [facility]: [place]

## 2023-09-07 NOTE — TELEPHONE ENCOUNTER
Patient calling requesting refill,    Pended medication       Bertha Camacho    [facility]

## 2023-09-16 NOTE — TELEPHONE ENCOUNTER
Addended by: TESSY HENSON on: 9/16/2023 08:08 AM     Modules accepted: Orders     nystatin (MYCOSTATIN) [id number] UNIT/GM POWD      Last Written Prescription Date:  5-  Last Fill Quantity: 1 BTL,   # refills: 5  Last Office Visit with G, P or [facility] prescribing provider: 9-  Future Office visit:       Routing refill request to provider for review/approval because:  Drug not active on patient's medication list

## 2023-10-10 NOTE — TELEPHONE ENCOUNTER
Patient calling to get a refill of the below pended medication.    GAYATRI Galvez  [facility]

## 2023-10-11 NOTE — TELEPHONE ENCOUNTER
Tc reached out to relay message no answer VM message left to call clinic    3mth chronic follow up needed       Bertha Camacho    [facility]

## 2023-10-12 PROBLEM — G14 POST-POLIO SYNDROME (H): Status: ACTIVE | Noted: 2023-01-01

## 2023-10-12 NOTE — NURSING NOTE
Prior to immunization administration, verified patients identity using patient s name and date of birth. Please see Immunization Activity for additional information.     Screening Questionnaire for Adult Immunization    Are you sick today?   No   Do you have allergies to medications, food, a vaccine component or latex?   No   Have you ever had a serious reaction after receiving a vaccination?   No   Do you have a long-term health problem with heart, lung, kidney, or metabolic disease (e.g., diabetes), asthma, a blood disorder, no spleen, complement component deficiency, a cochlear implant, or a spinal fluid leak?  Are you on long-term aspirin therapy?   No   Do you have cancer, leukemia, HIV/AIDS, or any other immune system problem?   No   Do you have a parent, brother, or sister with an immune system problem?   No   In the past 3 months, have you taken medications that affect  your immune system, such as prednisone, other steroids, or anticancer drugs; drugs for the treatment of rheumatoid arthritis, Crohn s disease, or psoriasis; or have you had radiation treatments?   No   Have you had a seizure, or a brain or other nervous system problem?   No   During the past year, have you received a transfusion of blood or blood    products, or been given immune (gamma) globulin or antiviral drug?   No   For women: Are you pregnant or is there a chance you could become       pregnant during the next month?   No   Have you received any vaccinations in the past 4 weeks?   No     Immunization questionnaire answers were all negative.      Patient instructed to remain in clinic for 15 minutes afterwards, and to report any adverse reactions.     Screening performed by Jeanette Parikh MA on 10/12/2023 at 3:21 PM.

## 2023-10-12 NOTE — PROGRESS NOTES
Assessment & Plan     Moderate episode of recurrent major depressive disorder (H)  Uncontrolled  - Comprehensive metabolic panel (BMP + Alb, Alk Phos, ALT, AST, Total. Bili, TP); Future  - Increase Duloxetine from 60 mg to 80 mg  - Comprehensive metabolic panel (BMP + Alb, Alk Phos, ALT, AST, Total. Bili, TP)    Generalized anxiety disorder  Chronic, stable.  - DULoxetine (CYMBALTA) 60 MG capsule; Take 1 capsule (60 mg) by mouth daily For total daily dose of 80 mg    Chronic pain syndrome    - diclofenac (VOLTAREN) 1 % topical gel; Apply 4 g topically 4 times daily  - oxyCODONE-acetaminophen (PERCOCET) 5-325 MG tablet; Take 1 tablet by mouth every 6 hours as needed for severe pain Maximum 3 tablets daily as needed for severe pain.  - DULoxetine (CYMBALTA) 20 MG capsule; Take 1 capsule (20 mg) by mouth daily For total daily dose of 80 mg  - Drug Confirmation Panel Urine with Creat - lab collect; Future  - DULoxetine (CYMBALTA) 60 MG capsule; Take 1 capsule (60 mg) by mouth daily For total daily dose of 80 mg    Primary insomnia  Chronic, stable, continue current treatment.   - mirtazapine (REMERON) 15 MG tablet; Take 1 tablet (15 mg) by mouth at bedtime    Pulmonary embolism, unspecified chronicity, unspecified pulmonary embolism type, unspecified whether acute cor pulmonale present (H)  Recent ED visit (reviewed via [facility]) showed PE, patient treated with Apixiban but stopped Apixiban due to diarrhea.    - Adult Oncology/Hematology  Referral; Future  - Start Rivaroxaban ANTICOAGULANT 15 & 20 MG TBPK Starter Therapy Pack; Take 15 mg by mouth 2 times daily (with meals) for 21 days, THEN 20 mg daily with food for 9 days. Continue for at least 3 months  Discussed with patient the indication and use of medication(s), risks/benefits, and potential adverse side effects.  Patient/guardian verbalized understanding and agreement with the plan.   - Recommend follow up with Hematology to discuss  "anticoagulation plan duration    Thyroid mass  Incidental thyroid mass found on CT, needs thyroid U/S for fu  - US Thyroid; Future    Elevated vitamin B12 level    - Vitamin B12; Future  - Vitamin B12    Need for influenza vaccination    - INFLUENZA VACCINE 65+ (FLUZONE HD)    Follow-up in 3 months for recheck             Nicotine/Tobacco Cessation:  She reports that she has been smoking cigarettes. She has a 10.75 pack-year smoking history. She has never used smokeless tobacco.  Nicotine/Tobacco Cessation Plan:       BMI:   Estimated body mass index is 26.63 kg/m  as calculated from the following:    Height as of this encounter: 1.651 m (5' 5\").    Weight as of this encounter: 72.6 kg (160 lb).           Loulou Ashley NP  [facility]    Giacomo Carreno is a 78 year old, presenting for the following health issues:  Shoulder (Both )        10/12/2023     1:55 PM   Additional Questions   Roomed by Mary SOTOMAYOR       Depression and Anxiety Follow-Up  How are you doing with your depression since your last visit? No change  How are you doing with your anxiety since your last visit?  No change  Are you having other symptoms that might be associated with depression or anxiety? No  Have you had a significant life event? OTHER: brother passed away 10/07/2023    Do you have any concerns with your use of alcohol or other drugs? No    Social History     Tobacco Use    Smoking status: Every Day     Packs/day: 0.25     Years: 43.00     Additional pack years: 0.00     Total pack years: 10.75     Types: Cigarettes    Smokeless tobacco: Never    Tobacco comments:     5 ciggs per day   Vaping Use    Vaping Use: Never used   Substance Use Topics    Alcohol use: No     Alcohol/week: 0.0 standard drinks of alcohol    Drug use: No         12/1/2022     1:00 PM 3/7/2023     7:00 AM 10/12/2023     2:12 PM   PHQ   PHQ-9 Total Score 4 2 8   Q9: Thoughts of better off dead/self-harm past 2 weeks Not at " all Not at all Not at all         8/25/2022    12:00 PM 10/27/2022    10:00 AM 10/12/2023     2:12 PM   LESTER-7 SCORE   Total Score 8 4 5         10/12/2023     2:12 PM   Last PHQ-9   1.  Little interest or pleasure in doing things 1   2.  Feeling down, depressed, or hopeless 1   3.  Trouble falling or staying asleep, or sleeping too much 3   4.  Feeling tired or having little energy 1   5.  Poor appetite or overeating 0   6.  Feeling bad about yourself 0   7.  Trouble concentrating 2   8.  Moving slowly or restless 0   Q9: Thoughts of better off dead/self-harm past 2 weeks 0   PHQ-9 Total Score 8   Difficulty at work, home, or with people Not difficult at all         10/12/2023     2:12 PM   LESTER-7    1. Feeling nervous, anxious, or on edge 2   2. Not being able to stop or control worrying 1   3. Worrying too much about different things 1   4. Trouble relaxing 0   5. Being so restless that it is hard to sit still 0   6. Becoming easily annoyed or irritable 1   7. Feeling afraid, as if something awful might happen 0   LESTER-7 Total Score 5   If you checked any problems, how difficult have they made it for you to do your work, take care of things at home, or get along with other people? Somewhat difficult       Suicide Assessment Five-step Evaluation and Treatment (SAFE-T)    How many servings of fruits and vegetables do you eat daily?  0-1 a can of V8  On average, how many sweetened beverages do you drink each day (Examples: soda, juice, sweet tea, etc.  Do NOT count diet or artificially sweetened beverages)?   0  How many days per week do you exercise enough to make your heart beat faster? 7  How many minutes a day do you exercise enough to make your heart beat faster? 10 - 19  How many days per week do you miss taking your medication? 0    Pain History:  When did you first notice your pain? Shoulders    Have you seen this provider for your pain in the past? Yes   Where in your body do you have pain? Both shoulders  "  Are you seeing anyone else for your pain? No        12/1/2022     1:00 PM 3/7/2023     7:00 AM 10/12/2023     2:12 PM   PHQ-9 SCORE   PHQ-9 Total Score 4 2 8           8/25/2022    12:00 PM 10/27/2022    10:00 AM 10/12/2023     2:12 PM   LESTER-7 SCORE   Total Score 8 4 5     Patient is fasting if labs needed '    Chronic Pain Follow Up:    Location of pain: shoulders   Analgesia/pain control:    - Recent changes:  yes     - Overall control: Tolerable with discomfort    - Current treatments: diclofenac and solon pas and tylenol    Adherence:     - Do you ever take more pain medicine than prescribed? No    - When did you take your last dose of pain medicine?  This Morning    Adverse effects: No   PDMP Review         Value Time User    State PDMP site checked  Yes 9/7/2023 11:48 AM Loulou Ashley NP          Last CSA Agreement:   CSA -- Patient Level:    CSA: None found at the patient level.       Last UDS: 7/18/2022      ED/UC Followup:    [facility]ility[facility]  Date of visit: 9/3/23  Reason for visit: Acute respiratory failure with hypoxemia (HRC);   Syncope and collapse;   Lactic acidosis;   Mass of soft tissue of neck;   Pneumonia of left lower lobe due to infectious organism;   Pulmonary embolism, unspecified chronicity, unspecified pulmonary embolism type, unspecified whether acute cor pulmonale present (HRC)   Current Status: good    She was prescribed Apixaban for the PE but says she stopped due to diarrhea.    Review of Systems   Constitutional, HEENT, cardiovascular, pulmonary, gi and gu systems are negative, except as otherwise noted.      Objective    /70 (BP Location: Left arm, Patient Position: Sitting, Cuff Size: Adult Regular)   Pulse 103   Temp 99.1  F (37.3  C) (Oral)   Resp 25   Ht 1.651 m (5' 5\")   Wt 72.6 kg (160 lb)   SpO2 95%   BMI 26.63 kg/m    Body mass index is 26.63 kg/m .  Physical Exam   GENERAL: healthy, alert and no distress  RESP: lungs clear to " auscultation - no rales, rhonchi or wheezes  CV: regular rates and rhythm, normal S1 S2, no S3 or S4, and no murmur, click or rub  PSYCH: mentation appears normal, affect normal/bright, judgement and insight intact, and appearance well groomed    Results for orders placed or performed in visit on 10/12/23   Comprehensive metabolic panel (BMP + Alb, Alk Phos, ALT, AST, Total. Bili, TP)     Status: Abnormal   Result Value Ref Range    Sodium 134 (L) 135 - 145 mmol/L    Potassium 4.5 3.4 - 5.3 mmol/L    Carbon Dioxide (CO2) 26 22 - 29 mmol/L    Anion Gap 12 7 - 15 mmol/L    Urea Nitrogen 10.0 8.0 - 23.0 mg/dL    Creatinine 0.76 0.51 - 0.95 mg/dL    GFR Estimate 80 >60 mL/min/1.73m2    Calcium 9.3 8.8 - 10.2 mg/dL    Chloride 96 (L) 98 - 107 mmol/L    Glucose 72 70 - 99 mg/dL    Alkaline Phosphatase 96 35 - 104 U/L    AST 32 0 - 45 U/L    ALT 13 0 - 50 U/L    Protein Total 6.8 6.4 - 8.3 g/dL    Albumin 3.8 3.5 - 5.2 g/dL    Bilirubin Total 0.5 <=1.2 mg/dL   Vitamin B12     Status: Normal   Result Value Ref Range    Vitamin B12 1,104 232 - 1,245 pg/mL   Drug Confirmation Panel Urine with Creat - lab collect *Canceled*     Status: None ()    Narrative    The following orders were created for panel order Drug Confirmation Panel Urine with Creat - lab collect.  Procedure                               Abnormality         Status                     ---------                               -----------         ------                     Urine Drug Confirmation ...[[id number]]                                                 Urine Creatinine for Berhane...[[id number]]                                                   Please view results for these tests on the individual orders.

## 2023-10-12 NOTE — LETTER
October 16, 2023      Ev Odonnell  [address]  [place] [address]        Dear Ev,     Vitamin B12 level is now in good range.   Kidney function, liver function are in good range.   Sodium level is just 1 point below normal- you have history of slightly low sodium so this is stable for you.     If you have any questions or concerns please feel free to contact me at the office at [phone] or via [facility].     Loulou Ashley, DNP, APRN, CNP     Resulted Orders   Comprehensive metabolic panel (BMP + Alb, Alk Phos, ALT, AST, Total. Bili, TP)   Result Value Ref Range    Sodium 134 (L) 135 - 145 mmol/L      Comment:      Reference intervals for this test were updated on 09/26/2023 to more accurately reflect our healthy population. There may be differences in the flagging of prior results with similar values performed with this method. Interpretation of those prior results can be made in the context of the updated reference intervals.     Potassium 4.5 3.4 - 5.3 mmol/L    Carbon Dioxide (CO2) 26 22 - 29 mmol/L    Anion Gap 12 7 - 15 mmol/L    Urea Nitrogen 10.0 8.0 - 23.0 mg/dL    Creatinine 0.76 0.51 - 0.95 mg/dL    GFR Estimate 80 >60 mL/min/1.73m2    Calcium 9.3 8.8 - 10.2 mg/dL    Chloride 96 (L) 98 - 107 mmol/L    Glucose 72 70 - 99 mg/dL    Alkaline Phosphatase 96 35 - 104 U/L    AST 32 0 - 45 U/L      Comment:      Reference intervals for this test were updated on 6/12/2023 to more accurately reflect our healthy population. There may be differences in the flagging of prior results with similar values performed with this method. Interpretation of those prior results can be made in the context of the updated reference intervals.    ALT 13 0 - 50 U/L      Comment:      Reference intervals for this test were updated on 6/12/2023 to more accurately reflect our healthy population. There may be differences in the flagging of prior results with similar values performed with this method. Interpretation  of those prior results can be made in the context of the updated reference intervals.      Protein Total 6.8 6.4 - 8.3 g/dL    Albumin 3.8 3.5 - 5.2 g/dL    Bilirubin Total 0.5 <=1.2 mg/dL   Vitamin B12   Result Value Ref Range    Vitamin B12 1,104 232 - 1,245 pg/mL

## 2023-10-24 NOTE — PATIENT INSTRUCTIONS
AFTER VISIT SUMMARY    [facility] CORTISONE Injection Discharge Instructions    You may shower, however avoid swimming, tub baths or hot tubs for 24 hours following your procedure    You may have a mild to moderate increase in pain for several days following the injection.    It may take up to 14 days for the steroid medication to start working although you may feel the effect as early as a few days after the procedure.    You may use ice packs for 10-15 minutes, 3 to 4 times a day at the injection site for comfort    You may use anti-inflammatory medications (such as Ibuprofen or Aleve or Advil) or Tylenol for pain control if necessary    If you were fasting, you may resume your normal diet and medications after the procedure    If you have diabetes, check your blood sugar more frequently than usual as your blood sugar may be higher than normal for 10-14 days following a steroid injection. Contact your doctor who manages your diabetes if your blood sugar is higher than usual      If you experience any of the following, call [facility] [phone]  -Fever over 100 degree F  -Swelling, bleeding, redness, drainage, warmth at the injection site  - New or worsening pain

## 2023-10-24 NOTE — PROGRESS NOTES
SUBJECTIVE:  Ledy Odonnell is a 74 year old female who is seen in follow-up for bilateral shoulder rotator cuff tear arthropathy..  Right knee severe osteoarthritis.    She is wheel chair dependant and cannot function during recovery time for a rotator cuff repair or RTSA. She is being treated non-operatively. She has  trouble wheeling with her wheel chair due to significant shoulder pains and hand deformities. She uses her Sheree scooter, which has made a huge difference in her life.  She has a ramp at her house that works as well.   The right knee is also very painful as well..  She has mild osteoarthritis in the knee as of the 2016 xrays, but is probably worse now.  Surgery on any of her joints is not an option in her mind..    Also multiple other joint pain complaints--hands and severely deformed left foot and ankle as well. Neurologic issues in right foot as well.  She wants an idea how to offload the left foot to allow the ulcer to heal. She weight bears on her talar dome almost  She goes to the wound clinic for the ulcer.     Corticosteroid injections last visit:  5/9/23, 10/11/22, 9/8/21.  Amount of relief: very good.  Length of time of relief  4- 6 weeks for bilateral shoulders and right knee  .  Doing therapy/exercises: (y/n): yes occasionally.    Bilateral shoulders with severe pain with movements.    GENERAL: healthy, alert and no distress  GAIT: normal   SKIN: no suspicious lesions or rashes  NEURO: Normal strength and tone, mentation intact and speech normal  VASCULAR:  normal pulses and cappillary refill   PSYCH:  mentation appears normal and affect normal/bright    SHOULDER:  Shoulder Inspection: no swelling, bruising, discoloration, or obvious deformity or asymmetry  marked muscle atrophy of rotator cuff     Range of Motion:   Active:forward flexion 50 degrees bilateral    Right knee:  Some valgus  Moderate effusion  Range of motion 0-120   Diffuse tenderness.    Right Foot:4-5 cm ulcer on the  dorso-lateral ankle/hindfoot. Less purulence than last time.     Xrays :2/21/18 bilateral shoulders: Complete loss of the acromiohumeral interval bilaterally  compatible with chronic rotator cuff tear. Degenerative change at the  glenohumeral joints. No acute fracture or acute malalignment.     Impression:   Encounter Diagnoses   Name Primary?    Rotator cuff arthropathy of both shoulders Yes    Primary osteoarthritis of right knee     Peripheral polyneuropathy           PLAN: surgery is not an option in her mind, not even for her deformed left ankle/foot which has a chronic large ulcer on it, where a BKA has been discussed.     Continued follow up with wound care clinic.    With the patient's consent, right knee(s) injected intra-articularly with 80mg of Depomedrol and 4cc of local anesthetic after sterile prep.  Procedure Note:   Informed consent obtained. Risks, benefits and complications of the injection were discussed with the patient and the patient elected to proceed. Bilateral shoulder injected into the subacromial space after sterile prep, using 80mg Depomedrol and 4cc local anesthetic.    Follow up as needed     SALINAS Woods MD  Dept. Orthopedic Surgery  [facility]

## 2023-10-24 NOTE — PROGRESS NOTES
Large Joint Injection/Arthocentesis: bilateral subacromial bursa    Date/Time: 10/24/2023 1:47 PM    Performed by: Wolfgang Angeles  Authorized by: Rex Woods MD    Indications:  Pain and osteoarthritis  Needle Size:  22 G  Guidance: landmark guided    Approach:  Lateral  Location:  Shoulder  Laterality:  Bilateral      Site:  Bilateral subacromial bursa  Medications (Right):  80 mg methylPREDNISolone 80 MG/ML; 4 mL lidocaine (PF) 1 %  Medications (Left):  80 mg methylPREDNISolone 80 MG/ML; 4 mL lidocaine (PF) 1 %  Outcome:  Tolerated well, no immediate complications  Procedure discussed: discussed risks, benefits, and alternatives    Consent Given by:  Patient  Timeout: timeout called immediately prior to procedure    Prep: patient was prepped and draped in usual sterile fashion    Large Joint Injection/Arthocentesis: R knee joint    Date/Time: 10/24/2023 1:47 PM    Performed by: Wolfgang Angeles  Authorized by: Rex Woods MD    Indications:  Pain and osteoarthritis  Needle Size:  22 G  Guidance: landmark guided    Approach:  Anterolateral  Location:  Knee      Medications:  80 mg methylPREDNISolone 80 MG/ML; 4 mL lidocaine (PF) 1 %  Outcome:  Tolerated well, no immediate complications  Procedure discussed: discussed risks, benefits, and alternatives    Consent Given by:  Patient  Timeout: timeout called immediately prior to procedure    Prep: patient was prepped and draped in usual sterile fashion

## 2023-10-24 NOTE — LETTER
10/24/2023         RE: Ledy Odonnell  [address]  [place] [facility] [address]        Dear Colleague,    Thank you for referring your patient, Ledy Odonnell, to the [facility]. Please see a copy of my visit note below.    Large Joint Injection/Arthocentesis: bilateral subacromial bursa    Date/Time: 10/24/2023 1:47 PM    Performed by: Wolfgang Angeles  Authorized by: Rex Woods MD    Indications:  Pain and osteoarthritis  Needle Size:  22 G  Guidance: landmark guided    Approach:  Lateral  Location:  Shoulder  Laterality:  Bilateral      Site:  Bilateral subacromial bursa  Medications (Right):  80 mg methylPREDNISolone 80 MG/ML; 4 mL lidocaine (PF) 1 %  Medications (Left):  80 mg methylPREDNISolone 80 MG/ML; 4 mL lidocaine (PF) 1 %  Outcome:  Tolerated well, no immediate complications  Procedure discussed: discussed risks, benefits, and alternatives    Consent Given by:  Patient  Timeout: timeout called immediately prior to procedure    Prep: patient was prepped and draped in usual sterile fashion    Large Joint Injection/Arthocentesis: R knee joint    Date/Time: 10/24/2023 1:47 PM    Performed by: Wolfgang Angeles  Authorized by: Rex Woods MD    Indications:  Pain and osteoarthritis  Needle Size:  22 G  Guidance: landmark guided    Approach:  Anterolateral  Location:  Knee      Medications:  80 mg methylPREDNISolone 80 MG/ML; 4 mL lidocaine (PF) 1 %  Outcome:  Tolerated well, no immediate complications  Procedure discussed: discussed risks, benefits, and alternatives    Consent Given by:  Patient  Timeout: timeout called immediately prior to procedure    Prep: patient was prepped and draped in usual sterile fashion          SUBJECTIVE:  Ledy Odonnell is a 74 year old female who is seen in follow-up for bilateral shoulder rotator cuff tear arthropathy..  Right knee severe osteoarthritis.    She is wheel chair dependant and cannot function during recovery  time for a rotator cuff repair or RTSA. She is being treated non-operatively. She has  trouble wheeling with her wheel chair due to significant shoulder pains and hand deformities. She uses her Sheree scooter, which has made a huge difference in her life.  She has a ramp at her house that works as well.   The right knee is also very painful as well..  She has mild osteoarthritis in the knee as of the 2016 xrays, but is probably worse now.  Surgery on any of her joints is not an option in her mind..    Also multiple other joint pain complaints--hands and severely deformed left foot and ankle as well. Neurologic issues in right foot as well.  She wants an idea how to offload the left foot to allow the ulcer to heal. She weight bears on her talar dome almost  She goes to the wound clinic for the ulcer.     Corticosteroid injections last visit:  5/9/23, 10/11/22, 9/8/21.  Amount of relief: very good.  Length of time of relief  4- 6 weeks for bilateral shoulders and right knee  .  Doing therapy/exercises: (y/n): yes occasionally.    Bilateral shoulders with severe pain with movements.    GENERAL: healthy, alert and no distress  GAIT: normal   SKIN: no suspicious lesions or rashes  NEURO: Normal strength and tone, mentation intact and speech normal  VASCULAR:  normal pulses and cappillary refill   PSYCH:  mentation appears normal and affect normal/bright    SHOULDER:  Shoulder Inspection: no swelling, bruising, discoloration, or obvious deformity or asymmetry  marked muscle atrophy of rotator cuff     Range of Motion:   Active:forward flexion 50 degrees bilateral    Right knee:  Some valgus  Moderate effusion  Range of motion 0-120   Diffuse tenderness.    Right Foot:4-5 cm ulcer on the dorso-lateral ankle/hindfoot. Less purulence than last time.     Xrays :2/21/18 bilateral shoulders: Complete loss of the acromiohumeral interval bilaterally  compatible with chronic rotator cuff tear. Degenerative change at  the  glenohumeral joints. No acute fracture or acute malalignment.     Impression:   Encounter Diagnoses   Name Primary?     Rotator cuff arthropathy of both shoulders Yes     Primary osteoarthritis of right knee      Peripheral polyneuropathy           PLAN: surgery is not an option in her mind, not even for her deformed left ankle/foot which has a chronic large ulcer on it, where a BKA has been discussed.     Continued follow up with wound care clinic.    With the patient's consent, right knee(s) injected intra-articularly with 80mg of Depomedrol and 4cc of local anesthetic after sterile prep.  Procedure Note:   Informed consent obtained. Risks, benefits and complications of the injection were discussed with the patient and the patient elected to proceed. Bilateral shoulder injected into the subacromial space after sterile prep, using 80mg Depomedrol and 4cc local anesthetic.    Follow up as needed     SALINAS Woods MD  Dept. Orthopedic Surgery  [facility]          Again, thank you for allowing me to participate in the care of your patient.        Sincerely,        Rex Woods MD

## 2023-11-08 NOTE — TELEPHONE ENCOUNTER
Patient was looked up in [facility] and there are not indications for concern regarding use of controlled substances on record at this time.    Percocet last dispensed 10/13/23 so she is next due for fill 11/12/23.  Will sign off on this Rx 11/9/23 to start using 11/12/23.

## 2023-12-08 NOTE — TELEPHONE ENCOUNTER
Do not recommend taking more narcotics than as prescribed.  I do not feel comfortable increasing her narcotic pain medications to more than 3 per day.  Since she is out of her medication now, I will refill for her but only at 3 per day.    Since her chronic pain is worsening, and if her Orthopedic did not have any further recommendations, I recommend establishing with pain management.  She saw [facility] in the past.  But if she wants to go to a different pain clinic should let me know and I can adjust the referral.

## 2023-12-08 NOTE — TELEPHONE ENCOUNTER
Patient called, for a refill on her percocet.  She has been taking an extra pill per day, and is having to make up for it, at the end of the month.    She stated that she is having more pain, and it needing more than 3 per day.  She stated that both of her rotator cuffs are torn. On a scale of 1-10, patient stated that her pain has been an 8/10, before pain meds.    Patient stated that she all out of meds.    Last prescribed on 11/12/23.    Routed to PCP to please advise.    Lore PRUETT RN  Triage Nurse  [facility]

## 2023-12-11 NOTE — TELEPHONE ENCOUNTER
See providers(Loulou Ashley) message below.    Called patient, no answer, left message on voicemail to call [facility] at [phone].    Cordelia White RN on 12/11/2023 at 9:35 AM

## 2023-12-12 NOTE — TELEPHONE ENCOUNTER
RN called and spoke with patient.    RN relayed providers message.    Patient states it very difficult for her to come to appointments.    RN urged patient to call pain management to see if there would be telephone/virtual options.    Stone Foster RN, BSN, PHN  [facility]

## 2024-01-01 RX ORDER — BUPROPION HYDROCHLORIDE 150 MG/1
150 TABLET ORAL DAILY
Qty: 90 TABLET | Refills: 0 | Status: SHIPPED | OUTPATIENT
Start: 2024-01-01

## 2024-01-01 RX ORDER — TRAZODONE HYDROCHLORIDE 100 MG/1
100-200 TABLET ORAL AT BEDTIME
Qty: 180 TABLET | Refills: 0 | Status: SHIPPED | OUTPATIENT
Start: 2024-01-01

## 2024-05-21 NOTE — TELEPHONE ENCOUNTER
Left message on answering machine for patient to call back.  Yanet Sadler CMA ([facility])     Salbador Hanley had called today stating that she had a huge pimple under her arm that has now turned into a knot and it is very painful. She was asking for a referral to general surgery. I told her she needed to be seen to have that spot evaluated. I got her scheduled for tomorrow at 17:94 to see Lizet Mckeon. Noted If In Chart

## 2024-07-16 NOTE — LETTER
7/29/2020       RE: Ledy Odonnell  [address]  [place] [facility] [address]     Dear Colleague,    Thank you for referring your patient, Ledy Odonnell, to the [facility] at [facility]. Please see a copy of my visit note below.    Ledy Odonnell is a 74 year old female who is being evaluated via a billable telephone visit.      Marielena Cabrales CMA    Phone call duration: 18 minutes    Belkis Rothman MD    Interval History:  Referred by PCP, Vika Napoles, due to opioid management as patient has had recent issues with sobriety and therefore PCP wishes to wean down/off Percocet. PCP would like to discuss other non-opioid medication management options.     She remains in a wheelchair and is having difficulty with assistance because of COVID19  Has been in wheelchair for 5 years after history hip surgery  Pain has been increasing in the last several months, especially in rotator cuffs bilaterally and bilateral feet    Has been taking percocet for over 5 years. Discussed with Ledy, that she could likely have developed tolerance to oxycodone. Her goal is to wean off opioids and we discussed other option    Current Medications:  Percocet 5-325, taking 2 tabs per day; states that she is glad that this dose was reduced  Duloxetine 60 mg every day     Voltaren Gel  Tylenol only taking occasionally     PLAN:  Recommend taking scheduled Tylenol 975 mg scheduled Q8H  Goal to continue to wean off percocet as tolerated      Previous Treatments:  Gabapentin 300 mg TID  (2013)    AVS mailed to the pt.   Vika Weiss LPN      Again, thank you for allowing me to participate in the care of your patient.      Sincerely,    Belkis Rothman MD       Yes

## 2025-04-11 NOTE — TELEPHONE ENCOUNTER
Louisa is calling for verbals to continue OT for the next two weeks.  1x a week for functional mobility with new power chair.     Call  ok to lm, call anytime    Mary Flaherty/  [place]  
Okay for the following OT orders   
RN spoke with Louisa and gaver verbal orders to kevyn Foster, RN, BSN, PHN  [facility]  
Routing to PCP    Ok for verbal order for OT?    Stone Foster, RN, BSN, PHN  [facility]  
Otc Regimen: moisturizer
Detail Level: Zone